# Patient Record
Sex: FEMALE | Employment: OTHER | URBAN - NONMETROPOLITAN AREA
[De-identification: names, ages, dates, MRNs, and addresses within clinical notes are randomized per-mention and may not be internally consistent; named-entity substitution may affect disease eponyms.]

---

## 2018-09-25 ENCOUNTER — OFFICE VISIT (OUTPATIENT)
Dept: MEDICAL GROUP | Facility: PHYSICIAN GROUP | Age: 68
End: 2018-09-25
Payer: MEDICARE

## 2018-09-25 VITALS
DIASTOLIC BLOOD PRESSURE: 70 MMHG | SYSTOLIC BLOOD PRESSURE: 130 MMHG | TEMPERATURE: 97.8 F | RESPIRATION RATE: 16 BRPM | OXYGEN SATURATION: 98 % | HEART RATE: 86 BPM | HEIGHT: 62 IN | WEIGHT: 181 LBS | BODY MASS INDEX: 33.31 KG/M2

## 2018-09-25 DIAGNOSIS — E78.2 MIXED HYPERLIPIDEMIA: ICD-10-CM

## 2018-09-25 DIAGNOSIS — Z13.0 ENCOUNTER FOR SCREENING FOR HEMATOLOGIC DISORDER: ICD-10-CM

## 2018-09-25 DIAGNOSIS — J44.9 CHRONIC OBSTRUCTIVE PULMONARY DISEASE, UNSPECIFIED COPD TYPE (HCC): ICD-10-CM

## 2018-09-25 DIAGNOSIS — I10 ESSENTIAL HYPERTENSION: ICD-10-CM

## 2018-09-25 DIAGNOSIS — Z23 NEED FOR INFLUENZA VACCINATION: ICD-10-CM

## 2018-09-25 DIAGNOSIS — Z12.31 VISIT FOR SCREENING MAMMOGRAM: ICD-10-CM

## 2018-09-25 PROCEDURE — 99204 OFFICE O/P NEW MOD 45 MIN: CPT | Mod: 25 | Performed by: NURSE PRACTITIONER

## 2018-09-25 PROCEDURE — 90662 IIV NO PRSV INCREASED AG IM: CPT | Performed by: NURSE PRACTITIONER

## 2018-09-25 PROCEDURE — G0008 ADMIN INFLUENZA VIRUS VAC: HCPCS | Performed by: NURSE PRACTITIONER

## 2018-09-25 RX ORDER — TIOTROPIUM BROMIDE 18 UG/1
18 CAPSULE ORAL; RESPIRATORY (INHALATION) DAILY
COMMUNITY
End: 2018-12-13 | Stop reason: SDUPTHER

## 2018-09-25 RX ORDER — IBUPROFEN 200 MG
800 TABLET ORAL EVERY 8 HOURS PRN
Status: ON HOLD | COMMUNITY
End: 2023-11-30

## 2018-09-25 RX ORDER — FLUOXETINE HYDROCHLORIDE 40 MG/1
40 CAPSULE ORAL DAILY
COMMUNITY
End: 2018-11-06

## 2018-09-25 RX ORDER — LOSARTAN POTASSIUM AND HYDROCHLOROTHIAZIDE 12.5; 1 MG/1; MG/1
1 TABLET ORAL DAILY
Qty: 90 TAB | Refills: 3 | Status: SHIPPED | OUTPATIENT
Start: 2018-09-25 | End: 2019-09-03 | Stop reason: SDUPTHER

## 2018-09-25 RX ORDER — RANITIDINE 150 MG/1
150 TABLET ORAL 2 TIMES DAILY
COMMUNITY
End: 2019-09-17

## 2018-09-25 RX ORDER — METFORMIN HYDROCHLORIDE 500 MG/1
500 TABLET, EXTENDED RELEASE ORAL DAILY
COMMUNITY
End: 2018-11-06 | Stop reason: SDUPTHER

## 2018-09-25 RX ORDER — LOSARTAN POTASSIUM AND HYDROCHLOROTHIAZIDE 12.5; 1 MG/1; MG/1
1 TABLET ORAL DAILY
COMMUNITY
End: 2018-09-25 | Stop reason: SDUPTHER

## 2018-09-25 RX ORDER — BETAMETHASONE DIPROPIONATE 0.5 MG/G
CREAM TOPICAL 2 TIMES DAILY
COMMUNITY
End: 2020-08-24

## 2018-09-25 RX ORDER — AMLODIPINE BESYLATE 10 MG/1
10 TABLET ORAL DAILY
COMMUNITY
End: 2018-09-25

## 2018-09-25 RX ORDER — AMCINONIDE 1 MG/G
CREAM TOPICAL 2 TIMES DAILY
COMMUNITY
End: 2020-08-24

## 2018-09-25 ASSESSMENT — PATIENT HEALTH QUESTIONNAIRE - PHQ9: CLINICAL INTERPRETATION OF PHQ2 SCORE: 0

## 2018-09-25 NOTE — PROGRESS NOTES
Chief Complaint   Patient presents with   • Hypertension     est care         This is a 67 y.o.female patient that presents today with the following:establish care    COPD (chronic obstructive pulmonary disease) ([facility])  This is a chronic condition which is well controlled on current regimen/medication(s): combivent and spiriva. Patient is tolerating medication(s) without significant or bothersome side effects. She is not currently followed by pulmonology. Unfortunately, she continue to smoke on a daily basis.               DM (diabetes mellitus), type 2, uncontrolled ([facility])  This is chronic, status of control unknown as she is due for labs. She believes her last A1c was over 8. She is only on Metformin ER. She will have labs done before she follows up with me in 6-8 weeks. She appropriately on  ARB, but is not on a statin.    Essential hypertension  This is a chronic condition which is well controlled on current regimen/medication(s): losartan HCIZ. BP today is 130/70 and she denies s/sx of HTN.  Patient is tolerating medication(s) without significant or bothersome side effects.  Patient is continue current regimen for this condition and is due for labs, she is to have these done before her next appt.            Hyperlipidemia  This is chronic, status of control unknown as she is due for labs. She is not currently on statin therapy.       No results found for any previous visit.         clinical course has been stable    History reviewed. No pertinent past medical history.    History reviewed. No pertinent surgical history.    History reviewed. No pertinent family history.    Sulfa drugs    Current Outpatient Prescriptions Ordered in [facility]   Medication Sig Dispense Refill   • fluoxetine (PROZAC) 40 MG capsule Take 40 mg by mouth every day.     • aspirin EC (ECOTRIN) 81 MG Tablet Delayed Response Take 81 mg by mouth every day.     • metFORMIN ER (GLUCOPHAGE XR) 500 MG TABLET SR 24 HR Take 500 mg by mouth every day.    "  • raNITidine (ZANTAC) 150 MG Tab Take 150 mg by mouth 2 times a day.     • amcinonide (CYCLOCORT) 0.1 % topical cream Apply  to affected area(s) 2 times a day.     • betamethasone dipropionate (DIPROLENE) 0.05 % Cream Apply  to affected area(s) 2 times a day.     • ibuprofen (MOTRIN) 200 MG Tab Take 200 mg by mouth every 6 hours as needed.     • fluticasone-salmeterol (ADVAIR) 250-50 MCG/DOSE AEROSOL POWDER, BREATH ACTIVATED Inhale 1 Puff by mouth every 12 hours.     • ipratropium-albuterol (COMBIVENT RESPIMAT)  MCG/ACT Aero Soln Inhale 1 Puff by mouth 4 times a day.     • tiotropium (SPIRIVA HANDIHALER) 18 MCG Cap Inhale 18 mcg by mouth every day.     • losartan-hydrochlorothiazide (HYZAAR) 100-12.5 MG per tablet Take 1 Tab by mouth every day. 90 Tab 3     No current Epic-ordered facility-administered medications on file.        Constitutional ROS: No unexpected change in weight, No weakness, No unexplained fevers, sweats, or chills  Pulmonary ROS:  No shortness of breath, No recent change in breathing, Positive for smoker, COPD  Cardiovascular ROS: No chest pain, No edema, No palpitations, Positive for hypertension and hyperlipidemia  Gastrointestinal ROS: No abdominal pain, No nausea, vomiting, diarrhea, or constipation  Musculoskeletal/Extremities ROS: No clubbing, No peripheral edema, No pain, redness or swelling on the joints  Neurologic ROS: Normal development  Endocrine ROS: positive per HPI    Physical exam:  /70 (BP Location: Right arm, Patient Position: Sitting, BP Cuff Size: Adult)   Pulse 86   Temp 36.6 °C (97.8 °F) (Temporal)   Resp 16   Ht 1.575 m (5' 2\")   Wt 82.1 kg (181 lb)   SpO2 98%   BMI 33.11 kg/m²   General Appearance: alert, no distress, obese, well groomed  Skin: Skin color, texture, turgor normal. No rashes or lesions.  Lungs: negative findings: normal respiratory rate and rhythm, lungs clear to auscultation  Heart: negative. RRR without murmur, gallop, or rubs.  No " ectopy.  Abdomen: Abdomen soft, non-tender. BS normal. No masses,  No organomegaly  Musculoskeletal: negative findings: no evidence of joint instability  Neurologic: intact, CN 2-12 grossly intact.    Medical decision making/discussion: pt to have labs done before next visit in 6-8 weeks. Her medications have been refilled. She will get influenza immunization today    Jigna was seen today for hypertension.    Diagnoses and all orders for this visit:    Uncontrolled type 2 diabetes mellitus with other specified complication, without long-term current use of insulin (HCC)  -     COMP METABOLIC PANEL; Future  -     HEMOGLOBIN A1C; Future  -     LIPID PROFILE; Future  -     MICROALBUMIN CREAT RATIO URINE; Future  -     TSH WITH REFLEX TO FT4; Future  -     VITAMIN D,25 HYDROXY; Future    Chronic obstructive pulmonary disease, unspecified COPD type (HCC)    Mixed hyperlipidemia  -     COMP METABOLIC PANEL; Future  -     LIPID PROFILE; Future    Essential hypertension  -     COMP METABOLIC PANEL; Future  -     LIPID PROFILE; Future  -     losartan-hydrochlorothiazide (HYZAAR) 100-12.5 MG per tablet; Take 1 Tab by mouth every day.    Need for influenza vaccination  -     INFLUENZA VACCINE, HIGH DOSE (65+ ONLY)    Encounter for screening for hematologic disorder  -     CBC WITH DIFFERENTIAL; Future    Visit for screening mammogram  -     MA-SCREEN MAMMO W/CAD-BILAT; Future    Other orders  -     Obtain Results: Other (see comment); Obtain Results From: Other (see comment)          Please note that this dictation was created using voice recognition software. I have made every reasonable attempt to correct obvious errors, but I expect that there are errors of grammar and possibly content that I did not discover before finalizing the note.

## 2018-09-26 NOTE — ASSESSMENT & PLAN NOTE
This is a chronic condition which is well controlled on current regimen/medication(s): losartan HCIZ. BP today is 130/70 and she denies s/sx of HTN.  Patient is tolerating medication(s) without significant or bothersome side effects.  Patient is continue current regimen for this condition and is due for labs, she is to have these done before her next appt.

## 2018-09-26 NOTE — ASSESSMENT & PLAN NOTE
This is chronic, status of control unknown as she is due for labs. She is not currently on statin therapy.

## 2018-09-26 NOTE — ASSESSMENT & PLAN NOTE
This is chronic, status of control unknown as she is due for labs. She believes her last A1c was over 8. She is only on Metformin ER. She will have labs done before she follows up with me in 6-8 weeks. She appropriately on  ARB, but is not on a statin.

## 2018-09-26 NOTE — ASSESSMENT & PLAN NOTE
This is a chronic condition which is well controlled on current regimen/medication(s): combivent and spiriva. Patient is tolerating medication(s) without significant or bothersome side effects. She is not currently followed by pulmonology. Unfortunately, she continue to smoke on a daily basis.

## 2018-10-08 ENCOUNTER — HOSPITAL ENCOUNTER (OUTPATIENT)
Dept: LAB | Facility: MEDICAL CENTER | Age: 68
End: 2018-10-08
Attending: NURSE PRACTITIONER
Payer: MEDICARE

## 2018-10-08 DIAGNOSIS — I10 ESSENTIAL HYPERTENSION: ICD-10-CM

## 2018-10-08 DIAGNOSIS — Z13.0 ENCOUNTER FOR SCREENING FOR HEMATOLOGIC DISORDER: ICD-10-CM

## 2018-10-08 DIAGNOSIS — E78.2 MIXED HYPERLIPIDEMIA: ICD-10-CM

## 2018-10-08 LAB
25(OH)D3 SERPL-MCNC: 18 NG/ML (ref 30–100)
ALBUMIN SERPL BCP-MCNC: 3.9 G/DL (ref 3.2–4.9)
ALBUMIN/GLOB SERPL: 1.2 G/DL
ALP SERPL-CCNC: 75 U/L (ref 30–99)
ALT SERPL-CCNC: 18 U/L (ref 2–50)
ANION GAP SERPL CALC-SCNC: 8 MMOL/L (ref 0–11.9)
AST SERPL-CCNC: 17 U/L (ref 12–45)
BASOPHILS # BLD AUTO: 0.7 % (ref 0–1.8)
BASOPHILS # BLD: 0.08 K/UL (ref 0–0.12)
BILIRUB SERPL-MCNC: 0.5 MG/DL (ref 0.1–1.5)
BUN SERPL-MCNC: 21 MG/DL (ref 8–22)
CALCIUM SERPL-MCNC: 10 MG/DL (ref 8.5–10.5)
CHLORIDE SERPL-SCNC: 104 MMOL/L (ref 96–112)
CHOLEST SERPL-MCNC: 208 MG/DL (ref 100–199)
CO2 SERPL-SCNC: 28 MMOL/L (ref 20–33)
CREAT SERPL-MCNC: 0.76 MG/DL (ref 0.5–1.4)
CREAT UR-MCNC: 206.9 MG/DL
EOSINOPHIL # BLD AUTO: 0.22 K/UL (ref 0–0.51)
EOSINOPHIL NFR BLD: 2 % (ref 0–6.9)
ERYTHROCYTE [DISTWIDTH] IN BLOOD BY AUTOMATED COUNT: 45.6 FL (ref 35.9–50)
EST. AVERAGE GLUCOSE BLD GHB EST-MCNC: 169 MG/DL
FASTING STATUS PATIENT QL REPORTED: NORMAL
GLOBULIN SER CALC-MCNC: 3.3 G/DL (ref 1.9–3.5)
GLUCOSE SERPL-MCNC: 106 MG/DL (ref 65–99)
HBA1C MFR BLD: 7.5 % (ref 0–5.6)
HCT VFR BLD AUTO: 43.5 % (ref 37–47)
HDLC SERPL-MCNC: 53 MG/DL
HGB BLD-MCNC: 13.9 G/DL (ref 12–16)
IMM GRANULOCYTES # BLD AUTO: 0.03 K/UL (ref 0–0.11)
IMM GRANULOCYTES NFR BLD AUTO: 0.3 % (ref 0–0.9)
LDLC SERPL CALC-MCNC: 127 MG/DL
LYMPHOCYTES # BLD AUTO: 3.71 K/UL (ref 1–4.8)
LYMPHOCYTES NFR BLD: 33.1 % (ref 22–41)
MCH RBC QN AUTO: 27.8 PG (ref 27–33)
MCHC RBC AUTO-ENTMCNC: 32 G/DL (ref 33.6–35)
MCV RBC AUTO: 87 FL (ref 81.4–97.8)
MICROALBUMIN UR-MCNC: 0.9 MG/DL
MICROALBUMIN/CREAT UR: 4 MG/G (ref 0–30)
MONOCYTES # BLD AUTO: 0.87 K/UL (ref 0–0.85)
MONOCYTES NFR BLD AUTO: 7.8 % (ref 0–13.4)
NEUTROPHILS # BLD AUTO: 6.3 K/UL (ref 2–7.15)
NEUTROPHILS NFR BLD: 56.1 % (ref 44–72)
NRBC # BLD AUTO: 0 K/UL
NRBC BLD-RTO: 0 /100 WBC
PLATELET # BLD AUTO: 348 K/UL (ref 164–446)
PMV BLD AUTO: 10.2 FL (ref 9–12.9)
POTASSIUM SERPL-SCNC: 4.3 MMOL/L (ref 3.6–5.5)
PROT SERPL-MCNC: 7.2 G/DL (ref 6–8.2)
RBC # BLD AUTO: 5 M/UL (ref 4.2–5.4)
SODIUM SERPL-SCNC: 140 MMOL/L (ref 135–145)
TRIGL SERPL-MCNC: 138 MG/DL (ref 0–149)
TSH SERPL DL<=0.005 MIU/L-ACNC: 1.49 UIU/ML (ref 0.38–5.33)
WBC # BLD AUTO: 11.2 K/UL (ref 4.8–10.8)

## 2018-10-08 PROCEDURE — 80053 COMPREHEN METABOLIC PANEL: CPT

## 2018-10-08 PROCEDURE — 36415 COLL VENOUS BLD VENIPUNCTURE: CPT

## 2018-10-08 PROCEDURE — 80061 LIPID PANEL: CPT

## 2018-10-08 PROCEDURE — 85025 COMPLETE CBC W/AUTO DIFF WBC: CPT

## 2018-10-08 PROCEDURE — 82306 VITAMIN D 25 HYDROXY: CPT | Mod: GA

## 2018-10-08 PROCEDURE — 83036 HEMOGLOBIN GLYCOSYLATED A1C: CPT | Mod: GA

## 2018-10-08 PROCEDURE — 84443 ASSAY THYROID STIM HORMONE: CPT

## 2018-10-08 PROCEDURE — 82570 ASSAY OF URINE CREATININE: CPT

## 2018-10-08 PROCEDURE — 82043 UR ALBUMIN QUANTITATIVE: CPT

## 2018-11-06 ENCOUNTER — OFFICE VISIT (OUTPATIENT)
Dept: MEDICAL GROUP | Facility: PHYSICIAN GROUP | Age: 68
End: 2018-11-06
Payer: MEDICARE

## 2018-11-06 VITALS
HEIGHT: 62 IN | HEART RATE: 90 BPM | SYSTOLIC BLOOD PRESSURE: 138 MMHG | WEIGHT: 183 LBS | OXYGEN SATURATION: 96 % | RESPIRATION RATE: 16 BRPM | BODY MASS INDEX: 33.68 KG/M2 | TEMPERATURE: 97.2 F | DIASTOLIC BLOOD PRESSURE: 76 MMHG

## 2018-11-06 DIAGNOSIS — E11.65 UNCONTROLLED TYPE 2 DIABETES MELLITUS WITH HYPERGLYCEMIA (HCC): ICD-10-CM

## 2018-11-06 DIAGNOSIS — E55.9 VITAMIN D DEFICIENCY: ICD-10-CM

## 2018-11-06 DIAGNOSIS — E78.2 MIXED HYPERLIPIDEMIA: ICD-10-CM

## 2018-11-06 PROCEDURE — 99214 OFFICE O/P EST MOD 30 MIN: CPT | Performed by: NURSE PRACTITIONER

## 2018-11-06 RX ORDER — ERGOCALCIFEROL 1.25 MG/1
CAPSULE ORAL
COMMUNITY
End: 2019-09-17

## 2018-11-06 RX ORDER — METFORMIN HYDROCHLORIDE 500 MG/1
500 TABLET, EXTENDED RELEASE ORAL 2 TIMES DAILY
Qty: 180 TAB | Refills: 3 | Status: SHIPPED | OUTPATIENT
Start: 2018-11-06 | End: 2020-01-08

## 2018-11-06 RX ORDER — FENOFIBRATE 145 MG/1
145 TABLET, COATED ORAL DAILY
Qty: 90 TAB | Refills: 1 | Status: SHIPPED | OUTPATIENT
Start: 2018-11-06 | End: 2019-05-08 | Stop reason: SDUPTHER

## 2018-11-06 NOTE — PATIENT INSTRUCTIONS
In addition to fenofibrate--start taking red yeast rice supplement, bergamot supplement    Increase the metformin to twice daily

## 2018-11-06 NOTE — PROGRESS NOTES
Chief Complaint   Patient presents with   • Diabetes     fv labs         This is a 68 y.o.female patient that presents today with the following: Follow-up, review labs    Hyperlipidemia  The ASCVD Risk score ([facility] et al., 2013) failed to calculate for the following reasons:    The patient has a prior MI or stroke diagnosis     Patie  Component      Latest Ref Rng & Units 10/8/2018          11:24 AM   Cholesterol,Tot      100 - 199 mg/dL 208 (H)   Triglycerides      0 - 149 mg/dL 138   HDL      >=40 mg/dL 53   LDL      <100 mg/dL 127 (H)   nt not currently taking a statin, states she does not tolerate these medications at all due to severe muscle aches.  She is agreeable to trying an on statin, she will start fenofibrate 445 mg daily.  Also advised her to consider taking red yeast rice or bergamot supplement, as these have also been shown to decrease cholesterol levels.    DM (diabetes mellitus), type 2, uncontrolled (HCC)  This is a chronic condition, suboptimally controlled on current medications.  She is on metformin extended release 500 mg twice daily.  Hemoglobin A1c is 7.5%, but states that before she moved here was over 8.  We will have her increase the metformin to 1000 mg twice daily and plan on rechecking labs again in 3 months.  She is appropriately on ARB, but not on a statin due to intolerance.  She is agreeable to starting an on statin and we discussed over-the-counter supplements.      Hospital Outpatient Visit on 10/08/2018   Component Date Value   • Sodium 10/08/2018 140    • Potassium 10/08/2018 4.3    • Chloride 10/08/2018 104    • Co2 10/08/2018 28    • Anion Gap 10/08/2018 8.0    • Glucose 10/08/2018 106*   • Bun 10/08/2018 21    • Creatinine 10/08/2018 0.76    • Calcium 10/08/2018 10.0    • AST(SGOT) 10/08/2018 17    • ALT(SGPT) 10/08/2018 18    • Alkaline Phosphatase 10/08/2018 75    • Total Bilirubin 10/08/2018 0.5    • Albumin 10/08/2018 3.9    • Total Protein 10/08/2018 7.2    •  Globulin 10/08/2018 3.3    • A-G Ratio 10/08/2018 1.2    • WBC 10/08/2018 11.2*   • RBC 10/08/2018 5.00    • Hemoglobin 10/08/2018 13.9    • Hematocrit 10/08/2018 43.5    • MCV 10/08/2018 87.0    • MCH 10/08/2018 27.8    • MCHC 10/08/2018 32.0*   • RDW 10/08/2018 45.6    • Platelet Count 10/08/2018 348    • MPV 10/08/2018 10.2    • Neutrophils-Polys 10/08/2018 56.10    • Lymphocytes 10/08/2018 33.10    • Monocytes 10/08/2018 7.80    • Eosinophils 10/08/2018 2.00    • Basophils 10/08/2018 0.70    • Immature Granulocytes 10/08/2018 0.30    • Nucleated RBC 10/08/2018 0.00    • Neutrophils (Absolute) 10/08/2018 6.30    • Lymphs (Absolute) 10/08/2018 3.71    • Monos (Absolute) 10/08/2018 0.87*   • Eos (Absolute) 10/08/2018 0.22    • Baso (Absolute) 10/08/2018 0.08    • Immature Granulocytes (a* 10/08/2018 0.03    • NRBC (Absolute) 10/08/2018 0.00    • Glycohemoglobin 10/08/2018 7.5*   • Est Avg Glucose 10/08/2018 169    • Cholesterol,Tot 10/08/2018 208*   • Triglycerides 10/08/2018 138    • HDL 10/08/2018 53    • LDL 10/08/2018 127*   • Creatinine, Urine 10/08/2018 206.90    • Microalbumin, Urine Rand* 10/08/2018 0.9    • Micro Alb Creat Ratio 10/08/2018 4    • TSH 10/08/2018 1.490    • 25-Hydroxy   Vitamin D 25 10/08/2018 18*   • Fasting Status 10/08/2018 Fasting    • GFR If  10/08/2018 >60    • GFR If Non  Ameri* 10/08/2018 >60          clinical course has been stable    No past medical history on file.    No past surgical history on file.    No family history on file.    Sulfa drugs    Current Outpatient Prescriptions Ordered in [facility]   Medication Sig Dispense Refill   • vitamin D, Ergocalciferol, (DRISDOL) 52609 units Cap capsule Take  by mouth every 7 days.     • fenofibrate (TRICOR) 145 MG Tab Take 1 Tab by mouth every day. 90 Tab 1   • metFORMIN ER (GLUCOPHAGE XR) 500 MG TABLET SR 24 HR Take 1 Tab by mouth 2 times a day. 180 Tab 3   • aspirin EC (ECOTRIN) 81 MG Tablet Delayed Response Take 81  "mg by mouth every day.     • raNITidine (ZANTAC) 150 MG Tab Take 150 mg by mouth 2 times a day.     • amcinonide (CYCLOCORT) 0.1 % topical cream Apply  to affected area(s) 2 times a day.     • betamethasone dipropionate (DIPROLENE) 0.05 % Cream Apply  to affected area(s) 2 times a day.     • ibuprofen (MOTRIN) 200 MG Tab Take 200 mg by mouth every 6 hours as needed.     • fluticasone-salmeterol (ADVAIR) 250-50 MCG/DOSE AEROSOL POWDER, BREATH ACTIVATED Inhale 1 Puff by mouth every 12 hours.     • ipratropium-albuterol (COMBIVENT RESPIMAT)  MCG/ACT Aero Soln Inhale 1 Puff by mouth 4 times a day.     • tiotropium (SPIRIVA HANDIHALER) 18 MCG Cap Inhale 18 mcg by mouth every day.     • losartan-hydrochlorothiazide (HYZAAR) 100-12.5 MG per tablet Take 1 Tab by mouth every day. 90 Tab 3     No current Epic-ordered facility-administered medications on file.        Constitutional ROS: No unexpected change in weight, No weakness, No unexplained fevers, sweats, or chills  Pulmonary ROS: Positive for COPD, current everyday smoker  Cardiovascular ROS: Positive for CAD status post stent, hyperlipidemia, carotid artery disease  Musculoskeletal/Extremities ROS: No clubbing, No peripheral edema, No pain, redness or swelling on the joints  Neurologic ROS: Normal development, No seizures, No weakness  Endocrine ROS: Positive per HPI    Physical exam:  /76 (BP Location: Right arm, Patient Position: Sitting, BP Cuff Size: Adult)   Pulse 90   Temp 36.2 °C (97.2 °F)   Resp 16   Ht 1.575 m (5' 2\")   Wt 83 kg (183 lb)   SpO2 96%   BMI 33.47 kg/m²   General Appearance: Older female, alert, no distress, obese, well-groomed  Skin: Skin color, texture, turgor normal. No rashes or lesions.  Lungs: negative findings: normal respiratory rate and rhythm, lungs clear to auscultation  Heart: negative. RRR without murmur, gallop, or rubs.  No ectopy.  Abdomen: Abdomen soft, non-tender. BS normal. No masses,  No " organomegaly  Musculoskeletal: negative findings: no evidence of joint instability, strength normal, no deformities present  Neurologic: intact, CN II through XII grossly intact  Diabetic Foot Exam: No ulcers, erythema or skin lesions present, patient tested with monofilament (10g) and tuning fork found to be sensitive bilaterally throughout the ball of the foot, great toe and heel.      Medical decision making/discussion: Patient will increase metformin to 1000 mg twice daily.  In addition to fenofibrate we discussed over-the-counter supplements including red yeast rice, Bergamot and fish oil.  She is going to follow-up with me in 3 months with labs done before visit.    Jigna was seen today for diabetes.    Diagnoses and all orders for this visit:    Uncontrolled type 2 diabetes mellitus with hyperglycemia (HCC)  -     HEMOGLOBIN A1C; Future  -     Diabetic Monofilament Lower Extremity Exam  -     metFORMIN ER (GLUCOPHAGE XR) 500 MG TABLET SR 24 HR; Take 1 Tab by mouth 2 times a day.    Vitamin D deficiency  -     VITAMIN D,25 HYDROXY; Future    Mixed hyperlipidemia  -     fenofibrate (TRICOR) 145 MG Tab; Take 1 Tab by mouth every day.  -     LIPID PROFILE; Future    Other orders  -     Obtain Results: Colonoscopy; Obtain Results From: Other (see comment)          Please note that this dictation was created using voice recognition software. I have made every reasonable attempt to correct obvious errors, but I expect that there are errors of grammar and possibly content that I did not discover before finalizing the note.

## 2018-11-06 NOTE — ASSESSMENT & PLAN NOTE
The ASCVD Risk score ([place] Jr., et al., 2013) failed to calculate for the following reasons:    The patient has a prior MI or stroke diagnosis     Patie  Component      Latest Ref Rng & Units 10/8/2018          11:24 AM   Cholesterol,Tot      100 - 199 mg/dL 208 (H)   Triglycerides      0 - 149 mg/dL 138   HDL      >=40 mg/dL 53   LDL      <100 mg/dL 127 (H)   nt not currently taking a statin, states she does not tolerate these medications at all due to severe muscle aches.  She is agreeable to trying an on statin, she will start fenofibrate 445 mg daily.  Also advised her to consider taking red yeast rice or bergamot supplement, as these have also been shown to decrease cholesterol levels.

## 2018-11-07 NOTE — ASSESSMENT & PLAN NOTE
This is a chronic condition, suboptimally controlled on current medications.  She is on metformin extended release 500 mg twice daily.  Hemoglobin A1c is 7.5%, but states that before she moved here was over 8.  We will have her increase the metformin to 1000 mg twice daily and plan on rechecking labs again in 3 months.  She is appropriately on ARB, but not on a statin due to intolerance.  She is agreeable to starting an on statin and we discussed over-the-counter supplements.

## 2018-12-03 ENCOUNTER — HOSPITAL ENCOUNTER (OUTPATIENT)
Dept: RADIOLOGY | Facility: MEDICAL CENTER | Age: 68
End: 2018-12-03
Attending: NURSE PRACTITIONER
Payer: MEDICARE

## 2018-12-03 DIAGNOSIS — Z12.31 VISIT FOR SCREENING MAMMOGRAM: ICD-10-CM

## 2018-12-03 PROCEDURE — 77067 SCR MAMMO BI INCL CAD: CPT

## 2019-01-08 NOTE — TELEPHONE ENCOUNTER
Was the patient seen in the last year in this department? Yes    Does patient have an active prescription for medications requested? No     Received Request Via: Patient      Pt met protocol?: Yes    OV 11/18

## 2019-02-02 ENCOUNTER — HOSPITAL ENCOUNTER (OUTPATIENT)
Dept: LAB | Facility: MEDICAL CENTER | Age: 69
End: 2019-02-02
Attending: NURSE PRACTITIONER
Payer: MEDICARE

## 2019-02-02 DIAGNOSIS — E55.9 VITAMIN D DEFICIENCY: ICD-10-CM

## 2019-02-02 DIAGNOSIS — E11.65 UNCONTROLLED TYPE 2 DIABETES MELLITUS WITH HYPERGLYCEMIA (HCC): ICD-10-CM

## 2019-02-02 DIAGNOSIS — E78.2 MIXED HYPERLIPIDEMIA: ICD-10-CM

## 2019-02-02 LAB
25(OH)D3 SERPL-MCNC: 33 NG/ML (ref 30–100)
CHOLEST SERPL-MCNC: 196 MG/DL (ref 100–199)
EST. AVERAGE GLUCOSE BLD GHB EST-MCNC: 171 MG/DL
HBA1C MFR BLD: 7.6 % (ref 0–5.6)
HDLC SERPL-MCNC: 45 MG/DL
LDLC SERPL CALC-MCNC: 116 MG/DL
TRIGL SERPL-MCNC: 175 MG/DL (ref 0–149)

## 2019-02-02 PROCEDURE — 82306 VITAMIN D 25 HYDROXY: CPT

## 2019-02-02 PROCEDURE — 83036 HEMOGLOBIN GLYCOSYLATED A1C: CPT | Mod: GA

## 2019-02-02 PROCEDURE — 36415 COLL VENOUS BLD VENIPUNCTURE: CPT

## 2019-02-02 PROCEDURE — 80061 LIPID PANEL: CPT

## 2019-02-05 ENCOUNTER — OFFICE VISIT (OUTPATIENT)
Dept: MEDICAL GROUP | Facility: PHYSICIAN GROUP | Age: 69
End: 2019-02-05
Payer: MEDICARE

## 2019-02-05 VITALS
OXYGEN SATURATION: 98 % | HEIGHT: 62 IN | SYSTOLIC BLOOD PRESSURE: 146 MMHG | DIASTOLIC BLOOD PRESSURE: 82 MMHG | BODY MASS INDEX: 35.15 KG/M2 | RESPIRATION RATE: 16 BRPM | TEMPERATURE: 98.6 F | HEART RATE: 70 BPM | WEIGHT: 191 LBS

## 2019-02-05 DIAGNOSIS — E55.9 VITAMIN D DEFICIENCY: ICD-10-CM

## 2019-02-05 DIAGNOSIS — Z98.890 S/P CAROTID ENDARTERECTOMY: ICD-10-CM

## 2019-02-05 DIAGNOSIS — M54.2 NECK PAIN ON LEFT SIDE: ICD-10-CM

## 2019-02-05 DIAGNOSIS — E11.65 UNCONTROLLED TYPE 2 DIABETES MELLITUS WITH HYPERGLYCEMIA (HCC): ICD-10-CM

## 2019-02-05 DIAGNOSIS — J44.9 CHRONIC OBSTRUCTIVE PULMONARY DISEASE, UNSPECIFIED COPD TYPE (HCC): ICD-10-CM

## 2019-02-05 DIAGNOSIS — M79.2 RADICULAR PAIN IN LEFT ARM: ICD-10-CM

## 2019-02-05 DIAGNOSIS — I65.23 BILATERAL CAROTID ARTERY STENOSIS WITHOUT CEREBRAL INFARCTION: ICD-10-CM

## 2019-02-05 DIAGNOSIS — E78.2 MIXED HYPERLIPIDEMIA: ICD-10-CM

## 2019-02-05 PROCEDURE — 99214 OFFICE O/P EST MOD 30 MIN: CPT | Performed by: NURSE PRACTITIONER

## 2019-02-05 RX ORDER — TIZANIDINE 4 MG/1
4 TABLET ORAL EVERY 6 HOURS PRN
Qty: 30 TAB | Refills: 0 | Status: SHIPPED | OUTPATIENT
Start: 2019-02-05 | End: 2021-01-19

## 2019-02-05 RX ORDER — FAMOTIDINE 20 MG/1
20 TABLET, FILM COATED ORAL 2 TIMES DAILY
COMMUNITY
End: 2019-09-17

## 2019-02-05 RX ORDER — NAPROXEN 500 MG/1
500 TABLET ORAL 2 TIMES DAILY WITH MEALS
Qty: 60 TAB | Refills: 0 | Status: SHIPPED | OUTPATIENT
Start: 2019-02-05 | End: 2019-03-14 | Stop reason: SDUPTHER

## 2019-02-05 RX ORDER — ALBUTEROL SULFATE 90 UG/1
2 AEROSOL, METERED RESPIRATORY (INHALATION) EVERY 6 HOURS PRN
Qty: 8.5 G | Refills: 3 | Status: SHIPPED | OUTPATIENT
Start: 2019-02-05 | End: 2020-04-17

## 2019-02-05 NOTE — PROGRESS NOTES
Chief Complaint   Patient presents with   • Diabetes     fv labs         This is a 68 y.o.female patient that presents today with the following: Follow-up visit, review labs    Carotid artery stenosis without cerebral infarction  Patient has history of carotid artery disease, she is status post left carotid endarterectomy several years ago.  She reports that her provider in [place] did a carotid ultrasound on the left side about a year ago and was told it was normal and no intervention was needed.  She does decline referral to vascular medicine or to cardiology at this time.  She does deny any symptoms associated with carotid artery disease.    COPD (chronic obstructive pulmonary disease) ([facility])  This is chronic and stable, well controlled on current medications including Combivent, Spiriva and Advair.  However she is requesting a different medication in place of the Combivent due to the high cost, this is reasonable, will call in albuterol, she can use as needed.  She does not report any recent exacerbations.    DM (diabetes mellitus), type 2, uncontrolled ([facility])  This is chronic, suboptimally controlled on current medications.  She is on metformin extended release 5 mg twice daily.  3 months ago A1c was 7.5, is now 7.6 she believes this is due to the recent holidays and does not want to make any changes to her medications but to see how she does over the next 3 months with healthy eating and regular physical activity.  She is appropriately on ARB but not on statin as she declines.  At her last visit fenofibrate was called in, she did not start taking this.    Hyperlipidemia  The ASCVD Risk score (Marek OSCAR Jr., et al., 2013) failed to calculate for the following reasons:    The patient has a prior MI or stroke diagnosis  Not on statin she does continue to climb as she does not feel she tolerates these well.  She was started on fenofibrate but has not started taking this yet.    Vitamin D deficiency  Patient has  history of vitamin D deficiency, her most recent level was well within normal limits    Radicular pain in left arm  Pt has pain in L neck radiating down L shoulder and arm into her hand.  This is not related to any particular injury.  She does have limited range of motion in her cervical spine especially on the left side.  We will start with anti-inflammatory muscle relaxer, these were called in for her.  She is advised to take the naproxen with food and only take the muscle relaxer as needed and not to drive due to sedating effects.  If symptoms do not improve with these interventions, she may benefit from physical therapy and may need referral to specialist.      Hospital Outpatient Visit on 02/02/2019   Component Date Value   • Cholesterol,Tot 02/02/2019 196    • Triglycerides 02/02/2019 175*   • HDL 02/02/2019 45    • LDL 02/02/2019 116*   • Glycohemoglobin 02/02/2019 7.6*   • Est Avg Glucose 02/02/2019 171    • 25-Hydroxy   Vitamin D 25 02/02/2019 33          clinical course has been stable    Past Medical History:   Diagnosis Date   • Anxiety    • Carotid arterial disease (HCC)    • COPD (chronic obstructive pulmonary disease) (HCC)    • Diabetes (HCC)    • HTN (hypertension)    • Hyperlipidemia    • Tobacco use        No past surgical history on file.    Family History   Problem Relation Age of Onset   • Cancer Mother 47        breast       Sulfa drugs    Current Outpatient Prescriptions Ordered in [facility]   Medication Sig Dispense Refill   • famotidine (PEPCID) 20 MG Tab Take 20 mg by mouth 2 times a day.     • albuterol 108 (90 Base) MCG/ACT Aero Soln inhalation aerosol Inhale 2 Puffs by mouth every 6 hours as needed for Shortness of Breath. 8.5 g 3   • naproxen (NAPROSYN) 500 MG Tab Take 1 Tab by mouth 2 times a day, with meals. 60 Tab 0   • tizanidine (ZANAFLEX) 4 MG Tab Take 1 Tab by mouth every 6 hours as needed. 30 Tab 0   • fluticasone-salmeterol (ADVAIR) 250-50 MCG/DOSE AEROSOL POWDER, BREATH ACTIVATED  "Inhale 1 Puff by mouth every 12 hours. 180 Inhaler 1   • tiotropium (SPIRIVA HANDIHALER) 18 MCG Cap Inhale 1 Cap by mouth every day. 90 Cap 1   • glucose blood (FREESTYLE LITE) strip Use as directed.patient tests once a day     • vitamin D, Ergocalciferol, (DRISDOL) 21890 units Cap capsule Take  by mouth every 7 days.     • metFORMIN ER (GLUCOPHAGE XR) 500 MG TABLET SR 24 HR Take 1 Tab by mouth 2 times a day. 180 Tab 3   • aspirin EC (ECOTRIN) 81 MG Tablet Delayed Response Take 81 mg by mouth every day.     • losartan-hydrochlorothiazide (HYZAAR) 100-12.5 MG per tablet Take 1 Tab by mouth every day. 90 Tab 3   • fenofibrate (TRICOR) 145 MG Tab Take 1 Tab by mouth every day. 90 Tab 1   • raNITidine (ZANTAC) 150 MG Tab Take 150 mg by mouth 2 times a day.     • amcinonide (CYCLOCORT) 0.1 % topical cream Apply  to affected area(s) 2 times a day.     • betamethasone dipropionate (DIPROLENE) 0.05 % Cream Apply  to affected area(s) 2 times a day.     • ibuprofen (MOTRIN) 200 MG Tab Take 200 mg by mouth every 6 hours as needed.       No current Epic-ordered facility-administered medications on file.        Constitutional ROS: No unexpected change in weight, No weakness, No unexplained fevers, sweats, or chills  Pulmonary ROS: No chronic cough, sputum, or hemoptysis, No shortness of breath, No recent change in breathing, Positive for smoker   Cardiovascular ROS: No chest pain, No edema, No palpitations, Positive for coronary artery and carotid artery disease  Gastrointestinal ROS: No abdominal pain  Musculoskeletal/Extremities ROS: No clubbing, No peripheral edema, No pain, redness or swelling on the joints  Neurologic ROS: Normal development, No seizures, No weakness  Endocrine ROS: Positive per HPI    Physical exam:  /82 (BP Location: Right arm, Patient Position: Sitting, BP Cuff Size: Adult)   Pulse 70   Temp 37 °C (98.6 °F) (Temporal)   Resp 16   Ht 1.575 m (5' 2\")   Wt 86.6 kg (191 lb)   SpO2 98%   BMI " 34.93 kg/m²   General Appearance: Older female, alert, no distress, obese, well-groomed  Skin: Skin color, texture, turgor normal. No rashes or lesions.  Neck: negative findings: no asymmetry, masses, or scars, trachea midline and normal to palpitation, carotids 4/4 without bruits, no jugular venous distention  Lungs: negative findings: normal respiratory rate and rhythm, lungs clear to auscultation  Heart: negative. RRR without murmur, gallop, or rubs.  No ectopy.  Abdomen: Abdomen soft, non-tender. BS normal. No masses,  No organomegaly  Musculoskeletal: negative findings: no evidence of joint instability, no evidence of muscle atrophy, no deformities present  Neurologic: intact    Medical decision making/discussion: We will have patient start anti-inflammatory muscle relaxer as mentioned above.  We will also switch the Combivent to just albuterol since she is already on Spiriva.  I would like her to follow-up with me in 3 months with labs done before visit.  She is to work on healthy diet, regular physical activity and continued efforts towards weight loss.    Jigna was seen today for diabetes.    Diagnoses and all orders for this visit:    Chronic obstructive pulmonary disease, unspecified COPD type ([facility])  -     albuterol 108 (90 Base) MCG/ACT Aero Soln inhalation aerosol; Inhale 2 Puffs by mouth every 6 hours as needed for Shortness of Breath.    Radicular pain in left arm  -     naproxen (NAPROSYN) 500 MG Tab; Take 1 Tab by mouth 2 times a day, with meals.  -     tizanidine (ZANAFLEX) 4 MG Tab; Take 1 Tab by mouth every 6 hours as needed.    Neck pain on left side  -     naproxen (NAPROSYN) 500 MG Tab; Take 1 Tab by mouth 2 times a day, with meals.  -     tizanidine (ZANAFLEX) 4 MG Tab; Take 1 Tab by mouth every 6 hours as needed.    Uncontrolled type 2 diabetes mellitus with hyperglycemia ([facility])  -     HEMOGLOBIN A1C; Future    Mixed hyperlipidemia  -     Lipid Profile; Future    Vitamin D  deficiency    Bilateral carotid artery stenosis without cerebral infarction    S/P carotid endarterectomy          Please note that this dictation was created using voice recognition software. I have made every reasonable attempt to correct obvious errors, but I expect that there are errors of grammar and possibly content that I did not discover before finalizing the note.

## 2019-02-05 NOTE — ASSESSMENT & PLAN NOTE
The ASCVD Risk score (Marekmallorie MOORE Jr., et al., 2013) failed to calculate for the following reasons:    The patient has a prior MI or stroke diagnosis  Not on statin she does continue to climb as she does not feel she tolerates these well.  She was started on fenofibrate but has not started taking this yet.

## 2019-02-05 NOTE — ASSESSMENT & PLAN NOTE
This is chronic and stable, well controlled on current medications including Combivent, Spiriva and Advair.  However she is requesting a different medication in place of the Combivent due to the high cost, this is reasonable, will call in albuterol, she can use as needed.  She does not report any recent exacerbations.

## 2019-02-05 NOTE — ASSESSMENT & PLAN NOTE
Patient has history of carotid artery disease, she is status post left carotid endarterectomy several years ago.  She reports that her provider in [place] did a carotid ultrasound on the left side about a year ago and was told it was normal and no intervention was needed.  She does decline referral to vascular medicine or to cardiology at this time.  She does deny any symptoms associated with carotid artery disease.

## 2019-02-05 NOTE — ASSESSMENT & PLAN NOTE
Pt has pain in L neck radiating down L shoulder and arm into her hand.  This is not related to any particular injury.  She does have limited range of motion in her cervical spine especially on the left side.  We will start with anti-inflammatory muscle relaxer, these were called in for her.  She is advised to take the naproxen with food and only take the muscle relaxer as needed and not to drive due to sedating effects.  If symptoms do not improve with these interventions, she may benefit from physical therapy and may need referral to specialist.

## 2019-02-05 NOTE — ASSESSMENT & PLAN NOTE
This is chronic, suboptimally controlled on current medications.  She is on metformin extended release 5 mg twice daily.  3 months ago A1c was 7.5, is now 7.6 she believes this is due to the recent holidays and does not want to make any changes to her medications but to see how she does over the next 3 months with healthy eating and regular physical activity.  She is appropriately on ARB but not on statin as she declines.  At her last visit fenofibrate was called in, she did not start taking this.

## 2019-03-01 ENCOUNTER — PATIENT MESSAGE (OUTPATIENT)
Dept: MEDICAL GROUP | Facility: PHYSICIAN GROUP | Age: 69
End: 2019-03-01

## 2019-03-01 DIAGNOSIS — E11.65 UNCONTROLLED TYPE 2 DIABETES MELLITUS WITH HYPERGLYCEMIA (HCC): ICD-10-CM

## 2019-03-04 NOTE — TELEPHONE ENCOUNTER
From: Jigna Allen  To: OMAYRA Tannre  Sent: 3/1/2019 3:53 PM PST  Subject: Prescription Question    Aliya, I am at my daughter's home and I have forgotten my Meds. Can you notify [facility] to request 3 days of metformin and losartin/hctz the number is [phone] at [address], [place]  Thank you Jigna Allen

## 2019-03-04 NOTE — PATIENT COMMUNICATION
Phone Number Called: [phone]    Message: Spoke with patient and they are already home. No need for the 3 day refill    Left Message for patient to call back: N\A

## 2019-03-14 DIAGNOSIS — M54.2 NECK PAIN ON LEFT SIDE: ICD-10-CM

## 2019-03-14 DIAGNOSIS — M79.2 RADICULAR PAIN IN LEFT ARM: ICD-10-CM

## 2019-03-15 NOTE — TELEPHONE ENCOUNTER
Was the patient seen in the last year in this department? Yes    Does patient have an active prescription for medications requested? No     Received Request Via: Pharmacy      Pt met protocol?: Yes    LAST OV 02/05/2019

## 2019-03-17 RX ORDER — NAPROXEN 500 MG/1
TABLET ORAL
Qty: 60 TAB | Refills: 0 | Status: SHIPPED | OUTPATIENT
Start: 2019-03-17 | End: 2019-07-22 | Stop reason: SDUPTHER

## 2019-05-08 DIAGNOSIS — E78.2 MIXED HYPERLIPIDEMIA: ICD-10-CM

## 2019-05-09 RX ORDER — FENOFIBRATE 145 MG/1
145 TABLET, COATED ORAL DAILY
Qty: 90 TAB | Refills: 1 | Status: SHIPPED | OUTPATIENT
Start: 2019-05-09 | End: 2020-03-05

## 2019-05-09 NOTE — TELEPHONE ENCOUNTER
Was the patient seen in the last year in this department? Yes    Does patient have an active prescription for medications requested? No     Received Request Via: Pharmacy    Pt met protocol?: Yes     Last OV 02/05/2019      Lab Results  Component Value Date/Time   CHOLSTRLTOT 196 02/02/2019 1134   TRIGLYCERIDE 175 (H) 02/02/2019 1134   HDL 45 02/02/2019 1134    (H) 02/02/2019 1134

## 2019-05-09 NOTE — TELEPHONE ENCOUNTER
Pt has had OV within the 12 month protocol and lipid panel is current. 6 month supply sent to pharmacy.   Lab Results   Component Value Date/Time    CHOLSTRLTOT 196 02/02/2019 11:34 AM     (H) 02/02/2019 11:34 AM    HDL 45 02/02/2019 11:34 AM    TRIGLYCERIDE 175 (H) 02/02/2019 11:34 AM       Lab Results   Component Value Date/Time    SODIUM 140 10/08/2018 11:24 AM    POTASSIUM 4.3 10/08/2018 11:24 AM    CHLORIDE 104 10/08/2018 11:24 AM    CO2 28 10/08/2018 11:24 AM    GLUCOSE 106 (H) 10/08/2018 11:24 AM    BUN 21 10/08/2018 11:24 AM    CREATININE 0.76 10/08/2018 11:24 AM     Lab Results   Component Value Date/Time    ALKPHOSPHAT 75 10/08/2018 11:24 AM    ASTSGOT 17 10/08/2018 11:24 AM    ALTSGPT 18 10/08/2018 11:24 AM    TBILIRUBIN 0.5 10/08/2018 11:24 AM

## 2019-06-14 RX ORDER — TIOTROPIUM BROMIDE 18 UG/1
CAPSULE ORAL; RESPIRATORY (INHALATION)
Qty: 90 CAP | Refills: 1 | Status: SHIPPED | OUTPATIENT
Start: 2019-06-14 | End: 2020-01-08

## 2019-06-28 DIAGNOSIS — E11.65 UNCONTROLLED TYPE 2 DIABETES MELLITUS WITH HYPERGLYCEMIA (HCC): ICD-10-CM

## 2019-07-22 DIAGNOSIS — M54.2 NECK PAIN ON LEFT SIDE: ICD-10-CM

## 2019-07-22 DIAGNOSIS — M79.2 RADICULAR PAIN IN LEFT ARM: ICD-10-CM

## 2019-07-22 RX ORDER — NAPROXEN 500 MG/1
TABLET ORAL
Qty: 60 TAB | Refills: 2 | Status: SHIPPED | OUTPATIENT
Start: 2019-07-22 | End: 2021-01-19

## 2019-07-22 NOTE — TELEPHONE ENCOUNTER
Was the patient seen in the last year in this department? Yes    Does patient have an active prescription for medications requested? No     Received Request Via: Pharmacy      Pt met protocol?: Yes, ov 2/19

## 2019-08-14 ENCOUNTER — HOSPITAL ENCOUNTER (OUTPATIENT)
Dept: LAB | Facility: MEDICAL CENTER | Age: 69
End: 2019-08-14
Attending: NURSE PRACTITIONER
Payer: MEDICARE

## 2019-08-14 DIAGNOSIS — E11.65 UNCONTROLLED TYPE 2 DIABETES MELLITUS WITH HYPERGLYCEMIA (HCC): ICD-10-CM

## 2019-08-14 DIAGNOSIS — E78.2 MIXED HYPERLIPIDEMIA: ICD-10-CM

## 2019-08-14 LAB
CHOLEST SERPL-MCNC: 176 MG/DL (ref 100–199)
EST. AVERAGE GLUCOSE BLD GHB EST-MCNC: 146 MG/DL
FASTING STATUS PATIENT QL REPORTED: NORMAL
HBA1C MFR BLD: 6.7 % (ref 0–5.6)
HDLC SERPL-MCNC: 46 MG/DL
LDLC SERPL CALC-MCNC: 101 MG/DL
TRIGL SERPL-MCNC: 143 MG/DL (ref 0–149)

## 2019-08-14 PROCEDURE — 36415 COLL VENOUS BLD VENIPUNCTURE: CPT

## 2019-08-14 PROCEDURE — 83036 HEMOGLOBIN GLYCOSYLATED A1C: CPT | Mod: GA

## 2019-08-14 PROCEDURE — 80061 LIPID PANEL: CPT

## 2019-09-03 DIAGNOSIS — I10 ESSENTIAL HYPERTENSION: ICD-10-CM

## 2019-09-04 RX ORDER — LOSARTAN POTASSIUM AND HYDROCHLOROTHIAZIDE 12.5; 1 MG/1; MG/1
TABLET ORAL
Qty: 90 TAB | Refills: 1 | Status: SHIPPED | OUTPATIENT
Start: 2019-09-04 | End: 2020-03-05

## 2019-09-04 NOTE — TELEPHONE ENCOUNTER
*PT NEEDS TO GET LABS UPDATED*  Was the patient seen in the last year in this department? Yes    Does patient have an active prescription for medications requested? No     Received Request Via: Pharmacy      Pt met protocol?: NO      LAST OV 02/05/2019    BP Readings from Last 1 Encounters:   02/05/19 146/82     Lab Results   Component Value Date/Time    CHOLSTRLTOT 176 08/14/2019 10:36 AM     (H) 08/14/2019 10:36 AM    HDL 46 08/14/2019 10:36 AM    TRIGLYCERIDE 143 08/14/2019 10:36 AM       Lab Results   Component Value Date/Time    SODIUM 140 10/08/2018 11:24 AM    POTASSIUM 4.3 10/08/2018 11:24 AM    CHLORIDE 104 10/08/2018 11:24 AM    CO2 28 10/08/2018 11:24 AM    GLUCOSE 106 (H) 10/08/2018 11:24 AM    BUN 21 10/08/2018 11:24 AM    CREATININE 0.76 10/08/2018 11:24 AM     Lab Results   Component Value Date/Time    ALKPHOSPHAT 75 10/08/2018 11:24 AM    ASTSGOT 17 10/08/2018 11:24 AM    ALTSGPT 18 10/08/2018 11:24 AM    TBILIRUBIN 0.5 10/08/2018 11:24 AM

## 2019-09-04 NOTE — TELEPHONE ENCOUNTER
Refill X 6 months, sent to pharmacy.Pt. Seen in the last 6 months per protocol.   Lab Results   Component Value Date/Time    SODIUM 140 10/08/2018 11:24 AM    POTASSIUM 4.3 10/08/2018 11:24 AM    CHLORIDE 104 10/08/2018 11:24 AM    CO2 28 10/08/2018 11:24 AM    GLUCOSE 106 (H) 10/08/2018 11:24 AM    BUN 21 10/08/2018 11:24 AM    CREATININE 0.76 10/08/2018 11:24 AM

## 2019-09-17 ENCOUNTER — OFFICE VISIT (OUTPATIENT)
Dept: MEDICAL GROUP | Facility: PHYSICIAN GROUP | Age: 69
End: 2019-09-17
Payer: MEDICARE

## 2019-09-17 DIAGNOSIS — Z23 NEED FOR VACCINATION: ICD-10-CM

## 2019-09-17 DIAGNOSIS — Z12.11 SCREENING FOR COLORECTAL CANCER: ICD-10-CM

## 2019-09-17 DIAGNOSIS — E78.2 MIXED HYPERLIPIDEMIA: ICD-10-CM

## 2019-09-17 DIAGNOSIS — E66.9 OBESITY (BMI 30-39.9): ICD-10-CM

## 2019-09-17 DIAGNOSIS — Z11.59 NEED FOR HEPATITIS C SCREENING TEST: ICD-10-CM

## 2019-09-17 DIAGNOSIS — J44.9 CHRONIC OBSTRUCTIVE PULMONARY DISEASE, UNSPECIFIED COPD TYPE (HCC): ICD-10-CM

## 2019-09-17 DIAGNOSIS — Z78.0 POSTMENOPAUSAL STATUS (AGE-RELATED) (NATURAL): ICD-10-CM

## 2019-09-17 DIAGNOSIS — Z12.12 SCREENING FOR COLORECTAL CANCER: ICD-10-CM

## 2019-09-17 DIAGNOSIS — E11.8 TYPE 2 DIABETES MELLITUS WITH COMPLICATION, WITHOUT LONG-TERM CURRENT USE OF INSULIN (HCC): ICD-10-CM

## 2019-09-17 PROCEDURE — 99214 OFFICE O/P EST MOD 30 MIN: CPT | Mod: 25 | Performed by: NURSE PRACTITIONER

## 2019-09-17 PROCEDURE — 90662 IIV NO PRSV INCREASED AG IM: CPT | Performed by: NURSE PRACTITIONER

## 2019-09-17 PROCEDURE — G0008 ADMIN INFLUENZA VIRUS VAC: HCPCS | Performed by: NURSE PRACTITIONER

## 2019-09-17 RX ORDER — FLUTICASONE PROPIONATE 50 MCG
SPRAY, SUSPENSION (ML) NASAL
COMMUNITY
End: 2020-08-24

## 2019-09-17 RX ORDER — CHOLECALCIFEROL (VITAMIN D3) 50 MCG
4000 TABLET ORAL DAILY
COMMUNITY

## 2019-09-17 RX ORDER — AMLODIPINE BESYLATE 10 MG/1
10 TABLET ORAL
COMMUNITY
Start: 2016-12-20 | End: 2020-08-24

## 2019-09-17 ASSESSMENT — PAIN SCALES - GENERAL: PAINLEVEL: NO PAIN

## 2019-09-17 ASSESSMENT — PATIENT HEALTH QUESTIONNAIRE - PHQ9: CLINICAL INTERPRETATION OF PHQ2 SCORE: 0

## 2019-09-17 NOTE — PATIENT INSTRUCTIONS
dexa scan    FIT test    See me in 6 months with labs    Flu shot    Shingles shot in written form    Keep up the good work!

## 2019-09-18 NOTE — ASSESSMENT & PLAN NOTE
This is a chronic condition, stable and very well controlled on current medications including metformin 500 mg twice a day, A1c 6.7%, this is down from 7.6% to 7 months ago.  She has started the keto diet, doing the healthy version and has lost nearly 20 pounds since she last saw me.  She is appropriately on ARB but does not take statin due to intolerance.  She will follow-up with me in 6 months with labs done before visit.

## 2019-09-18 NOTE — ASSESSMENT & PLAN NOTE
This is a chronic condition, much improved.  She is down nearly 20 pounds since her last visit with me.  She has been doing the keto diet and doing it in a healthy manner.  Her LDL has gone down over 20 points.  She states that she has more energy, feels healthier.  She was congratulated for her efforts and encouraged to continue with her weight loss efforts.

## 2019-09-18 NOTE — ASSESSMENT & PLAN NOTE
The ASCVD Risk score (Marekmallorie MOORE Jr, et al., 2013) failed to calculate.  Patient does continue to decline statin, reports that she does not tolerate them.  She does continue to take fenofibrate.  She does understand the risks associated with not taking statin.  Her LDL has significantly improved since losing weight, she was congratulated for her weight loss efforts.  She will continue to work on this.

## 2019-09-18 NOTE — ASSESSMENT & PLAN NOTE
This is a chronic condition, stable and well controlled on medications including Spiriva and Advair.  She also uses as needed albuterol.  She does not report any recent exacerbations.  She also does not use supplemental oxygen.

## 2019-09-19 VITALS
HEIGHT: 62 IN | SYSTOLIC BLOOD PRESSURE: 110 MMHG | OXYGEN SATURATION: 95 % | TEMPERATURE: 99.3 F | BODY MASS INDEX: 32.94 KG/M2 | WEIGHT: 179 LBS | RESPIRATION RATE: 12 BRPM | DIASTOLIC BLOOD PRESSURE: 68 MMHG | HEART RATE: 101 BPM

## 2019-10-07 DIAGNOSIS — L98.9 SKIN LESION OF FACE: ICD-10-CM

## 2019-10-08 ENCOUNTER — HOSPITAL ENCOUNTER (OUTPATIENT)
Dept: RADIOLOGY | Facility: MEDICAL CENTER | Age: 69
End: 2019-10-08
Attending: NURSE PRACTITIONER
Payer: MEDICARE

## 2019-10-08 PROCEDURE — 77080 DXA BONE DENSITY AXIAL: CPT

## 2019-12-31 ENCOUNTER — APPOINTMENT (OUTPATIENT)
Dept: RADIOLOGY | Facility: IMAGING CENTER | Age: 69
End: 2019-12-31
Attending: NURSE PRACTITIONER
Payer: MEDICARE

## 2019-12-31 ENCOUNTER — OFFICE VISIT (OUTPATIENT)
Dept: URGENT CARE | Facility: PHYSICIAN GROUP | Age: 69
End: 2019-12-31
Payer: MEDICARE

## 2019-12-31 VITALS
TEMPERATURE: 97.4 F | HEART RATE: 84 BPM | OXYGEN SATURATION: 91 % | DIASTOLIC BLOOD PRESSURE: 72 MMHG | SYSTOLIC BLOOD PRESSURE: 124 MMHG | RESPIRATION RATE: 14 BRPM

## 2019-12-31 DIAGNOSIS — J40 BRONCHITIS: ICD-10-CM

## 2019-12-31 DIAGNOSIS — J01.00 ACUTE MAXILLARY SINUSITIS, RECURRENCE NOT SPECIFIED: ICD-10-CM

## 2019-12-31 DIAGNOSIS — J18.9 COMMUNITY ACQUIRED PNEUMONIA OF LEFT LOWER LOBE OF LUNG: ICD-10-CM

## 2019-12-31 DIAGNOSIS — R05.9 COUGH: ICD-10-CM

## 2019-12-31 PROCEDURE — 99214 OFFICE O/P EST MOD 30 MIN: CPT | Performed by: NURSE PRACTITIONER

## 2019-12-31 PROCEDURE — 71046 X-RAY EXAM CHEST 2 VIEWS: CPT | Mod: TC,FY | Performed by: NURSE PRACTITIONER

## 2019-12-31 RX ORDER — PREDNISONE 10 MG/1
TABLET ORAL
Qty: 15 TAB | Refills: 0 | Status: SHIPPED | OUTPATIENT
Start: 2019-12-31 | End: 2020-08-24

## 2019-12-31 RX ORDER — IPRATROPIUM BROMIDE AND ALBUTEROL SULFATE 2.5; .5 MG/3ML; MG/3ML
3 SOLUTION RESPIRATORY (INHALATION) ONCE
Status: COMPLETED | OUTPATIENT
Start: 2019-12-31 | End: 2019-12-31

## 2019-12-31 RX ORDER — IPRATROPIUM BROMIDE AND ALBUTEROL SULFATE 2.5; .5 MG/3ML; MG/3ML
3 SOLUTION RESPIRATORY (INHALATION) 4 TIMES DAILY
Qty: 60 BULLET | Refills: 0 | Status: SHIPPED | OUTPATIENT
Start: 2019-12-31 | End: 2020-12-17 | Stop reason: SDUPTHER

## 2019-12-31 RX ORDER — AMOXICILLIN AND CLAVULANATE POTASSIUM 562.5; 437.5; 62.5 MG/1; MG/1; MG/1
2 TABLET, MULTILAYER, EXTENDED RELEASE ORAL 2 TIMES DAILY
Qty: 28 TAB | Refills: 0 | Status: SHIPPED | OUTPATIENT
Start: 2019-12-31 | End: 2020-01-07

## 2019-12-31 RX ORDER — DOXYCYCLINE HYCLATE 100 MG
100 TABLET ORAL 2 TIMES DAILY
Qty: 20 TAB | Refills: 0 | Status: SHIPPED | OUTPATIENT
Start: 2019-12-31 | End: 2020-01-10

## 2019-12-31 RX ORDER — BENZONATATE 200 MG/1
200 CAPSULE ORAL 3 TIMES DAILY PRN
Qty: 60 CAP | Refills: 0 | Status: SHIPPED | OUTPATIENT
Start: 2019-12-31 | End: 2020-08-24

## 2019-12-31 RX ADMIN — IPRATROPIUM BROMIDE AND ALBUTEROL SULFATE 3 ML: 2.5; .5 SOLUTION RESPIRATORY (INHALATION) at 16:40

## 2019-12-31 ASSESSMENT — ENCOUNTER SYMPTOMS
CHILLS: 0
FEVER: 0
SORE THROAT: 1
SINUS PAIN: 1
RHINORRHEA: 1
COUGH: 1
SPUTUM PRODUCTION: 1

## 2020-01-01 NOTE — PROGRESS NOTES
Subjective:      Jigna Allen is a 69 y.o. female who presents with Cough (face pain x10 days )    Past Medical History:   Diagnosis Date   • Anxiety    • Carotid arterial disease (HCC)    • COPD (chronic obstructive pulmonary disease) (HCC)    • Diabetes (HCC)    • HTN (hypertension)    • Hyperlipidemia    • Tobacco use      Social History     Socioeconomic History   • Marital status:      Spouse name: Not on file   • Number of children: Not on file   • Years of education: Not on file   • Highest education level: Not on file   Occupational History   • Not on file   Social Needs   • Financial resource strain: Not on file   • Food insecurity:     Worry: Not on file     Inability: Not on file   • Transportation needs:     Medical: Not on file     Non-medical: Not on file   Tobacco Use   • Smoking status: Current Some Day Smoker     Packs/day: 0.50     Types: Cigarettes   • Smokeless tobacco: Never Used   Substance and Sexual Activity   • Alcohol use: No   • Drug use: Yes     Types: Marijuana     Comment: cbd ointment   • Sexual activity: Not Currently     Partners: Male   Lifestyle   • Physical activity:     Days per week: Not on file     Minutes per session: Not on file   • Stress: Not on file   Relationships   • Social connections:     Talks on phone: Not on file     Gets together: Not on file     Attends [employer] service: Not on file     Active member of club or organization: Not on file     Attends meetings of clubs or organizations: Not on file     Relationship status: Not on file   • Intimate partner violence:     Fear of current or ex partner: Not on file     Emotionally abused: Not on file     Physically abused: Not on file     Forced sexual activity: Not on file   Other Topics Concern   • Not on file   Social History Narrative   • Not on file     Family History   Problem Relation Age of Onset   • Cancer Mother 47        breast       Allergies: Sulfa drugs    Patient is a 69-year-old female who presents  today with complaint of 10-day onset of cough that has been productive, and shortness of breath.  Positive history of COPD.  States she has had sinus pain pressure and drainage with this as well.  Positive prior history of pneumonia.        Cough   This is a new problem. The current episode started in the past 7 days. The problem has been unchanged. The problem occurs every few minutes. The cough is productive of sputum. Associated symptoms include nasal congestion, postnasal drip, rhinorrhea and a sore throat. Pertinent negatives include no chills or fever. Nothing aggravates the symptoms. She has tried nothing for the symptoms. The treatment provided no relief.       Review of Systems   Constitutional: Positive for malaise/fatigue. Negative for chills and fever.   HENT: Positive for congestion, postnasal drip, rhinorrhea, sinus pain and sore throat.    Respiratory: Positive for cough and sputum production.    Skin: Negative.    All other systems reviewed and are negative.         Objective:     /72   Pulse 84   Temp 36.3 °C (97.4 °F)   Resp 14   SpO2 91%      Physical Exam  Vitals signs reviewed.   Constitutional:       Appearance: Normal appearance.   HENT:      Right Ear: Tympanic membrane, ear canal and external ear normal.      Left Ear: Tympanic membrane, ear canal and external ear normal.      Mouth/Throat:      Mouth: Mucous membranes are moist.   Eyes:      Extraocular Movements: Extraocular movements intact.      Conjunctiva/sclera: Conjunctivae normal.      Pupils: Pupils are equal, round, and reactive to light.   Neck:      Musculoskeletal: Normal range of motion and neck supple.   Cardiovascular:      Rate and Rhythm: Normal rate and regular rhythm.      Heart sounds: Normal heart sounds.   Skin:     General: Skin is warm and dry.      Capillary Refill: Capillary refill takes less than 2 seconds.   Neurological:      Mental Status: She is alert and oriented to person, place, and time.    Psychiatric:         Mood and Affect: Mood normal.         Behavior: Behavior normal.         Thought Content: Thought content normal.         Judgment: Judgment normal.         XR chest:     12/31/2019 4:27 PM     HISTORY/REASON FOR EXAM:  Cough        TECHNIQUE/EXAM DESCRIPTION AND NUMBER OF VIEWS:  Two views of the chest.     COMPARISON:  None available.     FINDINGS:  Cardiomediastinal contour is within normal limits.  Atherosclerotic calcification of thoracic aorta.  Patchy increased opacity LEFT lung base.  No pleural fluid collection or pneumothorax.  Degenerative change of thoracic spine.     IMPRESSION:     Minimal LEFT lung base infiltrate or atelectasis.            Assessment/Plan:   Sinusitis  Bronchitis  cough    Doxycycline  augmentin  Prednisone  Tessalon PRN cough  duoneb treatment given in office  duoneb by HHN at home every 6 hours  Follow up with PCP in the next 2 weeks  flonase  Humidifier  Push fluids  Strict ER precautions for respiratory distress     There are no diagnoses linked to this encounter.

## 2020-01-07 ENCOUNTER — OFFICE VISIT (OUTPATIENT)
Dept: MEDICAL GROUP | Facility: PHYSICIAN GROUP | Age: 70
End: 2020-01-07
Payer: MEDICARE

## 2020-01-07 VITALS
HEART RATE: 81 BPM | DIASTOLIC BLOOD PRESSURE: 76 MMHG | SYSTOLIC BLOOD PRESSURE: 124 MMHG | RESPIRATION RATE: 16 BRPM | TEMPERATURE: 98.2 F | OXYGEN SATURATION: 97 % | BODY MASS INDEX: 31.83 KG/M2 | HEIGHT: 62 IN | WEIGHT: 173 LBS

## 2020-01-07 DIAGNOSIS — E11.65 UNCONTROLLED TYPE 2 DIABETES MELLITUS WITH HYPERGLYCEMIA (HCC): ICD-10-CM

## 2020-01-07 DIAGNOSIS — Z91.89 OTHER SPECIFIED PERSONAL RISK FACTORS, NOT ELSEWHERE CLASSIFIED: ICD-10-CM

## 2020-01-07 DIAGNOSIS — F17.200 TOBACCO DEPENDENCE: ICD-10-CM

## 2020-01-07 DIAGNOSIS — E78.2 MIXED HYPERLIPIDEMIA: ICD-10-CM

## 2020-01-07 DIAGNOSIS — Z23 NEED FOR VACCINATION: ICD-10-CM

## 2020-01-07 DIAGNOSIS — Z12.31 ENCOUNTER FOR SCREENING MAMMOGRAM FOR BREAST CANCER: ICD-10-CM

## 2020-01-07 DIAGNOSIS — B37.31 VAGINAL YEAST INFECTION: ICD-10-CM

## 2020-01-07 DIAGNOSIS — J18.9 PNEUMONIA DUE TO INFECTIOUS ORGANISM, UNSPECIFIED LATERALITY, UNSPECIFIED PART OF LUNG: ICD-10-CM

## 2020-01-07 DIAGNOSIS — E11.8 TYPE 2 DIABETES MELLITUS WITH COMPLICATION, WITHOUT LONG-TERM CURRENT USE OF INSULIN (HCC): ICD-10-CM

## 2020-01-07 PROCEDURE — 99214 OFFICE O/P EST MOD 30 MIN: CPT | Mod: 25 | Performed by: NURSE PRACTITIONER

## 2020-01-07 PROCEDURE — G0009 ADMIN PNEUMOCOCCAL VACCINE: HCPCS | Performed by: NURSE PRACTITIONER

## 2020-01-07 PROCEDURE — 90732 PPSV23 VACC 2 YRS+ SUBQ/IM: CPT | Performed by: NURSE PRACTITIONER

## 2020-01-07 RX ORDER — FLUCONAZOLE 200 MG/1
TABLET ORAL
Qty: 2 TAB | Refills: 0 | Status: SHIPPED | OUTPATIENT
Start: 2020-01-07 | End: 2020-08-24

## 2020-01-07 RX ORDER — DUREZOL 0.5 MG/ML
EMULSION OPHTHALMIC
COMMUNITY
Start: 2019-10-10 | End: 2021-04-12

## 2020-01-07 ASSESSMENT — PATIENT HEALTH QUESTIONNAIRE - PHQ9: CLINICAL INTERPRETATION OF PHQ2 SCORE: 0

## 2020-01-07 NOTE — PROGRESS NOTES
Chief Complaint   Patient presents with   • Pneumonia     Seen in  Monday   • Vaginitis         This is a 69 y.o.female patient that presents today with the following: Care follow-up, pneumonia, yeast infection    Hyperlipidemia  The ASCVD Risk score ([place] OSCAR Jr, et al., 2013) failed to calculate.  Patient due for labs before she follows up with me in a couple of months.  She is currently on TriCor, unable to tolerate statins.  She had incidental finding of atherosclerosis of the thoracic aorta with a recent chest x-ray.  She does agree to CT cardiac scoring exam    Pneumonia due to infectious organism  Patient here for urgent care follow-up, was seen last week and diagnosed and treated for pneumonia.  She states that overall she is feeling better but still tired.  I did discuss with her recovery time in setting of pneumonia.  Unfortunately she has developed a vaginal yeast infection and is requesting medication.  She will start Diflucan 200 mg once and may repeat in 72 hours if needed.  We will plan on repeating chest x-ray in about 5 to 6 weeks to check for resolution of pneumonia.  Advised her to continue with supportive measures including rest, fluids, over-the-counter cough and cold medications especially those containing guaifenesin.    Type 2 diabetes mellitus with complication, without long-term current use of insulin (HCC)  This is a chronic condition, stable and under very good control with current medications including metformin, last known A1c was 6.7%, she is due for follow-up visit in March with labs done before visit.  She is due for monofilament exam, this was completed today.    Vaginal yeast infection  See additional notes    Tobacco dependence  Unfortunately patient continues to smoke on a daily basis, not ready to quit but will let me know when she is ready.  She does understand the risks associated with this especially in the setting of her comorbid conditions.      No visits with results  within 1 Month(s) from this visit.   Latest known visit with results is:   Hospital Outpatient Visit on 08/14/2019   Component Date Value   • Cholesterol,Tot 08/14/2019 176    • Triglycerides 08/14/2019 143    • HDL 08/14/2019 46    • LDL 08/14/2019 101*   • Glycohemoglobin 08/14/2019 6.7*   • Est Avg Glucose 08/14/2019 146    • Fasting Status 08/14/2019 Fasting          clinical course has been stable    Past Medical History:   Diagnosis Date   • Anxiety    • Carotid arterial disease (HCC)    • COPD (chronic obstructive pulmonary disease) (HCC)    • Diabetes (HCC)    • HTN (hypertension)    • Hyperlipidemia    • Tobacco use        No past surgical history on file.    Family History   Problem Relation Age of Onset   • Cancer Mother 47        breast       Sulfa drugs    Current Outpatient Medications Ordered in Epic   Medication Sig Dispense Refill   • fluconazole (DIFLUCAN) 200 MG Tab Take 1 pill, can repeat in 72 hours if needed 2 Tab 0   • doxycycline (VIBRAMYCIN) 100 MG Tab Take 1 Tab by mouth 2 times a day for 10 days. 20 Tab 0   • predniSONE (DELTASONE) 10 MG Tab Take 1 tablet by mouth 3 times daily for 5 days 15 Tab 0   • benzonatate (TESSALON) 200 MG capsule Take 1 Cap by mouth 3 times a day as needed. 60 Cap 0   • ipratropium-albuterol (DUONEB) 0.5-2.5 (3) MG/3ML nebulizer solution 3 mL by Nebulization route 4 times a day. 60 Bullet 0   • vitamin D (CHOLECALCIFEROL) 1000 UNIT Tab Take 1,000 Units by mouth every day.     • amLODIPine (NORVASC) 10 MG Tab Take 10 mg by mouth.     • fluticasone (FLONASE) 50 MCG/ACT nasal spray Spray  in nose.     • losartan-hydrochlorothiazide (HYZAAR) 100-12.5 MG per tablet TAKE 1 TABLET DAILY 90 Tab 1   • naproxen (NAPROSYN) 500 MG Tab TAKE 1 TABLET BY MOUTH TWICE DAILY WITH MEALS 60 Tab 2   • SPIRIVA HANDIHALER 18 MCG Cap INHALE THE CONTENTS OF 1 CAPSULE DAILY 90 Cap 1   • fenofibrate (TRICOR) 145 MG Tab Take 1 Tab by mouth every day. 90 Tab 1   • albuterol 108 (90 Base)  "MCG/ACT Aero Soln inhalation aerosol Inhale 2 Puffs by mouth every 6 hours as needed for Shortness of Breath. 8.5 g 3   • tizanidine (ZANAFLEX) 4 MG Tab Take 1 Tab by mouth every 6 hours as needed. 30 Tab 0   • fluticasone-salmeterol (ADVAIR) 250-50 MCG/DOSE AEROSOL POWDER, BREATH ACTIVATED Inhale 1 Puff by mouth every 12 hours. 180 Inhaler 1   • glucose blood (FREESTYLE LITE) strip Use as directed.patient tests once a day     • metFORMIN ER (GLUCOPHAGE XR) 500 MG TABLET SR 24 HR Take 1 Tab by mouth 2 times a day. 180 Tab 3   • aspirin EC (ECOTRIN) 81 MG Tablet Delayed Response Take 81 mg by mouth every day.     • amcinonide (CYCLOCORT) 0.1 % topical cream Apply  to affected area(s) 2 times a day.     • ibuprofen (MOTRIN) 200 MG Tab Take 200 mg by mouth every 6 hours as needed.     • DUREZOL 0.05 % Emulsion      • amoxicillin-clavulanate SR (AUGMENTIN SR) 1000-62.5 MG TABLET SR 12 HR Take 2 Tabs by mouth 2 times a day for 7 days. 28 Tab 0   • betamethasone dipropionate (DIPROLENE) 0.05 % Cream Apply  to affected area(s) 2 times a day.       No current [facility]-ordered facility-administered medications on file.        Constitutional ROS: No unexpected change in weight, No weakness, No unexplained fevers, sweats, or chills  Pulmonary ROS:  Positive for smoker, recent pneumonia  Cardiovascular ROS: No chest pain, No edema, No palpitations, Positive for hypertension, hyperlipidemia, CAD  Gastrointestinal ROS: No abdominal pain, No nausea, vomiting, diarrhea, or constipation  Musculoskeletal/Extremities ROS: No clubbing, No peripheral edema, No pain, redness or swelling on the joints  Neurologic ROS: Normal development, No seizures, No weakness   ROS: Positive per HPI    Physical exam:  /76   Pulse 81   Temp 36.8 °C (98.2 °F) (Temporal)   Resp 16   Ht 1.575 m (5' 2\")   Wt 78.5 kg (173 lb)   SpO2 97%   BMI 31.64 kg/m²   General Appearance: Very pleasant elderly female, alert, no distress, obese, " well-groomed  Skin: Skin color, texture, turgor normal. No rashes or lesions.  Lungs: negative findings: normal respiratory rate and rhythm, lungs clear to auscultation  Heart: negative. RRR without murmur, gallop, or rubs.  No ectopy.  Abdomen: Abdomen soft, non-tender. BS normal. No masses,  No organomegaly  Musculoskeletal: negative findings: no evidence of joint instability, no evidence of muscle atrophy, no deformities present  Neurologic: intact, CN II through XII grossly intact  Diabetic Foot Exam: No ulcers, erythema or skin lesions present, patient tested with monofilament (10g) and tuning fork found to be sensitive bilaterally throughout the ball of the foot, great toe and heel.      Medical decision making/discussion: Patient to keep her upcoming appointment with me as already scheduled in March with labs done before visit.  She will repeat a chest x-ray in about 5 to 6 weeks to check for resolution of pneumonia.  In the setting of recent infection we will have her start Diflucan 200 mg 1 pill, can repeat with 1 pill in 72 hours if needed.  She is due for mammogram, this is been ordered.  She does agree to CT cardiac scoring exam, this is been ordered.    Jigna was seen today for pneumonia and vaginitis.    Diagnoses and all orders for this visit:    Type 2 diabetes mellitus with complication, without long-term current use of insulin (HCC)  -     Diabetic Monofilament LE Exam    Vaginal yeast infection  -     fluconazole (DIFLUCAN) 200 MG Tab; Take 1 pill, can repeat in 72 hours if needed    Pneumonia due to infectious organism, unspecified laterality, unspecified part of lung  -     DX-CHEST-2 VIEWS; Future    Mixed hyperlipidemia  -     CT-CARDIAC SCORING; Future    Other specified personal risk factors, not elsewhere classified  -     CT-CARDIAC SCORING; Future    Tobacco dependence    Encounter for screening mammogram for breast cancer  -     MA-SCREEN MAMMO W/CAD-BILAT; Future    Need for  vaccination  -     Pneumovax Vaccine (PPSV23)        Return in about 2 months (around 3/7/2020) for Discuss Labs, Follow-up.        Please note that this dictation was created using voice recognition software. I have made every reasonable attempt to correct obvious errors, but I expect that there are errors of grammar and possibly content that I did not discover before finalizing the note.

## 2020-01-08 RX ORDER — METFORMIN HYDROCHLORIDE 500 MG/1
TABLET, EXTENDED RELEASE ORAL
Qty: 180 TAB | Refills: 0 | Status: SHIPPED | OUTPATIENT
Start: 2020-01-08 | End: 2020-03-17

## 2020-01-08 RX ORDER — TIOTROPIUM BROMIDE 18 UG/1
CAPSULE ORAL; RESPIRATORY (INHALATION)
Qty: 90 CAP | Refills: 4 | Status: SHIPPED | OUTPATIENT
Start: 2020-01-08 | End: 2021-03-25

## 2020-01-08 NOTE — ASSESSMENT & PLAN NOTE
This is a chronic condition, stable and under very good control with current medications including metformin, last known A1c was 6.7%, she is due for follow-up visit in March with labs done before visit.  She is due for monofilament exam, this was completed today.

## 2020-01-08 NOTE — ASSESSMENT & PLAN NOTE
Patient here for urgent care follow-up, was seen last week and diagnosed and treated for pneumonia.  She states that overall she is feeling better but still tired.  I did discuss with her recovery time in setting of pneumonia.  Unfortunately she has developed a vaginal yeast infection and is requesting medication.  She will start Diflucan 200 mg once and may repeat in 72 hours if needed.  We will plan on repeating chest x-ray in about 5 to 6 weeks to check for resolution of pneumonia.  Advised her to continue with supportive measures including rest, fluids, over-the-counter cough and cold medications especially those containing guaifenesin.

## 2020-01-08 NOTE — ASSESSMENT & PLAN NOTE
The ASCVD Risk score (Mare[place]allorie MOORE Jr, et al., 2013) failed to calculate.  Patient due for labs before she follows up with me in a couple of months.  She is currently on TriCor, unable to tolerate statins.  She had incidental finding of atherosclerosis of the thoracic aorta with a recent chest x-ray.  She does agree to CT cardiac scoring exam

## 2020-01-08 NOTE — ASSESSMENT & PLAN NOTE
Unfortunately patient continues to smoke on a daily basis, not ready to quit but will let me know when she is ready.  She does understand the risks associated with this especially in the setting of her comorbid conditions.

## 2020-02-12 ENCOUNTER — HOSPITAL ENCOUNTER (OUTPATIENT)
Dept: RADIOLOGY | Facility: MEDICAL CENTER | Age: 70
End: 2020-02-12
Attending: NURSE PRACTITIONER
Payer: MEDICARE

## 2020-02-12 ENCOUNTER — HOSPITAL ENCOUNTER (OUTPATIENT)
Dept: RADIOLOGY | Facility: MEDICAL CENTER | Age: 70
End: 2020-02-12
Attending: NURSE PRACTITIONER
Payer: COMMERCIAL

## 2020-02-12 DIAGNOSIS — E78.2 MIXED HYPERLIPIDEMIA: ICD-10-CM

## 2020-02-12 DIAGNOSIS — Z91.89 OTHER SPECIFIED PERSONAL RISK FACTORS, NOT ELSEWHERE CLASSIFIED: ICD-10-CM

## 2020-02-12 DIAGNOSIS — Z12.31 ENCOUNTER FOR SCREENING MAMMOGRAM FOR BREAST CANCER: ICD-10-CM

## 2020-02-12 PROCEDURE — 77067 SCR MAMMO BI INCL CAD: CPT

## 2020-02-12 PROCEDURE — 4410556 CT-CARDIAC SCORING

## 2020-02-14 PROBLEM — R93.1 ABNORMAL SCREENING CARDIAC CT: Status: ACTIVE | Noted: 2020-02-14

## 2020-02-14 NOTE — RESULT ENCOUNTER NOTE
Cam Pisano is off today. This is a copy of your mammogram. I see you are scheduled for additional views already.   Many times the follow up test will prove to be negative. I will hope for this result for you.   Please be sure to keep your follow up appointment for further testing  Thank you   Lisseth García RN, APN

## 2020-02-14 NOTE — RESULT ENCOUNTER NOTE
Hello  Here are your cardiac scoring results.   It is a fairly high score which means you have some cholesterol or plaque build up in your arteries that supply your heart. This increases your risk of a heart attack in the future.   The cardiologists recommend that you stop smoking, eat very healthy with a plant based diet and take a daily low dose aspirin. They also recommend a statin medication for your cholesterol.   Please schedule an appointment soon to review these results and recommendations with Aliya garland.  Thank you   Lisseth García RN, APN

## 2020-02-24 ENCOUNTER — HOSPITAL ENCOUNTER (OUTPATIENT)
Dept: RADIOLOGY | Facility: MEDICAL CENTER | Age: 70
End: 2020-02-24
Attending: NURSE PRACTITIONER
Payer: MEDICARE

## 2020-02-24 DIAGNOSIS — R92.8 ABNORMAL MAMMOGRAM: ICD-10-CM

## 2020-02-24 PROCEDURE — 76642 ULTRASOUND BREAST LIMITED: CPT | Mod: LT

## 2020-02-24 PROCEDURE — G0279 TOMOSYNTHESIS, MAMMO: HCPCS | Mod: LT

## 2020-03-04 DIAGNOSIS — E78.2 MIXED HYPERLIPIDEMIA: ICD-10-CM

## 2020-03-04 DIAGNOSIS — I10 ESSENTIAL HYPERTENSION: ICD-10-CM

## 2020-03-05 RX ORDER — FENOFIBRATE 145 MG/1
TABLET ORAL
Qty: 90 TAB | Refills: 1 | Status: SHIPPED | OUTPATIENT
Start: 2020-03-05 | End: 2020-08-24 | Stop reason: SDUPTHER

## 2020-03-05 RX ORDER — LOSARTAN POTASSIUM AND HYDROCHLOROTHIAZIDE 12.5; 1 MG/1; MG/1
TABLET ORAL
Qty: 90 TAB | Refills: 1 | Status: SHIPPED | OUTPATIENT
Start: 2020-03-05 | End: 2020-05-15 | Stop reason: SDUPTHER

## 2020-03-05 NOTE — TELEPHONE ENCOUNTER
Pt has had OV within the 12 month protocol and lipid panel is current. 6 month supply sent to pharmacy.   Lab Results   Component Value Date/Time    CHOLSTRLTOT 176 08/14/2019 10:36 AM     (H) 08/14/2019 10:36 AM    HDL 46 08/14/2019 10:36 AM    TRIGLYCERIDE 143 08/14/2019 10:36 AM       Lab Results   Component Value Date/Time    SODIUM 140 10/08/2018 11:24 AM    POTASSIUM 4.3 10/08/2018 11:24 AM    CHLORIDE 104 10/08/2018 11:24 AM    CO2 28 10/08/2018 11:24 AM    GLUCOSE 106 (H) 10/08/2018 11:24 AM    BUN 21 10/08/2018 11:24 AM    CREATININE 0.76 10/08/2018 11:24 AM     Lab Results   Component Value Date/Time    ALKPHOSPHAT 75 10/08/2018 11:24 AM    ASTSGOT 17 10/08/2018 11:24 AM    ALTSGPT 18 10/08/2018 11:24 AM    TBILIRUBIN 0.5 10/08/2018 11:24 AM

## 2020-03-09 ENCOUNTER — APPOINTMENT (OUTPATIENT)
Dept: URGENT CARE | Facility: PHYSICIAN GROUP | Age: 70
End: 2020-03-09
Payer: MEDICARE

## 2020-03-09 ENCOUNTER — APPOINTMENT (OUTPATIENT)
Dept: RADIOLOGY | Facility: IMAGING CENTER | Age: 70
End: 2020-03-09
Attending: NURSE PRACTITIONER
Payer: MEDICARE

## 2020-03-09 DIAGNOSIS — J18.9 PNEUMONIA DUE TO INFECTIOUS ORGANISM, UNSPECIFIED LATERALITY, UNSPECIFIED PART OF LUNG: ICD-10-CM

## 2020-03-09 PROCEDURE — 71046 X-RAY EXAM CHEST 2 VIEWS: CPT | Mod: TC,FY | Performed by: NURSE PRACTITIONER

## 2020-03-15 DIAGNOSIS — E11.65 UNCONTROLLED TYPE 2 DIABETES MELLITUS WITH HYPERGLYCEMIA (HCC): ICD-10-CM

## 2020-03-17 RX ORDER — METFORMIN HYDROCHLORIDE 500 MG/1
TABLET, EXTENDED RELEASE ORAL
Qty: 180 TAB | Refills: 1 | Status: SHIPPED | OUTPATIENT
Start: 2020-03-17 | End: 2020-08-24 | Stop reason: SDUPTHER

## 2020-03-17 NOTE — TELEPHONE ENCOUNTER
Patient has recently been seen by PCP within the last 6 months per protocol (1/20). Will refill medications for 6 months.  Lab Results   Component Value Date/Time    HBA1C 6.7 (H) 08/14/2019 10:36 AM      Lab Results   Component Value Date/Time    MALBCRT 4 10/08/2018 11:24 AM    MICROALBUR 0.9 10/08/2018 11:24 AM      Lab Results   Component Value Date/Time    ALKPHOSPHAT 75 10/08/2018 11:24 AM    ASTSGOT 17 10/08/2018 11:24 AM    ALTSGPT 18 10/08/2018 11:24 AM    TBILIRUBIN 0.5 10/08/2018 11:24 AM

## 2020-03-17 NOTE — TELEPHONE ENCOUNTER
Was the patient seen in the last year in this department? Yes    Does patient have an active prescription for medications requested? No     Received Request Via: Pharmacy      Pt met protocol?: Yes    OV 1/20

## 2020-04-17 DIAGNOSIS — J44.9 CHRONIC OBSTRUCTIVE PULMONARY DISEASE, UNSPECIFIED COPD TYPE (HCC): ICD-10-CM

## 2020-04-17 NOTE — TELEPHONE ENCOUNTER
Was the patient seen in the last year in this department? Yes    Does patient have an active prescription for medications requested? No     Received Request Via: Pharmacy      Pt met protocol?: Yes, OV 1/20

## 2020-05-15 DIAGNOSIS — I10 ESSENTIAL HYPERTENSION: ICD-10-CM

## 2020-05-15 RX ORDER — LOSARTAN POTASSIUM AND HYDROCHLOROTHIAZIDE 12.5; 1 MG/1; MG/1
TABLET ORAL
Qty: 90 TAB | Refills: 1 | Status: SHIPPED | OUTPATIENT
Start: 2020-05-15 | End: 2020-09-18

## 2020-05-15 NOTE — TELEPHONE ENCOUNTER
Last seen by PCP 01/07/2020.     Will send 6 month(s) to the pharmacy.  Last Blood Pressure reading was 124/76 on 01/07/2020

## 2020-05-15 NOTE — TELEPHONE ENCOUNTER
2  Was the patient seen in the last year in this department? Yes    Does patient have an active prescription for medications requested? Yes    Received Request Via: Pharmacy      Pt met protocol?: Yes , medication is temporarily out of stock please mark woods, ov 1/20bp 124/76

## 2020-08-24 ENCOUNTER — TELEMEDICINE (OUTPATIENT)
Dept: MEDICAL GROUP | Facility: PHYSICIAN GROUP | Age: 70
End: 2020-08-24
Payer: MEDICARE

## 2020-08-24 VITALS — BODY MASS INDEX: 30.12 KG/M2 | WEIGHT: 170 LBS | HEIGHT: 63 IN

## 2020-08-24 DIAGNOSIS — E11.8 TYPE 2 DIABETES MELLITUS WITH COMPLICATION, WITHOUT LONG-TERM CURRENT USE OF INSULIN (HCC): ICD-10-CM

## 2020-08-24 DIAGNOSIS — M79.2 NEUROPATHIC PAIN: ICD-10-CM

## 2020-08-24 DIAGNOSIS — R30.0 DYSURIA: ICD-10-CM

## 2020-08-24 DIAGNOSIS — E78.2 MIXED HYPERLIPIDEMIA: ICD-10-CM

## 2020-08-24 PROCEDURE — 99214 OFFICE O/P EST MOD 30 MIN: CPT | Mod: 95,CR | Performed by: NURSE PRACTITIONER

## 2020-08-24 RX ORDER — METFORMIN HYDROCHLORIDE 500 MG/1
TABLET, EXTENDED RELEASE ORAL
Qty: 180 TAB | Refills: 1 | Status: SHIPPED | OUTPATIENT
Start: 2020-08-24 | End: 2021-02-24

## 2020-08-24 RX ORDER — GABAPENTIN 100 MG/1
100 CAPSULE ORAL 3 TIMES DAILY
Qty: 90 CAP | Refills: 2 | Status: SHIPPED | OUTPATIENT
Start: 2020-08-24 | End: 2021-01-19

## 2020-08-24 RX ORDER — FENOFIBRATE 145 MG/1
TABLET, COATED ORAL
Qty: 90 TAB | Refills: 3 | Status: SHIPPED | OUTPATIENT
Start: 2020-08-24 | End: 2021-01-19

## 2020-08-24 ASSESSMENT — FIBROSIS 4 INDEX: FIB4 SCORE: 0.79

## 2020-08-24 NOTE — ASSESSMENT & PLAN NOTE
The ASCVD Risk score (Marekmallorie MOORE Jr, et al., 2013) failed to calculate.  Patient still past due for labs  Currently on TriCor, unable to tolerate statins  She does need refills on TriCor, this was called in  Advised to have her labs done as soon as possible and follow-up in office in 3 months

## 2020-08-24 NOTE — ASSESSMENT & PLAN NOTE
Chronic, status of control unknown as patient is past due for labs  These were previously ordered, she can have done anytime soon  Does need refills on metformin which is only medication she takes for type 2 diabetes  Last known A1c over a year ago was 6.7%  Appropriately on ARB.  On TriCor unable to tolerate statin

## 2020-08-24 NOTE — PATIENT INSTRUCTIONS
Will trial gabapentin, 100 mg 3 times a day, start with 1 pill daily for 3-5 days, then 1 pill twice a day for 3-5 days, then take 3 times a day    meds refilled    Have your labs done as soon as possible    Follow up with me in 3 months in office or virtual visit

## 2020-08-24 NOTE — PROGRESS NOTES
Virtual Visit: Established Patient   This visit was conducted via Zoom  using secure and encrypted videoconferencing technology. The patient was in a private location in the state of [place].    The patient's identity was confirmed and verbal consent was obtained for this virtual visit.    Subjective:   CC:   Chief Complaint   Patient presents with   • Diabetes   • Medication Refill       Jigna Allen is a 69 y.o. female presenting for evaluation and management of:    Type 2 diabetes mellitus with complication, without long-term current use of insulin (HCC)  Chronic, status of control unknown as patient is past due for labs  These were previously ordered, she can have done anytime soon  Does need refills on metformin which is only medication she takes for type 2 diabetes  Last known A1c over a year ago was 6.7%  Appropriately on ARB.  On TriCor unable to tolerate statin    Hyperlipidemia  The ASCVD Risk score ([place] OSCAR Jr, et al., 2013) failed to calculate.  Patient still past due for labs  Currently on TriCor, unable to tolerate statins  She does need refills on TriCor, this was called in  Advised to have her labs done as soon as possible and follow-up in office in 3 months    Neuropathic pain  Patient continues to have neuropathic pain in her right hand  She does not report chronic neck pain  Of note she does have type 2 diabetes, status control unknown  She has been taking ibuprofen and naproxen  Discouraged use of 2 different anti-inflammatories  Because her pain is neuropathic in nature we will trial gabapentin 100 mg up to 3 times a day  She will titrate slowly up by 1 pill every 3 to 5 days as tolerated  Discussed with her the risks, benefits side effects associated with this medication    Dysuria  Patient requesting urinalysis as she feels she may have a UTI  However she has never had a UTI before  She does have burning with urination as well as urgency and frequency  Urinalysis has been ordered, she will have  labs done tomorrow and be notified of results and further actions if needed        ROS   Denies any recent fevers or chills. No nausea or vomiting. No chest pains or shortness of breath.   CV ROS: Positive for hypertension, hyperlipidemia  Endocrine ROS: Positive per HPI  Neuro ROS: Positive for neuropathy   ROS: Positive per HPI    Allergies   Allergen Reactions   • Sulfa Drugs        Current medicines (including changes today)  Current Outpatient Medications   Medication Sig Dispense Refill   • gabapentin (NEURONTIN) 100 MG Cap Take 1 Cap by mouth 3 times a day. 90 Cap 2   • fenofibrate (TRICOR) 145 MG Tab TAKE 1 TABLET DAILY 90 Tab 3   • metFORMIN ER (GLUCOPHAGE XR) 500 MG TABLET SR 24 HR TAKE 1 TABLET TWICE A  Tab 1   • losartan-hydrochlorothiazide (HYZAAR) 100-12.5 MG per tablet TAKE 1 TABLET DAILY 90 Tab 1   • PROAIR  (90 Base) MCG/ACT Aero Soln inhalation aerosol USE 2 INHALATIONS EVERY 6 HOURS AS NEEDED FOR SHORTNESS OF BREATH 1 Inhaler 5   • fluticasone-salmeterol (ADVAIR) 250-50 MCG/DOSE AEROSOL POWDER, BREATH ACTIVATED USE 1 INHALATION EVERY 12 HOURS 3 Inhaler 1   • SPIRIVA HANDIHALER 18 MCG Cap INHALE THE CONTENTS OF 1 CAPSULE DAILY 90 Cap 4   • DUREZOL 0.05 % Emulsion      • ipratropium-albuterol (DUONEB) 0.5-2.5 (3) MG/3ML nebulizer solution 3 mL by Nebulization route 4 times a day. 60 Bullet 0   • vitamin D (CHOLECALCIFEROL) 1000 UNIT Tab Take 1,000 Units by mouth every day.     • tizanidine (ZANAFLEX) 4 MG Tab Take 1 Tab by mouth every 6 hours as needed. 30 Tab 0   • glucose blood (FREESTYLE LITE) strip Use as directed.patient tests once a day     • aspirin EC (ECOTRIN) 81 MG Tablet Delayed Response Take 81 mg by mouth every day.     • ibuprofen (MOTRIN) 200 MG Tab Take 200 mg by mouth every 6 hours as needed.     • predniSONE (DELTASONE) 10 MG Tab Take 1 tablet by mouth 3 times daily for 5 days 15 Tab 0   • naproxen (NAPROSYN) 500 MG Tab TAKE 1 TABLET BY MOUTH TWICE DAILY WITH MEALS  "60 Tab 2     No current facility-administered medications for this visit.        Patient Active Problem List    Diagnosis Date Noted   • Neuropathic pain 08/24/2020   • Dysuria 08/24/2020   • Abnormal screening cardiac CT 02/14/2020   • Vaginal yeast infection 01/07/2020   • Pneumonia due to infectious organism 01/07/2020   • Obesity (BMI 30-39.9) 09/17/2019   • S/P carotid endarterectomy 02/05/2019   • Radicular pain in left arm 02/05/2019   • Neck pain on left side 02/05/2019   • Vitamin D deficiency 11/06/2018   • Essential hypertension 09/25/2018   • Anxiety 03/25/2016   • CAD (coronary artery disease) 03/25/2016   • COPD (chronic obstructive pulmonary disease) ([facility]) 03/25/2016   • Type 2 diabetes mellitus with complication, without long-term current use of insulin ([facility]) 03/25/2016   • Goiter 03/25/2016   • Hyperlipidemia 03/25/2016   • Stented coronary artery 03/25/2016   • Tobacco dependence 03/25/2016   • Venous stasis 03/25/2016   • Carotid artery stenosis without cerebral infarction 09/25/2014       Family History   Problem Relation Age of Onset   • Cancer Mother 47        breast       She  has a past medical history of Abnormal screening cardiac CT (2/14/2020), Anxiety, Carotid arterial disease ([facility]), COPD (chronic obstructive pulmonary disease) ([facility]), Diabetes ([facility]), HTN (hypertension), Hyperlipidemia, and Tobacco use.  She  has no past surgical history on file.       Objective:   Ht 1.6 m (5' 3\")   Wt 77.1 kg (170 lb)   BMI 30.11 kg/m²     Physical Exam:  Constitutional: Alert, no distress, well-groomed.  Skin: No rashes in visible areas.  Eye: Round. Conjunctiva clear, lids normal. No icterus.   ENMT: Lips pink without lesions, good dentition, moist mucous membranes. Phonation normal.  Neck: No masses, no thyromegaly. Moves freely without pain.  Respiratory: Unlabored respiratory effort, no cough or audible wheeze  Psych: Alert and oriented x3, normal affect and mood.       Assessment and Plan:   The " following treatment plan was discussed:     Will trial gabapentin, 100 mg 3 times a day, start with 1 pill daily for 3-5 days, then 1 pill twice a day for 3-5 days, then take 3 times a day    meds refilled    Have your labs done as soon as possible    Follow up with me in 3 months in office or virtual visit    1. Type 2 diabetes mellitus with complication, without long-term current use of insulin (HCC)  - metFORMIN ER (GLUCOPHAGE XR) 500 MG TABLET SR 24 HR; TAKE 1 TABLET TWICE A DAY  Dispense: 180 Tab; Refill: 1    2. Mixed hyperlipidemia  - fenofibrate (TRICOR) 145 MG Tab; TAKE 1 TABLET DAILY  Dispense: 90 Tab; Refill: 3    3. Neuropathic pain  - gabapentin (NEURONTIN) 100 MG Cap; Take 1 Cap by mouth 3 times a day.  Dispense: 90 Cap; Refill: 2    4. Dysuria  - URINALYSIS,CULTURE IF INDICATED; Future        Follow-up: Return in about 3 months (around 11/24/2020) for Discuss Labs, Follow-up.

## 2020-08-24 NOTE — ASSESSMENT & PLAN NOTE
Patient requesting urinalysis as she feels she may have a UTI  However she has never had a UTI before  She does have burning with urination as well as urgency and frequency  Urinalysis has been ordered, she will have labs done tomorrow and be notified of results and further actions if needed

## 2020-08-28 DIAGNOSIS — E11.8 TYPE 2 DIABETES MELLITUS WITH COMPLICATION, WITHOUT LONG-TERM CURRENT USE OF INSULIN (HCC): ICD-10-CM

## 2020-08-28 DIAGNOSIS — I10 ESSENTIAL HYPERTENSION: ICD-10-CM

## 2020-08-28 DIAGNOSIS — E78.2 MIXED HYPERLIPIDEMIA: ICD-10-CM

## 2020-08-31 ENCOUNTER — HOSPITAL ENCOUNTER (OUTPATIENT)
Dept: LAB | Facility: MEDICAL CENTER | Age: 70
End: 2020-08-31
Attending: NURSE PRACTITIONER
Payer: MEDICARE

## 2020-08-31 ENCOUNTER — HOSPITAL ENCOUNTER (OUTPATIENT)
Dept: LAB | Facility: MEDICAL CENTER | Age: 70
End: 2020-08-31
Attending: FAMILY MEDICINE
Payer: MEDICARE

## 2020-08-31 DIAGNOSIS — E11.8 TYPE 2 DIABETES MELLITUS WITH COMPLICATION, WITHOUT LONG-TERM CURRENT USE OF INSULIN (HCC): ICD-10-CM

## 2020-08-31 DIAGNOSIS — I10 ESSENTIAL HYPERTENSION: ICD-10-CM

## 2020-08-31 DIAGNOSIS — E78.2 MIXED HYPERLIPIDEMIA: ICD-10-CM

## 2020-08-31 DIAGNOSIS — Z11.59 NEED FOR HEPATITIS C SCREENING TEST: ICD-10-CM

## 2020-08-31 DIAGNOSIS — R30.0 DYSURIA: ICD-10-CM

## 2020-08-31 LAB
ALBUMIN SERPL BCP-MCNC: 4.5 G/DL (ref 3.2–4.9)
ALBUMIN/GLOB SERPL: 1.7 G/DL
ALP SERPL-CCNC: 52 U/L (ref 30–99)
ALT SERPL-CCNC: 20 U/L (ref 2–50)
ANION GAP SERPL CALC-SCNC: 16 MMOL/L (ref 7–16)
APPEARANCE UR: CLEAR
AST SERPL-CCNC: 20 U/L (ref 12–45)
BACTERIA #/AREA URNS HPF: NEGATIVE /HPF
BILIRUB SERPL-MCNC: 0.3 MG/DL (ref 0.1–1.5)
BILIRUB UR QL STRIP.AUTO: NEGATIVE
BUN SERPL-MCNC: 19 MG/DL (ref 8–22)
CALCIUM SERPL-MCNC: 10 MG/DL (ref 8.5–10.5)
CHLORIDE SERPL-SCNC: 100 MMOL/L (ref 96–112)
CHOLEST SERPL-MCNC: 181 MG/DL (ref 100–199)
CO2 SERPL-SCNC: 23 MMOL/L (ref 20–33)
COLOR UR: YELLOW
CREAT SERPL-MCNC: 0.74 MG/DL (ref 0.5–1.4)
CREAT UR-MCNC: 70.91 MG/DL
EPI CELLS #/AREA URNS HPF: ABNORMAL /HPF
EST. AVERAGE GLUCOSE BLD GHB EST-MCNC: 128 MG/DL
FASTING STATUS PATIENT QL REPORTED: NORMAL
GLOBULIN SER CALC-MCNC: 2.6 G/DL (ref 1.9–3.5)
GLUCOSE SERPL-MCNC: 115 MG/DL (ref 65–99)
GLUCOSE UR STRIP.AUTO-MCNC: NEGATIVE MG/DL
HBA1C MFR BLD: 6.1 % (ref 0–5.6)
HCV AB SER QL: NORMAL
HDLC SERPL-MCNC: 62 MG/DL
HYALINE CASTS #/AREA URNS LPF: ABNORMAL /LPF
KETONES UR STRIP.AUTO-MCNC: NEGATIVE MG/DL
LDLC SERPL CALC-MCNC: 101 MG/DL
LEUKOCYTE ESTERASE UR QL STRIP.AUTO: ABNORMAL
MICRO URNS: ABNORMAL
MICROALBUMIN UR-MCNC: <1.2 MG/DL
MICROALBUMIN/CREAT UR: NORMAL MG/G (ref 0–30)
NITRITE UR QL STRIP.AUTO: NEGATIVE
PH UR STRIP.AUTO: 5.5 [PH] (ref 5–8)
POTASSIUM SERPL-SCNC: 4 MMOL/L (ref 3.6–5.5)
PROT SERPL-MCNC: 7.1 G/DL (ref 6–8.2)
PROT UR QL STRIP: NEGATIVE MG/DL
RBC # URNS HPF: ABNORMAL /HPF
RBC UR QL AUTO: NEGATIVE
SODIUM SERPL-SCNC: 139 MMOL/L (ref 135–145)
SP GR UR STRIP.AUTO: 1.02
TRIGL SERPL-MCNC: 90 MG/DL (ref 0–149)
UROBILINOGEN UR STRIP.AUTO-MCNC: 0.2 MG/DL
WBC #/AREA URNS HPF: ABNORMAL /HPF

## 2020-08-31 PROCEDURE — 82043 UR ALBUMIN QUANTITATIVE: CPT

## 2020-08-31 PROCEDURE — 80053 COMPREHEN METABOLIC PANEL: CPT

## 2020-08-31 PROCEDURE — 80061 LIPID PANEL: CPT

## 2020-08-31 PROCEDURE — 83036 HEMOGLOBIN GLYCOSYLATED A1C: CPT | Mod: GA

## 2020-08-31 PROCEDURE — 81001 URINALYSIS AUTO W/SCOPE: CPT

## 2020-08-31 PROCEDURE — 82570 ASSAY OF URINE CREATININE: CPT

## 2020-08-31 PROCEDURE — 36415 COLL VENOUS BLD VENIPUNCTURE: CPT

## 2020-08-31 PROCEDURE — 86803 HEPATITIS C AB TEST: CPT

## 2020-09-01 NOTE — RESULT ENCOUNTER NOTE
Jigna,  Your labs show improved A1c and improved urine protein levels. The urine did not show any bacteria, if you have any symptoms of a UTI let me know. All other labs look stable and good.   Laurie Bee M.D.  (covering for Aliya a few days)

## 2020-09-17 DIAGNOSIS — I10 ESSENTIAL HYPERTENSION: ICD-10-CM

## 2020-09-18 RX ORDER — LOSARTAN POTASSIUM AND HYDROCHLOROTHIAZIDE 12.5; 1 MG/1; MG/1
TABLET ORAL
Qty: 90 TAB | Refills: 0 | Status: SHIPPED | OUTPATIENT
Start: 2020-09-18 | End: 2021-02-02

## 2020-09-18 NOTE — TELEPHONE ENCOUNTER
Last seen by PCP 08/24/2020.      Last Blood Pressure reading was 124/76 on 1/7/2020    Lab Results   Component Value Date/Time    SODIUM 139 08/31/2020 10:13 AM    POTASSIUM 4.0 08/31/2020 10:13 AM    CHLORIDE 100 08/31/2020 10:13 AM    CO2 23 08/31/2020 10:13 AM    GLUCOSE 115 (H) 08/31/2020 10:13 AM    BUN 19 08/31/2020 10:13 AM    CREATININE 0.74 08/31/2020 10:13 AM       Will send 3 month(s) to the pharmacy.

## 2020-09-21 DIAGNOSIS — M79.2 NEUROPATHIC PAIN: ICD-10-CM

## 2020-09-21 DIAGNOSIS — M79.2 RADICULAR PAIN IN LEFT ARM: ICD-10-CM

## 2020-09-21 DIAGNOSIS — M54.2 NECK PAIN: ICD-10-CM

## 2020-11-23 DIAGNOSIS — M54.2 NECK PAIN: ICD-10-CM

## 2020-11-23 DIAGNOSIS — M79.2 NEUROPATHIC PAIN: ICD-10-CM

## 2020-11-23 DIAGNOSIS — M79.2 RADICULAR PAIN IN LEFT ARM: ICD-10-CM

## 2020-12-15 ENCOUNTER — HOSPITAL ENCOUNTER (OUTPATIENT)
Dept: RADIOLOGY | Facility: MEDICAL CENTER | Age: 70
End: 2020-12-15
Attending: NURSE PRACTITIONER
Payer: MEDICARE

## 2020-12-15 DIAGNOSIS — R92.8 ABNORMAL MAMMOGRAM: ICD-10-CM

## 2020-12-15 PROCEDURE — G0279 TOMOSYNTHESIS, MAMMO: HCPCS

## 2020-12-17 ENCOUNTER — OFFICE VISIT (OUTPATIENT)
Dept: MEDICAL GROUP | Facility: PHYSICIAN GROUP | Age: 70
End: 2020-12-17
Payer: MEDICARE

## 2020-12-17 VITALS
RESPIRATION RATE: 18 BRPM | DIASTOLIC BLOOD PRESSURE: 80 MMHG | HEART RATE: 94 BPM | WEIGHT: 174 LBS | BODY MASS INDEX: 30.83 KG/M2 | HEIGHT: 63 IN | TEMPERATURE: 98.5 F | OXYGEN SATURATION: 94 % | SYSTOLIC BLOOD PRESSURE: 140 MMHG

## 2020-12-17 DIAGNOSIS — I87.2 VENOUS STASIS DERMATITIS OF LEFT LOWER EXTREMITY: ICD-10-CM

## 2020-12-17 DIAGNOSIS — E11.8 TYPE 2 DIABETES MELLITUS WITH COMPLICATION, WITHOUT LONG-TERM CURRENT USE OF INSULIN (HCC): ICD-10-CM

## 2020-12-17 DIAGNOSIS — J44.9 CHRONIC OBSTRUCTIVE PULMONARY DISEASE, UNSPECIFIED COPD TYPE (HCC): ICD-10-CM

## 2020-12-17 PROCEDURE — 99214 OFFICE O/P EST MOD 30 MIN: CPT | Performed by: NURSE PRACTITIONER

## 2020-12-17 RX ORDER — TRIAMCINOLONE ACETONIDE 1 MG/G
CREAM TOPICAL
Qty: 45 G | Refills: 1 | Status: SHIPPED | OUTPATIENT
Start: 2020-12-17 | End: 2021-04-05 | Stop reason: SDUPTHER

## 2020-12-17 RX ORDER — IPRATROPIUM BROMIDE AND ALBUTEROL SULFATE 2.5; .5 MG/3ML; MG/3ML
3 SOLUTION RESPIRATORY (INHALATION) 4 TIMES DAILY
Qty: 270 ML | Refills: 0 | Status: SHIPPED | OUTPATIENT
Start: 2020-12-17 | End: 2021-07-01

## 2020-12-18 NOTE — PROGRESS NOTES
Chief Complaint   Patient presents with   • Skin Lesion   • Other     silviano on shin had for 2 year then turned red first of the week         This is a 70 y.o.female patient that presents today with the following: follow up visit, rash, refills    Type 2 diabetes mellitus with complication, without long-term current use of insulin (HCC)  This is a chronic and stable condition  Well controlled on Metformin alone, A1c in late August was 6.1%  Appropriately on ARB, on tricor as she does not tolerate statins  Will be due for labs in late February  Discussed importance of lifestyle modifications    COPD (chronic obstructive pulmonary disease) (HCC)  Chronic and stable  Unfortunately she continues to smoke and understands the risks in doing so  Does need refills on Duoneb, this has been refilled  Does not report any recent exacerbations    Venous stasis dermatitis of left lower extremity  Pt has s/sx consistent with venous stasis dermatitis  Does report LE edema that is gone by morning  Denies SOB or chest pain  The rash is most prominent on the anterior aspect of the LLE  Reports to be very pruritic  Will trial triamcinolone cream twice a day  Encouraged elevated when not active, compression stockings, adequate hydration      No visits with results within 1 Month(s) from this visit.   Latest known visit with results is:   Hospital Outpatient Visit on 08/31/2020   Component Date Value   • Sodium 08/31/2020 139    • Potassium 08/31/2020 4.0    • Chloride 08/31/2020 100    • Co2 08/31/2020 23    • Anion Gap 08/31/2020 16.0    • Glucose 08/31/2020 115*   • Bun 08/31/2020 19    • Creatinine 08/31/2020 0.74    • Calcium 08/31/2020 10.0    • AST(SGOT) 08/31/2020 20    • ALT(SGPT) 08/31/2020 20    • Alkaline Phosphatase 08/31/2020 52    • Total Bilirubin 08/31/2020 0.3    • Albumin 08/31/2020 4.5    • Total Protein 08/31/2020 7.1    • Globulin 08/31/2020 2.6    • A-G Ratio 08/31/2020 1.7    • GFR If  08/31/2020 >60     • GFR If Non  Ameri* 08/31/2020 >60          clinical course has been stable    Past Medical History:   Diagnosis Date   • Abnormal screening cardiac CT 2/14/2020   • Anxiety    • Carotid arterial disease (HCC)    • COPD (chronic obstructive pulmonary disease) (HCC)    • Diabetes (HCC)    • HTN (hypertension)    • Hyperlipidemia    • Tobacco use        History reviewed. No pertinent surgical history.    Family History   Problem Relation Age of Onset   • Cancer Mother 47        breast       Sulfa drugs    Current Outpatient Medications Ordered in Epic   Medication Sig Dispense Refill   • triamcinolone acetonide (KENALOG) 0.1 % Cream Apply to affected area twice a day for no more than 2 weeks at a time 45 g 1   • ipratropium-albuterol (DUONEB) 0.5-2.5 (3) MG/3ML nebulizer solution Take 3 mL by nebulization 4 times a day. 270 mL 0   • losartan-hydrochlorothiazide (HYZAAR) 100-12.5 MG per tablet TAKE 1 TABLET BY MOUTH DAILY 90 Tab 0   • gabapentin (NEURONTIN) 100 MG Cap Take 1 Cap by mouth 3 times a day. 90 Cap 2   • fenofibrate (TRICOR) 145 MG Tab TAKE 1 TABLET DAILY 90 Tab 3   • metFORMIN ER (GLUCOPHAGE XR) 500 MG TABLET SR 24 HR TAKE 1 TABLET TWICE A  Tab 1   • PROAIR  (90 Base) MCG/ACT Aero Soln inhalation aerosol USE 2 INHALATIONS EVERY 6 HOURS AS NEEDED FOR SHORTNESS OF BREATH 1 Inhaler 5   • fluticasone-salmeterol (ADVAIR) 250-50 MCG/DOSE AEROSOL POWDER, BREATH ACTIVATED USE 1 INHALATION EVERY 12 HOURS 3 Inhaler 1   • SPIRIVA HANDIHALER 18 MCG Cap INHALE THE CONTENTS OF 1 CAPSULE DAILY 90 Cap 4   • DUREZOL 0.05 % Emulsion      • vitamin D (CHOLECALCIFEROL) 1000 UNIT Tab Take 1,000 Units by mouth every day.     • naproxen (NAPROSYN) 500 MG Tab TAKE 1 TABLET BY MOUTH TWICE DAILY WITH MEALS 60 Tab 2   • tizanidine (ZANAFLEX) 4 MG Tab Take 1 Tab by mouth every 6 hours as needed. 30 Tab 0   • glucose blood (FREESTYLE LITE) strip Use as directed.patient tests once a day     • aspirin EC  "(ECOTRIN) 81 MG Tablet Delayed Response Take 81 mg by mouth every day.     • ibuprofen (MOTRIN) 200 MG Tab Take 200 mg by mouth every 6 hours as needed.       No current Epic-ordered facility-administered medications on file.        Constitutional ROS: No unexpected change in weight, No weakness, No unexplained fevers, sweats, or chills  Pulmonary ROS: positive per HPI  Cardiovascular ROS: No chest pain  Gastrointestinal ROS: No abdominal pain, No nausea, vomiting, diarrhea, or constipation  Musculoskeletal/Extremities ROS: No clubbing, No peripheral edema, No pain, redness or swelling on the joints  Skin/Integumentary ROS: positive per HPI  Neurologic ROS: Normal development, No seizures, No weakness  Endocrine ROS: positive per HPI    Physical exam:  /80 (BP Location: Left arm, Patient Position: Sitting, BP Cuff Size: Adult)   Pulse 94   Temp 36.9 °C (98.5 °F) (Temporal)   Resp 18   Ht 1.6 m (5' 3\")   Wt 78.9 kg (174 lb)   SpO2 94%   BMI 30.82 kg/m²   General Appearance: very pleasant elderly female, alert, no distress, well groomed  Skin: positive for venous stasis rash to anterior aspect of LLE  Lungs: negative findings: normal respiratory rate and rhythm, lungs clear to auscultation  Heart: negative. RRR without murmur, gallop, or rubs.  No ectopy.  Abdomen: Abdomen soft, non-tender. BS normal. No masses,  No organomegaly  Musculoskeletal: negative findings: no evidence of joint instability, no evidence of muscle atrophy, no deformities present  Neurologic: intact, CN 2-12 grossly intact    Medical decision making/discussion:     A1c in February    Follow up after lab    meds refilled    Will trial steroid cream for rash    Jigna was seen today for skin lesion and other.    Diagnoses and all orders for this visit:    Venous stasis dermatitis of left lower extremity  -     triamcinolone acetonide (KENALOG) 0.1 % Cream; Apply to affected area twice a day for no more than 2 weeks at a time    Type 2 " diabetes mellitus with complication, without long-term current use of insulin (HCC)  -     HEMOGLOBIN A1C; Future    Chronic obstructive pulmonary disease, unspecified COPD type (HCC)  -     ipratropium-albuterol (DUONEB) 0.5-2.5 (3) MG/3ML nebulizer solution; Take 3 mL by nebulization 4 times a day.        Return in about 3 months (around 3/17/2021) for Follow-up, Discuss Labs.        Please note that this dictation was created using voice recognition software. I have made every reasonable attempt to correct obvious errors, but I expect that there are errors of grammar and possibly content that I did not discover before finalizing the note.

## 2020-12-21 NOTE — ASSESSMENT & PLAN NOTE
Chronic and stable  Unfortunately she continues to smoke and understands the risks in doing so  Does need refills on Duoneb, this has been refilled  Does not report any recent exacerbations

## 2020-12-21 NOTE — ASSESSMENT & PLAN NOTE
This is a chronic and stable condition  Well controlled on Metformin alone, A1c in late August was 6.1%  Appropriately on ARB, on tricor as she does not tolerate statins  Will be due for labs in late February  Discussed importance of lifestyle modifications

## 2021-01-11 ENCOUNTER — OFFICE VISIT (OUTPATIENT)
Dept: URGENT CARE | Facility: PHYSICIAN GROUP | Age: 71
End: 2021-01-11
Payer: MEDICARE

## 2021-01-11 VITALS
TEMPERATURE: 97.1 F | HEART RATE: 105 BPM | HEIGHT: 63 IN | OXYGEN SATURATION: 91 % | BODY MASS INDEX: 30.62 KG/M2 | SYSTOLIC BLOOD PRESSURE: 122 MMHG | DIASTOLIC BLOOD PRESSURE: 72 MMHG | RESPIRATION RATE: 16 BRPM | WEIGHT: 172.8 LBS

## 2021-01-11 DIAGNOSIS — R07.9 CHEST PAIN, UNSPECIFIED TYPE: ICD-10-CM

## 2021-01-11 DIAGNOSIS — R42 VERTIGO: ICD-10-CM

## 2021-01-11 DIAGNOSIS — R53.83 FATIGUE, UNSPECIFIED TYPE: ICD-10-CM

## 2021-01-11 PROCEDURE — 93000 ELECTROCARDIOGRAM COMPLETE: CPT | Performed by: PHYSICIAN ASSISTANT

## 2021-01-11 PROCEDURE — 99214 OFFICE O/P EST MOD 30 MIN: CPT | Performed by: PHYSICIAN ASSISTANT

## 2021-01-11 NOTE — PROGRESS NOTES
Chief Complaint   Patient presents with   • Dizziness     started on thursday, pt states she had episode of felling hot went to bed, woke feeling dizzy.        HISTORY OF PRESENT ILLNESS: Patient is a 70 y.o. female who presents today for the followin2021, sitting, reading; suddenly felt hot, diaphoretic, squeezing sensation around her chest  Got up to go to bed; woke up on the floor; doesn't remember passing out/falling;  helped her up, passed out again  Slept the rest of the day  Dizziness since; no longer squeezing sensation  Dizziness is worse with walking or getting up  Denies SOB; didn't realize she was about to pass out  H/o MI 11 years ago; symptoms were similar but more intense than ; had a drug-eluting stent placed at that time; history of left-sided endarterectomy  Denies palpitations, fever, speech changes, vision changes, weakness, recent illness, urinary symptoms  DMII, doesn't check her sugars regularly; last A1C 6.1 2020  Last saw cardiology 2 years ago  Swept the floor yesterday and had to lay down for the rest of the day; extreme fatigue continues today; not normal for her  Denies orthopnea, PND, leg swelling  Cardiologist was in [place]    Patient Active Problem List    Diagnosis Date Noted   • Venous stasis dermatitis of left lower extremity 2020   • Neuropathic pain 2020   • Dysuria 2020   • Abnormal screening cardiac CT 2020   • Vaginal yeast infection 2020   • Pneumonia due to infectious organism 2020   • Obesity (BMI 30-39.9) 2019   • S/P carotid endarterectomy 2019   • Radicular pain in left arm 2019   • Neck pain on left side 2019   • Vitamin D deficiency 2018   • Essential hypertension 2018   • Anxiety 2016   • CAD (coronary artery disease) 2016   • COPD (chronic obstructive pulmonary disease) (HCC) 2016   • Type 2 diabetes mellitus with complication, without long-term current  use of insulin (HCC) 03/25/2016   • Goiter 03/25/2016   • Hyperlipidemia 03/25/2016   • Stented coronary artery 03/25/2016   • Tobacco dependence 03/25/2016   • Venous stasis 03/25/2016   • Carotid artery stenosis without cerebral infarction 09/25/2014       Allergies:Sulfa drugs    Current Outpatient Medications Ordered in Epic   Medication Sig Dispense Refill   • triamcinolone acetonide (KENALOG) 0.1 % Cream Apply to affected area twice a day for no more than 2 weeks at a time 45 g 1   • ipratropium-albuterol (DUONEB) 0.5-2.5 (3) MG/3ML nebulizer solution Take 3 mL by nebulization 4 times a day. 270 mL 0   • losartan-hydrochlorothiazide (HYZAAR) 100-12.5 MG per tablet TAKE 1 TABLET BY MOUTH DAILY 90 Tab 0   • gabapentin (NEURONTIN) 100 MG Cap Take 1 Cap by mouth 3 times a day. 90 Cap 2   • fenofibrate (TRICOR) 145 MG Tab TAKE 1 TABLET DAILY 90 Tab 3   • metFORMIN ER (GLUCOPHAGE XR) 500 MG TABLET SR 24 HR TAKE 1 TABLET TWICE A  Tab 1   • PROAIR  (90 Base) MCG/ACT Aero Soln inhalation aerosol USE 2 INHALATIONS EVERY 6 HOURS AS NEEDED FOR SHORTNESS OF BREATH 1 Inhaler 5   • fluticasone-salmeterol (ADVAIR) 250-50 MCG/DOSE AEROSOL POWDER, BREATH ACTIVATED USE 1 INHALATION EVERY 12 HOURS 3 Inhaler 1   • SPIRIVA HANDIHALER 18 MCG Cap INHALE THE CONTENTS OF 1 CAPSULE DAILY 90 Cap 4   • DUREZOL 0.05 % Emulsion      • vitamin D (CHOLECALCIFEROL) 1000 UNIT Tab Take 1,000 Units by mouth every day.     • naproxen (NAPROSYN) 500 MG Tab TAKE 1 TABLET BY MOUTH TWICE DAILY WITH MEALS 60 Tab 2   • tizanidine (ZANAFLEX) 4 MG Tab Take 1 Tab by mouth every 6 hours as needed. 30 Tab 0   • glucose blood (FREESTYLE LITE) strip Use as directed.patient tests once a day     • aspirin EC (ECOTRIN) 81 MG Tablet Delayed Response Take 81 mg by mouth every day.     • ibuprofen (MOTRIN) 200 MG Tab Take 200 mg by mouth every 6 hours as needed.       No current Epic-ordered facility-administered medications on file.        Past  "Medical History:   Diagnosis Date   • Abnormal screening cardiac CT 2020   • Anxiety    • Carotid arterial disease (HCC)    • COPD (chronic obstructive pulmonary disease) (HCC)    • Diabetes (HCC)    • HTN (hypertension)    • Hyperlipidemia    • Tobacco use        Social History     Tobacco Use   • Smoking status: Current Some Day Smoker     Packs/day: 0.50     Types: Cigarettes   • Smokeless tobacco: Never Used   Substance Use Topics   • Alcohol use: No   • Drug use: Yes     Types: Marijuana       Family Status   Relation Name Status   • Mo       Family History   Problem Relation Age of Onset   • Cancer Mother 47        breast       Review of Systems:  Constitutional ROS: No unexpected change in weight, No weakness, No fatigue  Eye ROS: No recent significant change in vision, No eye pain, redness, discharge  Ear ROS: No drainage, No tinnitus or vertigo, No recent change in hearing  Mouth/Throat ROS: No teeth or gum problems, No bleeding gums, No tongue complaints  Neck ROS: No swollen glands, No significant pain in neck  Pulmonary ROS: No chronic cough, sputum, or hemoptysis, No dyspnea on exertion, No wheezing  Cardiovascular ROS: chest tightness  Musculoskeletal/Extremities ROS: No peripheral edema, No pain, redness or swelling on the joints  Hematologic/Lymphatic ROS: No chills, No night sweats, No weight loss  Skin/Integumentary ROS: No edema, No evidence of rash, No itching      Exam:  /72   Pulse (!) 105   Temp 36.2 °C (97.1 °F) (Temporal)   Resp 16   Ht 1.6 m (5' 3\")   Wt 78.4 kg (172 lb 12.8 oz)   SpO2 91%   General: Well developed, well nourished. No distress.    Eye: PERRL/EOMI; conjunctivae clear, lids normal.  Neck: No carotid bruits noted.  Scar noted on the left side of the neck, consistent with HPI of history of endarterectomy.  ENMT: Lips without lesions, MMM. Oropharynx is clear. Bilateral TMs are within normal limits.  Pulmonary: Unlabored respiratory effort. Lungs clear " to auscultation, no wheezes, no rhonchi.    Cardiovascular: Regular rate and rhythm without murmur.   Neurologic: Grossly nonfocal. No facial asymmetry noted.  Extremities: No lower extremity edema noted.  Skin: Warm, dry, good turgor. No rashes in visible areas.   Psych: Normal mood. Alert and oriented to person, place and time.    EKG, per my interpretation: Normal sinus rhythm.  Normal axis.  No obvious ST elevation/depression.  No old EKGs for comparison.    Assessment/Plan:  Had a long discussion with the patient regarding her symptoms.  Am concerned that this may be cardiac in nature.  Her initial chest tightness did resolve after 5 to 10 minutes 1/7/2021 but she has had persistent extreme fatigue.  Patient states that she is scared to go to the emergency department in light of Covid.  She is polite but refuses ED evaluation at this time.  Placing an urgent referral to cardiology for further evaluation, understanding if symptoms worsen at any point that she will call 911 or proceed directly to the emergency department.  Patient verbalizes understanding and agrees with plan of care.  1. Chest pain, unspecified type  EKG    REFERRAL TO CARDIOLOGY   2. Fatigue, unspecified type  EKG    REFERRAL TO CARDIOLOGY   3. Vertigo  EKG    REFERRAL TO CARDIOLOGY

## 2021-01-18 ENCOUNTER — TELEPHONE (OUTPATIENT)
Dept: CARDIOLOGY | Facility: MEDICAL CENTER | Age: 71
End: 2021-01-18

## 2021-01-18 NOTE — TELEPHONE ENCOUNTER
Spoke with pt and confimred pt was last seen by cardiologist in [facility]. Dr. Cuenca. All records have been requested for upcoming appt with AK.    Pending records.

## 2021-01-19 ENCOUNTER — OFFICE VISIT (OUTPATIENT)
Dept: CARDIOLOGY | Facility: MEDICAL CENTER | Age: 71
End: 2021-01-19
Payer: MEDICARE

## 2021-01-19 VITALS
SYSTOLIC BLOOD PRESSURE: 132 MMHG | RESPIRATION RATE: 12 BRPM | WEIGHT: 172 LBS | HEIGHT: 63 IN | HEART RATE: 98 BPM | BODY MASS INDEX: 30.48 KG/M2 | DIASTOLIC BLOOD PRESSURE: 82 MMHG | OXYGEN SATURATION: 94 %

## 2021-01-19 DIAGNOSIS — I20.0 UNSTABLE ANGINA (HCC): ICD-10-CM

## 2021-01-19 DIAGNOSIS — I25.2 OLD MYOCARDIAL INFARCTION: ICD-10-CM

## 2021-01-19 PROCEDURE — 99204 OFFICE O/P NEW MOD 45 MIN: CPT | Performed by: INTERNAL MEDICINE

## 2021-01-19 RX ORDER — NITROGLYCERIN 0.4 MG/1
0.4 TABLET SUBLINGUAL PRN
Qty: 25 TAB | Refills: 3 | Status: SHIPPED | OUTPATIENT
Start: 2021-01-19 | End: 2021-01-21 | Stop reason: SDUPTHER

## 2021-01-19 RX ORDER — EZETIMIBE 10 MG/1
10 TABLET ORAL DAILY
Qty: 30 TAB | Refills: 11 | Status: SHIPPED | OUTPATIENT
Start: 2021-01-19 | End: 2021-04-12

## 2021-01-19 NOTE — PROGRESS NOTES
Cardiology Initial Consultation Note    Date of note:    1/19/2021    Primary Care Provider: OMAYRA Tanner  Referring Provider: Anny Ravi P.A.*     Patient Name: Jigna Allen     YOB: 1950  MRN:              [id number]    Chief Complaint: Chest pressure    Jigna Allen is a 70 y.o. female  patient presented today with complaints of chest pressure.  She had history of coronary artery disease, prior MI, prior stent placed several years ago in [place].  She did well from cardiac standpoint all this years, recently she had an episode of angina similar to what she had before.  She felt sudden onset heart flashes along with sweating, chest pressure lasted for several minutes.  She was seen by her primary care physician, referred to me.  She feels well currently, since that episode she does not have recurrent episodes.    ROS  Fatigue, itching, cough, wheezing, heartburn, neck pain, back pain, joint pain, easy bruising, dizziness, passing out, anxiety, memory loss.    All other systems reviewed and discussed using a comprehensive questionnaire and are negative.     Past medical history, family history, social history, allergies and labs are reviewed and updated as needed as documented below.    Past Medical History:   Diagnosis Date   • Abnormal screening cardiac CT 2/14/2020   • Anxiety    • Carotid arterial disease (HCC)    • COPD (chronic obstructive pulmonary disease) (HCC)    • Diabetes (HCC)    • HTN (hypertension)    • Hyperlipidemia    • Tobacco use          No past surgical history on file.      Current Outpatient Medications   Medication Sig Dispense Refill   • ezetimibe (ZETIA) 10 MG Tab Take 1 Tab by mouth every day. 30 Tab 11   • triamcinolone acetonide (KENALOG) 0.1 % Cream Apply to affected area twice a day for no more than 2 weeks at a time 45 g 1   • ipratropium-albuterol (DUONEB) 0.5-2.5 (3) MG/3ML nebulizer solution Take 3 mL by nebulization 4 times a day. 270 mL  0   • losartan-hydrochlorothiazide (HYZAAR) 100-12.5 MG per tablet TAKE 1 TABLET BY MOUTH DAILY 90 Tab 0   • metFORMIN ER (GLUCOPHAGE XR) 500 MG TABLET SR 24 HR TAKE 1 TABLET TWICE A  Tab 1   • PROAIR  (90 Base) MCG/ACT Aero Soln inhalation aerosol USE 2 INHALATIONS EVERY 6 HOURS AS NEEDED FOR SHORTNESS OF BREATH 1 Inhaler 5   • fluticasone-salmeterol (ADVAIR) 250-50 MCG/DOSE AEROSOL POWDER, BREATH ACTIVATED USE 1 INHALATION EVERY 12 HOURS 3 Inhaler 1   • SPIRIVA HANDIHALER 18 MCG Cap INHALE THE CONTENTS OF 1 CAPSULE DAILY 90 Cap 4   • vitamin D (CHOLECALCIFEROL) 1000 UNIT Tab Take 1,000 Units by mouth every day.     • aspirin EC (ECOTRIN) 81 MG Tablet Delayed Response Take 81 mg by mouth every day.     • ibuprofen (MOTRIN) 200 MG Tab Take 200 mg by mouth every 6 hours as needed.     • DUREZOL 0.05 % Emulsion      • glucose blood (FREESTYLE LITE) strip Use as directed.patient tests once a day       No current facility-administered medications for this visit.          Allergies   Allergen Reactions   • Sulfa Drugs          Family History   Problem Relation Age of Onset   • Cancer Mother 47        breast         Social History     Socioeconomic History   • Marital status:      Spouse name: Not on file   • Number of children: Not on file   • Years of education: Not on file   • Highest education level: Not on file   Occupational History   • Not on file   Social Needs   • Financial resource strain: Not on file   • Food insecurity     Worry: Not on file     Inability: Not on file   • Transportation needs     Medical: Not on file     Non-medical: Not on file   Tobacco Use   • Smoking status: Current Some Day Smoker     Packs/day: 0.50     Types: Cigarettes   • Smokeless tobacco: Never Used   Substance and Sexual Activity   • Alcohol use: No   • Drug use: Yes     Types: Marijuana   • Sexual activity: Not Currently     Partners: Male     Comment:    Lifestyle   • Physical activity     Days per  "week: Not on file     Minutes per session: Not on file   • Stress: Not on file   Relationships   • Social connections     Talks on phone: Not on file     Gets together: Not on file     Attends Jainism service: Not on file     Active member of club or organization: Not on file     Attends meetings of clubs or organizations: Not on file     Relationship status: Not on file   • Intimate partner violence     Fear of current or ex partner: Not on file     Emotionally abused: Not on file     Physically abused: Not on file     Forced sexual activity: Not on file   Other Topics Concern   • Not on file   Social History Narrative   • Not on file         Physical Exam:  Ambulatory Vitals  /82 (BP Location: Right arm, Patient Position: Sitting, BP Cuff Size: Adult)   Pulse 98   Resp 12   Ht 1.6 m (5' 3\")   Wt 78 kg (172 lb)   SpO2 94%    Oxygen Therapy:  Pulse Oximetry: 94 %  BP Readings from Last 4 Encounters:   01/19/21 132/82   01/11/21 122/72   12/17/20 140/80   01/07/20 124/76       Weight/BMI: Body mass index is 30.47 kg/m².  Wt Readings from Last 4 Encounters:   01/19/21 78 kg (172 lb)   01/11/21 78.4 kg (172 lb 12.8 oz)   12/17/20 78.9 kg (174 lb)   08/24/20 77.1 kg (170 lb)       General: Well appearing and in no apparent distress  Head: atrumatic  Eyes: No conjunctival pallor   ENT: normal external appearance of nose and ears  Neck: JVD absent, carotid bruits absent  Lungs: respiratory sounds  normal, additional breath sounds absent  Heart: Regular rhythm,   No palpable thrills on palpation, murmurs absent, no rubs,   Lower extremity edema absent.   Pedal pulses normal  Abdomen: soft, non tender, non distended.  Extremities/MSK: no clubbing, no cyanosis  Neurological: normal orientation, Gait normal   Psychiatric: Appropriate affect, intact judgement and insight  Skin: Warm extremities      Lab Data Review:  Lab Results   Component Value Date/Time    LUCIENANUSHKA 181 08/31/2020 10:05 AM     (H) " 2020 10:05 AM    HDL 62 2020 10:05 AM    TRIGLYCERIDE 90 2020 10:05 AM       Lab Results   Component Value Date/Time    SODIUM 139 2020 10:13 AM    POTASSIUM 4.0 2020 10:13 AM    CHLORIDE 100 2020 10:13 AM    CO2 23 2020 10:13 AM    GLUCOSE 115 (H) 2020 10:13 AM    BUN 19 2020 10:13 AM    CREATININE 0.74 2020 10:13 AM     Lab Results   Component Value Date/Time    ALKPHOSPHAT 52 2020 10:13 AM    ASTSGOT 20 2020 10:13 AM    ALTSGPT 20 2020 10:13 AM    TBILIRUBIN 0.3 2020 10:13 AM      Lab Results   Component Value Date/Time    WBC 11.2 (H) 10/08/2018 11:24 AM     Notes from Ms. Ravi reviewed 21.    EKG from 2021 reviewed, independently interpreted sinus rhythm, no significant ST changes    Medical Decision Makin-year-old female patient with  1.  Episode of anginal equivalent, unstable angina  2.  Coronary artery disease with prior stent  3.  Carotid artery stenosis with prior endarterectomy  4.  Diabetes mellitus  5.  Smoking  6.  Hypertension  7.  COPD    She has an episode of chest pain, similar to her prior angina.  She is feeling currently well, no recurrent angina.  Will perform nuclear stress test to further evaluate.  Her LDL was 100, stop fenofibrate, start Zetia.  She cannot take statins due to muscle cramps.  Continue aspirin 81 mg daily.  Blood pressure is well controlled, continue losartan hydrochlorothiazide.  Repeat lipid panel in 3 months on Zetia.  Prescribed nitroglycerin tablets in case if she develops recurrent chest pains.    If the stress test is normal, will follow up in a year.  This note was dictated using Dragon speech recognition software.    Jason PAYAN  Interventional cardiologist  [facility] [address]  [address] [address]  [place] [address]  Phone: [phone]  Fax: [phone]

## 2021-01-20 ENCOUNTER — PATIENT MESSAGE (OUTPATIENT)
Dept: CARDIOLOGY | Facility: MEDICAL CENTER | Age: 71
End: 2021-01-20

## 2021-01-21 RX ORDER — NITROGLYCERIN 0.4 MG/1
0.4 TABLET SUBLINGUAL PRN
Qty: 25 TAB | Refills: 3 | Status: SHIPPED | OUTPATIENT
Start: 2021-01-21 | End: 2022-03-22 | Stop reason: SDUPTHER

## 2021-01-25 ENCOUNTER — APPOINTMENT (OUTPATIENT)
Dept: RADIOLOGY | Facility: MEDICAL CENTER | Age: 71
End: 2021-01-25
Attending: INTERNAL MEDICINE
Payer: MEDICARE

## 2021-01-25 DIAGNOSIS — I25.2 OLD MYOCARDIAL INFARCTION: ICD-10-CM

## 2021-01-25 DIAGNOSIS — I20.0 UNSTABLE ANGINA (HCC): ICD-10-CM

## 2021-01-25 PROCEDURE — 93018 CV STRESS TEST I&R ONLY: CPT | Performed by: INTERNAL MEDICINE

## 2021-01-25 PROCEDURE — 700111 HCHG RX REV CODE 636 W/ 250 OVERRIDE (IP)

## 2021-01-25 PROCEDURE — A9502 TC99M TETROFOSMIN: HCPCS

## 2021-01-25 PROCEDURE — 78452 HT MUSCLE IMAGE SPECT MULT: CPT | Mod: 26 | Performed by: INTERNAL MEDICINE

## 2021-01-25 RX ORDER — REGADENOSON 0.08 MG/ML
0.4 INJECTION, SOLUTION INTRAVENOUS ONCE
Status: COMPLETED | OUTPATIENT
Start: 2021-01-25 | End: 2021-01-25

## 2021-01-25 RX ORDER — REGADENOSON 0.08 MG/ML
INJECTION, SOLUTION INTRAVENOUS
Status: COMPLETED
Start: 2021-01-25 | End: 2021-01-25

## 2021-01-25 RX ADMIN — REGADENOSON 0.4 MG: 0.08 INJECTION, SOLUTION INTRAVENOUS at 13:58

## 2021-01-29 DIAGNOSIS — I10 ESSENTIAL HYPERTENSION: ICD-10-CM

## 2021-02-02 RX ORDER — LOSARTAN POTASSIUM AND HYDROCHLOROTHIAZIDE 12.5; 1 MG/1; MG/1
TABLET ORAL
Qty: 90 TAB | Refills: 0 | Status: SHIPPED | OUTPATIENT
Start: 2021-02-02 | End: 2021-05-03

## 2021-03-23 ENCOUNTER — HOSPITAL ENCOUNTER (OUTPATIENT)
Dept: LAB | Facility: MEDICAL CENTER | Age: 71
End: 2021-03-23
Attending: NURSE PRACTITIONER
Payer: MEDICARE

## 2021-03-23 DIAGNOSIS — E11.8 TYPE 2 DIABETES MELLITUS WITH COMPLICATION, WITHOUT LONG-TERM CURRENT USE OF INSULIN (HCC): ICD-10-CM

## 2021-03-23 PROCEDURE — 83036 HEMOGLOBIN GLYCOSYLATED A1C: CPT | Mod: GA

## 2021-03-23 PROCEDURE — 36415 COLL VENOUS BLD VENIPUNCTURE: CPT | Mod: GA

## 2021-03-24 LAB
EST. AVERAGE GLUCOSE BLD GHB EST-MCNC: 146 MG/DL
HBA1C MFR BLD: 6.7 % (ref 4–5.6)

## 2021-04-05 DIAGNOSIS — I87.2 VENOUS STASIS DERMATITIS OF LEFT LOWER EXTREMITY: ICD-10-CM

## 2021-04-05 RX ORDER — TRIAMCINOLONE ACETONIDE 1 MG/G
CREAM TOPICAL
Qty: 45 G | Refills: 1 | Status: SHIPPED | OUTPATIENT
Start: 2021-04-05 | End: 2021-11-17 | Stop reason: SDUPTHER

## 2021-04-12 ENCOUNTER — OFFICE VISIT (OUTPATIENT)
Dept: MEDICAL GROUP | Facility: PHYSICIAN GROUP | Age: 71
End: 2021-04-12
Payer: MEDICARE

## 2021-04-12 VITALS
OXYGEN SATURATION: 98 % | HEART RATE: 94 BPM | HEIGHT: 63 IN | WEIGHT: 174 LBS | TEMPERATURE: 97.6 F | SYSTOLIC BLOOD PRESSURE: 152 MMHG | RESPIRATION RATE: 14 BRPM | BODY MASS INDEX: 30.83 KG/M2 | DIASTOLIC BLOOD PRESSURE: 90 MMHG

## 2021-04-12 DIAGNOSIS — Z23 NEED FOR VACCINATION: ICD-10-CM

## 2021-04-12 DIAGNOSIS — E78.2 MIXED HYPERLIPIDEMIA: ICD-10-CM

## 2021-04-12 DIAGNOSIS — Z12.11 SCREENING FOR COLON CANCER: ICD-10-CM

## 2021-04-12 DIAGNOSIS — E11.8 TYPE 2 DIABETES MELLITUS WITH COMPLICATION, WITHOUT LONG-TERM CURRENT USE OF INSULIN (HCC): ICD-10-CM

## 2021-04-12 PROCEDURE — 99214 OFFICE O/P EST MOD 30 MIN: CPT | Performed by: NURSE PRACTITIONER

## 2021-04-12 RX ORDER — FENOFIBRATE 145 MG/1
145 TABLET, COATED ORAL DAILY
COMMUNITY
End: 2021-04-12 | Stop reason: SDUPTHER

## 2021-04-12 RX ORDER — FENOFIBRATE 145 MG/1
145 TABLET, COATED ORAL DAILY
Qty: 90 TABLET | Refills: 3 | Status: SHIPPED | OUTPATIENT
Start: 2021-04-12 | End: 2022-03-21

## 2021-04-12 ASSESSMENT — PATIENT HEALTH QUESTIONNAIRE - PHQ9: CLINICAL INTERPRETATION OF PHQ2 SCORE: 0

## 2021-04-12 NOTE — ASSESSMENT & PLAN NOTE
The ASCVD Risk score (Marekmallorie MOORE Jr, et al., 2013) failed to calculate.  Patient suffers from statin intolerance however she is candidate for cholesterol-lowering medication in the setting of hypertension and type 2 diabetes  She is closely followed by cardiology, states she was recently put on Zetia but did not tolerate this as well, caused significant diarrhea  Previously on fenofibrate, she still has some medication left over from previous prescription he would like to continue on this until she can be seen again by cardiology, this is reasonable, medication refilled

## 2021-04-12 NOTE — ASSESSMENT & PLAN NOTE
This is a chronic condition, usually well controlled on medications including Metformin 500 mg twice daily  A1c previously at 6.1%, this has gone up to 6.7%  She has successfully brought her A1c down with diet and exercise along with medication and would like to work on this for another 3 to 4 months, this is reasonable  Appropriately on ARB but not on statin due to intolerance  Due for monofilament exam, this is completed today  She will follow up in 3 to 4 months with labs done before visit

## 2021-04-12 NOTE — PROGRESS NOTES
Chief Complaint   Patient presents with   • Diabetes       HISTORY OF PRESENT ILLNESS: Patient is a 70 y.o. female established patient who presents today to follow-up, review labs    Hyperlipidemia  The ASCVD Risk score (Marek OSCAR Jr, et al., 2013) failed to calculate.  Patient suffers from statin intolerance however she is candidate for cholesterol-lowering medication in the setting of hypertension and type 2 diabetes  She is closely followed by cardiology, states she was recently put on Zetia but did not tolerate this as well, caused significant diarrhea  Previously on fenofibrate, she still has some medication left over from previous prescription he would like to continue on this until she can be seen again by cardiology, this is reasonable, medication refilled    Type 2 diabetes mellitus with complication, without long-term current use of insulin (HCC)  This is a chronic condition, usually well controlled on medications including Metformin 500 mg twice daily  A1c previously at 6.1%, this has gone up to 6.7%  She has successfully brought her A1c down with diet and exercise along with medication and would like to work on this for another 3 to 4 months, this is reasonable  Appropriately on ARB but not on statin due to intolerance  Due for monofilament exam, this is completed today  She will follow up in 3 to 4 months with labs done before visit      Patient Active Problem List    Diagnosis Date Noted   • Venous stasis dermatitis of left lower extremity 12/17/2020   • Neuropathic pain 08/24/2020   • Dysuria 08/24/2020   • Abnormal screening cardiac CT 02/14/2020   • Vaginal yeast infection 01/07/2020   • Pneumonia due to infectious organism 01/07/2020   • Obesity (BMI 30-39.9) 09/17/2019   • S/P carotid endarterectomy 02/05/2019   • Radicular pain in left arm 02/05/2019   • Neck pain on left side 02/05/2019   • Vitamin D deficiency 11/06/2018   • Essential hypertension 09/25/2018   • Anxiety 03/25/2016   • CAD  (coronary artery disease) 03/25/2016   • COPD (chronic obstructive pulmonary disease) ([facility]) 03/25/2016   • Type 2 diabetes mellitus with complication, without long-term current use of insulin ([facility]) 03/25/2016   • Goiter 03/25/2016   • Hyperlipidemia 03/25/2016   • Stented coronary artery 03/25/2016   • Tobacco dependence 03/25/2016   • Venous stasis 03/25/2016   • Carotid artery stenosis without cerebral infarction 09/25/2014       Allergies:Statins [hmg-coa-r inhibitors] and Sulfa drugs    Current Outpatient Medications   Medication Sig Dispense Refill   • Zoster Vac Recomb Adjuvanted (SHINGRIX) 50 MCG/0.5ML Recon Susp Inject 0.5 mL into the shoulder, thigh, or buttocks one time for 1 dose. 0.5 mL 0   • fenofibrate (TRICOR) 145 MG Tab Take 1 tablet by mouth every day. 90 tablet 3   • triamcinolone acetonide (KENALOG) 0.1 % Cream Apply to affected area twice a day for no more than 2 weeks at a time 45 g 1   • SPIRIVA HANDIHALER 18 MCG Cap INHALE THE CONTENTS OF 1 CAPSULE DAILY 90 capsule 3   • metFORMIN ER (GLUCOPHAGE XR) 500 MG TABLET SR 24 HR TAKE 1 TABLET TWICE A  tablet 3   • fluticasone-salmeterol (ADVAIR) 250-50 MCG/DOSE AEROSOL POWDER, BREATH ACTIVATED USE 1 INHALATION EVERY 12 HOURS 3 Each 1   • losartan-hydrochlorothiazide (HYZAAR) 100-12.5 MG per tablet TAKE 1 TABLET DAILY 90 Tab 0   • nitroglycerin (NITROSTAT) 0.4 MG SL Tab Place 1 Tab under the tongue as needed for Chest Pain (Once every 5 minutes up to 3 doses). 25 Tab 3   • ipratropium-albuterol (DUONEB) 0.5-2.5 (3) MG/3ML nebulizer solution Take 3 mL by nebulization 4 times a day. 270 mL 0   • PROAIR  (90 Base) MCG/ACT Aero Soln inhalation aerosol USE 2 INHALATIONS EVERY 6 HOURS AS NEEDED FOR SHORTNESS OF BREATH 1 Inhaler 5   • vitamin D (CHOLECALCIFEROL) 1000 UNIT Tab Take 1,000 Units by mouth every day.     • aspirin EC (ECOTRIN) 81 MG Tablet Delayed Response Take 81 mg by mouth every day.     • ibuprofen (MOTRIN) 200 MG Tab Take 200  "mg by mouth every 6 hours as needed.     • glucose blood (FREESTYLE LITE) strip Use as directed.patient tests once a day       No current facility-administered medications for this visit.       Social History     Tobacco Use   • Smoking status: Current Some Day Smoker     Packs/day: 0.50     Types: Cigarettes   • Smokeless tobacco: Never Used   Substance Use Topics   • Alcohol use: No   • Drug use: Yes     Types: Marijuana       Family Status   Relation Name Status   • Mo       Family History   Problem Relation Age of Onset   • Cancer Mother 47        breast       Review of Systems:   Constitutional: Negative for fever, chills, weight loss and malaise/fatigue.   HENT: Negative for ear pain, nosebleeds, congestion, sore throat and neck pain.    Eyes: Negative for blurred vision.   Respiratory: Negative for cough, sputum production, shortness of breath and wheezing.    Cardiovascular: Negative for chest pain, palpitations, orthopnea and leg swelling.   Gastrointestinal: Negative for heartburn, nausea, vomiting and abdominal pain.   Genitourinary/Renal: Negative for dysuria, urgency and frequency.   Musculoskeletal: Negative for myalgias, back pain and joint pain.   Skin: Negative for rash and itching.   Neurological: Negative for dizziness, tingling, tremors, sensory change, focal weakness and headaches.   Endo/Heme/Allergies: Does not bruise/bleed easily.   Psychiatric/Behavioral: Negative for depression, suicidal ideas and memory loss.  The patient is not nervous/anxious and does not have insomnia.    All other systems reviewed and are negative except as in HPI.    Exam:  /90   Pulse 94   Temp 36.4 °C (97.6 °F) (Temporal)   Resp 14   Ht 1.6 m (5' 3\")   Wt 78.9 kg (174 lb)   SpO2 98%   General:  Well nourished, well developed female in NAD  Skin: warm, dry, intact, no evidence of rash or concerning lesions  Head: is grossly normal.  HEENT: eyes clear, conjunctiva normal, PERRLA,TMs normal; " bilaterally  Neck: Supple without JVD or bruit. Thyroid is not enlarged.  Pulmonary: Clear to ausculation. Normal effort. No rales, ronchi, or wheezing.  Cardiovascular: Regular rate and rhythm without murmur. Carotid and radial pulses are intact and equal bilaterally.  Abdomen: soft, non-tender, positive bowel sounds  Musculoskeletal: no clubbing, cyanosis, or edema.  Psych/mental: no depression, anxiety, hallucinations  Neuro: alert, intact, CN 2-12 grossly intact    Medical decision-making and discussion:    As mentioned above, patient will continue with healthy lifestyle modifications and follow-up in 3 to 4 months with labs done before visit.  She will start her fenofibrate until he can be seen again by cardiology.        Assessment/Plan:  Jigna was seen today for diabetes.    Diagnoses and all orders for this visit:    Type 2 diabetes mellitus with complication, without long-term current use of insulin (HCC)  -     REFERRAL FOR RETINAL SCREENING EXAM  -     Diabetic Monofilament LE Exam    Mixed hyperlipidemia  -     fenofibrate (TRICOR) 145 MG Tab; Take 1 tablet by mouth every day.    Need for vaccination  -     Zoster Vac Recomb Adjuvanted (SHINGRIX) 50 MCG/0.5ML Recon Susp; Inject 0.5 mL into the shoulder, thigh, or buttocks one time for 1 dose.    Screening for colon cancer  -     Cologuard Colon Cancer Screening         Return in about 4 months (around 8/12/2021) for Chronic Pain.    Please note that this dictation was created using voice recognition software. I have made every reasonable attempt to correct obvious errors, but I expect that there are errors of grammar and possibly content that I did not discover before finalizing the note.

## 2021-06-03 DIAGNOSIS — R92.8 ABNORMAL MAMMOGRAM: ICD-10-CM

## 2021-06-21 ENCOUNTER — HOSPITAL ENCOUNTER (OUTPATIENT)
Dept: RADIOLOGY | Facility: MEDICAL CENTER | Age: 71
End: 2021-06-21
Attending: NURSE PRACTITIONER
Payer: MEDICARE

## 2021-06-21 DIAGNOSIS — R92.8 ABNORMAL MAMMOGRAM: ICD-10-CM

## 2021-06-21 PROCEDURE — G0279 TOMOSYNTHESIS, MAMMO: HCPCS

## 2021-07-02 NOTE — PROGRESS NOTES
Chief Complaint   Patient presents with   • Labs Only         This is a 68 y.o.female patient that presents today with the following: Follow-up, review labs    COPD (chronic obstructive pulmonary disease) (HCC)  This is a chronic condition, stable and well controlled on medications including Spiriva and Advair.  She also uses as needed albuterol.  She does not report any recent exacerbations.  She also does not use supplemental oxygen.    Hyperlipidemia  The ASCVD Risk score (Garland OSCAR Jr, et al., 2013) failed to calculate.  Patient does continue to decline statin, reports that she does not tolerate them.  She does continue to take fenofibrate.  She does understand the risks associated with not taking statin.  Her LDL has significantly improved since losing weight, she was congratulated for her weight loss efforts.  She will continue to work on this.    Obesity (BMI 30-39.9)  This is a chronic condition, much improved.  She is down nearly 20 pounds since her last visit with me.  She has been doing the keto diet and doing it in a healthy manner.  Her LDL has gone down over 20 points.  She states that she has more energy, feels healthier.  She was congratulated for her efforts and encouraged to continue with her weight loss efforts.    Type 2 diabetes mellitus with complication, without long-term current use of insulin (HCC)  This is a chronic condition, stable and very well controlled on current medications including metformin 500 mg twice a day, A1c 6.7%, this is down from 7.6% to 7 months ago.  She has started the keto diet, doing the healthy version and has lost nearly 20 pounds since she last saw me.  She is appropriately on ARB but does not take statin due to intolerance.  She will follow-up with me in 6 months with labs done before visit.      No visits with results within 1 Month(s) from this visit.   Latest known visit with results is:   Hospital Outpatient Visit on 08/14/2019   Component Date Value   •  July 2, 2021     [place] [id number]    Patient: Deysi Panda   YOB: 1941   Date of Visit: 7/2/2021       Dear Dr Bernie Gilmore: Thank you for referring Iglesia Hendrickson to me for evaluation  Below are my notes for this consultation  If you have questions, please do not hesitate to call me  I look forward to following your patient along with you  Sincerely,        Vero Mac MD        CC: No Recipients  Vero Mac MD  7/2/2021  9:48 AM  Sign when Signing Visit     Cystoscopy     Date/Time 7/2/2021 9:48 AM     Performed by  Vero Mac MD     Authorized by Vero Mac MD      Universal Protocol:  Timeout called at: 7/2/2021 9:48 AM       Procedure Details:  Procedure type: cystoscopy    Patient tolerance: Patient tolerated the procedure well with no immediate complications    Additional Procedure Details:   Cystoscopy procedure note:    Risk and benefits of flexible cystoscopy were discussed  Informed consent was obtained  Urine dip was adequate for cystoscopy  The patient was placed in the supine position  His genitalia was prepped with Betadine and draped in a sterile fashion  Viscous 2% lidocaine jelly was instilled into the urethra and allowed dwell time for local anesthesia  Patient does have some meatal stenosis from prior meatotomy and this was dilated with graduated lidocaine says cylinder  Flexible cystoscopy was then performed using a 16F scope  The distal urethra and prostatic urethra were evaluated and were normal in course and caliber  We did note a bulbar urethral stricture that was easily passable with the scope  Once inside the bladder, it was carefully inspected in a 360 degree fashion  Immediately at the left side of the bladder neck, there was a growth emanating from prosthetic tissue  Both ureteral orifices in their orthotopic location were visualized with clear efflux of urine  Cholesterol,Tot 08/14/2019 176    • Triglycerides 08/14/2019 143    • HDL 08/14/2019 46    • LDL 08/14/2019 101*   • Glycohemoglobin 08/14/2019 6.7*   • Est Avg Glucose 08/14/2019 146    • Fasting Status 08/14/2019 Fasting          clinical course has been stable    Past Medical History:   Diagnosis Date   • Anxiety    • Carotid arterial disease (HCC)    • COPD (chronic obstructive pulmonary disease) (HCC)    • Diabetes (HCC)    • HTN (hypertension)    • Hyperlipidemia    • Tobacco use        No past surgical history on file.    Family History   Problem Relation Age of Onset   • Cancer Mother 47        breast       Sulfa drugs    Current Outpatient Medications Ordered in Epic   Medication Sig Dispense Refill   • vitamin D (CHOLECALCIFEROL) 1000 UNIT Tab Take 1,000 Units by mouth every day.     • amLODIPine (NORVASC) 10 MG Tab Take 10 mg by mouth.     • losartan-hydrochlorothiazide (HYZAAR) 100-12.5 MG per tablet TAKE 1 TABLET DAILY 90 Tab 1   • naproxen (NAPROSYN) 500 MG Tab TAKE 1 TABLET BY MOUTH TWICE DAILY WITH MEALS 60 Tab 2   • SPIRIVA HANDIHALER 18 MCG Cap INHALE THE CONTENTS OF 1 CAPSULE DAILY 90 Cap 1   • fenofibrate (TRICOR) 145 MG Tab Take 1 Tab by mouth every day. 90 Tab 1   • albuterol 108 (90 Base) MCG/ACT Aero Soln inhalation aerosol Inhale 2 Puffs by mouth every 6 hours as needed for Shortness of Breath. 8.5 g 3   • tizanidine (ZANAFLEX) 4 MG Tab Take 1 Tab by mouth every 6 hours as needed. 30 Tab 0   • fluticasone-salmeterol (ADVAIR) 250-50 MCG/DOSE AEROSOL POWDER, BREATH ACTIVATED Inhale 1 Puff by mouth every 12 hours. 180 Inhaler 1   • metFORMIN ER (GLUCOPHAGE XR) 500 MG TABLET SR 24 HR Take 1 Tab by mouth 2 times a day. 180 Tab 3   • aspirin EC (ECOTRIN) 81 MG Tablet Delayed Response Take 81 mg by mouth every day.     • amcinonide (CYCLOCORT) 0.1 % topical cream Apply  to affected area(s) 2 times a day.     • betamethasone dipropionate (DIPROLENE) 0.05 % Cream Apply  to affected area(s) 2 times a  Retroflexion for evaluation of the bladder neck   Again demonstrated the abnormality  Overall this was   A cystoscopy that demonstrated a lesion emanating from the bladder neck and prostatic tissue  Hood Montenegro MD  7/2/2021  9:47 AM  Sign when Signing Visit    UROLOGY PROCEDURE NOTE     CHIEF COMPLAINT   Kamran Schmid is a 78 y o  male with a complaint of prostate cancer, metastatic    History of Present Illness:     78 y o  gentleman who had been undergoing routine prostate cancer screening with his primary care team  The patient has had a steady PSA but in March of 2017 was noted to have some oscillation  The patient was given a course of antibiotics and his PSA came down from the mid 5 range to 4 6  He initially presented in June 2017  We initially recommended follow-up in 1 year  Follow-up of his PSA values ordered by his primary team demonstrated concern for rapid rise  The patient return to see me in May of 2018  At that time, we recommended a prostate biopsy but the patient had just undergone percutaneous stenting and was on dual anti-platelet therapy  Biopsy delayed  The patient underwent a CT scan of his chest which demonstrated some sclerotic lesions in his primary care team again contacted us  We recommended repeat PSA and a bone scan  PSA has risen again and bone scan does demonstrate some concern  Lab Results   Component Value Date    PSA 13 0 (H) 06/30/2021    PSA 7 2 (H) 04/27/2021    PSA 6 9 (H) 04/13/2021   10/5/18 PSA 9 8  4/30/18 PSA 8 6, free 19 6%  9/30/17 PSA 7 3, free 15%  5/5/17 PSA 4 6  3/20/17 PSA 5 4     We did discuss with Interventional Radiology possible biopsy of the patient's thoracic and rib lesions although these were not felt to be safe for attempt  As such, the patient was arranged for a prostate biopsy here in our office  This demonstrated high risk [place] 9 disease    Patient was discussed at multidisciplinary conference and although "day.     • ibuprofen (MOTRIN) 200 MG Tab Take 200 mg by mouth every 6 hours as needed.     • fluticasone (FLONASE) 50 MCG/ACT nasal spray Spray  in nose.     • glucose blood (FREESTYLE LITE) strip Use as directed.patient tests once a day       No current [facility]-ordered facility-administered medications on file.        Constitutional ROS: No unexpected change in weight, No weakness, No unexplained fevers, sweats, or chills  Pulmonary ROS: No chronic cough, sputum, or hemoptysis, No shortness of breath, No recent change in breathing, Positive for smoker, COPD  Cardiovascular ROS: No chest pain, No edema, No palpitations, Positive for hypertension and hyperlipidemia  Gastrointestinal ROS: No abdominal pain, No nausea, vomiting, diarrhea, or constipation  Musculoskeletal/Extremities ROS: No clubbing, No peripheral edema, No pain, redness or swelling on the joints  Neurologic ROS: Normal development, No seizures, No weakness  Endocrine ROS: Positive per HPI    Physical exam:  /68 (BP Location: Left arm, Patient Position: Sitting, BP Cuff Size: Adult)   Pulse (!) 101   Temp 37.4 °C (99.3 °F) (Temporal)   Resp 12   Ht 1.575 m (5' 2\")   Wt 85.7 kg (189 lb)   SpO2 95%   Breastfeeding? No   BMI 34.57 kg/m²   General Appearance: Very pleasant older female, alert, no distress, obese, well-groomed  Skin: Skin color, texture, turgor normal. No rashes or lesions.  Lungs: negative findings: normal respiratory rate and rhythm, lungs clear to auscultation  Heart: negative. RRR without murmur, gallop, or rubs.  No ectopy.  Abdomen: Abdomen soft, non-tender. BS normal. No masses,  No organomegaly  Musculoskeletal: negative findings: no evidence of joint instability, no evidence of muscle atrophy, no deformities present  Neurologic: intact, CN II through XII grossly intact    Medical decision making/discussion: Patient to continue on medications and call for refills as needed.  She will get influenza immunization today.  She " we were not able to biopsy his bony lesions, it was felt these represented stage IV metastatic disease  The patient was started on androgen deprivation therapy and seen by Medical Oncology  He was started on enzalutamide and Zometa  He returns for androgen deprivation therapy  He has had good PSA response  Recently enzalutamide has transitioned to abiraterone and now back to enzalutamide  Patient continues to have progression of his bony disease on imaging as well as slight progression his PSA trend  Patient has had further discussion with medical oncology team after progression castrate resistant disease  PET scan has demonstrated activity in the thoracic spine and at the bladder base and additional pathologic testing was performed on his cancer  Ultimately it was decided the patient would be a candidate for radiation to the prostate and pelvis  He will likely be progressing to Taxotere chemotherapy according to medical oncology  In advance of his radiation, the team has requested fiducial marker placement  Multidisciplinary discussion with patient to ensure he had good understanding of prognosis and goals of care  Has now undergone local therapy with radiation  Last Lupron 3/2021  Continues to have 2nd opinion with the [facility] oncology team   Imaging raised concern for posterior bladder lesion  Questionable secondary malignancy for bladder tumor  Patient presents today for cystoscopy to rule out      Past Medical History:     Past Medical History:   Diagnosis Date    Anemia     last assessed  1/7/14     Cancer [facility])     Coronary artery disease     Hypercholesteremia     Hypertension     Kidney stone     Polyp of sigmoid colon     Prostate cancer ([address] )     Renal disorder     elevated serum creat    Tinnitus     unspecified laterality / last assessed 4/9/13    Vitamin D deficiency        PAST SURGICAL HISTORY:     Past Surgical History:   Procedure Laterality Date is to continue with right loss efforts as mentioned above.  I would like her to follow-up with me in 6 months with labs done before visit.    Jigna was seen today for labs only.    Diagnoses and all orders for this visit:    Type 2 diabetes mellitus with complication, without long-term current use of insulin (HCC)  -     HEMOGLOBIN A1C; Future  -     MICROALBUMIN CREAT RATIO URINE; Future    Mixed hyperlipidemia  -     Lipid Profile; Future    Chronic obstructive pulmonary disease, unspecified COPD type (HCC)    Obesity (BMI 30-39.9)  -     Patient identified as having weight management issue.  Appropriate orders and counseling given.    Need for vaccination  -     Zoster Vac Recomb Adjuvanted (SHINGRIX) 50 MCG/0.5ML Recon Susp; 0.5 mL by Intramuscular route Once for 1 dose.  -     INFLUENZA VACCINE, HIGH DOSE (65+ ONLY)    Screening for colorectal cancer  -     Occult Blood Feces Immunoassay (FIT); Future    Postmenopausal status (age-related) (natural)  -     DS-BONE DENSITY STUDY (DEXA)    Need for hepatitis C screening test  -     HEP C VIRUS ANTIBODY; Future        Return in about 6 months (around 3/17/2020) for Follow-up, Discuss Labs.        Please note that this dictation was created using voice recognition software. I have made every reasonable attempt to correct obvious errors, but I expect that there are errors of grammar and possibly content that I did not discover before finalizing the note.          CARDIAC SURGERY      COLONOSCOPY      CORONARY ANGIOPLASTY WITH STENT PLACEMENT      FL RETROGRADE PYELOGRAM  9/11/2019    HEMORRHOID SURGERY      TN CYSTO/URETERO W/LITHOTRIPSY &INDWELL STENT INSRT Left 9/11/2019    Procedure: CYSTO, URETEROSCOPY W/HOLMIUM LASER, BASKET STONE EXTRACTION, RETROGRADE PYELOGRAM, STENT EXCHANGE;  Surgeon: Paulino Milton MD;  Location: [facility];  Service: Urology    PROSTATE BIOPSY  10/24/2018    TONSILLECTOMY         CURRENT MEDICATIONS:     Current Outpatient Medications   Medication Sig Dispense Refill    acetaminophen (TYLENOL) 500 mg tablet Take 500 mg by mouth every 6 (six) hours as needed for mild pain      aspirin (ASPIRIN ADULT LOW STRENGTH) 81 mg EC tablet Take 1 tablet by mouth daily      atorvastatin (LIPITOR) 40 mg tablet Take 40 mg by mouth daily       Calcium 500 MG tablet Take 1,000 mg by mouth daily       cholecalciferol (VITAMIN D3) 1,000 units tablet Take 1 tablet by mouth daily      fluticasone (FLONASE) 50 mcg/act nasal spray 2 sprays into each nostril daily 16 g 3    hydrochlorothiazide (HYDRODIURIL) 25 mg tablet Take 1 tablet (25 mg total) by mouth daily 90 tablet 1    leuprolide (LUPRON DEPOT 6 MONTH KIT) 45 mg Inject 45 mg into a muscle every 6 (six) months 45 mg 0    losartan (COZAAR) 100 MG tablet Take 1 tablet (100 mg total) by mouth daily 90 tablet 1    MELATONIN PO Take 1 tablet by mouth daily at bedtime as needed       metoprolol succinate (TOPROL-XL) 25 mg 24 hr tablet take 1 tablet by mouth once daily 90 tablet 1    ondansetron (ZOFRAN) 4 mg tablet Take 1 tablet (4 mg total) by mouth every 8 (eight) hours as needed for nausea or vomiting 20 tablet 1    tamsulosin (FLOMAX) 0 4 mg take 1 capsule by mouth once daily with dinner 90 capsule 3    Zoledronic Acid (ZOMETA IV) Infuse into a venous catheter every 3 (three) months      [START ON 7/4/2021] predniSONE 5 mg tablet Take 1 tablet (5 mg total) by mouth 2 (two) times a day with meals 60 tablet 3     No current facility-administered medications for this visit  ALLERGIES:   No Known Allergies    SOCIAL HISTORY:     Social History     Socioeconomic History    Marital status: /Civil Union     Spouse name: None    Number of children: None    Years of education: None    Highest education level: None   Occupational History    Occupation: consultant   Tobacco Use    Smoking status: Never Smoker    Smokeless tobacco: Never Used   Vaping Use    Vaping Use: Never used   Substance and Sexual Activity    Alcohol use: Yes     Alcohol/week: 2 0 - 3 0 standard drinks     Types: 2 - 3 Glasses of wine per week     Comment: Occuational / social     Drug use: No    Sexual activity: None   Other Topics Concern    None   Social History Narrative    Always uses seat belt     Daily caffeine consumption 2-3 servings a day     Feels safe at home     Social Determinants of Health     Financial Resource Strain:     Difficulty of Paying Living Expenses:    Food Insecurity:     Worried About Running Out of Food in the Last Year:     Ran Out of Food in the Last Year:    Transportation Needs:     Lack of Transportation (Medical):      Lack of Transportation (Non-Medical):    Physical Activity:     Days of Exercise per Week:     Minutes of Exercise per Session:    Stress:     Feeling of Stress :    Social Connections:     Frequency of Communication with Friends and Family:     Frequency of Social Gatherings with Friends and Family:     Attends Confucianist Services:     Active Member of Clubs or Organizations:     Attends Club or Organization Meetings:     Marital Status:    Intimate Partner Violence:     Fear of Current or Ex-Partner:     Emotionally Abused:     Physically Abused:     Sexually Abused:        SOCIAL HISTORY:     Family History   Problem Relation Age of Onset    Breast cancer Mother     Hypertension Father        REVIEW OF SYSTEMS:     Review of Systems Constitutional: Negative  Respiratory: Negative  Cardiovascular: Negative  Gastrointestinal: Negative  Genitourinary: Positive for frequency  Musculoskeletal: Negative  Skin: Negative  Psychiatric/Behavioral: Negative  PHYSICAL EXAM:     /70   Pulse 63   Ht 5' 5" (1 651 m)   Wt 93 4 kg (206 lb)   BMI 34 28 kg/m²     General:  Healthy appearing male in no acute distress  They have a normal affect  There is not appear to be any gross neurologic defects or abnormalities  HEENT:  Normocephalic, atraumatic  Neck is supple without any palpable lymphadenopathy  Cardiovascular:  Patient has normal palpable distal radial pulses  There is no significant peripheral edema  No JVD is noted  Respiratory:  Patient has unlabored respirations  There is no audible wheeze or rhonchi  Abdomen:  Abdomen is soft and nontender  There is no tympany  Inguinal and umbilical hernia are not appreciated  Genitourinary:   Uncircumcised phallus, reducible foreskin, patient has some lichen sclerosis and prior surgical meatotomy incision is visible with some meatal stenosis, testicles are smooth and descended    Musculoskeletal:  Patient does not have significant CVA tenderness in the  flank with palpation or percussion  They full range of motion in all 4 extremities  Strength in all 4 extremities appears congruent  Patient is able to ambulate without assistance or difficulty  Dermatologic:  Patient has no skin abnormalities or rashes          LABS:     CBC:   Lab Results   Component Value Date    WBC 5 4 06/30/2021    HGB 13 1 (L) 06/30/2021    HCT 37 9 (L) 06/30/2021    MCV 90 2 06/30/2021     06/30/2021       BMP:   Lab Results   Component Value Date    CALCIUM 9 5 06/30/2021     09/30/2017    K 4 2 06/30/2021    CO2 28 06/30/2021     06/30/2021    BUN 23 06/30/2021    CREATININE 1 09 06/30/2021     10/5/18 PSA 9 8  Lab Results   Component Value Date    PSA 13 0 (H) 2021    PSA 7 2 (H) 2021    PSA 6 9 (H) 2021     IMAGIN/18/21  BONE SCAN  WHOLE BODY     INDICATION: Metastatic prostate carcinoma  Right shoulder pain      PREVIOUS FILM CORRELATION:    CT 2021, PET scan 2020, nuclear bone scan 2020     TECHNIQUE:   This study was performed following the intravenous administration of 24 7 mCi Tc-99m labeled MDP  Delayed, anterior and posterior whole body images were acquired, 2-3 hours after radiopharmaceutical administration      FINDINGS:     IMPRESSION:  Multiple osseous lesions are seen compatible with progressive osseous metastases  In comparison to the previous nuclear bone scan, IMPRESSION: There is increasing activity noted in the proximal humerus, near the junction of the neck and shaft  There is   also increasing intensity with respect to the right anterior 5th rib lesion  Significantly increased intensity has occurred at the T10 vertebral body, and development of increased activity at L5, right lateral iliac bone, and left iliac crest   There   is also a new small focus of activity involving the right posterior T8 rib     IMPRESSION: In comparison to the previous nuclear bone scan, there is both an increase in intensity and number of scintigraphic abnormalities, compatible with progression of osseous metastatic disease    21  CT CHEST, ABDOMEN AND PELVIS WITH IV CONTRAST     INDICATION:   C79 51: Secondary malignant neoplasm of bone  C61: Malignant neoplasm of prostate      COMPARISON:  Compared with 2020 and PET scan of 2020     TECHNIQUE: CT examination of the chest, abdomen and pelvis was performed  Axial, sagittal, and coronal 2D reformatted images were created from the source data and submitted for interpretation      Radiation dose length product (DLP) for this visit:  951 mGy-cm     This examination, like all CT scans performed in the [facility], was performed utilizing techniques to minimize radiation dose exposure, including the use of iterative   reconstruction and automated exposure control      IV Contrast:  100 mL of iohexol (OMNIPAQUE)  Enteric Contrast: Enteric contrast was administered      FINDINGS:     CHEST     LUNGS:  Lungs are clear  There is no tracheal or endobronchial lesion      PLEURA:  Unremarkable      HEART/GREAT VESSELS:  Unremarkable for patient's age      MEDIASTINUM AND JOSE ANTONIO:  Small hiatal hernia is unchanged      CHEST WALL AND LOWER NECK:   Unremarkable      ABDOMEN     LIVER/BILIARY TREE:  Stable hepatic hypodensities of known cysts      GALLBLADDER:  No calcified gallstones  No pericholecystic inflammatory change      SPLEEN:  Unremarkable      PANCREAS:  Unremarkable      ADRENAL GLANDS:  Unremarkable      KIDNEYS/URETERS:  Stable bilateral renal cysts largest on the left  Stable bilateral parapelvic cysts    No hydronephrosis      STOMACH AND BOWEL:  Unremarkable      APPENDIX:  No findings to suggest appendicitis      ABDOMINOPELVIC CAVITY:  No ascites  No pneumoperitoneum  No lymphadenopathy      VESSELS:  Unremarkable for patient's age      PELVIS     REPRODUCTIVE ORGANS:  Unremarkable for patient's age      URINARY BLADDER:  Persistent hyperdense  mass posteriorly measuring 1 8 x 1 5 cm compared to 2 1 x 1 8 cm      ABDOMINAL WALL/INGUINAL REGIONS:  Subcentimeter inguinal lymph nodes unchanged      OSSEOUS STRUCTURES: Tiny less than 4 mm sclerotic foci in the sacrum unchanged  1 5 cm sclerotic focus in L5 vertebral body increased from 1 2 cm  Tiny sclerotic focus in the right posterior 4th rib is unchanged   left posterior 7th sclerotic foci are unchanged  Unchanged right anterior 5th rib sclerotic focus  Sclerotic focus in T10 is unchanged  Other tiny scattered sclerotic foci unchanged      No acute fracture or destructive osseous lesion      IMPRESSION:     Persistent lobulated mass in the posterior bladder      Mostly unchanged sclerotic metastatic lesions in the bone with slight increase in the L5 lesion  PATHOLOGY:     Final Diagnosis   A  Right lateral base prostate core biopsy:  - Adenocarcinoma, [place] score 4+ 5 = 9/10 (grade 5 comprises 10% of core biopsy), grade group 5, continuously involving greater than 95% of this core biopsy  - No perineural invasion is seen     B  Right lateral mid prostate core biopsy:  - Adenocarcinoma, [place] score 4+ 5 = 9/10 (grade 5 conprises 20% of core biopsy), grade group 5, continuously involving 90% of this core biopsy  - Perineural invasion is seen      C  Left lateral apex prostate core biopsy:  - Adenocarcinoma, Vitaliy score 5 + 4 = 9/10 (grade 5 comprises 60% of core biopsy), grade group 5, continuously involving 60% of this core biopsy  - Perineural invasion is seen     D  Right base prostate core biopsy:  - Adenocarcinoma, [place] score 4 + 5 = 9/10 (grade 5 comprises 10% of core biopsy), grade group 5, continuously involving 85% of this core biopsy  - Perineural invasion is seen     E  Right mid prostate core biopsy   - Adenocarcinoma, [place] score 5 + 4 = 9/10 (grade 5 comprises 95% of core biopsy), grade group 5, continuously involving 60% of this core biopsy  - Perineural invasion is seen     F  Right apex prostate core biopsy:  - Adenocarcinoma, Vitaliy score 4 + 5 = 9/10 (grade 5 comprises 45% of core biopsy), grade group 5, continuously involving 70% of this core biopsy  - Perineural invasion is seen     G  Left base prostate core biopsy:  - Adenocarcinoma, Castlewood score 4 + 5 = 9/10 (grade 5 comprises 35% of core biopsy), grade group 5, continuously involving 70% of this core biopsy  - No perineural invasion is seen     H  Left mid prostate core biopsy:  - Adenocarcinoma, Castlewood score 4 + 4 = 8/10, grade group 4, discontinuously involving 20% of this core biopsy  - No perineural invasion is seen      I  Left apex prostate core biopsy:  - Benign prostatic tissue     J   Left lateral base prostate core biopsy:  - Adenocarcinoma, Vitaliy score 4 + 5 = 9/10, (grade 5 comprises 35% of core biopsy), grade group 5, discontinuously involving 20% of this core biopsy  - No perineural invasion is seen      K  Left lateral mid prostate core biopsy:  - Adenocarcinoma, Vitaliy score 4 + 4 = 8/10, grade group 4, continuously involving 5% of this core biopsy  - No perineural invasion is seen      L  Left lateral apex prostate core biopsy:  - Adenocarcinoma, Felch score 4 + 4 = 8/10, grade group 4, discontinuously involving 5% of this core biopsy  - No perineural invasion is seen      Note: Block for Prolaris testing if required: E     PROCEDURE:     SEE NOTE    ASSESSMENT:     78 y o  male with high volume, high risk Felch 5+4=9 CaP,   With progressive bony lesions despite advanced systemic therapy, now completed local radiation to the prostate and pelvis after discussion with the [facility] 2nd opinion, presenting for cystoscopy to define the lobulated posterior bladder mass seen on CT imaging    PLAN:     Patient does have a growth emanating from the left side bladder neck and prostatic fossa  Although this could be urothelial carcinoma, a high likelihood is that this is a growth off the patient's prosthetic tissue  I still think resection is prudent and so I have recommended operative transurethral resection of his bladder lesion  This will be sent for pathology which will be definitive  Patient is getting ready to start more advanced treatments and chemotherapy with Medical Oncology team and I think it is certainly reasonable for him to start these and the procedure to be performed as soon as can be reasonably scheduled  I discussed procedure the risks and benefits with the patient he has agreed to proceed  He has signed informed consent

## 2021-08-25 ENCOUNTER — NON-PROVIDER VISIT (OUTPATIENT)
Dept: MEDICAL GROUP | Facility: PHYSICIAN GROUP | Age: 71
End: 2021-08-25
Payer: MEDICARE

## 2021-08-25 VITALS — SYSTOLIC BLOOD PRESSURE: 128 MMHG | DIASTOLIC BLOOD PRESSURE: 74 MMHG

## 2021-08-25 NOTE — NON-PROVIDER
Jigna Allen is a 70 y.o. female here for a non-provider visit for  BP check only     Vitals:    08/25/21 1318   BP: 128/74     If abnormal, was the Registered Nurse (office provider if RN is unavailable) notified today? No    Routed to PCP? Yes

## 2021-09-08 DIAGNOSIS — J44.9 CHRONIC OBSTRUCTIVE PULMONARY DISEASE, UNSPECIFIED COPD TYPE (HCC): ICD-10-CM

## 2021-09-08 NOTE — TELEPHONE ENCOUNTER
Received request via: Pharmacy    Was the patient seen in the last year in this department? Yes    Does the patient have an active prescription (recently filled or refills available) for medication(s) requested? No    Requested Prescriptions     Pending Prescriptions Disp Refills    fluticasone-salmeterol (ADVAIR DISKUS) 250-50 MCG/DOSE AEROSOL POWDER, BREATH ACTIVATED 3 Each 3     Sig: USE 1 INHALATION EVERY 12 HOURS

## 2021-10-28 ENCOUNTER — PATIENT MESSAGE (OUTPATIENT)
Dept: MEDICAL GROUP | Facility: PHYSICIAN GROUP | Age: 71
End: 2021-10-28

## 2021-10-28 DIAGNOSIS — E11.8 TYPE 2 DIABETES MELLITUS WITH COMPLICATION, WITHOUT LONG-TERM CURRENT USE OF INSULIN (HCC): ICD-10-CM

## 2021-10-28 RX ORDER — BLOOD-GLUCOSE METER
KIT MISCELLANEOUS
Qty: 100 STRIP | Refills: 1 | Status: SHIPPED | OUTPATIENT
Start: 2021-10-28 | End: 2022-03-22 | Stop reason: SDUPTHER

## 2021-10-28 NOTE — PATIENT COMMUNICATION
Received request via: Patient    Was the patient seen in the last year in this department? Yes    Does the patient have an active prescription (recently filled or refills available) for medication(s) requested? No    Requested Prescriptions     Pending Prescriptions Disp Refills   • glucose blood (FREESTYLE LITE) strip 100 Strip 1     Sig: Use as directed.patient tests once a day

## 2021-11-15 DIAGNOSIS — J44.9 CHRONIC OBSTRUCTIVE PULMONARY DISEASE, UNSPECIFIED COPD TYPE (HCC): ICD-10-CM

## 2021-11-15 RX ORDER — ALBUTEROL SULFATE 90 UG/1
AEROSOL, METERED RESPIRATORY (INHALATION)
Qty: 1 EACH | Refills: 3 | Status: SHIPPED | OUTPATIENT
Start: 2021-11-15 | End: 2021-11-17 | Stop reason: SDUPTHER

## 2021-11-17 DIAGNOSIS — J44.9 CHRONIC OBSTRUCTIVE PULMONARY DISEASE, UNSPECIFIED COPD TYPE (HCC): ICD-10-CM

## 2021-11-17 DIAGNOSIS — I87.2 VENOUS STASIS DERMATITIS OF LEFT LOWER EXTREMITY: ICD-10-CM

## 2021-11-17 RX ORDER — TRIAMCINOLONE ACETONIDE 1 MG/G
CREAM TOPICAL
Qty: 45 G | Refills: 1 | Status: SHIPPED | OUTPATIENT
Start: 2021-11-17 | End: 2022-03-22 | Stop reason: SDUPTHER

## 2021-11-17 RX ORDER — ALBUTEROL SULFATE 90 UG/1
AEROSOL, METERED RESPIRATORY (INHALATION)
Qty: 1 EACH | Refills: 3 | Status: SHIPPED | OUTPATIENT
Start: 2021-11-17 | End: 2022-03-22 | Stop reason: SDUPTHER

## 2021-11-17 NOTE — TELEPHONE ENCOUNTER
Received request via: Pharmacy    Was the patient seen in the last year in this department? Yes  LOV 04/12/2021    Does the patient have an active prescription (recently filled or refills available) for medication(s) requested? No     Pt is requesting a 90 day supply on both RX.

## 2021-12-28 ENCOUNTER — TELEMEDICINE (OUTPATIENT)
Dept: TELEHEALTH | Facility: TELEMEDICINE | Age: 71
End: 2021-12-28
Payer: MEDICARE

## 2021-12-28 ENCOUNTER — HOSPITAL ENCOUNTER (OUTPATIENT)
Dept: LAB | Facility: MEDICAL CENTER | Age: 71
End: 2021-12-28
Attending: FAMILY MEDICINE
Payer: MEDICARE

## 2021-12-28 DIAGNOSIS — R30.0 DYSURIA: ICD-10-CM

## 2021-12-28 LAB
APPEARANCE UR: CLEAR
BACTERIA #/AREA URNS HPF: ABNORMAL /HPF
BILIRUB UR QL STRIP.AUTO: NEGATIVE
COLOR UR: YELLOW
EPI CELLS #/AREA URNS HPF: NEGATIVE /HPF
GLUCOSE UR STRIP.AUTO-MCNC: NEGATIVE MG/DL
HYALINE CASTS #/AREA URNS LPF: ABNORMAL /LPF
KETONES UR STRIP.AUTO-MCNC: NEGATIVE MG/DL
LEUKOCYTE ESTERASE UR QL STRIP.AUTO: ABNORMAL
MICRO URNS: ABNORMAL
NITRITE UR QL STRIP.AUTO: NEGATIVE
PH UR STRIP.AUTO: 7 [PH] (ref 5–8)
PROT UR QL STRIP: 30 MG/DL
RBC # URNS HPF: ABNORMAL /HPF
RBC UR QL AUTO: ABNORMAL
SP GR UR STRIP.AUTO: 1.02
UROBILINOGEN UR STRIP.AUTO-MCNC: 0.2 MG/DL
WBC #/AREA URNS HPF: ABNORMAL /HPF

## 2021-12-28 PROCEDURE — 99213 OFFICE O/P EST LOW 20 MIN: CPT | Mod: 95 | Performed by: FAMILY MEDICINE

## 2021-12-28 PROCEDURE — 81001 URINALYSIS AUTO W/SCOPE: CPT

## 2021-12-28 PROCEDURE — 87186 SC STD MICRODIL/AGAR DIL: CPT

## 2021-12-28 PROCEDURE — 87086 URINE CULTURE/COLONY COUNT: CPT

## 2021-12-28 PROCEDURE — 87077 CULTURE AEROBIC IDENTIFY: CPT

## 2021-12-28 RX ORDER — CEFDINIR 300 MG/1
300 CAPSULE ORAL 2 TIMES DAILY
Qty: 10 CAPSULE | Refills: 0 | Status: SHIPPED | OUTPATIENT
Start: 2021-12-28 | End: 2022-01-02

## 2021-12-28 NOTE — PROGRESS NOTES
"Virtual Visit: Established Patient   This visit was conducted via Zoom using secure and encrypted videoconferencing technology.   The patient was in a private location in the state of [place].    The patient's identity was confirmed and verbal consent was obtained for this virtual visit.    Subjective:   CC: dysuria    Jigna Allen is a 71 y.o. female presenting for evaluation and management of:    1.5 weeks urinary burning, urgency, frequency. Urine appears cloudy with a change. No fever. Right low back pain \"ache\" 5/10 severity. No PMH pyelonephritis. No new fatigue. No other aggravating or alleviating factors.      ROS       Current medicines (including changes today)  Current Outpatient Medications   Medication Sig Dispense Refill   • albuterol (PROAIR HFA) 108 (90 Base) MCG/ACT Aero Soln inhalation aerosol USE 2 INHALATIONS EVERY 6 HOURS AS NEEDED FOR SHORTNESS OF BREATH 1 Each 3   • triamcinolone acetonide (KENALOG) 0.1 % Cream Apply to affected area twice a day for no more than 2 weeks at a time 45 g 1   • glucose blood (FREESTYLE LITE) strip Use as directed.patient tests once a day 100 Strip 1   • fluticasone-salmeterol (ADVAIR DISKUS) 250-50 MCG/DOSE AEROSOL POWDER, BREATH ACTIVATED USE 1 INHALATION EVERY 12 HOURS 3 Each 3   • ipratropium-albuterol (DUONEB) 0.5-2.5 (3) MG/3ML nebulizer solution USE 1 VIAL (3 ML) VIA NEBULIZER FOUR TIMES A DAY 45 mL 3   • losartan-hydrochlorothiazide (HYZAAR) 100-12.5 MG per tablet TAKE 1 TABLET DAILY 90 tablet 3   • fenofibrate (TRICOR) 145 MG Tab Take 1 tablet by mouth every day. 90 tablet 3   • SPIRIVA HANDIHALER 18 MCG Cap INHALE THE CONTENTS OF 1 CAPSULE DAILY 90 capsule 3   • metFORMIN ER (GLUCOPHAGE XR) 500 MG TABLET SR 24 HR TAKE 1 TABLET TWICE A  tablet 3   • nitroglycerin (NITROSTAT) 0.4 MG SL Tab Place 1 Tab under the tongue as needed for Chest Pain (Once every 5 minutes up to 3 doses). 25 Tab 3   • vitamin D (CHOLECALCIFEROL) 1000 UNIT Tab Take 1,000 " Units by mouth every day.     • aspirin EC (ECOTRIN) 81 MG Tablet Delayed Response Take 81 mg by mouth every day.     • ibuprofen (MOTRIN) 200 MG Tab Take 200 mg by mouth every 6 hours as needed.       No current facility-administered medications for this visit.       Patient Active Problem List    Diagnosis Date Noted   • Venous stasis dermatitis of left lower extremity 12/17/2020   • Neuropathic pain 08/24/2020   • Dysuria 08/24/2020   • Abnormal screening cardiac CT 02/14/2020   • Vaginal yeast infection 01/07/2020   • Pneumonia due to infectious organism 01/07/2020   • Obesity (BMI 30-39.9) 09/17/2019   • S/P carotid endarterectomy 02/05/2019   • Radicular pain in left arm 02/05/2019   • Neck pain on left side 02/05/2019   • Vitamin D deficiency 11/06/2018   • Essential hypertension 09/25/2018   • Anxiety 03/25/2016   • CAD (coronary artery disease) 03/25/2016   • COPD (chronic obstructive pulmonary disease) ([facility]) 03/25/2016   • Type 2 diabetes mellitus with complication, without long-term current use of insulin ([facility]) 03/25/2016   • Goiter 03/25/2016   • Hyperlipidemia 03/25/2016   • Stented coronary artery 03/25/2016   • Tobacco dependence 03/25/2016   • Venous stasis 03/25/2016   • Carotid artery stenosis without cerebral infarction 09/25/2014        Objective:   There were no vitals taken for this visit.    Physical Exam:  Constitutional: Alert, no distress, well-groomed.  Skin: No rashes in visible areas.  Eye: Round. Conjunctiva clear, lids normal. No icterus.   ENMT: Lips pink without lesions, good dentition, moist mucous membranes. Phonation normal.  Area of perceived pain right low back/inferior to CVA.   Neck: No masses, no thyromegaly. Moves freely without pain.  Respiratory: Unlabored respiratory effort, no cough or audible wheeze  Psych: Alert and oriented x3, normal affect and mood.     Assessment and Plan:     1. Dysuria  URINALYSIS,CULTURE IF INDICATED    cefdinir (OMNICEF) 300 MG Cap      Differential diagnosis, natural history, supportive care, and indications for immediate follow-up discussed at length.     She will leave urine sample and [place] lab prior to initiating abx.

## 2021-12-29 DIAGNOSIS — I10 ESSENTIAL HYPERTENSION: ICD-10-CM

## 2021-12-29 DIAGNOSIS — E55.9 VITAMIN D DEFICIENCY: ICD-10-CM

## 2021-12-29 DIAGNOSIS — E78.2 MIXED HYPERLIPIDEMIA: ICD-10-CM

## 2021-12-29 DIAGNOSIS — E11.8 TYPE 2 DIABETES MELLITUS WITH COMPLICATION, WITHOUT LONG-TERM CURRENT USE OF INSULIN (HCC): ICD-10-CM

## 2022-01-01 LAB
BACTERIA UR CULT: ABNORMAL
BACTERIA UR CULT: ABNORMAL
SIGNIFICANT IND 70042: ABNORMAL
SITE SITE: ABNORMAL
SOURCE SOURCE: ABNORMAL

## 2022-01-16 DIAGNOSIS — J44.9 CHRONIC OBSTRUCTIVE PULMONARY DISEASE, UNSPECIFIED COPD TYPE (HCC): ICD-10-CM

## 2022-01-18 ENCOUNTER — HOSPITAL ENCOUNTER (OUTPATIENT)
Dept: LAB | Facility: MEDICAL CENTER | Age: 72
End: 2022-01-18
Attending: NURSE PRACTITIONER
Payer: MEDICARE

## 2022-01-18 DIAGNOSIS — E11.8 TYPE 2 DIABETES MELLITUS WITH COMPLICATION, WITHOUT LONG-TERM CURRENT USE OF INSULIN (HCC): ICD-10-CM

## 2022-01-18 DIAGNOSIS — E55.9 VITAMIN D DEFICIENCY: ICD-10-CM

## 2022-01-18 DIAGNOSIS — E78.2 MIXED HYPERLIPIDEMIA: ICD-10-CM

## 2022-01-18 DIAGNOSIS — I10 ESSENTIAL HYPERTENSION: ICD-10-CM

## 2022-01-18 LAB
25(OH)D3 SERPL-MCNC: 31 NG/ML (ref 30–100)
ALBUMIN SERPL BCP-MCNC: 4.4 G/DL (ref 3.2–4.9)
ALBUMIN/GLOB SERPL: 1.5 G/DL
ALP SERPL-CCNC: 56 U/L (ref 30–99)
ALT SERPL-CCNC: 16 U/L (ref 2–50)
ANION GAP SERPL CALC-SCNC: 15 MMOL/L (ref 7–16)
AST SERPL-CCNC: 15 U/L (ref 12–45)
BASOPHILS # BLD AUTO: 0.6 % (ref 0–1.8)
BASOPHILS # BLD: 0.06 K/UL (ref 0–0.12)
BILIRUB SERPL-MCNC: 0.4 MG/DL (ref 0.1–1.5)
BUN SERPL-MCNC: 15 MG/DL (ref 8–22)
CALCIUM SERPL-MCNC: 10.4 MG/DL (ref 8.5–10.5)
CHLORIDE SERPL-SCNC: 101 MMOL/L (ref 96–112)
CHOLEST SERPL-MCNC: 184 MG/DL (ref 100–199)
CO2 SERPL-SCNC: 23 MMOL/L (ref 20–33)
CREAT SERPL-MCNC: 0.74 MG/DL (ref 0.5–1.4)
CREAT UR-MCNC: 127.62 MG/DL
EOSINOPHIL # BLD AUTO: 0.18 K/UL (ref 0–0.51)
EOSINOPHIL NFR BLD: 1.7 % (ref 0–6.9)
ERYTHROCYTE [DISTWIDTH] IN BLOOD BY AUTOMATED COUNT: 43.8 FL (ref 35.9–50)
EST. AVERAGE GLUCOSE BLD GHB EST-MCNC: 160 MG/DL
FASTING STATUS PATIENT QL REPORTED: NORMAL
GLOBULIN SER CALC-MCNC: 2.9 G/DL (ref 1.9–3.5)
GLUCOSE SERPL-MCNC: 134 MG/DL (ref 65–99)
HBA1C MFR BLD: 7.2 % (ref 4–5.6)
HCT VFR BLD AUTO: 43.4 % (ref 37–47)
HDLC SERPL-MCNC: 57 MG/DL
HGB BLD-MCNC: 14.1 G/DL (ref 12–16)
IMM GRANULOCYTES # BLD AUTO: 0.02 K/UL (ref 0–0.11)
IMM GRANULOCYTES NFR BLD AUTO: 0.2 % (ref 0–0.9)
LDLC SERPL CALC-MCNC: 103 MG/DL
LYMPHOCYTES # BLD AUTO: 3.47 K/UL (ref 1–4.8)
LYMPHOCYTES NFR BLD: 32.8 % (ref 22–41)
MCH RBC QN AUTO: 28.3 PG (ref 27–33)
MCHC RBC AUTO-ENTMCNC: 32.5 G/DL (ref 33.6–35)
MCV RBC AUTO: 87.1 FL (ref 81.4–97.8)
MICROALBUMIN UR-MCNC: 1.7 MG/DL
MICROALBUMIN/CREAT UR: 13 MG/G (ref 0–30)
MONOCYTES # BLD AUTO: 0.87 K/UL (ref 0–0.85)
MONOCYTES NFR BLD AUTO: 8.2 % (ref 0–13.4)
NEUTROPHILS # BLD AUTO: 5.97 K/UL (ref 2–7.15)
NEUTROPHILS NFR BLD: 56.5 % (ref 44–72)
NRBC # BLD AUTO: 0 K/UL
NRBC BLD-RTO: 0 /100 WBC
PLATELET # BLD AUTO: 377 K/UL (ref 164–446)
PMV BLD AUTO: 10.1 FL (ref 9–12.9)
POTASSIUM SERPL-SCNC: 4 MMOL/L (ref 3.6–5.5)
PROT SERPL-MCNC: 7.3 G/DL (ref 6–8.2)
RBC # BLD AUTO: 4.98 M/UL (ref 4.2–5.4)
SODIUM SERPL-SCNC: 139 MMOL/L (ref 135–145)
TRIGL SERPL-MCNC: 122 MG/DL (ref 0–149)
WBC # BLD AUTO: 10.6 K/UL (ref 4.8–10.8)

## 2022-01-18 PROCEDURE — 82570 ASSAY OF URINE CREATININE: CPT

## 2022-01-18 PROCEDURE — 83036 HEMOGLOBIN GLYCOSYLATED A1C: CPT | Mod: GA

## 2022-01-18 PROCEDURE — 80061 LIPID PANEL: CPT

## 2022-01-18 PROCEDURE — 85025 COMPLETE CBC W/AUTO DIFF WBC: CPT

## 2022-01-18 PROCEDURE — 80053 COMPREHEN METABOLIC PANEL: CPT

## 2022-01-18 PROCEDURE — 82043 UR ALBUMIN QUANTITATIVE: CPT

## 2022-01-18 PROCEDURE — 82306 VITAMIN D 25 HYDROXY: CPT

## 2022-01-18 PROCEDURE — 36415 COLL VENOUS BLD VENIPUNCTURE: CPT

## 2022-01-20 RX ORDER — IPRATROPIUM BROMIDE AND ALBUTEROL SULFATE 2.5; .5 MG/3ML; MG/3ML
SOLUTION RESPIRATORY (INHALATION)
Qty: 15 ML | Refills: 44 | Status: SHIPPED | OUTPATIENT
Start: 2022-01-20 | End: 2022-03-22 | Stop reason: SDUPTHER

## 2022-01-20 NOTE — TELEPHONE ENCOUNTER
Received request via: Pharmacy    Was the patient seen in the last year in this department? Yes    Does the patient have an active prescription (recently filled or refills available) for medication(s) requested? No   Last office visit: 12/28/2021

## 2022-01-27 DIAGNOSIS — E11.8 TYPE 2 DIABETES MELLITUS WITH COMPLICATION, WITHOUT LONG-TERM CURRENT USE OF INSULIN (HCC): ICD-10-CM

## 2022-02-03 RX ORDER — METFORMIN HYDROCHLORIDE 500 MG/1
TABLET, EXTENDED RELEASE ORAL
Qty: 180 TABLET | Refills: 3 | Status: SHIPPED | OUTPATIENT
Start: 2022-02-03 | End: 2022-03-22 | Stop reason: SDUPTHER

## 2022-02-03 NOTE — TELEPHONE ENCOUNTER
[id number]  Last OV      Received request via: Pharmacy    Was the patient seen in the last year in this department? Yes    Does the patient have an active prescription (recently filled or refills available) for medication(s) requested? No

## 2022-02-28 RX ORDER — TIOTROPIUM BROMIDE 18 UG/1
CAPSULE ORAL; RESPIRATORY (INHALATION)
Qty: 90 CAPSULE | Refills: 3 | Status: SHIPPED | OUTPATIENT
Start: 2022-02-28 | End: 2022-03-22 | Stop reason: SDUPTHER

## 2022-02-28 NOTE — TELEPHONE ENCOUNTER
Received request via: Pharmacy    Was the patient seen in the last year in this department? Yes    Does the patient have an active prescription (recently filled or refills available) for medication(s) requested? No     LAST OV: 4/12/21

## 2022-03-21 DIAGNOSIS — E78.2 MIXED HYPERLIPIDEMIA: ICD-10-CM

## 2022-03-21 RX ORDER — FENOFIBRATE 145 MG/1
TABLET, COATED ORAL
Qty: 90 TABLET | Refills: 3 | Status: SHIPPED | OUTPATIENT
Start: 2022-03-21 | End: 2022-03-22 | Stop reason: SDUPTHER

## 2022-03-21 SDOH — ECONOMIC STABILITY: INCOME INSECURITY: IN THE LAST 12 MONTHS, WAS THERE A TIME WHEN YOU WERE NOT ABLE TO PAY THE MORTGAGE OR RENT ON TIME?: NO

## 2022-03-21 SDOH — ECONOMIC STABILITY: HOUSING INSECURITY
IN THE LAST 12 MONTHS, WAS THERE A TIME WHEN YOU DID NOT HAVE A STEADY PLACE TO SLEEP OR SLEPT IN A SHELTER (INCLUDING NOW)?: NO

## 2022-03-21 SDOH — HEALTH STABILITY: PHYSICAL HEALTH: ON AVERAGE, HOW MANY DAYS PER WEEK DO YOU ENGAGE IN MODERATE TO STRENUOUS EXERCISE (LIKE A BRISK WALK)?: 0 DAYS

## 2022-03-21 SDOH — ECONOMIC STABILITY: HOUSING INSECURITY: IN THE LAST 12 MONTHS, HOW MANY PLACES HAVE YOU LIVED?: 1

## 2022-03-21 SDOH — HEALTH STABILITY: MENTAL HEALTH
STRESS IS WHEN SOMEONE FEELS TENSE, NERVOUS, ANXIOUS, OR CAN'T SLEEP AT NIGHT BECAUSE THEIR MIND IS TROUBLED. HOW STRESSED ARE YOU?: VERY MUCH

## 2022-03-21 SDOH — ECONOMIC STABILITY: TRANSPORTATION INSECURITY
IN THE PAST 12 MONTHS, HAS THE LACK OF TRANSPORTATION KEPT YOU FROM MEDICAL APPOINTMENTS OR FROM GETTING MEDICATIONS?: NO

## 2022-03-21 SDOH — ECONOMIC STABILITY: FOOD INSECURITY: WITHIN THE PAST 12 MONTHS, THE FOOD YOU BOUGHT JUST DIDN'T LAST AND YOU DIDN'T HAVE MONEY TO GET MORE.: NEVER TRUE

## 2022-03-21 SDOH — ECONOMIC STABILITY: INCOME INSECURITY: HOW HARD IS IT FOR YOU TO PAY FOR THE VERY BASICS LIKE FOOD, HOUSING, MEDICAL CARE, AND HEATING?: NOT VERY HARD

## 2022-03-21 SDOH — ECONOMIC STABILITY: FOOD INSECURITY: WITHIN THE PAST 12 MONTHS, YOU WORRIED THAT YOUR FOOD WOULD RUN OUT BEFORE YOU GOT MONEY TO BUY MORE.: NEVER TRUE

## 2022-03-21 SDOH — ECONOMIC STABILITY: TRANSPORTATION INSECURITY
IN THE PAST 12 MONTHS, HAS LACK OF TRANSPORTATION KEPT YOU FROM MEETINGS, WORK, OR FROM GETTING THINGS NEEDED FOR DAILY LIVING?: NO

## 2022-03-21 SDOH — HEALTH STABILITY: PHYSICAL HEALTH

## 2022-03-21 SDOH — ECONOMIC STABILITY: TRANSPORTATION INSECURITY
IN THE PAST 12 MONTHS, HAS LACK OF RELIABLE TRANSPORTATION KEPT YOU FROM MEDICAL APPOINTMENTS, MEETINGS, WORK OR FROM GETTING THINGS NEEDED FOR DAILY LIVING?: NO

## 2022-03-21 ASSESSMENT — SOCIAL DETERMINANTS OF HEALTH (SDOH)
DO YOU BELONG TO ANY CLUBS OR ORGANIZATIONS SUCH AS CHURCH GROUPS UNIONS, FRATERNAL OR ATHLETIC GROUPS, OR SCHOOL GROUPS?: NO
DO YOU BELONG TO ANY CLUBS OR ORGANIZATIONS SUCH AS CHURCH GROUPS UNIONS, FRATERNAL OR ATHLETIC GROUPS, OR SCHOOL GROUPS?: NO
HOW OFTEN DO YOU HAVE SIX OR MORE DRINKS ON ONE OCCASION: NEVER
HOW HARD IS IT FOR YOU TO PAY FOR THE VERY BASICS LIKE FOOD, HOUSING, MEDICAL CARE, AND HEATING?: NOT VERY HARD
HOW OFTEN DO YOU GET TOGETHER WITH FRIENDS OR RELATIVES?: ONCE A WEEK
HOW OFTEN DO YOU ATTEND CHURCH OR RELIGIOUS SERVICES?: NEVER
IN A TYPICAL WEEK, HOW MANY TIMES DO YOU TALK ON THE PHONE WITH FAMILY, FRIENDS, OR NEIGHBORS?: MORE THAN THREE TIMES A WEEK
HOW OFTEN DO YOU ATTEND CHURCH OR RELIGIOUS SERVICES?: NEVER
HOW OFTEN DO YOU ATTENT MEETINGS OF THE CLUB OR ORGANIZATION YOU BELONG TO?: NEVER
HOW MANY DRINKS CONTAINING ALCOHOL DO YOU HAVE ON A TYPICAL DAY WHEN YOU ARE DRINKING: 1 OR 2
HOW OFTEN DO YOU ATTENT MEETINGS OF THE CLUB OR ORGANIZATION YOU BELONG TO?: NEVER
WITHIN THE PAST 12 MONTHS, YOU WORRIED THAT YOUR FOOD WOULD RUN OUT BEFORE YOU GOT THE MONEY TO BUY MORE: NEVER TRUE
HOW OFTEN DO YOU HAVE A DRINK CONTAINING ALCOHOL: MONTHLY OR LESS
IN A TYPICAL WEEK, HOW MANY TIMES DO YOU TALK ON THE PHONE WITH FAMILY, FRIENDS, OR NEIGHBORS?: MORE THAN THREE TIMES A WEEK
HOW OFTEN DO YOU GET TOGETHER WITH FRIENDS OR RELATIVES?: ONCE A WEEK

## 2022-03-21 ASSESSMENT — LIFESTYLE VARIABLES
HOW OFTEN DO YOU HAVE A DRINK CONTAINING ALCOHOL: MONTHLY OR LESS
HOW MANY STANDARD DRINKS CONTAINING ALCOHOL DO YOU HAVE ON A TYPICAL DAY: 1 OR 2
HOW OFTEN DO YOU HAVE SIX OR MORE DRINKS ON ONE OCCASION: NEVER

## 2022-03-21 NOTE — TELEPHONE ENCOUNTER
Received request via: Pharmacy    Was the patient seen in the last year in this department? Yes    Does the patient have an active prescription (recently filled or refills available) for medication(s) requested? No     Last Office Visit:04/12/2021  Last Labs:01/18/2022      Establish care appt:03/22/2022

## 2022-03-22 ENCOUNTER — OFFICE VISIT (OUTPATIENT)
Dept: MEDICAL GROUP | Facility: PHYSICIAN GROUP | Age: 72
End: 2022-03-22
Payer: MEDICARE

## 2022-03-22 VITALS
SYSTOLIC BLOOD PRESSURE: 140 MMHG | RESPIRATION RATE: 16 BRPM | WEIGHT: 170.2 LBS | DIASTOLIC BLOOD PRESSURE: 76 MMHG | OXYGEN SATURATION: 98 % | HEART RATE: 96 BPM | HEIGHT: 63 IN | BODY MASS INDEX: 30.16 KG/M2 | TEMPERATURE: 97.2 F

## 2022-03-22 DIAGNOSIS — Z12.31 ENCOUNTER FOR SCREENING MAMMOGRAM FOR MALIGNANT NEOPLASM OF BREAST: ICD-10-CM

## 2022-03-22 DIAGNOSIS — Z12.11 COLON CANCER SCREENING: ICD-10-CM

## 2022-03-22 DIAGNOSIS — J44.9 CHRONIC OBSTRUCTIVE PULMONARY DISEASE, UNSPECIFIED COPD TYPE (HCC): ICD-10-CM

## 2022-03-22 DIAGNOSIS — E78.2 MIXED HYPERLIPIDEMIA: ICD-10-CM

## 2022-03-22 DIAGNOSIS — I25.118 CORONARY ARTERY DISEASE OF NATIVE HEART WITH STABLE ANGINA PECTORIS, UNSPECIFIED VESSEL OR LESION TYPE (HCC): ICD-10-CM

## 2022-03-22 DIAGNOSIS — I87.2 VENOUS STASIS DERMATITIS OF LEFT LOWER EXTREMITY: ICD-10-CM

## 2022-03-22 DIAGNOSIS — Z00.00 HEALTHCARE MAINTENANCE: ICD-10-CM

## 2022-03-22 DIAGNOSIS — F33.1 MODERATE EPISODE OF RECURRENT MAJOR DEPRESSIVE DISORDER (HCC): ICD-10-CM

## 2022-03-22 DIAGNOSIS — E55.9 VITAMIN D DEFICIENCY: ICD-10-CM

## 2022-03-22 DIAGNOSIS — E11.8 TYPE 2 DIABETES MELLITUS WITH COMPLICATION, WITHOUT LONG-TERM CURRENT USE OF INSULIN (HCC): ICD-10-CM

## 2022-03-22 DIAGNOSIS — I10 ESSENTIAL HYPERTENSION: ICD-10-CM

## 2022-03-22 PROBLEM — R93.1 ABNORMAL SCREENING CARDIAC CT: Status: RESOLVED | Noted: 2020-02-14 | Resolved: 2022-03-22

## 2022-03-22 PROBLEM — F33.9 RECURRENT MAJOR DEPRESSIVE DISORDER (HCC): Status: ACTIVE | Noted: 2022-03-22

## 2022-03-22 PROBLEM — R30.0 DYSURIA: Status: RESOLVED | Noted: 2020-08-24 | Resolved: 2022-03-22

## 2022-03-22 PROBLEM — J18.9 PNEUMONIA DUE TO INFECTIOUS ORGANISM: Status: RESOLVED | Noted: 2020-01-07 | Resolved: 2022-03-22

## 2022-03-22 PROCEDURE — 99214 OFFICE O/P EST MOD 30 MIN: CPT | Performed by: NURSE PRACTITIONER

## 2022-03-22 RX ORDER — LOSARTAN POTASSIUM AND HYDROCHLOROTHIAZIDE 12.5; 1 MG/1; MG/1
1 TABLET ORAL DAILY
Qty: 90 TABLET | Refills: 3 | Status: SHIPPED | OUTPATIENT
Start: 2022-03-22 | End: 2023-04-03

## 2022-03-22 RX ORDER — IPRATROPIUM BROMIDE AND ALBUTEROL SULFATE 2.5; .5 MG/3ML; MG/3ML
3 SOLUTION RESPIRATORY (INHALATION) EVERY 6 HOURS PRN
Qty: 360 EACH | Refills: 3 | Status: SHIPPED | OUTPATIENT
Start: 2022-03-22 | End: 2022-03-22

## 2022-03-22 RX ORDER — TIOTROPIUM BROMIDE 18 UG/1
18 CAPSULE ORAL; RESPIRATORY (INHALATION) DAILY
Qty: 90 CAPSULE | Refills: 3 | Status: SHIPPED | OUTPATIENT
Start: 2022-03-22 | End: 2023-05-30 | Stop reason: SDUPTHER

## 2022-03-22 RX ORDER — TRIAMCINOLONE ACETONIDE 1 MG/G
CREAM TOPICAL
Qty: 45 G | Refills: 1 | Status: SHIPPED | OUTPATIENT
Start: 2022-03-22 | End: 2023-05-30 | Stop reason: SDUPTHER

## 2022-03-22 RX ORDER — NITROGLYCERIN 0.4 MG/1
0.4 TABLET SUBLINGUAL PRN
Qty: 25 TABLET | Refills: 3 | Status: SHIPPED | OUTPATIENT
Start: 2022-03-22 | End: 2023-05-30 | Stop reason: SDUPTHER

## 2022-03-22 RX ORDER — BLOOD-GLUCOSE METER
KIT MISCELLANEOUS
Qty: 100 STRIP | Refills: 3 | Status: SHIPPED | OUTPATIENT
Start: 2022-03-22

## 2022-03-22 RX ORDER — COVID-19 MOLECULAR TEST ASSAY
KIT MISCELLANEOUS
COMMUNITY
Start: 2022-01-27 | End: 2022-03-22

## 2022-03-22 RX ORDER — IPRATROPIUM BROMIDE AND ALBUTEROL SULFATE 2.5; .5 MG/3ML; MG/3ML
3 SOLUTION RESPIRATORY (INHALATION) EVERY 6 HOURS PRN
Qty: 360 EACH | Refills: 3 | Status: SHIPPED | OUTPATIENT
Start: 2022-03-22 | End: 2023-02-21

## 2022-03-22 RX ORDER — METFORMIN HYDROCHLORIDE 500 MG/1
TABLET, EXTENDED RELEASE ORAL
Qty: 270 TABLET | Refills: 3 | Status: SHIPPED | OUTPATIENT
Start: 2022-03-22 | End: 2023-05-09

## 2022-03-22 RX ORDER — FENOFIBRATE 145 MG/1
145 TABLET, COATED ORAL DAILY
Qty: 90 TABLET | Refills: 3 | Status: SHIPPED | OUTPATIENT
Start: 2022-03-22 | End: 2023-02-27

## 2022-03-22 RX ORDER — ALBUTEROL SULFATE 90 UG/1
AEROSOL, METERED RESPIRATORY (INHALATION)
Qty: 1 EACH | Refills: 11 | Status: SHIPPED | OUTPATIENT
Start: 2022-03-22 | End: 2023-05-30 | Stop reason: SDUPTHER

## 2022-03-22 ASSESSMENT — PATIENT HEALTH QUESTIONNAIRE - PHQ9
SUM OF ALL RESPONSES TO PHQ QUESTIONS 1-9: 11
CLINICAL INTERPRETATION OF PHQ2 SCORE: 2
5. POOR APPETITE OR OVEREATING: 3 - NEARLY EVERY DAY

## 2022-03-22 ASSESSMENT — FIBROSIS 4 INDEX: FIB4 SCORE: 0.71

## 2022-03-22 NOTE — ASSESSMENT & PLAN NOTE
Most recent A1c was 7.2, increased from 6.7 in March, 2021; taking metformin 500 mg bid as prescribed; pt will work on lifestyle changes (watch breads, pasta, rice, sweets)

## 2022-03-22 NOTE — ASSESSMENT & PLAN NOTE
Pt reports issues w/depression off and on but not interested in using an antidepressant; she reports added stress sometimes from her 's history of gambling, but he's doing better    She stays close with friends and family also has hobbies she enjoys doing like scrapbooking    Behavioral health referral was offered, but she  is not interested  at this time

## 2022-03-22 NOTE — PROGRESS NOTES
"Subjective:     CC:   Chief Complaint   Patient presents with   • Establish Care   • Other     Cologuard        HPI:   Jigna presents today with    Problem   Recurrent Major Depressive Disorder (Hcc)   Healthcare Maintenance   Vitamin D Deficiency   Essential Hypertension   Cad (Coronary Artery Disease)   Type 2 Diabetes Mellitus With Complication, Without Long-Term Current Use of Insulin (Hcc)   Dysuria (Resolved)   Abnormal Screening Cardiac CT (Resolved)   Pneumonia Due to Infectious Organism (Resolved)   Venous Stasis (Resolved)               Objective:     Exam:  /76 (BP Location: Right arm, Patient Position: Sitting, BP Cuff Size: Adult)   Pulse 96   Temp 36.2 °C (97.2 °F) (Temporal)   Resp 16   Ht 1.6 m (5' 3\")   Wt 77.2 kg (170 lb 3.2 oz)   SpO2 98%   BMI 30.15 kg/m²  Body mass index is 30.15 kg/m².    Physical Exam:  Constitutional: Well-developed and well-nourished female in NAD. Not diaphoretic. No distress.   Skin: warm, dry, intact, no evidence of rash or concerning lesions  Head: Atraumatic without lesions.  Eyes: Conjunctivae are normal. Pupils are equal, round,  No scleral icterus.   Ears:  External ears unremarkable.  Neck: Supple, trachea midline. No thyromegaly present.   Cardiovascular: Regular rate and rhythm without murmur. Radial pulses are intact and equal bilaterally.  Pulmonary: Clear to ausculation. Normal effort. No rales, ronchi, or wheezing.  Extremities: No cyanosis, clubbing, erythema, nor edema.   Neurological: Alert and oriented x 3.   Psychiatric:  Behavior, mood, and affect are appropriate.        Assessment & Plan:     71 y.o. female with the following -     Problem List Items Addressed This Visit     CAD (coronary artery disease)     Doesn't seen cardiology annually; prefers to be managed in PCP office; will let me know if she becomes symptomatic so I can refer her     Keeps NTG on hand in case she needs it; has never needed to use it         Relevant Medications    " fenofibrate (TRICOR) 145 MG Tab    losartan-hydrochlorothiazide (HYZAAR) 100-12.5 MG per tablet    nitroglycerin (NITROSTAT) 0.4 MG SL Tab    COPD (chronic obstructive pulmonary disease) ([facility])    Relevant Medications    albuterol (PROAIR HFA) 108 (90 Base) MCG/ACT Aero Soln inhalation aerosol    fluticasone-salmeterol (ADVAIR DISKUS) 250-50 MCG/DOSE AEROSOL POWDER, BREATH ACTIVATED    tiotropium (SPIRIVA HANDIHALER) 18 MCG Cap    ipratropium-albuterol (DUONEB) 0.5-2.5 (3) MG/3ML nebulizer solution    Type 2 diabetes mellitus with complication, without long-term current use of insulin ([facility])     Most recent A1c was 7.2, increased from 6.7 in March, 2021; taking metformin 500 mg bid as prescribed; pt will work on lifestyle changes (watch breads, pasta, rice, sweets)         Relevant Medications    metFORMIN ER (GLUCOPHAGE XR) 500 MG TABLET SR 24 HR    glucose blood (FREESTYLE LITE) strip    Hyperlipidemia    Relevant Medications    fenofibrate (TRICOR) 145 MG Tab    losartan-hydrochlorothiazide (HYZAAR) 100-12.5 MG per tablet    nitroglycerin (NITROSTAT) 0.4 MG SL Tab    Essential hypertension     Goal bp <130/90; pt to measure twice a day x 2 weeks and message me on My Chart w/results          Relevant Medications    fenofibrate (TRICOR) 145 MG Tab    losartan-hydrochlorothiazide (HYZAAR) 100-12.5 MG per tablet    nitroglycerin (NITROSTAT) 0.4 MG SL Tab    Vitamin D deficiency     Most recent value in mid 30s; advised 2000 iu/day          Venous stasis dermatitis of left lower extremity    Relevant Medications    triamcinolone acetonide (KENALOG) 0.1 % Cream    Recurrent major depressive disorder (HCC)     Pt reports issues w/depression off and on but not interested in using an antidepressant; she reports added stress sometimes from her 's history of gambling, but he's doing better    She stays close with friends and family also has hobbies she enjoys doing like scrapbooking    Behavioral health referral was  offered, but she  is not interested  at this time         Healthcare maintenance     Mammogram and colo guard ordered; will discuss w/pt at f/up            Other Visit Diagnoses     Colon cancer screening        Relevant Orders    COLOGUARD (FIT DNA)    Encounter for screening mammogram for malignant neoplasm of breast        Relevant Orders    MA-SCREENING MAMMO BILAT W/TOMOSYNTHESIS W/CAD            Return in about 6 months (around 9/22/2022) for annual Medicare visit.    Please note that this dictation was created using voice recognition software. I have made every reasonable attempt to correct obvious errors, but I expect that there are errors of grammar and possibly content that I did not discover before finalizing the note.

## 2022-03-22 NOTE — ASSESSMENT & PLAN NOTE
Doesn't seen cardiology annually; prefers to be managed in PCP office; will let me know if she becomes symptomatic so I can refer her     Keeps NTG on hand in case she needs it; has never needed to use it

## 2022-04-06 ENCOUNTER — OFFICE VISIT (OUTPATIENT)
Dept: URGENT CARE | Facility: PHYSICIAN GROUP | Age: 72
End: 2022-04-06
Payer: MEDICARE

## 2022-04-06 ENCOUNTER — HOSPITAL ENCOUNTER (EMERGENCY)
Facility: MEDICAL CENTER | Age: 72
End: 2022-04-06
Payer: MEDICARE

## 2022-04-06 VITALS
SYSTOLIC BLOOD PRESSURE: 180 MMHG | RESPIRATION RATE: 16 BRPM | HEART RATE: 108 BPM | HEIGHT: 63 IN | DIASTOLIC BLOOD PRESSURE: 84 MMHG | TEMPERATURE: 98.1 F | WEIGHT: 170 LBS | BODY MASS INDEX: 30.12 KG/M2 | OXYGEN SATURATION: 92 %

## 2022-04-06 DIAGNOSIS — R10.84 GENERALIZED ABDOMINAL PAIN: ICD-10-CM

## 2022-04-06 DIAGNOSIS — R11.2 NON-INTRACTABLE VOMITING WITH NAUSEA, UNSPECIFIED VOMITING TYPE: ICD-10-CM

## 2022-04-06 DIAGNOSIS — R53.1 WEAKNESS: ICD-10-CM

## 2022-04-06 DIAGNOSIS — Z87.19 HISTORY OF GASTROESOPHAGEAL REFLUX (GERD): ICD-10-CM

## 2022-04-06 DIAGNOSIS — Z86.39 HISTORY OF DIABETES MELLITUS: ICD-10-CM

## 2022-04-06 LAB — GLUCOSE BLD-MCNC: 175 MG/DL (ref 70–100)

## 2022-04-06 PROCEDURE — 82962 GLUCOSE BLOOD TEST: CPT | Performed by: NURSE PRACTITIONER

## 2022-04-06 PROCEDURE — 99214 OFFICE O/P EST MOD 30 MIN: CPT | Performed by: NURSE PRACTITIONER

## 2022-04-06 ASSESSMENT — ENCOUNTER SYMPTOMS
ORTHOPNEA: 0
PALPITATIONS: 0
ANOREXIA: 1
VOMITING: 1
FEVER: 0
CONSTIPATION: 0
SHORTNESS OF BREATH: 1
BELCHING: 0
DIZZINESS: 1
BLOOD IN STOOL: 0
DIARRHEA: 0
NAUSEA: 1
FLANK PAIN: 0
ABDOMINAL PAIN: 1
FLATUS: 0
MYALGIAS: 0
HEADACHES: 0
WEAKNESS: 1
HEARTBURN: 1
CHILLS: 0
EYES NEGATIVE: 1

## 2022-04-06 ASSESSMENT — FIBROSIS 4 INDEX: FIB4 SCORE: 0.71

## 2022-04-06 ASSESSMENT — CROHNS DISEASE ACTIVITY INDEX (CDAI): CDAI SCORE: 0

## 2022-04-06 NOTE — PROGRESS NOTES
Subjective     Jigna Allen is a 71 y.o. female who presents with Abdominal Pain (X2days ) and Nausea            Unable to retain fluids or foods since yesterday. Upper abdominal pain that radiates across upper abdomen. History of diabetes, GERD, HTN,CAD, COPD. Unable to take her meds earlier this morning and last night. Unsure if meds were taken yesterday morning last. Severe fatigue, malaise without fever., weakness.     Abdominal Pain  This is a new problem. The current episode started yesterday. The onset quality is sudden. The problem occurs constantly. The most recent episode lasted 2 days. The problem has been gradually worsening. The pain is located in the epigastric region. The pain is severe. The quality of the pain is sharp and burning. The abdominal pain radiates to the LUQ and RUQ. Associated symptoms include anorexia, nausea and vomiting. Pertinent negatives include no belching, constipation, diarrhea, dysuria, fever, flatus, frequency, headaches, hematuria, melena or myalgias. Nothing aggravates the pain. The pain is relieved by nothing. Her past medical history is significant for GERD. There is no history of abdominal surgery, colon cancer, Crohn's disease, gallstones, irritable bowel syndrome, pancreatitis, PUD or ulcerative colitis.   Nausea  Associated symptoms include abdominal pain, anorexia, nausea, vomiting and weakness. Pertinent negatives include no chest pain, chills, fever, headaches or myalgias.       Review of Systems   Constitutional: Positive for malaise/fatigue. Negative for chills and fever.   HENT: Negative.    Eyes: Negative.    Respiratory: Positive for shortness of breath.    Cardiovascular: Negative for chest pain, palpitations and orthopnea.   Gastrointestinal: Positive for abdominal pain, anorexia, heartburn, nausea and vomiting. Negative for blood in stool, constipation, diarrhea, flatus and melena.   Genitourinary: Negative for dysuria, flank pain, frequency, hematuria and  urgency.   Musculoskeletal: Negative for myalgias.   Neurological: Positive for dizziness and weakness. Negative for headaches.   All other systems reviewed and are negative.    PMH:  has a past medical history of Abnormal screening cardiac CT (2/14/2020), Anxiety, Carotid arterial disease (HCC), COPD (chronic obstructive pulmonary disease) ([facility]), Diabetes (HCC), HTN (hypertension), Hyperlipidemia, and Tobacco use.  MEDS:   Current Outpatient Medications:   •  albuterol (PROAIR HFA) 108 (90 Base) MCG/ACT Aero Soln inhalation aerosol, USE 2 INHALATIONS EVERY 6 HOURS AS NEEDED FOR SHORTNESS OF BREATH, Disp: 1 Each, Rfl: 11  •  fenofibrate (TRICOR) 145 MG Tab, Take 1 Tablet by mouth every day., Disp: 90 Tablet, Rfl: 3  •  fluticasone-salmeterol (ADVAIR DISKUS) 250-50 MCG/DOSE AEROSOL POWDER, BREATH ACTIVATED, USE 1 INHALATION EVERY 12 HOURS, Disp: 3 Each, Rfl: 3  •  losartan-hydrochlorothiazide (HYZAAR) 100-12.5 MG per tablet, Take 1 Tablet by mouth every day., Disp: 90 Tablet, Rfl: 3  •  metFORMIN ER (GLUCOPHAGE XR) 500 MG TABLET SR 24 HR, 1 tab PO in am and 2 tabs in pm, Disp: 270 Tablet, Rfl: 3  •  nitroglycerin (NITROSTAT) 0.4 MG SL Tab, Place 1 Tablet under the tongue as needed for Chest Pain (Once every 5 minutes up to 3 doses)., Disp: 25 Tablet, Rfl: 3  •  tiotropium (SPIRIVA HANDIHALER) 18 MCG Cap, Place 1 Capsule into inhaler and inhale every day., Disp: 90 Capsule, Rfl: 3  •  triamcinolone acetonide (KENALOG) 0.1 % Cream, Apply to affected area twice a day for no more than 2 weeks at a time, Disp: 45 g, Rfl: 1  •  ipratropium-albuterol (DUONEB) 0.5-2.5 (3) MG/3ML nebulizer solution, Take 3 mL by nebulization every 6 hours as needed for Shortness of Breath., Disp: 360 Each, Rfl: 3  •  glucose blood (FREESTYLE LITE) strip, Use as directed.patient tests fasting blood sugar every am, Disp: 100 Strip, Rfl: 3  •  vitamin D (CHOLECALCIFEROL) 1000 UNIT Tab, Take 1,000 Units by mouth every day., Disp: , Rfl:   •   "aspirin EC (ECOTRIN) 81 MG Tablet Delayed Response, Take 81 mg by mouth every day., Disp: , Rfl:   •  ibuprofen (MOTRIN) 200 MG Tab, Take 200 mg by mouth every 6 hours as needed., Disp: , Rfl:   ALLERGIES:   Allergies   Allergen Reactions   • Statins [Hmg-Coa-R Inhibitors]      Muscle cramps   • Sulfa Drugs    • Zetia [Ezetimibe]      Diarrhea      SURGHX: History reviewed. No pertinent surgical history.  SOCHX:  reports that she has been smoking cigarettes. She has been smoking about 0.50 packs per day. She has never used smokeless tobacco. She reports previous drug use. Drug: Marijuana. She reports that she does not drink alcohol.  FH: Family history was reviewed, no pertinent findings to report           Objective     BP (!) 180/84   Pulse (!) 108   Temp 36.7 °C (98.1 °F) (Temporal)   Resp 16   Ht 1.6 m (5' 3\")   Wt 77.1 kg (170 lb)   SpO2 92%   BMI 30.11 kg/m²      Physical Exam  Vitals reviewed.   Constitutional:       General: She is awake. She is not in acute distress.     Appearance: She is well-developed. She is ill-appearing. She is not toxic-appearing or diaphoretic.      Comments: Appears fatigued.   HENT:      Head: Normocephalic.      Mouth/Throat:      Mouth: Mucous membranes are dry.   Eyes:      Conjunctiva/sclera: Conjunctivae normal.      Pupils: Pupils are equal, round, and reactive to light.   Cardiovascular:      Rate and Rhythm: Regular rhythm. Tachycardia present.   Pulmonary:      Effort: Pulmonary effort is normal. No tachypnea, accessory muscle usage or respiratory distress.      Breath sounds: Normal breath sounds and air entry. No stridor, decreased air movement or transmitted upper airway sounds. No decreased breath sounds, wheezing, rhonchi or rales.   Abdominal:      General: Bowel sounds are normal. There is no distension.      Palpations: Abdomen is soft.      Tenderness: There is no abdominal tenderness. There is no guarding or rebound.   Musculoskeletal:         General: " Normal range of motion.      Cervical back: Normal range of motion and neck supple.   Skin:     General: Skin is warm and dry.   Neurological:      Mental Status: She is alert and oriented to person, place, and time.   Psychiatric:         Attention and Perception: Attention normal.         Mood and Affect: Mood normal.         Speech: Speech normal.         Behavior: Behavior normal. Behavior is cooperative.                             Assessment & Plan        1. Generalized abdominal pain    - POCT Glucose    2. Non-intractable vomiting with nausea, unspecified vomiting type    - POCT Glucose    3. Weakness    - POCT Glucose    4. History of diabetes mellitus    - POCT Glucose: 175    5. History of gastroesophageal reflux (GERD)      Unable to urinate during exam for POCT UA    -Refer to [facility] ER for further evaluation of acute abdominal pain, nausea/vomiting, weakness with co-morbidities   -Patient stable, vitals stable  -Patient prefers to go back ambulance to ER   -Called [facility] regarding patient status

## 2022-04-12 ENCOUNTER — OFFICE VISIT (OUTPATIENT)
Dept: MEDICAL GROUP | Facility: PHYSICIAN GROUP | Age: 72
End: 2022-04-12
Payer: MEDICARE

## 2022-04-12 VITALS
OXYGEN SATURATION: 95 % | WEIGHT: 173 LBS | RESPIRATION RATE: 16 BRPM | DIASTOLIC BLOOD PRESSURE: 52 MMHG | TEMPERATURE: 97.3 F | SYSTOLIC BLOOD PRESSURE: 152 MMHG | BODY MASS INDEX: 29.53 KG/M2 | HEART RATE: 98 BPM | HEIGHT: 64 IN

## 2022-04-12 DIAGNOSIS — K55.1 MESENTERIC ARTERY STENOSIS (HCC): ICD-10-CM

## 2022-04-12 DIAGNOSIS — R10.84 GENERALIZED ABDOMINAL PAIN: ICD-10-CM

## 2022-04-12 PROCEDURE — 99214 OFFICE O/P EST MOD 30 MIN: CPT | Performed by: NURSE PRACTITIONER

## 2022-04-12 RX ORDER — SUCRALFATE 1 G/1
1 TABLET ORAL
COMMUNITY
Start: 2022-04-06 | End: 2022-04-12

## 2022-04-12 RX ORDER — FAMOTIDINE 20 MG/1
20 TABLET, FILM COATED ORAL
COMMUNITY
Start: 2022-04-06 | End: 2022-04-12

## 2022-04-12 RX ORDER — DICYCLOMINE HYDROCHLORIDE 10 MG/1
20 CAPSULE ORAL
COMMUNITY
Start: 2022-04-07 | End: 2022-04-15

## 2022-04-12 RX ORDER — SUCRALFATE ORAL 1 G/10ML
1 SUSPENSION ORAL 4 TIMES DAILY
Qty: 1200 ML | Refills: 6 | Status: SHIPPED | OUTPATIENT
Start: 2022-04-12 | End: 2022-09-06

## 2022-04-12 RX ORDER — ONDANSETRON 4 MG/1
4 TABLET, ORALLY DISINTEGRATING ORAL
COMMUNITY
Start: 2022-04-06 | End: 2022-05-06

## 2022-04-12 ASSESSMENT — FIBROSIS 4 INDEX: FIB4 SCORE: 0.71

## 2022-04-13 NOTE — ASSESSMENT & PLAN NOTE
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

## 2022-04-13 NOTE — PROGRESS NOTES
"Subjective:     CC:   Chief Complaint   Patient presents with   • Follow-Up     ER stomach pain        HPI:   Jigna presents today for fup after ER     Generalized abdominal pain  See note from mesenteric artery stenosis    Pt reports bentyl has been helpful in reducing abdominal pain;  she was unable to take the sucralfate because the tablets were too big; will switch to oral suspension    GI referral ordered       Mesenteric artery stenosis (HCC)  Pt was seen in the ER at [facility]  last week two days in a row for abd pain, n/v  CBC showed wbc of 11.9  Potassium 3.0  Lipase wnl  Trop neg  Chest xray showed only minimal left lung base atelectasis or scarring, small reticular opacity at the left base     CTA abd and pelvis showed moderate proximal SMA stenosis; no acute arterial occlusion, no CT evidence of bowel ischemia                ROS per HPI    Objective:     Exam:  /52 (BP Location: Right arm, Patient Position: Sitting, BP Cuff Size: Adult)   Pulse 98   Temp 36.3 °C (97.3 °F) (Temporal)   Resp 16   Ht 1.613 m (5' 3.5\")   Wt 78.5 kg (173 lb)   SpO2 95%   BMI 30.16 kg/m²  Body mass index is 30.16 kg/m².    Physical Exam:  Constitutional: Well-developed and well-nourished female  in NAD. Not diaphoretic. No distress.   Skin: warm, dry, intact, no evidence of rash or concerning lesions  Head: Atraumatic without lesions.  Eyes: Conjunctivae are normal. Pupils are equal, round. No scleral icterus.   Ears:  External ears unremarkable.   Neck: Supple, trachea midline.   Cardiovascular: Regular rate and rhythm without murmur.   Pulmonary:  Normal effort.   Extremities: No cyanosis, clubbing, erythema, nor edema.   Neurological: Alert and oriented x 3.   Psychiatric:  Behavior, mood, and affect are appropriate.        Assessment & Plan:     71 y.o. female with the following -     1. Generalized abdominal pain  - sucralfate (CARAFATE) 1 GM/10ML Suspension; Take 10 mL by mouth 4 times a day.  Dispense: 1200 " mL; Refill: 6  - Referral to Gastroenterology    2. Mesenteric artery stenosis (HCC)  - Referral to Gastroenterology    Other orders  - dicyclomine (BENTYL) 10 MG Cap; Take 20 mg by mouth.  - ondansetron (ZOFRAN ODT) 4 MG TABLET DISPERSIBLE; Take 4 mg by mouth.         Return in about 3 months (around 7/12/2022) for gen check in after GI referral .    Please note that this dictation was created using voice recognition software. I have made every reasonable attempt to correct obvious errors, but I expect that there are errors of grammar and possibly content that I did not discover before finalizing the note.

## 2022-04-27 ENCOUNTER — OFFICE VISIT (OUTPATIENT)
Dept: URGENT CARE | Facility: PHYSICIAN GROUP | Age: 72
End: 2022-04-27
Payer: MEDICARE

## 2022-04-27 VITALS
OXYGEN SATURATION: 94 % | HEART RATE: 106 BPM | TEMPERATURE: 96.5 F | RESPIRATION RATE: 16 BRPM | HEIGHT: 63 IN | DIASTOLIC BLOOD PRESSURE: 60 MMHG | BODY MASS INDEX: 29.41 KG/M2 | WEIGHT: 166 LBS | SYSTOLIC BLOOD PRESSURE: 142 MMHG

## 2022-04-27 DIAGNOSIS — S90.811A ABRASION OF RIGHT FOOT, INITIAL ENCOUNTER: ICD-10-CM

## 2022-04-27 PROCEDURE — 99213 OFFICE O/P EST LOW 20 MIN: CPT | Performed by: NURSE PRACTITIONER

## 2022-04-27 ASSESSMENT — FIBROSIS 4 INDEX: FIB4 SCORE: 0.71

## 2022-04-27 ASSESSMENT — ENCOUNTER SYMPTOMS
FEVER: 0
WEAKNESS: 0
SENSORY CHANGE: 0
BRUISES/BLEEDS EASILY: 0
TINGLING: 0
CHILLS: 0
MYALGIAS: 0

## 2022-04-27 NOTE — PROGRESS NOTES
Subjective     Jigna Allen is a 71 y.o. female who presents with Laceration and Foot Injury (Went for a [employer] scrapped foot. )            HPI  Abrsion to right foot during pedicure. States unable to see wound on foot. Has been applying over the counter triple antibiotic ointment to it. No noted numbness.tingling or bleeding from site. No difficulty with walking.     PMH:  has a past medical history of Abnormal screening cardiac CT (2/14/2020), Anxiety, Carotid arterial disease (HCC), COPD (chronic obstructive pulmonary disease) (HCC), Diabetes (HCC), HTN (hypertension), Hyperlipidemia, and Tobacco use.  MEDS:   Current Outpatient Medications:   •  mupirocin (BACTROBAN) 2 % Ointment, Apply 1 Application topically 2 times a day for 7 days., Disp: 60 g, Rfl: 0  •  ondansetron (ZOFRAN ODT) 4 MG TABLET DISPERSIBLE, Take 4 mg by mouth., Disp: , Rfl:   •  albuterol (PROAIR HFA) 108 (90 Base) MCG/ACT Aero Soln inhalation aerosol, USE 2 INHALATIONS EVERY 6 HOURS AS NEEDED FOR SHORTNESS OF BREATH, Disp: 1 Each, Rfl: 11  •  fenofibrate (TRICOR) 145 MG Tab, Take 1 Tablet by mouth every day., Disp: 90 Tablet, Rfl: 3  •  fluticasone-salmeterol (ADVAIR DISKUS) 250-50 MCG/DOSE AEROSOL POWDER, BREATH ACTIVATED, USE 1 INHALATION EVERY 12 HOURS, Disp: 3 Each, Rfl: 3  •  losartan-hydrochlorothiazide (HYZAAR) 100-12.5 MG per tablet, Take 1 Tablet by mouth every day., Disp: 90 Tablet, Rfl: 3  •  metFORMIN ER (GLUCOPHAGE XR) 500 MG TABLET SR 24 HR, 1 tab PO in am and 2 tabs in pm, Disp: 270 Tablet, Rfl: 3  •  nitroglycerin (NITROSTAT) 0.4 MG SL Tab, Place 1 Tablet under the tongue as needed for Chest Pain (Once every 5 minutes up to 3 doses)., Disp: 25 Tablet, Rfl: 3  •  tiotropium (SPIRIVA HANDIHALER) 18 MCG Cap, Place 1 Capsule into inhaler and inhale every day., Disp: 90 Capsule, Rfl: 3  •  triamcinolone acetonide (KENALOG) 0.1 % Cream, Apply to affected area twice a day for no more than 2 weeks at a time, Disp: 45 g, Rfl: 1  •   "ipratropium-albuterol (DUONEB) 0.5-2.5 (3) MG/3ML nebulizer solution, Take 3 mL by nebulization every 6 hours as needed for Shortness of Breath., Disp: 360 Each, Rfl: 3  •  glucose blood (FREESTYLE LITE) strip, Use as directed.patient tests fasting blood sugar every am, Disp: 100 Strip, Rfl: 3  •  vitamin D (CHOLECALCIFEROL) 1000 UNIT Tab, Take 1,000 Units by mouth every day., Disp: , Rfl:   •  aspirin EC (ECOTRIN) 81 MG Tablet Delayed Response, Take 81 mg by mouth every day., Disp: , Rfl:   •  ibuprofen (MOTRIN) 200 MG Tab, Take 200 mg by mouth every 6 hours as needed., Disp: , Rfl:   •  sucralfate (CARAFATE) 1 GM/10ML Suspension, Take 10 mL by mouth 4 times a day., Disp: 1200 mL, Rfl: 6  ALLERGIES:   Allergies   Allergen Reactions   • Statins [Hmg-Coa-R Inhibitors]      Muscle cramps   • Sulfa Drugs    • Zetia [Ezetimibe]      Diarrhea      SURGHX: History reviewed. No pertinent surgical history.  SOCHX:  reports that she has been smoking cigarettes. She has been smoking about 0.50 packs per day. She has never used smokeless tobacco. She reports previous drug use. Drug: Marijuana. She reports that she does not drink alcohol.  FH: Family history was reviewed, no pertinent findings to report      Review of Systems   Constitutional: Negative for chills, fever and malaise/fatigue.   Musculoskeletal: Negative for joint pain and myalgias.   Skin: Negative for itching and rash.        Abrasion to right foot.   Neurological: Negative for tingling, sensory change and weakness.   Endo/Heme/Allergies: Does not bruise/bleed easily.   All other systems reviewed and are negative.             Objective     /60   Pulse (!) 106   Temp 35.8 °C (96.5 °F) (Temporal)   Resp 16   Ht 1.6 m (5' 3\")   Wt 75.3 kg (166 lb)   SpO2 94%   BMI 29.41 kg/m²      Physical Exam  Vitals reviewed.   Constitutional:       General: She is not in acute distress.     Appearance: Normal appearance. She is well-developed. She is not " ill-appearing, toxic-appearing or diaphoretic.   HENT:      Head: Normocephalic.   Cardiovascular:      Rate and Rhythm: Normal rate.   Pulmonary:      Effort: Pulmonary effort is normal.   Musculoskeletal:         General: Tenderness present. No deformity.      Right foot: Normal.   Feet:      Right foot:      Protective Sensation: 10 sites tested. 10 sites sensed.      Skin integrity: Skin integrity normal.   Skin:     General: Skin is warm and dry.      Findings: Abrasion present. No abscess, bruising, ecchymosis, erythema, signs of injury, laceration, lesion, petechiae, rash or wound. Urticarial rash: l.      Comments: Small singe superficial abrasion to right foot along lateral aspect of heel region.    Neurological:      Mental Status: She is alert and oriented to person, place, and time.   Psychiatric:         Behavior: Behavior is cooperative.                             Assessment & Plan        1. Abrasion of right foot, initial encounter    - mupirocin (BACTROBAN) 2 % Ointment; Apply 1 Application topically 2 times a day for 7 days.  Dispense: 60 g; Refill: 0     -May apply cool compress to area for any swelling   -Keep area clean with mild soap and tepid water, pat dry  -May apply rx antibiotic ointment with no weeping, drainage from site  -May cover loosely with bandage to prevent dirt or debris into wounds, as discussed  -Monitor for skin infection with increased swelling, redness, pain, discharge, fever, malaise, red streaking-recheck  -Return as needed

## 2022-06-07 ENCOUNTER — HOSPITAL ENCOUNTER (OUTPATIENT)
Dept: LAB | Facility: MEDICAL CENTER | Age: 72
End: 2022-06-07
Attending: INTERNAL MEDICINE
Payer: MEDICARE

## 2022-06-07 LAB
AMYLASE SERPL-CCNC: 74 U/L (ref 20–103)
CRP SERPL HS-MCNC: <0.3 MG/DL (ref 0–0.75)
ERYTHROCYTE [SEDIMENTATION RATE] IN BLOOD BY WESTERGREN METHOD: 12 MM/HOUR (ref 0–25)
LIPASE SERPL-CCNC: 34 U/L (ref 11–82)

## 2022-06-07 PROCEDURE — 82150 ASSAY OF AMYLASE: CPT

## 2022-06-07 PROCEDURE — 85652 RBC SED RATE AUTOMATED: CPT

## 2022-06-07 PROCEDURE — 83516 IMMUNOASSAY NONANTIBODY: CPT

## 2022-06-07 PROCEDURE — 82784 ASSAY IGA/IGD/IGG/IGM EACH: CPT

## 2022-06-07 PROCEDURE — 86140 C-REACTIVE PROTEIN: CPT

## 2022-06-07 PROCEDURE — 36415 COLL VENOUS BLD VENIPUNCTURE: CPT

## 2022-06-07 PROCEDURE — 83690 ASSAY OF LIPASE: CPT

## 2022-06-08 ENCOUNTER — HOSPITAL ENCOUNTER (OUTPATIENT)
Facility: MEDICAL CENTER | Age: 72
End: 2022-06-08
Attending: INTERNAL MEDICINE
Payer: MEDICARE

## 2022-06-08 LAB — H PYLORI AG STL QL IA: NOT DETECTED

## 2022-06-08 PROCEDURE — 83993 ASSAY FOR CALPROTECTIN FECAL: CPT

## 2022-06-08 PROCEDURE — 83630 LACTOFERRIN FECAL (QUAL): CPT

## 2022-06-08 PROCEDURE — 87338 HPYLORI STOOL AG IA: CPT

## 2022-06-09 LAB — IGA SERPL-MCNC: 168 MG/DL (ref 68–408)

## 2022-06-10 LAB
GLIADIN PEPTIDE+TTG IGA+IGG SER QL IA: 7 UNITS (ref 0–19)
LACTOFERRIN STL QL IA: NEGATIVE

## 2022-06-11 LAB — CALPROTECTIN STL-MCNT: 84 UG/G

## 2022-06-22 ENCOUNTER — HOSPITAL ENCOUNTER (OUTPATIENT)
Dept: RADIOLOGY | Facility: MEDICAL CENTER | Age: 72
End: 2022-06-22
Attending: NURSE PRACTITIONER
Payer: MEDICARE

## 2022-06-22 DIAGNOSIS — Z12.31 ENCOUNTER FOR SCREENING MAMMOGRAM FOR MALIGNANT NEOPLASM OF BREAST: ICD-10-CM

## 2022-06-22 PROCEDURE — 77063 BREAST TOMOSYNTHESIS BI: CPT

## 2022-09-06 ENCOUNTER — OFFICE VISIT (OUTPATIENT)
Dept: MEDICAL GROUP | Facility: PHYSICIAN GROUP | Age: 72
End: 2022-09-06
Payer: MEDICARE

## 2022-09-06 VITALS
DIASTOLIC BLOOD PRESSURE: 70 MMHG | HEART RATE: 97 BPM | HEIGHT: 63 IN | OXYGEN SATURATION: 95 % | WEIGHT: 167 LBS | BODY MASS INDEX: 29.59 KG/M2 | TEMPERATURE: 97 F | SYSTOLIC BLOOD PRESSURE: 130 MMHG

## 2022-09-06 DIAGNOSIS — F17.200 TOBACCO DEPENDENCE: ICD-10-CM

## 2022-09-06 DIAGNOSIS — E11.8 TYPE 2 DIABETES MELLITUS WITH COMPLICATION, WITHOUT LONG-TERM CURRENT USE OF INSULIN (HCC): ICD-10-CM

## 2022-09-06 DIAGNOSIS — F33.9 EPISODE OF RECURRENT MAJOR DEPRESSIVE DISORDER, UNSPECIFIED DEPRESSION EPISODE SEVERITY (HCC): ICD-10-CM

## 2022-09-06 PROCEDURE — G0439 PPPS, SUBSEQ VISIT: HCPCS | Performed by: NURSE PRACTITIONER

## 2022-09-06 RX ORDER — BUPROPION HYDROCHLORIDE 75 MG/1
TABLET ORAL
Qty: 60 TABLET | Refills: 3 | Status: SHIPPED | OUTPATIENT
Start: 2022-09-06 | End: 2022-12-05

## 2022-09-06 RX ORDER — NICOTINE 21 MG/24HR
1 PATCH, TRANSDERMAL 24 HOURS TRANSDERMAL EVERY 24 HOURS
Qty: 30 PATCH | Refills: 1 | Status: SHIPPED | OUTPATIENT
Start: 2022-09-06 | End: 2022-11-09

## 2022-09-06 ASSESSMENT — PATIENT HEALTH QUESTIONNAIRE - PHQ9
5. POOR APPETITE OR OVEREATING: 3 - NEARLY EVERY DAY
CLINICAL INTERPRETATION OF PHQ2 SCORE: 6
SUM OF ALL RESPONSES TO PHQ QUESTIONS 1-9: 16

## 2022-09-06 ASSESSMENT — ACTIVITIES OF DAILY LIVING (ADL): BATHING_REQUIRES_ASSISTANCE: 0

## 2022-09-06 ASSESSMENT — ENCOUNTER SYMPTOMS: GENERAL WELL-BEING: GOOD

## 2022-09-06 ASSESSMENT — FIBROSIS 4 INDEX: FIB4 SCORE: 0.71

## 2022-09-06 NOTE — PROGRESS NOTES
Chief Complaint   Patient presents with    Annual Exam       HPI:  Jigna Allen is a 71 y.o. here for Medicare Annual Wellness Visit     Patient Active Problem List    Diagnosis Date Noted    Generalized abdominal pain 04/12/2022    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    Recurrent major depressive disorder (HCC) 03/22/2022    Healthcare maintenance 03/22/2022    Venous stasis dermatitis of left lower extremity 12/17/2020    Neuropathic pain 08/24/2020    Vaginal yeast infection 01/07/2020    Obesity (BMI 30-39.9) 09/17/2019    S/P carotid endarterectomy 02/05/2019    Radicular pain in left arm 02/05/2019    Neck pain on left side 02/05/2019    Vitamin D deficiency 11/06/2018    Essential hypertension 09/25/2018    Anxiety 03/25/2016    CAD (coronary artery disease) 03/25/2016    COPD (chronic obstructive pulmonary disease) ([facility]) 03/25/2016    Type 2 diabetes mellitus with complication, without long-term current use of insulin ([facility]) 03/25/2016    Goiter 03/25/2016    Hyperlipidemia 03/25/2016    Stented coronary artery 03/25/2016    Tobacco dependence 03/25/2016    Carotid artery stenosis without cerebral infarction 09/25/2014       Current Outpatient Medications   Medication Sig Dispense Refill    buPROPion (WELLBUTRIN) 75 MG Tab 1 tab PO every am x 1 week then increase to twice daily 60 Tablet 3    nicotine (NICODERM) 14 MG/24HR PATCH 24 HR Place 1 Patch on the skin every 24 hours. 30 Patch 1    albuterol (PROAIR HFA) 108 (90 Base) MCG/ACT Aero Soln inhalation aerosol USE 2 INHALATIONS EVERY 6 HOURS AS NEEDED FOR SHORTNESS OF BREATH 1 Each 11    fenofibrate (TRICOR) 145 MG Tab Take 1 Tablet by mouth every day. 90 Tablet 3    fluticasone-salmeterol (ADVAIR DISKUS) 250-50 MCG/DOSE AEROSOL POWDER, BREATH ACTIVATED USE 1 INHALATION EVERY 12 HOURS 3 Each 3    losartan-hydrochlorothiazide (HYZAAR) 100-12.5 MG per tablet Take 1 Tablet by mouth every day. 90 Tablet 3    metFORMIN ER (GLUCOPHAGE XR) 500 MG TABLET  SR 24 HR 1 tab PO in am and 2 tabs in pm 270 Tablet 3    nitroglycerin (NITROSTAT) 0.4 MG SL Tab Place 1 Tablet under the tongue as needed for Chest Pain (Once every 5 minutes up to 3 doses). 25 Tablet 3    tiotropium (SPIRIVA HANDIHALER) 18 MCG Cap Place 1 Capsule into inhaler and inhale every day. 90 Capsule 3    triamcinolone acetonide (KENALOG) 0.1 % Cream Apply to affected area twice a day for no more than 2 weeks at a time 45 g 1    ipratropium-albuterol (DUONEB) 0.5-2.5 (3) MG/3ML nebulizer solution Take 3 mL by nebulization every 6 hours as needed for Shortness of Breath. 360 Each 3    glucose blood (FREESTYLE LITE) strip Use as directed.patient tests fasting blood sugar every am 100 Strip 3    vitamin D (CHOLECALCIFEROL) 1000 UNIT Tab Take 1,000 Units by mouth every day.      aspirin EC (ECOTRIN) 81 MG Tablet Delayed Response Take 81 mg by mouth every day.      ibuprofen (MOTRIN) 200 MG Tab Take 200 mg by mouth every 6 hours as needed.      sucralfate (CARAFATE) 1 GM/10ML Suspension Take 10 mL by mouth 4 times a day. 1200 mL 6     No current facility-administered medications for this visit.          Current supplements as per medication list.     Allergies: Statins [hmg-coa-r inhibitors], Sulfa drugs, and Zetia [ezetimibe]    Current social contact/activities:     She  reports that she has been smoking cigarettes. She has been smoking an average of .5 packs per day. She has never used smokeless tobacco. She reports that she does not currently use drugs after having used the following drugs: Marijuana. She reports that she does not drink alcohol.  Ready to quit: yes; she tried Chantix in the past but her insurance does not cover it has never tried Wellbutrin  Counseling given: yes; starting wellbutrin w/14 mg patch       DPA/Advanced Directive:  Patient does not have an Advanced Directive.  A packet and workshop information was given on Advanced Directives.    ROS:    Gait: Uses no assistive device  Ostomy:  No  Other tubes: No  Amputations: No  Chronic oxygen use: No  Last eye exam: June 2022  Wears hearing aids: No   : Reports urinary leakage during the last 6 months that has somewhat interfered with their daily activities or sleep.    Screening:    Depression Screening  Little interest or pleasure in doing things?  3 - nearly every day  Feeling down, depressed , or hopeless? 3 - nearly every day  Trouble falling or staying asleep, or sleeping too much?  1 - several days  Feeling tired or having little energy?  3 - nearly every day  Poor appetite or overeating?  3 - nearly every day  Feeling bad about yourself - or that you are a failure or have let yourself or your family down? 0 - not at all  Trouble concentrating on things, such as reading the newspaper or watching television? 3 - nearly every day  Moving or speaking so slowly that other people could have noticed.  Or the opposite - being so fidgety or restless that you have been moving around a lot more than usual?  0 - not at all  Thoughts that you would be better off dead, or of hurting yourself?  0 - not at all  Patient Health Questionnaire Score: 16    If depressive symptoms identified deferred to follow up visit unless specifically addressed in assessment and plan.    Interpretation of PHQ-9 Total Score   Score Severity   1-4 No Depression   5-9 Mild Depression   10-14 Moderate Depression   15-19 Moderately Severe Depression   20-27 Severe Depression    Screening for Cognitive Impairment  Three Minute Recall (daughter, heaven, mountain) 3/3    Frantz clock face with all 12 numbers and set the hands to show 10 past 11.  Yes    Cognitive concerns identified deferred for follow up unless specifically addressed in assessment and plan.    Fall Risk Assessment  Has the patient had two or more falls in the last year or any fall with injury in the last year?  No    Safety Assessment  Throw rugs on floor.  Yes  Handrails on all stairs.  No  Good lighting in all  hallways.  Yes  Difficulty hearing.  No  Patient counseled about all safety risks that were identified.    Functional Assessment ADLs  Are there any barriers preventing you from cooking for yourself or meeting nutritional needs?  No.    Are there any barriers preventing you from driving safely or obtaining transportation?  No.    Are there any barriers preventing you from using a telephone or calling for help?  No.    Are there any barriers preventing you from shopping?  No.    Are there any barriers preventing you from taking care of your own finances?  No.    Are there any barriers preventing you from managing your medications?  No.    Are there any barriers preventing you from showering, bathing or dressing yourself? No.    Are you currently engaging in any exercise or physical activity?  Yes.     What is your perception of your health? Good.    Health Maintenance Summary            Overdue - Annual Wellness Visit (Once) Overdue - never done      No completion history exists for this topic.              Overdue - IMM DTaP/Tdap/Td Vaccine (2 - Td or Tdap) Overdue since 1/1/2020 01/01/2010  Imm Admin: Tdap Vaccine             Overdue - IMM ZOSTER VACCINES (2 of 2) Overdue since 10/29/2021      09/03/2021  Imm Admin: Zoster Vaccine Recombinant (RZV) (SHINGRIX)             Overdue - RETINAL SCREENING (Yearly) Overdue since 7/7/2022 07/07/2021  REFERRAL FOR RETINAL SCREENING EXAM     08/07/2019  REFERRAL FOR RETINAL SCREENING EXAM     07/23/2019  REFERRAL FOR RETINAL SCREENING EXAM             Ordered - A1C SCREENING (Every 6 Months) Ordered on 9/6/2022 01/18/2022  HEMOGLOBIN A1C     03/23/2021  HEMOGLOBIN A1C     08/31/2020  HEMOGLOBIN A1C     08/14/2019  HEMOGLOBIN A1C     02/02/2019  HEMOGLOBIN A1C     Only the first 5 history entries have been loaded, but more history exists.            Overdue - IMM INFLUENZA (1) Overdue since 9/1/2022      10/18/2021  Imm Admin: Influenza Vaccine Adult HD      10/23/2020  Imm Admin: Influenza Vaccine Adult HD     10/14/2020  Imm Admin: Influenza, Unspecified - HISTORICAL DATA     09/17/2019  Imm Admin: Influenza Vaccine Adult HD     09/25/2018  Imm Admin: Influenza Vaccine Adult HD     Only the first 5 history entries have been loaded, but more history exists.            FASTING LIPID PROFILE (Yearly) Next due on 1/18/2023 01/18/2022  Lipid Profile     08/31/2020  Lipid Profile     08/14/2019  Lipid Profile     02/02/2019  LIPID PROFILE     10/08/2018  LIPID PROFILE             URINE ACR / MICROALBUMIN (Yearly) Next due on 1/18/2023 01/18/2022  MICROALBUMIN CREAT RATIO URINE     08/31/2020  MICROALBUMIN CREAT RATIO URINE     10/08/2018  MICROALBUMIN CREAT RATIO URINE             SERUM CREATININE (Yearly) Next due on 4/7/2023 04/07/2022  Outside Procedure: COMP METABOLIC PANEL     04/06/2022  Outside Procedure: COMP METABOLIC PANEL     01/18/2022  Comp Metabolic Panel     08/31/2020  Comp Metabolic Panel     10/08/2018  COMP METABOLIC PANEL             DIABETES MONOFILAMENT / LE EXAM (Yearly) Next due on 4/27/2023 04/27/2022  [employer]: FINDINGS - TESTS - NEUROLOGY - MONOFILAMENT TEST - RIGHT FOOT - NUMBER OF SITES TESTED     04/12/2021  Diabetic Monofilament LE Exam     01/07/2020  Diabetic Monofilament LE Exam     11/06/2018  Diabetic Monofilament Lower Extremity Exam             MAMMOGRAM (Yearly) Next due on 6/22/2023 06/22/2022  MA-SCREENING MAMMO BILAT W/TOMOSYNTHESIS W/CAD     06/21/2021  MA-DIAGNOSTIC MAMMO LEFT W/TOMOSYNTHESIS W/CAD     12/15/2020  MA-DIAGNOSTIC MAMMO BILAT W/TOMOSYNTHESIS W/CAD     02/24/2020  MA-DIAGNOSTIC MAMMO LEFT W/TOMOSYNTHESIS W/O CAD     02/12/2020  MA-SCREENING MAMMO BILAT W/TOMOSYNTHESIS W/CAD     Only the first 5 history entries have been loaded, but more history exists.            BONE DENSITY (Every 5 Years) Next due on 10/8/2024      10/08/2019  DS-BONE DENSITY STUDY (DEXA)             COLORECTAL CANCER  SCREENING (COLOGUARD STOOL DNA - Every 3 Years) Next due on 4/10/2025      04/10/2022  COLOGUARD COLON CANCER SCREENING     04/10/2022  COLOGUARD COLON CANCER SCREENING             IMM PNEUMOCOCCAL VACCINE: 65+ Years (Series Information) Completed      01/07/2020  Imm Admin: Pneumococcal polysaccharide vaccine (PPSV-23)     09/20/2017  Imm Admin: Pneumococcal Conjugate Vaccine (Prevnar/PCV-13)     01/01/2015  Imm Admin: Pneumococcal polysaccharide vaccine (PPSV-23)             HEPATITIS C SCREENING  Completed      08/31/2020  HEP C VIRUS ANTIBODY             COVID-19 Vaccine (Series Information) Completed      04/16/2022  Outside Immunization: COVID-19 mRNA (MOD)     10/28/2021  Imm Admin: Moderna SARS-CoV-2 Vaccine     03/08/2021  Imm Admin: Moderna SARS-CoV-2 Vaccine     02/08/2021  Imm Admin: Moderna SARS-CoV-2 Vaccine             IMM MENINGOCOCCAL ACWY VACCINE (Series Information) Aged Out      No completion history exists for this topic.              Discontinued - PAP SMEAR  Discontinued      No completion history exists for this topic.              Discontinued - IMM HEP B VACCINE  Discontinued      No completion history exists for this topic.              Discontinued - Annual Pulmonary Function Test / Spirometry  Discontinued      No completion history exists for this topic.                    Patient Care Team:  Cheryl M. Demucha, A.[email or web address] as PCP - General (Nurse Practitioner Family)      Social History     Tobacco Use    Smoking status: Some Days     Packs/day: 0.50     Types: Cigarettes    Smokeless tobacco: Never   Vaping Use    Vaping Use: Never used   Substance Use Topics    Alcohol use: No    Drug use: Not Currently     Types: Marijuana     Comment: CBD      Family History   Problem Relation Age of Onset    Cancer Mother 47        breast     She  has a past medical history of Abnormal screening cardiac CT (2/14/2020), Anxiety, Carotid arterial disease (HCC), COPD (chronic obstructive pulmonary  "disease) (HCC), Diabetes (HCC), HTN (hypertension), Hyperlipidemia, and Tobacco use.   No past surgical history on file.    Exam:   /70   Pulse 97   Temp 36.1 °C (97 °F)   Ht 1.6 m (5' 3\")   Wt 75.8 kg (167 lb)   SpO2 95%  Body mass index is 29.58 kg/m².    Hearing good.    Dentition good  Alert, oriented in no acute distress.  Eye contact is good, speech goal directed, affect calm    Assessment and Plan. The following treatment and monitoring plan is recommended:    1. Type 2 diabetes mellitus with complication, without long-term current use of insulin ([facility])  - POCT  A1C    2. Episode of recurrent major depressive disorder, unspecified depression episode severity ([facility])  - buPROPion (WELLBUTRIN) 75 MG Tab; 1 tab PO every am x 1 week then increase to twice daily  Dispense: 60 Tablet; Refill: 3    3. Tobacco dependence  - nicotine (NICODERM) 14 MG/24HR PATCH 24 HR; Place 1 Patch on the skin every 24 hours.  Dispense: 30 Patch; Refill: 1    Services suggested: No services needed at this time  Health Care Screening: Age-appropriate preventive services recommended by USPTF and ACIP covered by Medicare were discussed today. Services ordered if indicated and agreed upon by the patient.  Referrals offered: Community-based lifestyle interventions to reduce health risks and promote self-management and wellness, fall prevention, nutrition, physical activity, tobacco-use cessation, weight loss, and mental health services as per orders if indicated.    Discussion today about general wellness and lifestyle habits:    Prevent falls and reduce trip hazards; Cautioned about securing or removing rugs.  Have a working fire alarm and carbon monoxide detector;   Engage in regular physical activity and social activities     Follow-up: Return in about 3 weeks (around 9/27/2022) for depression smoking cessation.    "

## 2022-09-06 NOTE — ASSESSMENT & PLAN NOTE
Patient reports her   has become more irritable since his health issues have worsened   he told her he was going on a cruise by himself this week   has spent thousands of dollars    is open to trying Wellbutrin for smoking cessation and this issue

## 2022-09-06 NOTE — ASSESSMENT & PLAN NOTE
Patient reports smoking up almost a pack a day stress has increased since her 's health problems have worsened he is open to trying Wellbutrin and nicotine replacement patch we will start those and follow-up in 3 to 4 weeks

## 2022-09-24 ENCOUNTER — HOSPITAL ENCOUNTER (OUTPATIENT)
Dept: LAB | Facility: MEDICAL CENTER | Age: 72
End: 2022-09-24
Attending: INTERNAL MEDICINE
Payer: MEDICARE

## 2022-09-26 ENCOUNTER — HOSPITAL ENCOUNTER (OUTPATIENT)
Facility: MEDICAL CENTER | Age: 72
End: 2022-09-26
Attending: INTERNAL MEDICINE
Payer: MEDICARE

## 2022-09-26 PROCEDURE — 83993 ASSAY FOR CALPROTECTIN FECAL: CPT

## 2022-09-29 ENCOUNTER — OFFICE VISIT (OUTPATIENT)
Dept: MEDICAL GROUP | Facility: PHYSICIAN GROUP | Age: 72
End: 2022-09-29
Payer: MEDICARE

## 2022-09-29 VITALS
HEIGHT: 63 IN | WEIGHT: 169.6 LBS | TEMPERATURE: 96.8 F | BODY MASS INDEX: 30.05 KG/M2 | RESPIRATION RATE: 14 BRPM | OXYGEN SATURATION: 97 % | HEART RATE: 95 BPM | DIASTOLIC BLOOD PRESSURE: 74 MMHG | SYSTOLIC BLOOD PRESSURE: 132 MMHG

## 2022-09-29 DIAGNOSIS — F17.200 TOBACCO DEPENDENCE: ICD-10-CM

## 2022-09-29 DIAGNOSIS — F33.1 MODERATE EPISODE OF RECURRENT MAJOR DEPRESSIVE DISORDER (HCC): ICD-10-CM

## 2022-09-29 DIAGNOSIS — E11.8 TYPE 2 DIABETES MELLITUS WITH COMPLICATION, WITHOUT LONG-TERM CURRENT USE OF INSULIN (HCC): ICD-10-CM

## 2022-09-29 DIAGNOSIS — Z23 NEED FOR VACCINATION: ICD-10-CM

## 2022-09-29 LAB
HBA1C MFR BLD: 6.4 % (ref 0–5.6)
INT CON NEG: ABNORMAL
INT CON POS: ABNORMAL

## 2022-09-29 PROCEDURE — G0008 ADMIN INFLUENZA VIRUS VAC: HCPCS | Performed by: NURSE PRACTITIONER

## 2022-09-29 PROCEDURE — 90662 IIV NO PRSV INCREASED AG IM: CPT | Performed by: NURSE PRACTITIONER

## 2022-09-29 PROCEDURE — 99214 OFFICE O/P EST MOD 30 MIN: CPT | Mod: 25 | Performed by: NURSE PRACTITIONER

## 2022-09-29 PROCEDURE — 83036 HEMOGLOBIN GLYCOSYLATED A1C: CPT | Performed by: NURSE PRACTITIONER

## 2022-09-29 ASSESSMENT — PATIENT HEALTH QUESTIONNAIRE - PHQ9
5. POOR APPETITE OR OVEREATING: 3 - NEARLY EVERY DAY
CLINICAL INTERPRETATION OF PHQ2 SCORE: 1
SUM OF ALL RESPONSES TO PHQ QUESTIONS 1-9: 10

## 2022-09-29 ASSESSMENT — FIBROSIS 4 INDEX: FIB4 SCORE: 0.71

## 2022-09-29 NOTE — ASSESSMENT & PLAN NOTE
Feels wellbutrin is helpful; a little trouble sleeping  Still having stress w/her -spending money

## 2022-09-29 NOTE — ASSESSMENT & PLAN NOTE
Down to 12 cigarettes a day  Hasn't picked up nicotine patch yet; wasn't covered by insurance but will try [employer]

## 2022-09-29 NOTE — PROGRESS NOTES
"Subjective:     CC:   Chief Complaint   Patient presents with    Follow-Up     Depression and smoking        HPI:   Jigna presents today with    Tobacco dependence  Down to 12 cigarettes a day  Hasn't picked up nicotine patch yet; wasn't covered by insurance but will try Goodrx    Recurrent major depressive disorder (HCC)  Feels wellbutrin is helpful; a little trouble sleeping  Still having stress w/her -spending money     Type 2 diabetes mellitus with complication, without long-term current use of insulin (HCC)  Last A1c in Jan 2022 was 7.2  Today was 6.4               ROS per HPI    Objective:     Exam:  /74   Pulse 95   Temp 36 °C (96.8 °F) (Temporal)   Resp 14   Ht 1.6 m (5' 3\")   Wt 76.9 kg (169 lb 9.6 oz)   SpO2 97%   BMI 30.04 kg/m²  Body mass index is 30.04 kg/m².    Physical Exam:  Constitutional: Well-developed and well-nourished female. Not diaphoretic. No distress.   Skin: warm, dry, intact, no evidence of rash or concerning lesions  Head: Atraumatic without lesions.  Eyes: Conjunctivae are normal. Pupils are equal, round. No scleral icterus.   Ears:  External ears unremarkable.   Neck: Supple, trachea midline. No thyromegaly present. No cervical or supraclavicular lymphadenopathy.  Cardiovascular: Regular rate and rhythm without murmur.   Pulmonary: Clear to ausculation. Normal effort. No rales, ronchi, or wheezing.  Extremities: No cyanosis, clubbing, erythema, nor edema.   Neurological: Alert and oriented x 3.   Psychiatric:  Behavior, mood, and affect are appropriate.        Assessment & Plan:     71 y.o. female with the following -     1. Tobacco dependence  Will try Goodrx for patches     2. Moderate episode of recurrent major depressive disorder (HCC)   Consider taking 2nd dose at dinner vs bedtime; we can also do double dose in the mid am as needed  Pt to message me if she wants to do a  referral     Flu shot given today     Return in about 6 months (around 3/29/2023) for " gen check in, DM-A1c.      Spent 30 min reading notes, gathering hx, evaluating and planning care   Please note that this dictation was created using voice recognition software. I have made every reasonable attempt to correct obvious errors, but I expect that there are errors of grammar and possibly content that I did not discover before finalizing the note.

## 2022-10-01 LAB — CALPROTECTIN STL-MCNT: 70 UG/G

## 2022-11-06 DIAGNOSIS — J44.9 CHRONIC OBSTRUCTIVE PULMONARY DISEASE, UNSPECIFIED COPD TYPE (HCC): ICD-10-CM

## 2022-11-07 ENCOUNTER — APPOINTMENT (OUTPATIENT)
Dept: URGENT CARE | Facility: PHYSICIAN GROUP | Age: 72
End: 2022-11-07
Payer: MEDICARE

## 2022-11-07 RX ORDER — FLUTICASONE PROPIONATE AND SALMETEROL 250; 50 UG/1; UG/1
POWDER RESPIRATORY (INHALATION)
Qty: 1 EACH | Refills: 11 | Status: SHIPPED | OUTPATIENT
Start: 2022-11-07 | End: 2023-06-15

## 2022-11-07 NOTE — TELEPHONE ENCOUNTER
Received request via: Pharmacy    Was the patient seen in the last year in this department? Yes    Does the patient have an active prescription (recently filled or refills available) for medication(s) requested? No    Last OV 9/29/2022  Next OV 3/30/2023

## 2022-11-09 ENCOUNTER — OFFICE VISIT (OUTPATIENT)
Dept: MEDICAL GROUP | Facility: PHYSICIAN GROUP | Age: 72
End: 2022-11-09
Payer: MEDICARE

## 2022-11-09 VITALS
HEART RATE: 82 BPM | TEMPERATURE: 97 F | RESPIRATION RATE: 18 BRPM | BODY MASS INDEX: 30.02 KG/M2 | SYSTOLIC BLOOD PRESSURE: 128 MMHG | WEIGHT: 169.4 LBS | DIASTOLIC BLOOD PRESSURE: 78 MMHG | OXYGEN SATURATION: 94 % | HEIGHT: 63 IN

## 2022-11-09 DIAGNOSIS — J06.9 VIRAL UPPER RESPIRATORY TRACT INFECTION: ICD-10-CM

## 2022-11-09 DIAGNOSIS — R06.02 SHORTNESS OF BREATH: ICD-10-CM

## 2022-11-09 DIAGNOSIS — J44.9 CHRONIC OBSTRUCTIVE PULMONARY DISEASE, UNSPECIFIED COPD TYPE (HCC): ICD-10-CM

## 2022-11-09 DIAGNOSIS — J06.9 UPPER RESPIRATORY TRACT INFECTION, UNSPECIFIED TYPE: ICD-10-CM

## 2022-11-09 PROCEDURE — 99213 OFFICE O/P EST LOW 20 MIN: CPT | Performed by: NURSE PRACTITIONER

## 2022-11-09 RX ORDER — AZITHROMYCIN 250 MG/1
TABLET, FILM COATED ORAL
Qty: 6 TABLET | Refills: 0 | Status: SHIPPED | OUTPATIENT
Start: 2022-11-09 | End: 2023-01-18

## 2022-11-09 RX ORDER — TRIAMCINOLONE ACETONIDE 40 MG/ML
40 INJECTION, SUSPENSION INTRA-ARTICULAR; INTRAMUSCULAR ONCE
Status: COMPLETED | OUTPATIENT
Start: 2022-11-09 | End: 2022-11-09

## 2022-11-09 RX ADMIN — TRIAMCINOLONE ACETONIDE 40 MG: 40 INJECTION, SUSPENSION INTRA-ARTICULAR; INTRAMUSCULAR at 09:01

## 2022-11-09 ASSESSMENT — FIBROSIS 4 INDEX: FIB4 SCORE: 0.72

## 2022-11-09 NOTE — PROGRESS NOTES
"Subjective:     CC:   Chief Complaint   Patient presents with    Cough     Congestion x 7 weeks        HPI:   Jigna presents today with    URI (upper respiratory infection)  Cough and congestion for the past 2 months  Tested for covid twice neg most recent 2 weeks ago  Denies fever, chills, sore throat  Feels chest heaviness, gurgling              ROS per HPI    Objective:     Exam:  /78   Pulse 82   Temp 36.1 °C (97 °F) (Temporal)   Resp 18   Ht 1.6 m (5' 3\")   Wt 76.8 kg (169 lb 6.4 oz)   SpO2 94%   BMI 30.01 kg/m²  Body mass index is 30.01 kg/m².    Physical Exam:  Constitutional: Well-developed and well-nourished female Not diaphoretic. No distress.   Skin: warm, dry, intact, no evidence of rash or concerning lesions  Head: Atraumatic without lesions.  Eyes: Conjunctivae are normal. Pupils are equal, round. No scleral icterus.   Ears:  External ears unremarkable. Tms wnl; mild cerumen present bilat   Mouth: no erythema  Neck: Supple, trachea midline. No thyromegaly present. No cervical or supraclavicular lymphadenopathy.  Cardiovascular: Regular rate and rhythm without murmur.   Pulmonary:  Normal effort. Coarse lung sounds bilat; productive cough upon deep inspiration  Extremities: No cyanosis, clubbing, erythema, nor edema.   Neurological: Alert and oriented x 3.   Psychiatric:  Behavior, mood, and affect are appropriate.        Assessment & Plan:     72 y.o. female with the following -     1.  Upper respiratory tract infection, unspecified type  - triamcinolone acetonide (KENALOG-40) injection 40 mg  - azithromycin (ZITHROMAX Z-PARIS) 250 MG Tab; Take 2 tabs PO day 1 then 1 tab days 2-5  Dispense: 6 Tablet; Refill: 0  - Nebulizers Misc; Use nebulizer as directed up to 4x day prn sob  Dispense: 1 Each; Refill: 1    2. Shortness of breath  - Nebulizers Misc; Use nebulizer as directed up to 4x day prn sob  Dispense: 1 Each; Refill: 1    3. Chronic obstructive pulmonary disease, unspecified COPD type " ([facility])  - Nebulizers Misc; Use nebulizer as directed up to 4x day prn sob  Dispense: 1 Each; Refill: 1       Return in about 2 weeks (around 11/23/2022), or if symptoms worsen or fail to improve.    Please note that this dictation was created using voice recognition software. I have made every reasonable attempt to correct obvious errors, but I expect that there are errors of grammar and possibly content that I did not discover before finalizing the note.

## 2022-11-09 NOTE — ASSESSMENT & PLAN NOTE
Cough and congestion for the past 2 months  Tested for covid twice neg most recent 2 weeks ago  Denies fever, chills, sore throat  Feels chest heaviness, gurgling

## 2022-11-30 ENCOUNTER — OFFICE VISIT (OUTPATIENT)
Dept: MEDICAL GROUP | Facility: PHYSICIAN GROUP | Age: 72
End: 2022-11-30
Payer: MEDICARE

## 2022-11-30 VITALS
WEIGHT: 167 LBS | HEIGHT: 63 IN | BODY MASS INDEX: 29.59 KG/M2 | TEMPERATURE: 97.2 F | OXYGEN SATURATION: 96 % | SYSTOLIC BLOOD PRESSURE: 124 MMHG | RESPIRATION RATE: 18 BRPM | DIASTOLIC BLOOD PRESSURE: 62 MMHG | HEART RATE: 92 BPM

## 2022-11-30 DIAGNOSIS — J06.9 VIRAL UPPER RESPIRATORY TRACT INFECTION: ICD-10-CM

## 2022-11-30 DIAGNOSIS — R19.7 DIARRHEA, UNSPECIFIED TYPE: ICD-10-CM

## 2022-11-30 PROCEDURE — 99213 OFFICE O/P EST LOW 20 MIN: CPT | Performed by: NURSE PRACTITIONER

## 2022-11-30 ASSESSMENT — FIBROSIS 4 INDEX: FIB4 SCORE: 0.72

## 2022-11-30 NOTE — ASSESSMENT & PLAN NOTE
Daily after eating breakfast x 3 mos  Eats eggs and chi  Suggesting milder breakfast and probiotics

## 2022-11-30 NOTE — PROGRESS NOTES
"Subjective:     CC:   Chief Complaint   Patient presents with    Follow-Up     Cough     Diarrhea     X 3 m.o        HPI:   Jigna presents today with    Diarrhea  Daily after eating breakfast x 3 mos  Eats eggs and chi  Suggesting milder breakfast and probiotics   Has appt w/GI in near future     Viral upper respiratory tract infection   at her last visit she was reporting shortness of breath as well as cough   she was given a Kenalog shot Z-Daniel and a nebulizer and reports much improved              ROS per HPI    Objective:     Exam:  /62   Pulse 92   Temp 36.2 °C (97.2 °F) (Temporal)   Resp 18   Ht 1.6 m (5' 3\")   Wt 75.8 kg (167 lb)   SpO2 96%   BMI 29.58 kg/m²  Body mass index is 29.58 kg/m².    Physical Exam:  Constitutional: Well-developed and well-nourished female  Not diaphoretic. No distress.   Skin: warm, dry, intact, no evidence of rash or concerning lesions  Head: Atraumatic without lesions.  Eyes: Conjunctivae are normal. Pupils are equal, round. No scleral icterus.   Ears:  External ears unremarkable.   Neck: Supple, trachea midline. No thyromegaly present. No cervical or supraclavicular lymphadenopathy.  Cardiovascular: Regular rate    Pulmonary:  Normal effort. No rales, ronchi, or wheezing. Mild cough.   Extremities: No cyanosis, clubbing, erythema, nor edema.   Neurological: Alert and oriented x 3.   Psychiatric:  Behavior, mood, and affect are appropriate.        Assessment & Plan:     72 y.o. female with the following -     1. Diarrhea, unspecified type  See above    2. Viral upper respiratory tract infection   Improved; cont to monitor     Return in about 3 months (around 2/28/2023) for gen check in.    Please note that this dictation was created using voice recognition software. I have made every reasonable attempt to correct obvious errors, but I expect that there are errors of grammar and possibly content that I did not discover before finalizing the note.        "

## 2022-11-30 NOTE — ASSESSMENT & PLAN NOTE
at her last visit she was reporting shortness of breath as well as cough   she was given a Kenalog shot Z-Daniel and a nebulizer and reports much improved

## 2023-01-18 ENCOUNTER — TELEMEDICINE (OUTPATIENT)
Dept: MEDICAL GROUP | Facility: PHYSICIAN GROUP | Age: 73
End: 2023-01-18
Payer: MEDICARE

## 2023-01-18 VITALS — HEIGHT: 63 IN | WEIGHT: 162 LBS | BODY MASS INDEX: 28.7 KG/M2

## 2023-01-18 DIAGNOSIS — E66.9 OBESITY (BMI 30-39.9): ICD-10-CM

## 2023-01-18 DIAGNOSIS — R53.83 OTHER FATIGUE: ICD-10-CM

## 2023-01-18 DIAGNOSIS — M79.605 LEFT LEG PAIN: ICD-10-CM

## 2023-01-18 DIAGNOSIS — F33.41 RECURRENT MAJOR DEPRESSIVE DISORDER, IN PARTIAL REMISSION (HCC): ICD-10-CM

## 2023-01-18 DIAGNOSIS — E11.8 TYPE 2 DIABETES MELLITUS WITH COMPLICATION, WITHOUT LONG-TERM CURRENT USE OF INSULIN (HCC): ICD-10-CM

## 2023-01-18 DIAGNOSIS — E78.2 MIXED HYPERLIPIDEMIA: ICD-10-CM

## 2023-01-18 DIAGNOSIS — L60.0 INGROWN TOENAIL OF LEFT FOOT: ICD-10-CM

## 2023-01-18 DIAGNOSIS — M79.672 PAIN OF LEFT HEEL: ICD-10-CM

## 2023-01-18 DIAGNOSIS — F33.1 MODERATE EPISODE OF RECURRENT MAJOR DEPRESSIVE DISORDER (HCC): ICD-10-CM

## 2023-01-18 DIAGNOSIS — E55.9 VITAMIN D DEFICIENCY: ICD-10-CM

## 2023-01-18 PROBLEM — F32.9 MAJOR DEPRESSIVE DISORDER: Status: ACTIVE | Noted: 2023-01-18

## 2023-01-18 PROCEDURE — 99214 OFFICE O/P EST MOD 30 MIN: CPT | Mod: 95 | Performed by: NURSE PRACTITIONER

## 2023-01-18 RX ORDER — BUPROPION HYDROCHLORIDE 100 MG/1
100 TABLET ORAL 2 TIMES DAILY
Qty: 180 TABLET | Refills: 3 | Status: SHIPPED | OUTPATIENT
Start: 2023-01-18 | End: 2024-01-30

## 2023-01-18 ASSESSMENT — FIBROSIS 4 INDEX: FIB4 SCORE: 0.96

## 2023-01-18 ASSESSMENT — PATIENT HEALTH QUESTIONNAIRE - PHQ9
SUM OF ALL RESPONSES TO PHQ QUESTIONS 1-9: 12
5. POOR APPETITE OR OVEREATING: 3 - NEARLY EVERY DAY
CLINICAL INTERPRETATION OF PHQ2 SCORE: 3

## 2023-01-18 ASSESSMENT — PAIN SCALES - GENERAL: PAINLEVEL: 7=MODERATE-SEVERE PAIN

## 2023-01-18 NOTE — ASSESSMENT & PLAN NOTE
Pt doesn't feel the wellbutrin is as effective  Still having some issues in her marriage  Will consider counseling  Open to increasing wellbutrin from 75 mg bid to 100 mg bid

## 2023-01-18 NOTE — ASSESSMENT & PLAN NOTE
Left leg pain approx 1 mon ago  Reports waking up with a pain behind her knee  Sharp, piercing  Has had an issue w/this in the past, approx 20 yrs ago; injections were effective  Feels more superficial, not under knee cap   Denies swelling   Hurts more to bear weight, wakes up with aching   Is open to seeing PT; saw someone local for her hand  She reports a rubber band snapping feeling on the heel itself

## 2023-01-18 NOTE — PROGRESS NOTES
Virtual Visit: Established Patient   This visit was conducted via Zoom using secure and encrypted videoconferencing technology.   The patient was in their home in the state of [place].    The patient's identity was confirmed and verbal consent was obtained for this virtual visit.    Subjective:   CC:   Chief Complaint   Patient presents with    Leg Pain     Left leg travels to heel, Tingling, a ruber band feeling when walking under heel  x 1 m.o     Referral Needed     Pt      Jigna Allen is a 72 y.o. female presenting for evaluation and management of:    Left leg pain  Left leg pain approx 1 mon ago  Reports waking up with a pain behind her knee  Sharp, piercing  Has had an issue w/this in the past, approx 20 yrs ago; injections were effective  Feels more superficial, not under knee cap   Denies swelling   Hurts more to bear weight, wakes up with aching   Is open to seeing PT; saw someone local for her hand  She reports a rubber band snapping feeling on the heel itself       Ingrown toenail of left foot  Noticed a few weeks ago  Would like to be eval by podiatry    Recurrent major depressive disorder (HCC)  Pt doesn't feel the wellbutrin is as effective  Still having some issues in her marriage  Will consider counseling  Open to increasing wellbutrin from 75 mg bid to 100 mg bid     ROS       Current medicines (including changes today)  Current Outpatient Medications   Medication Sig Dispense Refill    buPROPion (WELLBUTRIN) 100 MG Tab Take 1 Tablet by mouth 2 times a day. 180 Tablet 3    Nebulizers Misc Use nebulizer as directed up to 4x day prn sob 1 Each 1    fluticasone-salmeterol (WIXELA INHUB) 250-50 MCG/ACT AEROSOL POWDER, BREATH ACTIVATED USE 1 INHALATION EVERY 12 HOURS 1 Each 11    albuterol (PROAIR HFA) 108 (90 Base) MCG/ACT Aero Soln inhalation aerosol USE 2 INHALATIONS EVERY 6 HOURS AS NEEDED FOR SHORTNESS OF BREATH 1 Each 11    fenofibrate (TRICOR) 145 MG Tab Take 1 Tablet by mouth every day. 90  Tablet 3    losartan-hydrochlorothiazide (HYZAAR) 100-12.5 MG per tablet Take 1 Tablet by mouth every day. 90 Tablet 3    metFORMIN ER (GLUCOPHAGE XR) 500 MG TABLET SR 24 HR 1 tab PO in am and 2 tabs in pm 270 Tablet 3    nitroglycerin (NITROSTAT) 0.4 MG SL Tab Place 1 Tablet under the tongue as needed for Chest Pain (Once every 5 minutes up to 3 doses). 25 Tablet 3    tiotropium (SPIRIVA HANDIHALER) 18 MCG Cap Place 1 Capsule into inhaler and inhale every day. 90 Capsule 3    triamcinolone acetonide (KENALOG) 0.1 % Cream Apply to affected area twice a day for no more than 2 weeks at a time 45 g 1    ipratropium-albuterol (DUONEB) 0.5-2.5 (3) MG/3ML nebulizer solution Take 3 mL by nebulization every 6 hours as needed for Shortness of Breath. 360 Each 3    glucose blood (FREESTYLE LITE) strip Use as directed.patient tests fasting blood sugar every am 100 Strip 3    vitamin D (CHOLECALCIFEROL) 1000 UNIT Tab Take 1 Tablet by mouth every day. Taking 2,000      aspirin EC (ECOTRIN) 81 MG Tablet Delayed Response Take 1 Tablet by mouth every day.      ibuprofen (MOTRIN) 200 MG Tab Take 1 Tablet by mouth every 6 hours as needed.       No current facility-administered medications for this visit.       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    Recurrent major depressive disorder (HCC) 03/22/2022    Healthcare maintenance 03/22/2022    Venous stasis dermatitis of left lower extremity 12/17/2020    Neuropathic pain 08/24/2020    Vaginal yeast infection 01/07/2020    Obesity (BMI 30-39.9) 09/17/2019    S/P carotid endarterectomy  "02/05/2019    Radicular pain in left arm 02/05/2019    Neck pain on left side 02/05/2019    Vitamin D deficiency 11/06/2018    Essential hypertension 09/25/2018    Anxiety 03/25/2016    CAD (coronary artery disease) 03/25/2016    COPD (chronic obstructive pulmonary disease) ([facility]) 03/25/2016    Type 2 diabetes mellitus with complication, without long-term current use of insulin ([facility]) 03/25/2016    Goiter 03/25/2016    Hyperlipidemia 03/25/2016    Stented coronary artery 03/25/2016    Tobacco dependence 03/25/2016    Carotid artery stenosis without cerebral infarction 09/25/2014        Objective:   Ht 1.6 m (5' 3\") Comment: pt stated  Wt 73.5 kg (162 lb) Comment: pt stated  BMI 28.70 kg/m²     Physical Exam:  Constitutional: Alert, no distress, well-groomed.  Skin: No rashes in visible areas.  Eye: Round. Conjunctiva clear, lids normal. No icterus.   ENMT: Lips pink without lesions, good dentition, moist mucous membranes. Phonation normal.  Neck: No masses, no thyromegaly. Moves freely without pain.  Respiratory: Unlabored respiratory effort, no cough or audible wheeze  Psych: Alert and oriented x3, normal affect and mood.     Assessment and Plan:   The following treatment plan was discussed:     1. Left leg pain  - Referral to Physical Therapy  - DX-FOOT-COMPLETE 3+ LEFT; Future    2. Type 2 diabetes mellitus with complication, without long-term current use of insulin ([facility])  - Microalbumin Creat Ratio Urine (Lab Collect); Future  - HEMOGLOBIN A1C; Future  - Comp Metabolic Panel; Future    3. Mixed hyperlipidemia  - Lipid Profile; Future    4. Vitamin D deficiency  - VITAMIN D,25 HYDROXY (DEFICIENCY); Future    5. Obesity (BMI 30-39.9)  - CBC WITH DIFFERENTIAL; Future  - Comp Metabolic Panel; Future    6. Other fatigue  - CBC WITH DIFFERENTIAL; Future  - Comp Metabolic Panel; Future    7. Pain of left heel  - DX-KNEE 3 VIEWS LEFT; Future  - Referral to Podiatry    8. Ingrown toenail of left foot  - Referral to " Podiatry    9. Recurrent major depressive disorder, in partial remission (HCC)  - buPROPion (WELLBUTRIN) 100 MG Tab; Take 1 Tablet by mouth 2 times a day.  Dispense: 180 Tablet; Refill: 3  - Patient has been identified as having a positive depression screening. Appropriate orders and counseling have been given.    Will message pt w/xray results       Follow-up: Return in about 4 weeks (around 2/15/2023) for depression, gen check in.

## 2023-01-20 ENCOUNTER — APPOINTMENT (OUTPATIENT)
Dept: RADIOLOGY | Facility: IMAGING CENTER | Age: 73
End: 2023-01-20
Attending: NURSE PRACTITIONER
Payer: MEDICARE

## 2023-01-20 ENCOUNTER — HOSPITAL ENCOUNTER (OUTPATIENT)
Dept: LAB | Facility: MEDICAL CENTER | Age: 73
End: 2023-01-20
Attending: NURSE PRACTITIONER
Payer: MEDICARE

## 2023-01-20 ENCOUNTER — NON-PROVIDER VISIT (OUTPATIENT)
Dept: URGENT CARE | Facility: PHYSICIAN GROUP | Age: 73
End: 2023-01-20
Payer: MEDICARE

## 2023-01-20 DIAGNOSIS — E66.9 OBESITY (BMI 30-39.9): ICD-10-CM

## 2023-01-20 DIAGNOSIS — M79.605 LEFT LEG PAIN: ICD-10-CM

## 2023-01-20 DIAGNOSIS — R53.83 OTHER FATIGUE: ICD-10-CM

## 2023-01-20 DIAGNOSIS — E11.8 TYPE 2 DIABETES MELLITUS WITH COMPLICATION, WITHOUT LONG-TERM CURRENT USE OF INSULIN (HCC): ICD-10-CM

## 2023-01-20 DIAGNOSIS — E55.9 VITAMIN D DEFICIENCY: ICD-10-CM

## 2023-01-20 DIAGNOSIS — M79.672 PAIN OF LEFT HEEL: ICD-10-CM

## 2023-01-20 DIAGNOSIS — E78.2 MIXED HYPERLIPIDEMIA: ICD-10-CM

## 2023-01-20 LAB
25(OH)D3 SERPL-MCNC: 37 NG/ML (ref 30–100)
ALBUMIN SERPL BCP-MCNC: 4.4 G/DL (ref 3.2–4.9)
ALBUMIN/GLOB SERPL: 1.6 G/DL
ALP SERPL-CCNC: 58 U/L (ref 30–99)
ALT SERPL-CCNC: 16 U/L (ref 2–50)
ANION GAP SERPL CALC-SCNC: 14 MMOL/L (ref 7–16)
AST SERPL-CCNC: 18 U/L (ref 12–45)
BASOPHILS # BLD AUTO: 0.6 % (ref 0–1.8)
BASOPHILS # BLD: 0.07 K/UL (ref 0–0.12)
BILIRUB SERPL-MCNC: 0.3 MG/DL (ref 0.1–1.5)
BUN SERPL-MCNC: 23 MG/DL (ref 8–22)
CALCIUM ALBUM COR SERPL-MCNC: 9.7 MG/DL (ref 8.5–10.5)
CALCIUM SERPL-MCNC: 10 MG/DL (ref 8.5–10.5)
CHLORIDE SERPL-SCNC: 99 MMOL/L (ref 96–112)
CHOLEST SERPL-MCNC: 196 MG/DL (ref 100–199)
CO2 SERPL-SCNC: 24 MMOL/L (ref 20–33)
CREAT SERPL-MCNC: 0.88 MG/DL (ref 0.5–1.4)
CREAT UR-MCNC: 124.51 MG/DL
EOSINOPHIL # BLD AUTO: 0.11 K/UL (ref 0–0.51)
EOSINOPHIL NFR BLD: 1 % (ref 0–6.9)
ERYTHROCYTE [DISTWIDTH] IN BLOOD BY AUTOMATED COUNT: 47.7 FL (ref 35.9–50)
EST. AVERAGE GLUCOSE BLD GHB EST-MCNC: 143 MG/DL
FASTING STATUS PATIENT QL REPORTED: NORMAL
GFR SERPLBLD CREATININE-BSD FMLA CKD-EPI: 70 ML/MIN/1.73 M 2
GLOBULIN SER CALC-MCNC: 2.8 G/DL (ref 1.9–3.5)
GLUCOSE SERPL-MCNC: 134 MG/DL (ref 65–99)
HBA1C MFR BLD: 6.6 % (ref 4–5.6)
HCT VFR BLD AUTO: 45.3 % (ref 37–47)
HDLC SERPL-MCNC: 64 MG/DL
HGB BLD-MCNC: 14.8 G/DL (ref 12–16)
IMM GRANULOCYTES # BLD AUTO: 0.03 K/UL (ref 0–0.11)
IMM GRANULOCYTES NFR BLD AUTO: 0.3 % (ref 0–0.9)
LDLC SERPL CALC-MCNC: 109 MG/DL
LYMPHOCYTES # BLD AUTO: 3.72 K/UL (ref 1–4.8)
LYMPHOCYTES NFR BLD: 33.8 % (ref 22–41)
MCH RBC QN AUTO: 29.1 PG (ref 27–33)
MCHC RBC AUTO-ENTMCNC: 32.7 G/DL (ref 33.6–35)
MCV RBC AUTO: 89.2 FL (ref 81.4–97.8)
MICROALBUMIN UR-MCNC: 2.9 MG/DL
MICROALBUMIN/CREAT UR: 23 MG/G (ref 0–30)
MONOCYTES # BLD AUTO: 0.85 K/UL (ref 0–0.85)
MONOCYTES NFR BLD AUTO: 7.7 % (ref 0–13.4)
NEUTROPHILS # BLD AUTO: 6.22 K/UL (ref 2–7.15)
NEUTROPHILS NFR BLD: 56.6 % (ref 44–72)
NRBC # BLD AUTO: 0 K/UL
NRBC BLD-RTO: 0 /100 WBC
PLATELET # BLD AUTO: 369 K/UL (ref 164–446)
PMV BLD AUTO: 10.1 FL (ref 9–12.9)
POTASSIUM SERPL-SCNC: 4.4 MMOL/L (ref 3.6–5.5)
PROT SERPL-MCNC: 7.2 G/DL (ref 6–8.2)
RBC # BLD AUTO: 5.08 M/UL (ref 4.2–5.4)
SODIUM SERPL-SCNC: 137 MMOL/L (ref 135–145)
TRIGL SERPL-MCNC: 116 MG/DL (ref 0–149)
WBC # BLD AUTO: 11 K/UL (ref 4.8–10.8)

## 2023-01-20 PROCEDURE — 82570 ASSAY OF URINE CREATININE: CPT

## 2023-01-20 PROCEDURE — 73630 X-RAY EXAM OF FOOT: CPT | Mod: TC,FY,LT | Performed by: NURSE PRACTITIONER

## 2023-01-20 PROCEDURE — 82306 VITAMIN D 25 HYDROXY: CPT

## 2023-01-20 PROCEDURE — 80061 LIPID PANEL: CPT

## 2023-01-20 PROCEDURE — 80053 COMPREHEN METABOLIC PANEL: CPT

## 2023-01-20 PROCEDURE — 73562 X-RAY EXAM OF KNEE 3: CPT | Mod: TC,FY,LT | Performed by: NURSE PRACTITIONER

## 2023-01-20 PROCEDURE — 83036 HEMOGLOBIN GLYCOSYLATED A1C: CPT | Mod: GZ

## 2023-01-20 PROCEDURE — 82043 UR ALBUMIN QUANTITATIVE: CPT

## 2023-01-20 PROCEDURE — 85025 COMPLETE CBC W/AUTO DIFF WBC: CPT

## 2023-01-20 PROCEDURE — 36415 COLL VENOUS BLD VENIPUNCTURE: CPT

## 2023-02-18 DIAGNOSIS — J44.9 CHRONIC OBSTRUCTIVE PULMONARY DISEASE, UNSPECIFIED COPD TYPE (HCC): ICD-10-CM

## 2023-02-21 RX ORDER — IPRATROPIUM BROMIDE AND ALBUTEROL SULFATE 2.5; .5 MG/3ML; MG/3ML
SOLUTION RESPIRATORY (INHALATION)
Qty: 90 ML | Refills: 44 | Status: SHIPPED | OUTPATIENT
Start: 2023-02-21 | End: 2023-02-22 | Stop reason: SDUPTHER

## 2023-02-21 NOTE — TELEPHONE ENCOUNTER
Received request via: Pharmacy    Was the patient seen in the last year in this department? Yes    Does the patient have an active prescription (recently filled or refills available) for medication(s) requested? No    Does the patient have [facility] and need 100 day supply (blood pressure, diabetes and cholesterol meds only)? Patient does not have SCP    Last Office Visit:01/18/2023  Last Labs:01/20/2023

## 2023-02-22 DIAGNOSIS — J44.9 CHRONIC OBSTRUCTIVE PULMONARY DISEASE, UNSPECIFIED COPD TYPE (HCC): ICD-10-CM

## 2023-02-22 RX ORDER — IPRATROPIUM BROMIDE AND ALBUTEROL SULFATE 2.5; .5 MG/3ML; MG/3ML
SOLUTION RESPIRATORY (INHALATION)
Qty: 360 EACH | Refills: 3 | Status: SHIPPED | OUTPATIENT
Start: 2023-02-22 | End: 2024-02-08 | Stop reason: SDUPTHER

## 2023-02-27 DIAGNOSIS — E78.2 MIXED HYPERLIPIDEMIA: ICD-10-CM

## 2023-02-27 RX ORDER — FENOFIBRATE 145 MG/1
TABLET, COATED ORAL
Qty: 90 TABLET | Refills: 3 | Status: SHIPPED | OUTPATIENT
Start: 2023-02-27 | End: 2023-11-26

## 2023-02-27 NOTE — TELEPHONE ENCOUNTER
Received request via: Pharmacy    Was the patient seen in the last year in this department? Yes    Does the patient have an active prescription (recently filled or refills available) for medication(s) requested? No    Does the patient have [facility] and need 100 day supply (blood pressure, diabetes and cholesterol meds only)? Patient does not have SCP    Last Office Visit:01/18/2023  Last Labs:01/20/2023

## 2023-03-01 ENCOUNTER — OFFICE VISIT (OUTPATIENT)
Dept: MEDICAL GROUP | Facility: PHYSICIAN GROUP | Age: 73
End: 2023-03-01
Payer: MEDICARE

## 2023-03-01 VITALS
OXYGEN SATURATION: 96 % | TEMPERATURE: 96.8 F | BODY MASS INDEX: 29.59 KG/M2 | DIASTOLIC BLOOD PRESSURE: 62 MMHG | HEIGHT: 63 IN | SYSTOLIC BLOOD PRESSURE: 122 MMHG | HEART RATE: 96 BPM | WEIGHT: 167 LBS | RESPIRATION RATE: 15 BRPM

## 2023-03-01 DIAGNOSIS — M79.672 PAIN OF LEFT HEEL: ICD-10-CM

## 2023-03-01 DIAGNOSIS — E11.8 TYPE 2 DIABETES MELLITUS WITH COMPLICATION, WITHOUT LONG-TERM CURRENT USE OF INSULIN (HCC): ICD-10-CM

## 2023-03-01 DIAGNOSIS — Z23 NEED FOR VACCINATION: ICD-10-CM

## 2023-03-01 DIAGNOSIS — F33.41 RECURRENT MAJOR DEPRESSIVE DISORDER, IN PARTIAL REMISSION (HCC): ICD-10-CM

## 2023-03-01 PROCEDURE — 90471 IMMUNIZATION ADMIN: CPT | Performed by: NURSE PRACTITIONER

## 2023-03-01 PROCEDURE — 99214 OFFICE O/P EST MOD 30 MIN: CPT | Mod: 25 | Performed by: NURSE PRACTITIONER

## 2023-03-01 PROCEDURE — 90715 TDAP VACCINE 7 YRS/> IM: CPT | Performed by: NURSE PRACTITIONER

## 2023-03-01 RX ORDER — PANTOPRAZOLE SODIUM 20 MG/1
20 TABLET, DELAYED RELEASE ORAL DAILY
COMMUNITY
Start: 2023-02-01

## 2023-03-01 ASSESSMENT — FIBROSIS 4 INDEX: FIB4 SCORE: .878048780487804878

## 2023-03-01 NOTE — PROGRESS NOTES
"Subjective:     CC:   Chief Complaint   Patient presents with    Follow-Up     Labs        HPI:   Jigna presents today with    Major depressive disorder  Increased wellbutrin from 75 mg bid to 100 mg bid  Pt reports improvement in symptoms     Type 2 diabetes mellitus with complication, without long-term current use of insulin (HCC)  Most recent labs showed an A1c of 6.6, an increase from 6.4 in September 2, 20022    Pain of left heel  Pt has been seen by podiatry since our last visit  Calcaneal enthesophyte              ROS per HPI    Objective:     Exam:  /62   Pulse 96   Temp 36 °C (96.8 °F) (Temporal)   Resp 15   Ht 1.6 m (5' 3\")   Wt 75.8 kg (167 lb)   SpO2 96%   BMI 29.58 kg/m²  Body mass index is 29.58 kg/m².    Physical Exam:  Constitutional: Well-developed and well-nourished female. Not diaphoretic. No distress.   Skin: warm, dry, intact, no evidence of rash or concerning lesions  Head: Atraumatic without lesions.  Eyes: Conjunctivae are normal. Pupils are equal, round. No scleral icterus.   Ears:  External ears unremarkable.   Neck: Supple, trachea midline. No thyromegaly present. No cervical or supraclavicular lymphadenopathy.  Cardiovascular: Regular rate .   Pulmonary: Normal effort.   Extremities: No cyanosis, clubbing, erythema, nor edema.   Neurological: Alert and oriented x 3.   Psychiatric:  Behavior, mood, and affect are appropriate.        Assessment & Plan:     72 y.o. female with the following -     1. Need for vaccination  - Tdap Vaccine =>6YO IM    2. Recurrent major depressive disorder, in partial remission (HCC)  Well controlled    3. Type 2 diabetes mellitus with complication, without long-term current use of insulin (HCC)  Under goal of 7; cont to monitor; f/up in 6 mos    4. Pain of left heel  F/up w/podiatry as advised         Return in about 6 months (around 9/1/2023) for DM-A1c.    Please note that this dictation was created using voice recognition software. I have made " every reasonable attempt to correct obvious errors, but I expect that there are errors of grammar and possibly content that I did not discover before finalizing the note.

## 2023-03-31 DIAGNOSIS — I10 ESSENTIAL HYPERTENSION: ICD-10-CM

## 2023-04-03 RX ORDER — LOSARTAN POTASSIUM AND HYDROCHLOROTHIAZIDE 12.5; 1 MG/1; MG/1
TABLET ORAL
Qty: 90 TABLET | Refills: 3 | Status: SHIPPED | OUTPATIENT
Start: 2023-04-03 | End: 2024-02-08 | Stop reason: SDUPTHER

## 2023-04-03 NOTE — TELEPHONE ENCOUNTER
Received request via: Pharmacy    Was the patient seen in the last year in this department? Yes    Does the patient have an active prescription (recently filled or refills available) for medication(s) requested? No    Does the patient have [facility] and need 100 day supply (blood pressure, diabetes and cholesterol meds only)? Patient does not have SCP    Last Office Visit:03/01/2023  Last Labs:01/20/2023

## 2023-05-05 DIAGNOSIS — E11.8 TYPE 2 DIABETES MELLITUS WITH COMPLICATION, WITHOUT LONG-TERM CURRENT USE OF INSULIN (HCC): ICD-10-CM

## 2023-05-06 ENCOUNTER — OFFICE VISIT (OUTPATIENT)
Dept: URGENT CARE | Facility: PHYSICIAN GROUP | Age: 73
End: 2023-05-06
Payer: MEDICARE

## 2023-05-06 VITALS
SYSTOLIC BLOOD PRESSURE: 162 MMHG | WEIGHT: 171 LBS | BODY MASS INDEX: 30.3 KG/M2 | TEMPERATURE: 96.7 F | RESPIRATION RATE: 18 BRPM | HEIGHT: 63 IN | HEART RATE: 84 BPM | DIASTOLIC BLOOD PRESSURE: 94 MMHG | OXYGEN SATURATION: 95 %

## 2023-05-06 DIAGNOSIS — R11.2 NAUSEA AND VOMITING, UNSPECIFIED VOMITING TYPE: ICD-10-CM

## 2023-05-06 DIAGNOSIS — R07.89 CHEST DISCOMFORT: ICD-10-CM

## 2023-05-06 DIAGNOSIS — K29.70 VIRAL GASTRITIS: ICD-10-CM

## 2023-05-06 PROCEDURE — 99214 OFFICE O/P EST MOD 30 MIN: CPT | Performed by: NURSE PRACTITIONER

## 2023-05-06 PROCEDURE — 93000 ELECTROCARDIOGRAM COMPLETE: CPT | Performed by: NURSE PRACTITIONER

## 2023-05-06 RX ORDER — ONDANSETRON 4 MG/1
4 TABLET, ORALLY DISINTEGRATING ORAL EVERY 8 HOURS PRN
Qty: 10 TABLET | Refills: 0 | Status: SHIPPED | OUTPATIENT
Start: 2023-05-06 | End: 2023-05-30

## 2023-05-06 RX ORDER — ONDANSETRON 2 MG/ML
4 INJECTION INTRAMUSCULAR; INTRAVENOUS ONCE
Status: COMPLETED | OUTPATIENT
Start: 2023-05-06 | End: 2023-05-06

## 2023-05-06 RX ADMIN — ONDANSETRON 4 MG: 2 INJECTION INTRAMUSCULAR; INTRAVENOUS at 10:17

## 2023-05-06 ASSESSMENT — ENCOUNTER SYMPTOMS
DIZZINESS: 0
HEARTBURN: 0
FATIGUE: 1
COUGH: 1
SHORTNESS OF BREATH: 0
ROS GI COMMENTS: 1
SORE THROAT: 0
VOMITING: 1
CHANGE IN BOWEL HABIT: 1
MYALGIAS: 0
FEVER: 0
DIAPHORESIS: 0
NAUSEA: 1
CHILLS: 1
BLOOD IN STOOL: 0
ABDOMINAL PAIN: 1
EYE PAIN: 0
DIARRHEA: 1
CONSTIPATION: 0

## 2023-05-06 ASSESSMENT — FIBROSIS 4 INDEX: FIB4 SCORE: .878048780487804878

## 2023-05-06 NOTE — PROGRESS NOTES
Subjective:   Jigna Allen is a 72 y.o. female who presents for Emesis (Patient states has been vomiting since 1 am this morning)      GI Problem  This is a new problem. The current episode started today (1 am this morning. denies suspected food intake or recently travel). The problem occurs constantly. The problem has been unchanged. Associated symptoms include abdominal pain, a change in bowel habit, chest pain, chills, congestion, coughing, fatigue, nausea and vomiting. Pertinent negatives include no diaphoresis, fever, myalgias, rash or sore throat. Associated symptoms comments: Chest pain from vomiting  . The symptoms are aggravated by eating. She has tried drinking for the symptoms. The treatment provided no relief.     Review of Systems   Constitutional:  Positive for chills, fatigue and malaise/fatigue. Negative for diaphoresis and fever.   HENT:  Positive for congestion. Negative for sore throat.    Eyes:  Negative for pain.   Respiratory:  Positive for cough. Negative for shortness of breath.    Cardiovascular:  Positive for chest pain.   Gastrointestinal:  Positive for abdominal pain, change in bowel habit, diarrhea, nausea and vomiting. Negative for blood in stool, constipation, heartburn and melena.        1   Genitourinary:  Negative for dysuria and hematuria.   Musculoskeletal:  Negative for myalgias.   Skin:  Negative for rash.   Neurological:  Negative for dizziness.     Medications:    albuterol Aers  aspirin EC Tbec  buPROPion Tabs  fenofibrate Tabs  fluticasone-salmeterol Aepb  FREESTYLE LITE Strp  ibuprofen Tabs  ipratropium-albuterol  losartan-hydrochlorothiazide  metFORMIN ER Tb24  Nebulizers Misc  nitroglycerin Subl  ondansetron  pantoprazole  Spiriva HandiHaler Caps  triamcinolone acetonide Crea  vitamin D Tabs    Allergies: Statins [hmg-coa-r inhibitors], Sulfa drugs, and Zetia [ezetimibe]    Problem List: Jigna Allen does not have any pertinent problems on file.    Surgical  "History:  No past surgical history on file.    Past Social Hx: Jigna Allen  reports that she has been smoking cigarettes. She has been smoking an average of .5 packs per day. She has never used smokeless tobacco. She reports that she does not currently use drugs after having used the following drugs: Marijuana. She reports that she does not drink alcohol.     Past Family Hx:  Jigna Allen family history includes Cancer (age of onset: 47) in her mother.     Problem list, medications, and allergies reviewed by myself today in Epic.     Objective:     BP (!) 162/94 (BP Location: Left arm, Patient Position: Sitting, BP Cuff Size: Large adult)   Pulse 84   Temp 35.9 °C (96.7 °F)   Resp 18   Ht 1.6 m (5' 3\")   Wt 77.6 kg (171 lb)   SpO2 95%   BMI 30.29 kg/m²     Physical Exam  Vitals and nursing note reviewed.   Constitutional:       General: She is not in acute distress.     Appearance: She is well-developed. She is ill-appearing.   HENT:      Head: Normocephalic and atraumatic.      Right Ear: External ear normal.      Left Ear: External ear normal.      Nose: Nose normal.      Mouth/Throat:      Mouth: Mucous membranes are moist.   Eyes:      Conjunctiva/sclera: Conjunctivae normal.   Cardiovascular:      Rate and Rhythm: Normal rate.      Pulses: Normal pulses.      Heart sounds: Normal heart sounds, S1 normal and S2 normal.   Pulmonary:      Effort: Pulmonary effort is normal. No respiratory distress.      Breath sounds: Normal breath sounds.   Abdominal:      General: Bowel sounds are normal. There is no distension.      Tenderness: There is no abdominal tenderness. There is no right CVA tenderness, left CVA tenderness, guarding or rebound. Negative signs include Pro's sign, Rovsing's sign, McBurney's sign and psoas sign.      Hernia: No hernia is present.   Musculoskeletal:         General: Normal range of motion.   Skin:     General: Skin is warm and dry.   Neurological:      General: No focal " deficit present.      Mental Status: She is alert and oriented to person, place, and time. Mental status is at baseline.      Gait: Gait (gait at baseline) normal.   Psychiatric:         Judgment: Judgment normal.       Assessment/Plan:     Diagnosis and associated orders:   1. Nausea and vomiting, unspecified vomiting type  ondansetron (ZOFRAN) syringe/vial injection 4 mg    ondansetron (ZOFRAN ODT) 4 MG TABLET DISPERSIBLE      2. Chest discomfort  EKG - Clinic Performed      3. Viral gastritis               Comments/MDM:     EKG: No comparison :   ' Sinus rhythm rate 90, LAE, consider bilateral enlargement.  No ST elevation, minimal ST depression in inferior leads.        Patient is a 72-year-old female present with the stated above, patient was ill-appearing on initial exam did administer IM Zofran which significantly helped patient's symptoms.  Patient's abdomen nontender, no acute rebound and or guarding, vital signs stable, patient mildly hypertensive, EKG with no acute ischemia, patient does have hx of patient does have a history of CAD, COPD and hypertension.  History onset of chest discomfort started after vomiting likely due to dry heaving discussed close watchful monitoring.  Patient will trial oral antiemetics, encouraged increased fluid and electrolytes.I personally reviewed prior external notes and prior test results pertinent to today's visit.   Discussed management options, risks and benefits, and alternatives to treatment plan agreed upon.   Red flags discussed and indications to immediately call 911 or present to the Emergency Department.   Supportive care, differential diagnoses, and indications for immediate follow-up discussed with patient.    Patient expresses understanding and agrees to plan. Patient denies any other questions or concerns.          Please note that this dictation was created using voice recognition software. I have made a reasonable attempt to correct obvious errors, but I  expect that there are errors of grammar and possibly content that I did not discover before finalizing the note.    This note was electronically signed by Michael BRAUN.

## 2023-05-09 RX ORDER — METFORMIN HYDROCHLORIDE 500 MG/1
TABLET, EXTENDED RELEASE ORAL
Qty: 270 TABLET | Refills: 3 | Status: SHIPPED | OUTPATIENT
Start: 2023-05-09 | End: 2024-02-08

## 2023-05-09 NOTE — TELEPHONE ENCOUNTER
Received request via: Pharmacy    Was the patient seen in the last year in this department? Yes    Does the patient have an active prescription (recently filled or refills available) for medication(s) requested? No    Does the patient have [facility] and need 100 day supply (blood pressure, diabetes and cholesterol meds only)? Patient does not have SCP    Last Office Visit:03/01/2023  Last Labs: 01/20/2023

## 2023-05-16 ENCOUNTER — APPOINTMENT (OUTPATIENT)
Dept: MEDICAL GROUP | Facility: PHYSICIAN GROUP | Age: 73
End: 2023-05-16
Payer: MEDICARE

## 2023-05-30 ENCOUNTER — TELEPHONE (OUTPATIENT)
Dept: MEDICAL GROUP | Facility: PHYSICIAN GROUP | Age: 73
End: 2023-05-30

## 2023-05-30 ENCOUNTER — OFFICE VISIT (OUTPATIENT)
Dept: MEDICAL GROUP | Facility: PHYSICIAN GROUP | Age: 73
End: 2023-05-30
Payer: MEDICARE

## 2023-05-30 VITALS
HEART RATE: 99 BPM | BODY MASS INDEX: 29.77 KG/M2 | DIASTOLIC BLOOD PRESSURE: 64 MMHG | OXYGEN SATURATION: 98 % | WEIGHT: 168 LBS | TEMPERATURE: 97.2 F | HEIGHT: 63 IN | RESPIRATION RATE: 18 BRPM | SYSTOLIC BLOOD PRESSURE: 124 MMHG

## 2023-05-30 DIAGNOSIS — J34.1 MUCOUS RETENTION CYST OF MAXILLARY SINUS: ICD-10-CM

## 2023-05-30 DIAGNOSIS — Z09 HOSPITAL DISCHARGE FOLLOW-UP: Primary | ICD-10-CM

## 2023-05-30 DIAGNOSIS — I87.2 VENOUS STASIS DERMATITIS OF LEFT LOWER EXTREMITY: ICD-10-CM

## 2023-05-30 DIAGNOSIS — I65.23 BILATERAL CAROTID ARTERY STENOSIS WITHOUT CEREBRAL INFARCTION: ICD-10-CM

## 2023-05-30 DIAGNOSIS — J44.9 CHRONIC OBSTRUCTIVE PULMONARY DISEASE, UNSPECIFIED COPD TYPE (HCC): ICD-10-CM

## 2023-05-30 PROCEDURE — 3078F DIAST BP <80 MM HG: CPT | Performed by: NURSE PRACTITIONER

## 2023-05-30 PROCEDURE — 3074F SYST BP LT 130 MM HG: CPT | Performed by: NURSE PRACTITIONER

## 2023-05-30 PROCEDURE — 99214 OFFICE O/P EST MOD 30 MIN: CPT | Performed by: NURSE PRACTITIONER

## 2023-05-30 RX ORDER — PANTOPRAZOLE SODIUM 40 MG/1
40 TABLET, DELAYED RELEASE ORAL
COMMUNITY
Start: 2023-05-14 | End: 2023-05-30

## 2023-05-30 RX ORDER — ONDANSETRON 4 MG/1
TABLET, FILM COATED ORAL
COMMUNITY
Start: 2023-05-14 | End: 2023-05-30

## 2023-05-30 RX ORDER — AMLODIPINE BESYLATE 10 MG/1
10 TABLET ORAL DAILY
COMMUNITY
Start: 2023-05-13 | End: 2023-05-30

## 2023-05-30 RX ORDER — TIOTROPIUM BROMIDE 18 UG/1
18 CAPSULE ORAL; RESPIRATORY (INHALATION) DAILY
Qty: 90 CAPSULE | Refills: 3 | Status: SHIPPED | OUTPATIENT
Start: 2023-05-30 | End: 2023-06-15

## 2023-05-30 RX ORDER — AMOXICILLIN AND CLAVULANATE POTASSIUM 875; 125 MG/1; MG/1
TABLET, FILM COATED ORAL
COMMUNITY
Start: 2023-05-12 | End: 2023-05-30

## 2023-05-30 RX ORDER — POTASSIUM CHLORIDE 20 MEQ/1
40 TABLET, EXTENDED RELEASE ORAL DAILY
COMMUNITY
Start: 2023-05-13 | End: 2023-05-30

## 2023-05-30 RX ORDER — FENOFIBRATE 145 MG/1
145 TABLET, COATED ORAL DAILY
COMMUNITY
Start: 2023-02-27 | End: 2023-05-30

## 2023-05-30 RX ORDER — PANTOPRAZOLE SODIUM 40 MG/1
40 TABLET, DELAYED RELEASE ORAL DAILY
COMMUNITY
Start: 2023-05-14 | End: 2023-05-30

## 2023-05-30 RX ORDER — ALBUTEROL SULFATE 90 UG/1
AEROSOL, METERED RESPIRATORY (INHALATION)
Qty: 1 EACH | Refills: 11 | Status: SHIPPED | OUTPATIENT
Start: 2023-05-30

## 2023-05-30 RX ORDER — NITROGLYCERIN 0.4 MG/1
0.4 TABLET SUBLINGUAL PRN
Qty: 100 TABLET | Refills: 3 | Status: SHIPPED | OUTPATIENT
Start: 2023-05-30

## 2023-05-30 RX ORDER — CHLORPROMAZINE HYDROCHLORIDE 25 MG/1
25 TABLET, FILM COATED ORAL
COMMUNITY
Start: 2023-05-12 | End: 2023-05-30 | Stop reason: CLARIF

## 2023-05-30 RX ORDER — FAMOTIDINE 20 MG/1
20 TABLET, FILM COATED ORAL DAILY
Status: ON HOLD | COMMUNITY
Start: 2023-05-25 | End: 2023-11-30

## 2023-05-30 RX ORDER — AMLODIPINE BESYLATE 10 MG/1
10 TABLET ORAL DAILY
Qty: 30 TABLET | Refills: 0 | Status: CANCELLED | OUTPATIENT
Start: 2023-05-30 | End: 2023-06-29

## 2023-05-30 RX ORDER — TRIAMCINOLONE ACETONIDE 1 MG/G
CREAM TOPICAL
Qty: 45 G | Refills: 6 | Status: SHIPPED | OUTPATIENT
Start: 2023-05-30 | End: 2023-11-26

## 2023-05-30 ASSESSMENT — FIBROSIS 4 INDEX: FIB4 SCORE: 0.71

## 2023-05-31 NOTE — TELEPHONE ENCOUNTER
Maria L,  I ordered a referral for patient to see vascular because at her last hospital visit a few weeks ago they found 50 to 69% stenosis in the carotid; she has already undergone left endarterectomy and needs to establish with you guys as soon as possible for possible one on the opposite side.     Could you fast-track this referral please?    Thanks!  t

## 2023-05-31 NOTE — ASSESSMENT & PLAN NOTE
See most recent ER notes from 5/16  Bilateral carotid artery ultrasounds showed hemodynamically significant stenosis of approximately 50 to 69% at the level of distal internal carotid artery on the right and at the level of the common carotid artery on the left    [facility] [place]

## 2023-05-31 NOTE — ASSESSMENT & PLAN NOTE
Patient was seen 3 times in the ER since in the the last month:   Note from 5/9 indicate:   Hospital Course:  Ms. Allen is a 72 year old female with PMHx of schizophrenia on thorazine who presented to the hospital with two day history of abdominal pain, nausea and vomiting and altered mental status. CT A/P showed intra and extrahepatic ductal dilatation. Patient was empirically started on Zosyn as she met sepsis criteria. MRCP was negative and likely post-cholecystectomy physiology given absence of elevated liver enzymes. She was found to have enteritis on KUB and will be discharged with Augmentin to complete her antibiotic course. Patient's mental status returned to normal and is medically stable for discharge.     Notes from 5/14 visit to ER indicate:  Patient seen: 5/14/2023 7:14 AM PDT   Jigna Allen is a 72 y.o. female who presents to the emergency department with complaints as noted above. Patient has a history of diabetes, hypertension, COPD,'s coronary artery disease, tobacco use, and was recently hospitalized in [facility] with abdominal pain, nausea, vomiting, diarrhea. At that time she met SIRS criteria and had a potassium of 2.8. She was discharged on Augmentin and potassium. She is here visiting her daughter. She was well until this morning when she woke up with nausea and vomiting. She says she vomited 3 times without blood or coffee, teary. No diarrhea. No fevers, chills, or sweats. She has a chronic cough and continues to smoke. No sputum production or hemoptysis. No back pain. Denies dysuria, frequency, urgency. She is a diabetic but does not check her sugars at home. Denies any chest pain or chest pressure. No exertional chest discomfort. Accompanied by her daughter.    5/16 notes indicate:    #Expressive aphasia, now resolved  #Possible TIA versus stroke  Symptoms have now resolved. Physical exam is now benign.   CT of the head did not show acute findings.   MRI did not show any acute changes but  "did show moderate severity chronic microangiopathic white matter changes.   Echo was ordered and results are not read. Patient was encouraged to follow-up with her primary care provider about results of the echo  Bilateral carotid artery ultrasounds showed hemodynamically significant stenosis of approximately 50 to 69% at the level of distal internal carotid artery on the right and at the level of the common carotid artery on the left. Discussed with vascular surgery at [facility] who stated patient could be followed up outpatient in a timely manner.  Discussed with patient patient is to see vascular surgery patient states she has a vascular surgeon in [place] and would prefer to follow-up with them.  Patient will be continued on aspirin. Patient does not take a statin because she states she had a bad reaction in the past and she \"does not believe they work.\"  Patient is stable for discharge to follow-up with her PCP as well as her vascular surgeon outpatient.    Pt wants to have schizophrenia and taking thorazine removed from her record as this was erroneous; will f/up w/[facility] to find out protocol              "

## 2023-05-31 NOTE — PROGRESS NOTES
Subjective:     CC:   Chief Complaint   Patient presents with    Transitional Care Management Hospital Follow-up       HPI:   Jigna presents today with    Hospital discharge follow-up  Patient was seen 3 times in the ER since in the the last month:   Note from 5/9 indicate:   Hospital Course:  Ms. Allen is a 72 year old female with PMHx of schizophrenia on thorazine who presented to the hospital with two day history of abdominal pain, nausea and vomiting and altered mental status. CT A/P showed intra and extrahepatic ductal dilatation. Patient was empirically started on Zosyn as she met sepsis criteria. MRCP was negative and likely post-cholecystectomy physiology given absence of elevated liver enzymes. She was found to have enteritis on KUB and will be discharged with Augmentin to complete her antibiotic course. Patient's mental status returned to normal and is medically stable for discharge.     Notes from 5/14 visit to ER indicate:  Patient seen: 5/14/2023 7:14 AM PDT   Jigan Allen is a 72 y.o. female who presents to the emergency department with complaints as noted above. Patient has a history of diabetes, hypertension, COPD,'s coronary artery disease, tobacco use, and was recently hospitalized in [facility] with abdominal pain, nausea, vomiting, diarrhea. At that time she met SIRS criteria and had a potassium of 2.8. She was discharged on Augmentin and potassium. She is here visiting her daughter. She was well until this morning when she woke up with nausea and vomiting. She says she vomited 3 times without blood or coffee, teary. No diarrhea. No fevers, chills, or sweats. She has a chronic cough and continues to smoke. No sputum production or hemoptysis. No back pain. Denies dysuria, frequency, urgency. She is a diabetic but does not check her sugars at home. Denies any chest pain or chest pressure. No exertional chest discomfort. Accompanied by her daughter.    5/16 notes indicate:    #Expressive aphasia, now  "resolved  #Possible TIA versus stroke  Symptoms have now resolved. Physical exam is now benign.   CT of the head did not show acute findings.   MRI did not show any acute changes but did show moderate severity chronic microangiopathic white matter changes.   Echo was ordered and results are not read. Patient was encouraged to follow-up with her primary care provider about results of the echo  Bilateral carotid artery ultrasounds showed hemodynamically significant stenosis of approximately 50 to 69% at the level of distal internal carotid artery on the right and at the level of the common carotid artery on the left. Discussed with vascular surgery at [facility] who stated patient could be followed up outpatient in a timely manner.  Discussed with patient patient is to see vascular surgery patient states she has a vascular surgeon in [place] and would prefer to follow-up with them.  Patient will be continued on aspirin. Patient does not take a statin because she states she had a bad reaction in the past and she \"does not believe they work.\"  Patient is stable for discharge to follow-up with her PCP as well as her vascular surgeon outpatient.    *Note: pt wants to have schizophrenia and taking thorazine removed from her record as this was erroneous; will f/up w/[facility] to find out protocol          Carotid artery stenosis without cerebral infarction  See most recent ER notes from 5/16  Bilateral carotid artery ultrasounds showed hemodynamically significant stenosis of approximately 50 to 69% at the level of distal internal carotid artery on the right and at the level of the common carotid artery on the left    Pt has vascular specialist in [place] but doesn't want to go that far, is open to scheduling here     Mucous retention cyst of maxillary sinus  Has been having daily h/a for the last few months  Left sided maxillary sinus cyst was found on MRI on head earlier this month  Open to ENT " "referral               ROS per HPI    Objective:     Exam:  /64   Pulse 99   Temp 36.2 °C (97.2 °F) (Temporal)   Resp 18   Ht 1.6 m (5' 3\")   Wt 76.2 kg (168 lb)   SpO2 98%   BMI 29.76 kg/m²  Body mass index is 29.76 kg/m².    Physical Exam:  Constitutional: Well-developed and well-nourished female. Not diaphoretic. No distress.   Skin: warm, dry, intact, no evidence of rash or concerning lesions  Head: Atraumatic without lesions.  Eyes: Conjunctivae are normal. Pupils are equal, round. No scleral icterus.   Ears:  External ears unremarkable.   Neck: Supple, trachea midline. No thyromegaly present. No cervical or supraclavicular lymphadenopathy.  Cardiovascular: Regular rate  Pulmonary: . Normal effort.   Extremities: No cyanosis, clubbing, erythema, nor edema.   Neurological: Alert and oriented x 3.   Psychiatric:  Behavior, mood, and affect are appropriate.        Assessment & Plan:     72 y.o. female with the following -     1. Venous stasis dermatitis of left lower extremity  - triamcinolone acetonide (KENALOG) 0.1 % Cream; Apply to affected area twice a day for no more than 2 weeks at a time  Dispense: 45 g; Refill: 6    2. Hospital discharge follow-up    3. Bilateral carotid artery stenosis without cerebral infarction  - Referral to Vascular Surgery  - nitroglycerin (NITROSTAT) 0.4 MG SL Tab; Place 1 Tablet under the tongue as needed for Chest Pain (Once every 5 minutes up to 3 doses).  Dispense: 100 Tablet; Refill: 3    4. Chronic obstructive pulmonary disease, unspecified COPD type (HCC)  - tiotropium (SPIRIVA HANDIHALER) 18 MCG Cap; Place 1 Capsule into inhaler and inhale every day.  Dispense: 90 Capsule; Refill: 3  - albuterol (PROAIR HFA) 108 (90 Base) MCG/ACT Aero Soln inhalation aerosol; USE 2 INHALATIONS EVERY 6 HOURS AS NEEDED FOR SHORTNESS OF BREATH  Dispense: 1 Each; Refill: 11    5. Mucous retention cyst of maxillary sinus  - Referral to ENT    Other orders  - famotidine (PEPCID) 20 MG " Tab         Return in about 7 months (around 12/30/2023).    Please note that this dictation was created using voice recognition software. I have made every reasonable attempt to correct obvious errors, but I expect that there are errors of grammar and possibly content that I did not discover before finalizing the note.

## 2023-05-31 NOTE — TELEPHONE ENCOUNTER
Alberto Arellano  I changed the priority of the referral to urgent. As of now, the referral is pending triage. I will continue to check the status of the triage.    Thanks and have a great day!  Maday   Referral Specialist

## 2023-05-31 NOTE — ASSESSMENT & PLAN NOTE
Has been having daily h/a for the last few months  Left sided maxillary sinus cyst was found on MRI on head earlier this month  ENT referral

## 2023-06-15 ENCOUNTER — TELEMEDICINE (OUTPATIENT)
Dept: MEDICAL GROUP | Facility: PHYSICIAN GROUP | Age: 73
End: 2023-06-15
Payer: MEDICARE

## 2023-06-15 VITALS — WEIGHT: 168 LBS | BODY MASS INDEX: 29.77 KG/M2 | HEIGHT: 63 IN

## 2023-06-15 DIAGNOSIS — J44.9 CHRONIC OBSTRUCTIVE PULMONARY DISEASE, UNSPECIFIED COPD TYPE (HCC): ICD-10-CM

## 2023-06-15 PROCEDURE — 99213 OFFICE O/P EST LOW 20 MIN: CPT | Mod: 95 | Performed by: NURSE PRACTITIONER

## 2023-06-15 RX ORDER — FLUTICASONE FUROATE, UMECLIDINIUM BROMIDE AND VILANTEROL TRIFENATATE 100; 62.5; 25 UG/1; UG/1; UG/1
1 POWDER RESPIRATORY (INHALATION) DAILY
Qty: 90 EACH | Refills: 3 | Status: SHIPPED | OUTPATIENT
Start: 2023-06-15 | End: 2023-06-29 | Stop reason: SDUPTHER

## 2023-06-15 ASSESSMENT — FIBROSIS 4 INDEX: FIB4 SCORE: 0.71

## 2023-06-15 NOTE — ASSESSMENT & PLAN NOTE
Pt reports her insurance doesn't cover Spiriva anymore; would like to d/c wixela and spiriva and try trelegy

## 2023-06-15 NOTE — PROGRESS NOTES
Virtual Visit: Established Patient   This visit was conducted via Zoom using secure and encrypted videoconferencing technology.   The patient was in their home in the state of [place].    The patient's identity was confirmed and verbal consent was obtained for this virtual visit.    Subjective:   CC:   Chief Complaint   Patient presents with    Other     W     Jigna Allen is a 72 y.o. female presenting for evaluation and management of:    COPD (chronic obstructive pulmonary disease) ([facility])  Pt reports her insurance doesn't cover Spiriva anymore; would like to d/c wixela and spiriva and try trelegy     ROS   See HPI    Current medicines (including changes today)  Current Outpatient Medications   Medication Sig Dispense Refill    fluticasone-umeclidin-vilant (TRELEGY ELLIPTA) 100-62.5-25 MCG/ACT AEROSOL POWDER, BREATH ACTIVATED inhalation Inhale 1 Inhalation every day. X 365 days 90 Each 3    famotidine (PEPCID) 20 MG Tab       triamcinolone acetonide (KENALOG) 0.1 % Cream Apply to affected area twice a day for no more than 2 weeks at a time 45 g 6    nitroglycerin (NITROSTAT) 0.4 MG SL Tab Place 1 Tablet under the tongue as needed for Chest Pain (Once every 5 minutes up to 3 doses). 100 Tablet 3    albuterol (PROAIR HFA) 108 (90 Base) MCG/ACT Aero Soln inhalation aerosol USE 2 INHALATIONS EVERY 6 HOURS AS NEEDED FOR SHORTNESS OF BREATH 1 Each 11    metFORMIN ER (GLUCOPHAGE XR) 500 MG TABLET SR 24 HR TAKE 1 TABLET IN THE MORNING AND 2 TABLETS IN THE EVENING 270 Tablet 3    losartan-hydrochlorothiazide (HYZAAR) 100-12.5 MG per tablet TAKE 1 TABLET DAILY 90 Tablet 3    pantoprazole (PROTONIX) 20 MG tablet       fenofibrate (TRICOR) 145 MG Tab TAKE 1 TABLET DAILY 90 Tablet 3    ipratropium-albuterol (DUONEB) 0.5-2.5 (3) MG/3ML nebulizer solution USE 1 VIAL (3 ML) VIA NEBULIZER FOUR TIMES A  Each 3    buPROPion (WELLBUTRIN) 100 MG Tab Take 1 Tablet by mouth 2 times a day. 180 Tablet 3    Nebulizers Misc Use  nebulizer as directed up to 4x day prn sob 1 Each 1    glucose blood (FREESTYLE LITE) strip Use as directed.patient tests fasting blood sugar every am 100 Strip 3    vitamin D (CHOLECALCIFEROL) 1000 UNIT Tab Take 1 Tablet by mouth every day. Taking 2,000      aspirin EC (ECOTRIN) 81 MG Tablet Delayed Response Take 1 Tablet by mouth every day.      ibuprofen (MOTRIN) 200 MG Tab Take 1 Tablet by mouth every 6 hours as needed.       No current facility-administered medications for this visit.       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    Recurrent major depressive disorder (HCC) 03/22/2022    Healthcare maintenance 03/22/2022    Venous stasis dermatitis of left lower extremity 12/17/2020    Neuropathic pain 08/24/2020    Vaginal yeast infection 01/07/2020    Obesity (BMI 30-39.9) 09/17/2019    S/P carotid endarterectomy 02/05/2019    Radicular pain in left arm 02/05/2019    Neck pain on left side 02/05/2019    Vitamin D deficiency 11/06/2018    Essential hypertension 09/25/2018    Anxiety 03/25/2016    CAD (coronary artery disease) 03/25/2016    COPD (chronic obstructive pulmonary disease) ([facility]) 03/25/2016    Type 2 diabetes mellitus with complication, without long-term current use of insulin ([facility]) 03/25/2016    Goiter 03/25/2016    Hyperlipidemia 03/25/2016    Stented coronary artery 03/25/2016    Tobacco dependence 03/25/2016    Carotid artery stenosis without cerebral infarction 09/25/2014        Objective:   Ht 1.6 m (5'  "3\") Comment: pt stated  Wt 76.2 kg (168 lb) Comment: pt stated  BMI 29.76 kg/m²     Physical Exam:  Constitutional: Alert, no distress, well-groomed.  Skin: No rashes in visible areas.  Eye: Round. Conjunctiva clear, lids normal. No icterus.   ENMT: Lips pink without lesions, good dentition, moist mucous membranes. Phonation normal.  Neck: No masses, no thyromegaly. Moves freely without pain.  Respiratory: Unlabored respiratory effort, no cough or audible wheeze  Psych: Alert and oriented x3, normal affect and mood.     Assessment and Plan:   The following treatment plan was discussed:     1. Chronic obstructive pulmonary disease, unspecified COPD type (HCC)  - fluticasone-umeclidin-vilant (TRELEGY ELLIPTA) 100-62.5-25 MCG/ACT AEROSOL POWDER, BREATH ACTIVATED inhalation; Inhale 1 Inhalation every day. X 365 days  Dispense: 90 Each; Refill: 3      Follow-up: Return in about 3 weeks (around 7/6/2023) for will send a message if effective; if not, increase to 200 mg.         "

## 2023-06-20 ENCOUNTER — OFFICE VISIT (OUTPATIENT)
Dept: VASCULAR SURGERY | Facility: MEDICAL CENTER | Age: 73
End: 2023-06-20
Payer: MEDICARE

## 2023-06-20 VITALS
WEIGHT: 165.8 LBS | BODY MASS INDEX: 29.38 KG/M2 | SYSTOLIC BLOOD PRESSURE: 138 MMHG | HEART RATE: 90 BPM | TEMPERATURE: 97.6 F | HEIGHT: 63 IN | OXYGEN SATURATION: 95 % | DIASTOLIC BLOOD PRESSURE: 72 MMHG

## 2023-06-20 DIAGNOSIS — I65.22 STENOSIS OF LEFT CAROTID ARTERY: ICD-10-CM

## 2023-06-20 PROCEDURE — 99204 OFFICE O/P NEW MOD 45 MIN: CPT | Performed by: SURGERY

## 2023-06-20 PROCEDURE — 3075F SYST BP GE 130 - 139MM HG: CPT | Performed by: SURGERY

## 2023-06-20 PROCEDURE — 3078F DIAST BP <80 MM HG: CPT | Performed by: SURGERY

## 2023-06-20 ASSESSMENT — FIBROSIS 4 INDEX: FIB4 SCORE: 0.71

## 2023-06-20 NOTE — H&P
Vascular Surgery            New Patient Consultation    Patient:Jigna Allen  MRN:[id number]  Primary care physician:Cheryl M. Demucha, A.P.R.N.  Referring Provider: Cheryl M. Demucha, A.P.R.N.    Vascular Consultant: Fabriizo Gonzalez MD    Date: 6/20/2023  _____________________________________________________    Chief Complaint:     Carotid stenosis  TIA with aphasia    History of Present Illness:   Jigna Allen  is a 72 y.o. year old female who was referred for evaluation of carotid stenosis.  In May the patient was visiting her daughter in [place] and had a TIA episode manifesting as 5 minutes of Broca's aphasia.  That episode resolved spontaneously and she has not had any recurrent symptoms.  This prompted a carotid ultrasound and a CT of the head and an MRI of the brain.  The CT of the head and the MRI of the brain showed no acute findings.  The carotid ultrasound was interpreted as showing at least moderate stenosis of the carotid arteries however the velocity measurements on both sides are nonconcerning, in fact the velocity measurements indicate less than 50% stenosis on both sides.  The patient has had a left carotid endarterectomy about 5 to 6 years ago.  The patient is right-hand dominant.  It appears based on the records that I have and the history that the patient is providing to me that no additional evaluation was undertaken to try and find the cause of the TIA, specifically no CTA was performed to further evaluate the cerebral vasculature.      Past Medical History:     Past Medical History:   Diagnosis Date    Abnormal screening cardiac CT 2/14/2020    Anxiety     Carotid arterial disease (HCC)     COPD (chronic obstructive pulmonary disease) (HCC)     Diabetes (HCC)     HTN (hypertension)     Hyperlipidemia     Tobacco use      Past Surgical History:   No past surgical history on file.  Allergies:     Allergies   Allergen Reactions    Sulfa Drugs Hives    Ezetimibe Nausea      Diarrhea     Hmg-Coa-R Inhibitors Unspecified     Muscle cramps     Medications:     Outpatient Encounter Medications as of 6/20/2023   Medication Sig Dispense Refill    fluticasone-umeclidin-vilant (TRELEGY ELLIPTA) 100-62.5-25 MCG/ACT AEROSOL POWDER, BREATH ACTIVATED inhalation Inhale 1 Inhalation every day. X 365 days 90 Each 3    famotidine (PEPCID) 20 MG Tab       triamcinolone acetonide (KENALOG) 0.1 % Cream Apply to affected area twice a day for no more than 2 weeks at a time 45 g 6    nitroglycerin (NITROSTAT) 0.4 MG SL Tab Place 1 Tablet under the tongue as needed for Chest Pain (Once every 5 minutes up to 3 doses). 100 Tablet 3    albuterol (PROAIR HFA) 108 (90 Base) MCG/ACT Aero Soln inhalation aerosol USE 2 INHALATIONS EVERY 6 HOURS AS NEEDED FOR SHORTNESS OF BREATH 1 Each 11    metFORMIN ER (GLUCOPHAGE XR) 500 MG TABLET SR 24 HR TAKE 1 TABLET IN THE MORNING AND 2 TABLETS IN THE EVENING 270 Tablet 3    losartan-hydrochlorothiazide (HYZAAR) 100-12.5 MG per tablet TAKE 1 TABLET DAILY 90 Tablet 3    pantoprazole (PROTONIX) 20 MG tablet       fenofibrate (TRICOR) 145 MG Tab TAKE 1 TABLET DAILY 90 Tablet 3    ipratropium-albuterol (DUONEB) 0.5-2.5 (3) MG/3ML nebulizer solution USE 1 VIAL (3 ML) VIA NEBULIZER FOUR TIMES A  Each 3    buPROPion (WELLBUTRIN) 100 MG Tab Take 1 Tablet by mouth 2 times a day. 180 Tablet 3    Nebulizers Misc Use nebulizer as directed up to 4x day prn sob 1 Each 1    glucose blood (FREESTYLE LITE) strip Use as directed.patient tests fasting blood sugar every am 100 Strip 3    vitamin D (CHOLECALCIFEROL) 1000 UNIT Tab Take 1 Tablet by mouth every day. Taking 2,000      aspirin EC (ECOTRIN) 81 MG Tablet Delayed Response Take 1 Tablet by mouth every day.      ibuprofen (MOTRIN) 200 MG Tab Take 1 Tablet by mouth every 6 hours as needed.       No facility-administered encounter medications on file as of 6/20/2023.     Social History:     Social History     Socioeconomic History     Marital status:      Spouse name: Not on file    Number of children: Not on file    Years of education: Not on file    Highest education level: Associate degree: occupational, technical, or vocational program   Occupational History    Not on file   Tobacco Use    Smoking status: Every Day     Packs/day: 0.50     Types: Cigarettes    Smokeless tobacco: Never   Vaping Use    Vaping Use: Never used   Substance and Sexual Activity    Alcohol use: No    Drug use: Not Currently     Types: Marijuana     Comment: CBD  occ    Sexual activity: Not Currently     Partners: Male     Comment:    Other Topics Concern    Not on file   Social History Narrative    Not on file     Social Determinants of Health     Financial Resource Strain: Low Risk  (3/21/2022)    Overall Financial Resource Strain (CARDIA)     Difficulty of Paying Living Expenses: Not very hard   Food Insecurity: No Food Insecurity (3/21/2022)    Hunger Vital Sign     Worried About Running Out of Food in the Last Year: Never true     Ran Out of Food in the Last Year: Never true   Transportation Needs: No Transportation Needs (3/21/2022)    PRAPARE - Transportation     Lack of Transportation (Medical): No     Lack of Transportation (Non-Medical): No   Physical Activity: Unknown (3/21/2022)    Exercise Vital Sign     Days of Exercise per Week: 0 days     Minutes of Exercise per Session: Not on file   Stress: Stress Concern Present (3/21/2022)    [employer] - Occupational Stress Questionnaire     Feeling of Stress : Very much   Social Connections: Moderately Isolated (3/21/2022)    Social Connection and Isolation Panel [NHANES]     Frequency of Communication with Friends and Family: More than three times a week     Frequency of Social Gatherings with Friends and Family: Once a week     Attends Synagogue Services: Never     Active Member of Clubs or Organizations: No     Attends Club or Organization Meetings: Never     Marital  "Status:    Intimate Partner Violence: Not on file   Housing Stability: Low Risk  (3/21/2022)    Housing Stability Vital Sign     Unable to Pay for Housing in the Last Year: No     Number of Places Lived in the Last Year: 1     Unstable Housing in the Last Year: No      Social History     Tobacco Use   Smoking Status Every Day    Packs/day: 0.50    Types: Cigarettes   Smokeless Tobacco Never   Vaping Use    Vaping Use: Never used     Social History     Substance and Sexual Activity   Alcohol Use No     Social History     Substance and Sexual Activity   Drug Use Not Currently    Types: Marijuana    Comment: CBD  occ      Family History:     Family History   Problem Relation Age of Onset    Cancer Mother 47        breast       Review of Systems:   Constitutional: Negative for fever or chills  HENT:   Negative for hearing loss or tinnitus    Eyes:    Negative for blurred vision or loss of vision  Respiratory:  Negative for cough or hemoptysis  Cardiac:  Negative for chest pain or palpitations  Vascular:  Negative for claudication, Negative for rest pain  Gastrointestinal: Negative for vomiting or abdominal pain     Negative for hematochezia or melena   Genitourinary: Negative for dysuria or hematuria   Musculoskeletal: Negative for myalgias or acute joint pain  Skin:   Negative for itching or rash  Neurological:  Negative for dizziness or headaches     Negative for speech disturbance     Negative for extremity weakness or paresthesias  Endo/Heme:  Negative for easy bruising or bleeding       Exam:   /72 (BP Location: Left arm, Patient Position: Sitting, BP Cuff Size: Adult)   Pulse 90   Temp 36.4 °C (97.6 °F) (Temporal)   Ht 1.6 m (5' 3\")   Wt 75.2 kg (165 lb 12.8 oz)   SpO2 95%   BMI 29.37 kg/m²     Constitutional: Alert, oriented, no acute distress  HEENT:  Normocephalic and atraumatic, EOMI  Neck:   Supple, no JVD,   Cardiovascular: Regular rate and rhythm,   Pulmonary:  Good air entry bilaterally, "    Abdominal:  Soft, non-tender, non-distended       Musculoskeletal: No tenderness, no deformity  Neurological:  CN II-XII grossly intact, no focal deficits  Skin:   Skin is warm and dry. No rash noted.  Vascular exam:    RUE: warm, cap refill<3 seconds, no edema, no tissue loss,   LUE: warm, cap refill<3 seconds, no edema, no tissue loss,   RLE: warm, cap refill<3 seconds, no edema, no tissue loss,   LLE: warm, cap refill<3 seconds, no edema, no tissue loss,       Imaging:   CT of the head and the MRI of the brain showed no acute findings    Carotid ultrasound was interpreted as showing at least moderate stenosis of the carotid arteries however the velocity measurements on both sides are nonconcerning, in fact the velocity measurements indicate less than 50% stenosis on both sides      Assessment and Plan:   - Mild bilateral carotid stenosis  - TIA manifesting as 5 minutes of aphasia    Appears to have had a TIA episode manifesting as 5 minutes of aphasia.  It is possible that the treatment team thought this was coming from carotid disease, however her carotid duplex only shows mild less than 50% stenosis bilaterally.  Therefore this does not appear to be a compelling explanation for the TIA that she had and I believe further work-up is necessary in the form of a CT angiogram of the head and neck.  In particular this will evaluate for intracranial disease that could potentially have caused her TIA.  Plan will be to obtain CTA of the head and neck and I will call the patient to discuss results.  Patient was advised to call if any concerning symptoms arise in the meantime to the emergency room if she has any recurrent TIA or stroke symptoms.      _____________________________________________________  Fabrizio Gonzalez MD  [facility]  [phone]  [address], [place] [address]

## 2023-06-29 DIAGNOSIS — J44.9 CHRONIC OBSTRUCTIVE PULMONARY DISEASE, UNSPECIFIED COPD TYPE (HCC): ICD-10-CM

## 2023-06-29 NOTE — TELEPHONE ENCOUNTER
Received request via: Patient    Was the patient seen in the last year in this department? Yes    Does the patient have an active prescription (recently filled or refills available) for medication(s) requested? No    Does the patient have residential Plus and need 100 day supply (blood pressure, diabetes and cholesterol meds only)? Patient does not have SCP    Last office visit:06/15/2023  Last labs:05/17/2023      Was send to the wrong [facility]

## 2023-06-30 ENCOUNTER — HOSPITAL ENCOUNTER (OUTPATIENT)
Dept: RADIOLOGY | Facility: MEDICAL CENTER | Age: 73
End: 2023-06-30
Attending: SURGERY
Payer: MEDICARE

## 2023-06-30 DIAGNOSIS — I65.22 STENOSIS OF LEFT CAROTID ARTERY: ICD-10-CM

## 2023-06-30 PROCEDURE — 70498 CT ANGIOGRAPHY NECK: CPT

## 2023-06-30 PROCEDURE — 700117 HCHG RX CONTRAST REV CODE 255: Performed by: SURGERY

## 2023-06-30 PROCEDURE — 70496 CT ANGIOGRAPHY HEAD: CPT

## 2023-06-30 RX ADMIN — IOHEXOL 100 ML: 350 INJECTION, SOLUTION INTRAVENOUS at 10:45

## 2023-07-04 RX ORDER — FLUTICASONE FUROATE, UMECLIDINIUM BROMIDE AND VILANTEROL TRIFENATATE 100; 62.5; 25 UG/1; UG/1; UG/1
1 POWDER RESPIRATORY (INHALATION) DAILY
Qty: 90 EACH | Refills: 3 | Status: SHIPPED | OUTPATIENT
Start: 2023-07-04

## 2023-08-08 ENCOUNTER — OFFICE VISIT (OUTPATIENT)
Dept: MEDICAL GROUP | Facility: PHYSICIAN GROUP | Age: 73
End: 2023-08-08
Payer: MEDICARE

## 2023-08-08 VITALS
DIASTOLIC BLOOD PRESSURE: 74 MMHG | TEMPERATURE: 97 F | HEIGHT: 63 IN | HEART RATE: 85 BPM | BODY MASS INDEX: 29.77 KG/M2 | RESPIRATION RATE: 20 BRPM | WEIGHT: 168 LBS | SYSTOLIC BLOOD PRESSURE: 130 MMHG | OXYGEN SATURATION: 98 %

## 2023-08-08 DIAGNOSIS — E11.8 TYPE 2 DIABETES MELLITUS WITH COMPLICATION, WITHOUT LONG-TERM CURRENT USE OF INSULIN (HCC): ICD-10-CM

## 2023-08-08 DIAGNOSIS — R94.4 ABNORMAL KIDNEY FUNCTION STUDY: ICD-10-CM

## 2023-08-08 DIAGNOSIS — E87.6 LOW BLOOD POTASSIUM: ICD-10-CM

## 2023-08-08 DIAGNOSIS — F17.200 TOBACCO DEPENDENCE: ICD-10-CM

## 2023-08-08 DIAGNOSIS — J44.9 CHRONIC OBSTRUCTIVE PULMONARY DISEASE, UNSPECIFIED COPD TYPE (HCC): ICD-10-CM

## 2023-08-08 DIAGNOSIS — R91.1 PULMONARY NODULE: ICD-10-CM

## 2023-08-08 LAB
HBA1C MFR BLD: 7 % (ref ?–5.8)
POCT INT CON NEG: NEGATIVE
POCT INT CON POS: POSITIVE

## 2023-08-08 PROCEDURE — 3078F DIAST BP <80 MM HG: CPT | Performed by: NURSE PRACTITIONER

## 2023-08-08 PROCEDURE — 3075F SYST BP GE 130 - 139MM HG: CPT | Performed by: NURSE PRACTITIONER

## 2023-08-08 PROCEDURE — 99214 OFFICE O/P EST MOD 30 MIN: CPT | Performed by: NURSE PRACTITIONER

## 2023-08-08 PROCEDURE — 83036 HEMOGLOBIN GLYCOSYLATED A1C: CPT | Performed by: NURSE PRACTITIONER

## 2023-08-08 RX ORDER — CHLORPROMAZINE HYDROCHLORIDE 100 MG/1
TABLET, FILM COATED ORAL
COMMUNITY
End: 2023-08-08

## 2023-08-08 ASSESSMENT — FIBROSIS 4 INDEX: FIB4 SCORE: 0.71

## 2023-08-09 NOTE — PROGRESS NOTES
"Subjective:     CC:   Chief Complaint   Patient presents with    Follow-Up     A1c         HPI:   Jigna presents today with    COPD (chronic obstructive pulmonary disease) (HCC)  Patient is doing much better on Trelegy  100  does not she feel she needs a higher dose    Type 2 diabetes mellitus with complication, without long-term current use of insulin (HCC)  Patient's A1c has increased from 6.6-7   she will work on dietary changes and healthy choices and continue to take 1 tablet in morning and 2 tablets in evening of metformin               ROS per HPI    Objective:     Exam:  /74   Pulse 85   Temp 36.1 °C (97 °F)   Resp 20   Ht 1.6 m (5' 3\")   Wt 76.2 kg (168 lb)   SpO2 98%   BMI 29.76 kg/m²  Body mass index is 29.76 kg/m².    Physical Exam:  Constitutional: Well-developed and well-nourished female . Not diaphoretic. No distress.   Skin: warm, dry, intact, no evidence of rash or concerning lesions  Head: Atraumatic without lesions.  Eyes: Conjunctivae are normal. Pupils are equal, round. No scleral icterus.   Ears:  External ears unremarkable.   Neck: Supple, trachea midline. No thyromegaly present. No cervical or supraclavicular lymphadenopathy.  Cardiovascular: Regular rate and rhythm without murmur.   Pulmonary: Clear to ausculation. Normal effort. No rales, ronchi, or wheezing.  Extremities: No cyanosis, clubbing, erythema, nor edema.   Neurological: Alert and oriented x 3.   Psychiatric:  Behavior, mood, and affect are appropriate.        Assessment & Plan:     72 y.o. female with the following -     1. Type 2 diabetes mellitus with complication, without long-term current use of insulin (HCC)  - POCT Hemoglobin A1C  - Diabetic Monofilament LE Exam    2. Low blood potassium  - Comp Metabolic Panel; Future    3. Abnormal kidney function study  - Comp Metabolic Panel; Future    4. Pulmonary nodule    5. Chronic obstructive pulmonary disease, unspecified COPD type (HCC)         Return in about 6 " months (around 2/8/2024) for DM-A1c.    Please note that this dictation was created using voice recognition software. I have made every reasonable attempt to correct obvious errors, but I expect that there are errors of grammar and possibly content that I did not discover before finalizing the note.

## 2023-08-10 ENCOUNTER — HOSPITAL ENCOUNTER (OUTPATIENT)
Dept: LAB | Facility: MEDICAL CENTER | Age: 73
End: 2023-08-10
Attending: NURSE PRACTITIONER
Payer: MEDICARE

## 2023-08-10 DIAGNOSIS — E87.6 LOW BLOOD POTASSIUM: ICD-10-CM

## 2023-08-10 DIAGNOSIS — R94.4 ABNORMAL KIDNEY FUNCTION STUDY: ICD-10-CM

## 2023-08-10 LAB
ALBUMIN SERPL BCP-MCNC: 4.4 G/DL (ref 3.2–4.9)
ALBUMIN/GLOB SERPL: 1.6 G/DL
ALP SERPL-CCNC: 61 U/L (ref 30–99)
ALT SERPL-CCNC: 11 U/L (ref 2–50)
ANION GAP SERPL CALC-SCNC: 15 MMOL/L (ref 7–16)
AST SERPL-CCNC: 13 U/L (ref 12–45)
BILIRUB SERPL-MCNC: 0.3 MG/DL (ref 0.1–1.5)
BUN SERPL-MCNC: 21 MG/DL (ref 8–22)
CALCIUM ALBUM COR SERPL-MCNC: 9.7 MG/DL (ref 8.5–10.5)
CALCIUM SERPL-MCNC: 10 MG/DL (ref 8.5–10.5)
CHLORIDE SERPL-SCNC: 103 MMOL/L (ref 96–112)
CO2 SERPL-SCNC: 24 MMOL/L (ref 20–33)
CREAT SERPL-MCNC: 0.74 MG/DL (ref 0.5–1.4)
GFR SERPLBLD CREATININE-BSD FMLA CKD-EPI: 85 ML/MIN/1.73 M 2
GLOBULIN SER CALC-MCNC: 2.7 G/DL (ref 1.9–3.5)
GLUCOSE SERPL-MCNC: 132 MG/DL (ref 65–99)
POTASSIUM SERPL-SCNC: 3.8 MMOL/L (ref 3.6–5.5)
PROT SERPL-MCNC: 7.1 G/DL (ref 6–8.2)
SODIUM SERPL-SCNC: 142 MMOL/L (ref 135–145)

## 2023-08-10 PROCEDURE — 36415 COLL VENOUS BLD VENIPUNCTURE: CPT

## 2023-08-10 PROCEDURE — 80053 COMPREHEN METABOLIC PANEL: CPT

## 2023-08-10 NOTE — ASSESSMENT & PLAN NOTE
Patient's A1c has increased from 6.6-7   she will work on dietary changes and healthy choices and continue to take 1 tablet in morning and 2 tablets in evening of metformin

## 2023-09-14 ENCOUNTER — APPOINTMENT (OUTPATIENT)
Dept: MEDICAL GROUP | Facility: PHYSICIAN GROUP | Age: 73
End: 2023-09-14
Payer: MEDICARE

## 2023-09-14 DIAGNOSIS — R91.1 LUNG NODULE: ICD-10-CM

## 2023-09-22 ENCOUNTER — HOSPITAL ENCOUNTER (OUTPATIENT)
Dept: RADIOLOGY | Facility: MEDICAL CENTER | Age: 73
End: 2023-09-22
Attending: NURSE PRACTITIONER
Payer: MEDICARE

## 2023-09-22 DIAGNOSIS — R91.1 LUNG NODULE: ICD-10-CM

## 2023-09-22 PROCEDURE — 71250 CT THORAX DX C-: CPT

## 2023-11-26 ENCOUNTER — APPOINTMENT (OUTPATIENT)
Dept: RADIOLOGY | Facility: MEDICAL CENTER | Age: 73
DRG: 189 | End: 2023-11-26
Attending: EMERGENCY MEDICINE
Payer: MEDICARE

## 2023-11-26 ENCOUNTER — APPOINTMENT (OUTPATIENT)
Dept: RADIOLOGY | Facility: MEDICAL CENTER | Age: 73
DRG: 189 | End: 2023-11-26
Attending: STUDENT IN AN ORGANIZED HEALTH CARE EDUCATION/TRAINING PROGRAM
Payer: MEDICARE

## 2023-11-26 ENCOUNTER — HOSPITAL ENCOUNTER (INPATIENT)
Facility: MEDICAL CENTER | Age: 73
LOS: 4 days | DRG: 189 | End: 2023-11-30
Attending: EMERGENCY MEDICINE | Admitting: STUDENT IN AN ORGANIZED HEALTH CARE EDUCATION/TRAINING PROGRAM
Payer: MEDICARE

## 2023-11-26 ENCOUNTER — OFFICE VISIT (OUTPATIENT)
Dept: URGENT CARE | Facility: PHYSICIAN GROUP | Age: 73
End: 2023-11-26
Payer: MEDICARE

## 2023-11-26 ENCOUNTER — APPOINTMENT (OUTPATIENT)
Dept: RADIOLOGY | Facility: IMAGING CENTER | Age: 73
End: 2023-11-26
Attending: NURSE PRACTITIONER
Payer: MEDICARE

## 2023-11-26 VITALS
WEIGHT: 163 LBS | DIASTOLIC BLOOD PRESSURE: 78 MMHG | TEMPERATURE: 97 F | HEIGHT: 63 IN | HEART RATE: 106 BPM | BODY MASS INDEX: 28.88 KG/M2 | OXYGEN SATURATION: 83 % | SYSTOLIC BLOOD PRESSURE: 158 MMHG | RESPIRATION RATE: 14 BRPM

## 2023-11-26 DIAGNOSIS — R79.89 ELEVATED TROPONIN: ICD-10-CM

## 2023-11-26 DIAGNOSIS — R10.9 ABDOMINAL PAIN OF UNKNOWN ETIOLOGY: ICD-10-CM

## 2023-11-26 DIAGNOSIS — K55.1 MESENTERIC ARTERY STENOSIS (HCC): ICD-10-CM

## 2023-11-26 DIAGNOSIS — J44.9 CHRONIC OBSTRUCTIVE PULMONARY DISEASE, UNSPECIFIED COPD TYPE (HCC): ICD-10-CM

## 2023-11-26 DIAGNOSIS — R09.02 HYPOXIA: ICD-10-CM

## 2023-11-26 DIAGNOSIS — A41.9 SEPSIS, DUE TO UNSPECIFIED ORGANISM, UNSPECIFIED WHETHER ACUTE ORGAN DYSFUNCTION PRESENT (HCC): ICD-10-CM

## 2023-11-26 DIAGNOSIS — R11.2 NAUSEA AND VOMITING, UNSPECIFIED VOMITING TYPE: ICD-10-CM

## 2023-11-26 DIAGNOSIS — R42 DIZZINESS: ICD-10-CM

## 2023-11-26 DIAGNOSIS — R19.7 DIARRHEA, UNSPECIFIED TYPE: ICD-10-CM

## 2023-11-26 DIAGNOSIS — J06.9 UPPER RESPIRATORY TRACT INFECTION, UNSPECIFIED TYPE: ICD-10-CM

## 2023-11-26 PROBLEM — J44.1 ACUTE EXACERBATION OF CHRONIC OBSTRUCTIVE PULMONARY DISEASE (COPD) (HCC): Status: ACTIVE | Noted: 2023-11-26

## 2023-11-26 PROBLEM — J96.01 ACUTE HYPOXEMIC RESPIRATORY FAILURE (HCC): Status: ACTIVE | Noted: 2023-11-26

## 2023-11-26 PROBLEM — D72.829 LEUKOCYTOSIS: Status: ACTIVE | Noted: 2023-11-26

## 2023-11-26 LAB
ALBUMIN SERPL BCP-MCNC: 4.4 G/DL (ref 3.2–4.9)
ALBUMIN/GLOB SERPL: 1.4 G/DL
ALP SERPL-CCNC: 73 U/L (ref 30–99)
ALT SERPL-CCNC: 17 U/L (ref 2–50)
ANION GAP SERPL CALC-SCNC: 14 MMOL/L (ref 7–16)
APPEARANCE UR: NORMAL
AST SERPL-CCNC: 18 U/L (ref 12–45)
BASOPHILS # BLD AUTO: 0.2 % (ref 0–1.8)
BASOPHILS # BLD: 0.03 K/UL (ref 0–0.12)
BILIRUB SERPL-MCNC: 0.4 MG/DL (ref 0.1–1.5)
BILIRUB UR STRIP-MCNC: NORMAL MG/DL
BUN SERPL-MCNC: 32 MG/DL (ref 8–22)
CALCIUM ALBUM COR SERPL-MCNC: 9.5 MG/DL (ref 8.5–10.5)
CALCIUM SERPL-MCNC: 9.8 MG/DL (ref 8.5–10.5)
CHLORIDE SERPL-SCNC: 95 MMOL/L (ref 96–112)
CO2 SERPL-SCNC: 28 MMOL/L (ref 20–33)
COLOR UR AUTO: YELLOW
CREAT SERPL-MCNC: 0.58 MG/DL (ref 0.5–1.4)
EKG IMPRESSION: NORMAL
EOSINOPHIL # BLD AUTO: 0.01 K/UL (ref 0–0.51)
EOSINOPHIL NFR BLD: 0.1 % (ref 0–6.9)
ERYTHROCYTE [DISTWIDTH] IN BLOOD BY AUTOMATED COUNT: 42.3 FL (ref 35.9–50)
FLUAV RNA SPEC QL NAA+PROBE: NEGATIVE
FLUBV RNA SPEC QL NAA+PROBE: NEGATIVE
GFR SERPLBLD CREATININE-BSD FMLA CKD-EPI: 95 ML/MIN/1.73 M 2
GLOBULIN SER CALC-MCNC: 3.1 G/DL (ref 1.9–3.5)
GLUCOSE BLD STRIP.AUTO-MCNC: 197 MG/DL (ref 65–99)
GLUCOSE BLD STRIP.AUTO-MCNC: 221 MG/DL (ref 65–99)
GLUCOSE BLD-MCNC: 188 MG/DL (ref 65–99)
GLUCOSE SERPL-MCNC: 191 MG/DL (ref 65–99)
GLUCOSE UR STRIP.AUTO-MCNC: 500 MG/DL
HCT VFR BLD AUTO: 42.6 % (ref 37–47)
HGB BLD-MCNC: 14.1 G/DL (ref 12–16)
IMM GRANULOCYTES # BLD AUTO: 0.08 K/UL (ref 0–0.11)
IMM GRANULOCYTES NFR BLD AUTO: 0.4 % (ref 0–0.9)
KETONES UR STRIP.AUTO-MCNC: 40 MG/DL
LACTATE SERPL-SCNC: 1.6 MMOL/L (ref 0.5–2)
LEUKOCYTE ESTERASE UR QL STRIP.AUTO: NEGATIVE
LYMPHOCYTES # BLD AUTO: 2.39 K/UL (ref 1–4.8)
LYMPHOCYTES NFR BLD: 13.2 % (ref 22–41)
MAGNESIUM SERPL-MCNC: 1.9 MG/DL (ref 1.5–2.5)
MCH RBC QN AUTO: 28 PG (ref 27–33)
MCHC RBC AUTO-ENTMCNC: 33.1 G/DL (ref 32.2–35.5)
MCV RBC AUTO: 84.7 FL (ref 81.4–97.8)
MONOCYTES # BLD AUTO: 1.33 K/UL (ref 0–0.85)
MONOCYTES NFR BLD AUTO: 7.3 % (ref 0–13.4)
NEUTROPHILS # BLD AUTO: 14.28 K/UL (ref 1.82–7.42)
NEUTROPHILS NFR BLD: 78.8 % (ref 44–72)
NITRITE UR QL STRIP.AUTO: NEGATIVE
NRBC # BLD AUTO: 0 K/UL
NRBC BLD-RTO: 0 /100 WBC (ref 0–0.2)
PH UR STRIP.AUTO: 6 [PH] (ref 5–8)
PLATELET # BLD AUTO: 418 K/UL (ref 164–446)
PMV BLD AUTO: 9.6 FL (ref 9–12.9)
POTASSIUM SERPL-SCNC: 3.3 MMOL/L (ref 3.6–5.5)
PROCALCITONIN SERPL-MCNC: <0.05 NG/ML
PROT SERPL-MCNC: 7.5 G/DL (ref 6–8.2)
PROT UR QL STRIP: 100 MG/DL
RBC # BLD AUTO: 5.03 M/UL (ref 4.2–5.4)
RBC UR QL AUTO: NORMAL
RSV RNA SPEC QL NAA+PROBE: NEGATIVE
SARS-COV-2 RNA RESP QL NAA+PROBE: NOTDETECTED
SODIUM SERPL-SCNC: 137 MMOL/L (ref 135–145)
SP GR UR STRIP.AUTO: 1.03
SPECIMEN SOURCE: NORMAL
TROPONIN T SERPL-MCNC: 56 NG/L (ref 6–19)
UROBILINOGEN UR STRIP-MCNC: NEGATIVE MG/DL
WBC # BLD AUTO: 18.1 K/UL (ref 4.8–10.8)

## 2023-11-26 PROCEDURE — 700117 HCHG RX CONTRAST REV CODE 255: Performed by: EMERGENCY MEDICINE

## 2023-11-26 PROCEDURE — 83605 ASSAY OF LACTIC ACID: CPT

## 2023-11-26 PROCEDURE — 700101 HCHG RX REV CODE 250: Performed by: EMERGENCY MEDICINE

## 2023-11-26 PROCEDURE — 81002 URINALYSIS NONAUTO W/O SCOPE: CPT | Performed by: NURSE PRACTITIONER

## 2023-11-26 PROCEDURE — 94640 AIRWAY INHALATION TREATMENT: CPT

## 2023-11-26 PROCEDURE — 96376 TX/PRO/DX INJ SAME DRUG ADON: CPT

## 2023-11-26 PROCEDURE — 83735 ASSAY OF MAGNESIUM: CPT

## 2023-11-26 PROCEDURE — 96375 TX/PRO/DX INJ NEW DRUG ADDON: CPT

## 2023-11-26 PROCEDURE — 74181 MRI ABDOMEN W/O CONTRAST: CPT

## 2023-11-26 PROCEDURE — 93005 ELECTROCARDIOGRAM TRACING: CPT | Performed by: EMERGENCY MEDICINE

## 2023-11-26 PROCEDURE — 700111 HCHG RX REV CODE 636 W/ 250 OVERRIDE (IP): Mod: JZ | Performed by: EMERGENCY MEDICINE

## 2023-11-26 PROCEDURE — 99223 1ST HOSP IP/OBS HIGH 75: CPT | Mod: AI | Performed by: STUDENT IN AN ORGANIZED HEALTH CARE EDUCATION/TRAINING PROGRAM

## 2023-11-26 PROCEDURE — 0241U HCHG SARS-COV-2 COVID-19 NFCT DS RESP RNA 4 TRGT MIC: CPT

## 2023-11-26 PROCEDURE — 71046 X-RAY EXAM CHEST 2 VIEWS: CPT | Mod: TC,FY | Performed by: NURSE PRACTITIONER

## 2023-11-26 PROCEDURE — 84145 PROCALCITONIN (PCT): CPT

## 2023-11-26 PROCEDURE — 36415 COLL VENOUS BLD VENIPUNCTURE: CPT

## 2023-11-26 PROCEDURE — 700105 HCHG RX REV CODE 258: Performed by: EMERGENCY MEDICINE

## 2023-11-26 PROCEDURE — 74177 CT ABD & PELVIS W/CONTRAST: CPT

## 2023-11-26 PROCEDURE — 96365 THER/PROPH/DIAG IV INF INIT: CPT

## 2023-11-26 PROCEDURE — 700101 HCHG RX REV CODE 250: Performed by: STUDENT IN AN ORGANIZED HEALTH CARE EDUCATION/TRAINING PROGRAM

## 2023-11-26 PROCEDURE — 3077F SYST BP >= 140 MM HG: CPT | Performed by: NURSE PRACTITIONER

## 2023-11-26 PROCEDURE — 87040 BLOOD CULTURE FOR BACTERIA: CPT

## 2023-11-26 PROCEDURE — 700102 HCHG RX REV CODE 250 W/ 637 OVERRIDE(OP): Performed by: STUDENT IN AN ORGANIZED HEALTH CARE EDUCATION/TRAINING PROGRAM

## 2023-11-26 PROCEDURE — 82962 GLUCOSE BLOOD TEST: CPT | Performed by: NURSE PRACTITIONER

## 2023-11-26 PROCEDURE — 71275 CT ANGIOGRAPHY CHEST: CPT

## 2023-11-26 PROCEDURE — 94669 MECHANICAL CHEST WALL OSCILL: CPT

## 2023-11-26 PROCEDURE — 85025 COMPLETE CBC W/AUTO DIFF WBC: CPT

## 2023-11-26 PROCEDURE — 99285 EMERGENCY DEPT VISIT HI MDM: CPT

## 2023-11-26 PROCEDURE — A9270 NON-COVERED ITEM OR SERVICE: HCPCS | Performed by: STUDENT IN AN ORGANIZED HEALTH CARE EDUCATION/TRAINING PROGRAM

## 2023-11-26 PROCEDURE — 82962 GLUCOSE BLOOD TEST: CPT

## 2023-11-26 PROCEDURE — 700111 HCHG RX REV CODE 636 W/ 250 OVERRIDE (IP): Performed by: STUDENT IN AN ORGANIZED HEALTH CARE EDUCATION/TRAINING PROGRAM

## 2023-11-26 PROCEDURE — 80053 COMPREHEN METABOLIC PANEL: CPT

## 2023-11-26 PROCEDURE — 94760 N-INVAS EAR/PLS OXIMETRY 1: CPT

## 2023-11-26 PROCEDURE — 770006 HCHG ROOM/CARE - MED/SURG/GYN SEMI*

## 2023-11-26 PROCEDURE — 3078F DIAST BP <80 MM HG: CPT | Performed by: NURSE PRACTITIONER

## 2023-11-26 PROCEDURE — 84484 ASSAY OF TROPONIN QUANT: CPT

## 2023-11-26 PROCEDURE — 99215 OFFICE O/P EST HI 40 MIN: CPT | Performed by: NURSE PRACTITIONER

## 2023-11-26 RX ORDER — METRONIDAZOLE 500 MG/100ML
500 INJECTION, SOLUTION INTRAVENOUS EVERY 12 HOURS
Status: DISCONTINUED | OUTPATIENT
Start: 2023-11-26 | End: 2023-11-28

## 2023-11-26 RX ORDER — OMEPRAZOLE 20 MG/1
20 CAPSULE, DELAYED RELEASE ORAL DAILY
Status: DISCONTINUED | OUTPATIENT
Start: 2023-11-27 | End: 2023-11-30 | Stop reason: HOSPADM

## 2023-11-26 RX ORDER — ENOXAPARIN SODIUM 100 MG/ML
40 INJECTION SUBCUTANEOUS DAILY
Status: DISCONTINUED | OUTPATIENT
Start: 2023-11-26 | End: 2023-11-30 | Stop reason: HOSPADM

## 2023-11-26 RX ORDER — ONDANSETRON 4 MG/1
4 TABLET, ORALLY DISINTEGRATING ORAL EVERY 4 HOURS PRN
Status: DISCONTINUED | OUTPATIENT
Start: 2023-11-26 | End: 2023-11-30 | Stop reason: HOSPADM

## 2023-11-26 RX ORDER — IPRATROPIUM BROMIDE AND ALBUTEROL SULFATE 2.5; .5 MG/3ML; MG/3ML
3 SOLUTION RESPIRATORY (INHALATION)
Status: DISCONTINUED | OUTPATIENT
Start: 2023-11-26 | End: 2023-11-28

## 2023-11-26 RX ORDER — POLYETHYLENE GLYCOL 3350 17 G/17G
1 POWDER, FOR SOLUTION ORAL
Status: DISCONTINUED | OUTPATIENT
Start: 2023-11-26 | End: 2023-11-27

## 2023-11-26 RX ORDER — LOSARTAN POTASSIUM AND HYDROCHLOROTHIAZIDE 12.5; 1 MG/1; MG/1
1 TABLET ORAL DAILY
Status: DISCONTINUED | OUTPATIENT
Start: 2023-11-26 | End: 2023-11-26

## 2023-11-26 RX ORDER — AMOXICILLIN 250 MG
2 CAPSULE ORAL 2 TIMES DAILY
Status: DISCONTINUED | OUTPATIENT
Start: 2023-11-27 | End: 2023-11-27

## 2023-11-26 RX ORDER — PREDNISONE 50 MG/1
50 TABLET ORAL DAILY
Status: DISCONTINUED | OUTPATIENT
Start: 2023-11-26 | End: 2023-11-27

## 2023-11-26 RX ORDER — FAMOTIDINE 20 MG/1
20 TABLET, FILM COATED ORAL DAILY
Status: DISCONTINUED | OUTPATIENT
Start: 2023-11-26 | End: 2023-11-30

## 2023-11-26 RX ORDER — HYDRALAZINE HYDROCHLORIDE 20 MG/ML
10 INJECTION INTRAMUSCULAR; INTRAVENOUS EVERY 4 HOURS PRN
Status: DISCONTINUED | OUTPATIENT
Start: 2023-11-26 | End: 2023-11-30 | Stop reason: HOSPADM

## 2023-11-26 RX ORDER — CLOBETASOL PROPIONATE 0.5 MG/G
1 OINTMENT TOPICAL 2 TIMES DAILY PRN
COMMUNITY

## 2023-11-26 RX ORDER — ASPIRIN 81 MG/1
81 TABLET ORAL DAILY
Status: DISCONTINUED | OUTPATIENT
Start: 2023-11-26 | End: 2023-11-30 | Stop reason: HOSPADM

## 2023-11-26 RX ORDER — LOSARTAN POTASSIUM 50 MG/1
100 TABLET ORAL
Status: DISCONTINUED | OUTPATIENT
Start: 2023-11-26 | End: 2023-11-30 | Stop reason: HOSPADM

## 2023-11-26 RX ORDER — ACETAMINOPHEN 500 MG
1000 TABLET ORAL 3 TIMES DAILY
Status: DISCONTINUED | OUTPATIENT
Start: 2023-11-26 | End: 2023-11-30

## 2023-11-26 RX ORDER — ONDANSETRON 2 MG/ML
4 INJECTION INTRAMUSCULAR; INTRAVENOUS EVERY 4 HOURS PRN
Status: DISCONTINUED | OUTPATIENT
Start: 2023-11-26 | End: 2023-11-30 | Stop reason: HOSPADM

## 2023-11-26 RX ORDER — BISACODYL 10 MG
10 SUPPOSITORY, RECTAL RECTAL
Status: DISCONTINUED | OUTPATIENT
Start: 2023-11-26 | End: 2023-11-27

## 2023-11-26 RX ORDER — DOXYCYCLINE 100 MG/1
100 TABLET ORAL EVERY 12 HOURS
Status: DISCONTINUED | OUTPATIENT
Start: 2023-11-26 | End: 2023-11-29

## 2023-11-26 RX ORDER — AMOXICILLIN AND CLAVULANATE POTASSIUM 875; 125 MG/1; MG/1
1 TABLET, FILM COATED ORAL EVERY 12 HOURS
Status: DISCONTINUED | OUTPATIENT
Start: 2023-11-26 | End: 2023-11-26

## 2023-11-26 RX ORDER — HYDROCHLOROTHIAZIDE 12.5 MG/1
12.5 TABLET ORAL
Status: DISCONTINUED | OUTPATIENT
Start: 2023-11-26 | End: 2023-11-30 | Stop reason: HOSPADM

## 2023-11-26 RX ORDER — ONDANSETRON 2 MG/ML
4 INJECTION INTRAMUSCULAR; INTRAVENOUS ONCE
Status: COMPLETED | OUTPATIENT
Start: 2023-11-26 | End: 2023-11-26

## 2023-11-26 RX ORDER — INSULIN LISPRO 100 [IU]/ML
1-6 INJECTION, SOLUTION INTRAVENOUS; SUBCUTANEOUS
Status: DISCONTINUED | OUTPATIENT
Start: 2023-11-26 | End: 2023-11-27

## 2023-11-26 RX ORDER — IPRATROPIUM BROMIDE AND ALBUTEROL SULFATE 2.5; .5 MG/3ML; MG/3ML
3 SOLUTION RESPIRATORY (INHALATION)
Status: COMPLETED | OUTPATIENT
Start: 2023-11-26 | End: 2023-11-26

## 2023-11-26 RX ORDER — BUPROPION HYDROCHLORIDE 100 MG/1
100 TABLET ORAL 2 TIMES DAILY
Status: DISCONTINUED | OUTPATIENT
Start: 2023-11-26 | End: 2023-11-30 | Stop reason: HOSPADM

## 2023-11-26 RX ORDER — GUAIFENESIN/DEXTROMETHORPHAN 100-10MG/5
10 SYRUP ORAL EVERY 6 HOURS PRN
Status: DISCONTINUED | OUTPATIENT
Start: 2023-11-26 | End: 2023-11-30 | Stop reason: HOSPADM

## 2023-11-26 RX ADMIN — AMPICILLIN AND SULBACTAM 3 G: 1; 2 INJECTION, POWDER, FOR SOLUTION INTRAMUSCULAR; INTRAVENOUS at 12:03

## 2023-11-26 RX ADMIN — IPRATROPIUM BROMIDE AND ALBUTEROL SULFATE 3 ML: 2.5; .5 SOLUTION RESPIRATORY (INHALATION) at 22:12

## 2023-11-26 RX ADMIN — ASPIRIN 81 MG: 81 TABLET, COATED ORAL at 15:00

## 2023-11-26 RX ADMIN — METRONIDAZOLE 500 MG: 500 INJECTION, SOLUTION INTRAVENOUS at 17:22

## 2023-11-26 RX ADMIN — INSULIN LISPRO 1 UNITS: 100 INJECTION, SOLUTION INTRAVENOUS; SUBCUTANEOUS at 17:32

## 2023-11-26 RX ADMIN — HYDROCHLOROTHIAZIDE 12.5 MG: 12.5 TABLET ORAL at 15:03

## 2023-11-26 RX ADMIN — FAMOTIDINE 20 MG: 20 TABLET ORAL at 15:03

## 2023-11-26 RX ADMIN — IPRATROPIUM BROMIDE AND ALBUTEROL SULFATE 3 ML: 2.5; .5 SOLUTION RESPIRATORY (INHALATION) at 18:16

## 2023-11-26 RX ADMIN — BUPROPION HYDROCHLORIDE 100 MG: 100 TABLET, FILM COATED ORAL at 17:23

## 2023-11-26 RX ADMIN — PREDNISONE 50 MG: 50 TABLET ORAL at 15:03

## 2023-11-26 RX ADMIN — FENTANYL CITRATE 50 MCG: 50 INJECTION, SOLUTION INTRAMUSCULAR; INTRAVENOUS at 13:11

## 2023-11-26 RX ADMIN — IOHEXOL 95 ML: 350 INJECTION, SOLUTION INTRAVENOUS at 13:15

## 2023-11-26 RX ADMIN — DOXYCYCLINE 100 MG: 100 TABLET, FILM COATED ORAL at 17:23

## 2023-11-26 RX ADMIN — HYDRALAZINE HYDROCHLORIDE 10 MG: 20 INJECTION, SOLUTION INTRAMUSCULAR; INTRAVENOUS at 17:34

## 2023-11-26 RX ADMIN — IPRATROPIUM BROMIDE AND ALBUTEROL SULFATE 3 ML: 2.5; .5 SOLUTION RESPIRATORY (INHALATION) at 13:31

## 2023-11-26 RX ADMIN — ACETAMINOPHEN 1000 MG: 500 TABLET ORAL at 20:29

## 2023-11-26 RX ADMIN — ACETAMINOPHEN 1000 MG: 500 TABLET ORAL at 15:03

## 2023-11-26 RX ADMIN — ONDANSETRON 4 MG: 2 INJECTION INTRAMUSCULAR; INTRAVENOUS at 15:06

## 2023-11-26 RX ADMIN — INSULIN LISPRO 2 UNITS: 100 INJECTION, SOLUTION INTRAVENOUS; SUBCUTANEOUS at 20:26

## 2023-11-26 RX ADMIN — ONDANSETRON 4 MG: 2 INJECTION INTRAMUSCULAR; INTRAVENOUS at 12:04

## 2023-11-26 RX ADMIN — CEFTRIAXONE SODIUM 2000 MG: 10 INJECTION, POWDER, FOR SOLUTION INTRAVENOUS at 17:19

## 2023-11-26 RX ADMIN — LOSARTAN POTASSIUM 100 MG: 50 TABLET, FILM COATED ORAL at 15:03

## 2023-11-26 RX ADMIN — ENOXAPARIN SODIUM 40 MG: 100 INJECTION SUBCUTANEOUS at 17:23

## 2023-11-26 ASSESSMENT — ENCOUNTER SYMPTOMS
ABDOMINAL PAIN: 1
MUSCULOSKELETAL NEGATIVE: 1
EYES NEGATIVE: 1
CHILLS: 1
FEVER: 1
WHEEZING: 0
EYE PAIN: 0
CHILLS: 1
SHORTNESS OF BREATH: 1
SORE THROAT: 0
SPUTUM PRODUCTION: 0
SHORTNESS OF BREATH: 0
PSYCHIATRIC NEGATIVE: 1
DIZZINESS: 1
COUGH: 1
ABDOMINAL PAIN: 1
NAUSEA: 1
VOMITING: 0
DIARRHEA: 0
COUGH: 0
NAUSEA: 0
CONSTIPATION: 0
FEVER: 0
MYALGIAS: 0
HEARTBURN: 1
WEAKNESS: 1
VOMITING: 1
CARDIOVASCULAR NEGATIVE: 1

## 2023-11-26 ASSESSMENT — COGNITIVE AND FUNCTIONAL STATUS - GENERAL
DAILY ACTIVITIY SCORE: 24
MOVING FROM LYING ON BACK TO SITTING ON SIDE OF FLAT BED: A LITTLE
WALKING IN HOSPITAL ROOM: A LITTLE
SUGGESTED CMS G CODE MODIFIER MOBILITY: CK
TURNING FROM BACK TO SIDE WHILE IN FLAT BAD: A LITTLE
MOVING TO AND FROM BED TO CHAIR: A LITTLE
CLIMB 3 TO 5 STEPS WITH RAILING: A LITTLE
STANDING UP FROM CHAIR USING ARMS: A LITTLE
SUGGESTED CMS G CODE MODIFIER DAILY ACTIVITY: CH
MOBILITY SCORE: 18

## 2023-11-26 ASSESSMENT — LIFESTYLE VARIABLES
ALCOHOL_USE: NO
HAVE PEOPLE ANNOYED YOU BY CRITICIZING YOUR DRINKING: NO
TOTAL SCORE: 0
AVERAGE NUMBER OF DAYS PER WEEK YOU HAVE A DRINK CONTAINING ALCOHOL: 0
DO YOU DRINK ALCOHOL: NO
HAVE YOU EVER FELT YOU SHOULD CUT DOWN ON YOUR DRINKING: NO
CONSUMPTION TOTAL: NEGATIVE
HOW MANY TIMES IN THE PAST YEAR HAVE YOU HAD 5 OR MORE DRINKS IN A DAY: 0
TOTAL SCORE: 0
EVER FELT BAD OR GUILTY ABOUT YOUR DRINKING: NO
ON A TYPICAL DAY WHEN YOU DRINK ALCOHOL HOW MANY DRINKS DO YOU HAVE: 0
EVER HAD A DRINK FIRST THING IN THE MORNING TO STEADY YOUR NERVES TO GET RID OF A HANGOVER: NO
TOTAL SCORE: 0

## 2023-11-26 ASSESSMENT — PATIENT HEALTH QUESTIONNAIRE - PHQ9
2. FEELING DOWN, DEPRESSED, IRRITABLE, OR HOPELESS: NOT AT ALL
1. LITTLE INTEREST OR PLEASURE IN DOING THINGS: NOT AT ALL
SUM OF ALL RESPONSES TO PHQ9 QUESTIONS 1 AND 2: 0

## 2023-11-26 ASSESSMENT — FIBROSIS 4 INDEX
FIB4 SCORE: 0.92
FIB4 SCORE: 0.92

## 2023-11-26 ASSESSMENT — PAIN DESCRIPTION - PAIN TYPE: TYPE: ACUTE PAIN

## 2023-11-26 NOTE — ASSESSMENT & PLAN NOTE
Presenting with worsening dyspnea and cough productive of sputum  Requiring 2 L to maintain saturation  Secondary to COPD exacerbation versus atypical pneumonia  RT/O2  COPD management  Titrate O2    Continue weaning off oxygen

## 2023-11-26 NOTE — ED NOTES
"Medicated per erp's order, pt refused pain medication despite pain level \"8 out of 10\".   Updated poc  "

## 2023-11-26 NOTE — H&P
Hospital Medicine History & Physical Note    Date of Service  11/26/2023    Primary Care Physician  Cheryl M. Demucha, A.P.R.N.    Consultants  None    Code Status  Full Code    Chief Complaint  Chief Complaint   Patient presents with    Abdominal Pain    N/V    Fever     At home     History of Presenting Illness  73-year-old female with a past medical history of hypertension, anxiety, CAD, type 2 diabetes and COPD who presents emergency department on 11/26/2023 with a 6-day history of worsening abdominal pain, dyspnea, cough, nausea and vomiting.  Patient reports that she has been having lower abdominal pain which worsened with drinking water.  She associates symptoms with fever, chills, nausea along with multiple bouts of nonbloody emesis.  She has been having worsening cough along with greenish sputum.  No loss of consciousness.    At the emergency department, vital signs with hypertension with SBP in the 200s.  Patient requiring 2 L maintain saturation (on room air at baseline).  CBC with leukocytosis at 18 and elevated polys.  Chemistry with hypokalemia.  Lactic acid low.  Chest x-ray with no acute cardiopulmonary process per my read.  Chest CTA consistent with emphysema.  Abdominal/pelvic CT with contrast showing Intrahepatic and extra hepatic biliary dilation with common bile duct measuring up to 2.2 cm and history of cholecystectomy.  Patient was admitted for acute hypoxic respiratory failure secondary to COPD exacerbation and possible atypical pneumonia.    I discussed the plan of care with patient and bedside RN.    Review of Systems  Review of Systems   Constitutional:  Positive for chills, fever and malaise/fatigue.   HENT: Negative.     Eyes: Negative.    Respiratory:  Positive for cough and shortness of breath.    Cardiovascular: Negative.    Gastrointestinal:  Positive for abdominal pain, heartburn, nausea and vomiting.   Genitourinary: Negative.    Musculoskeletal: Negative.    Skin: Negative.     Neurological:  Positive for weakness.   Endo/Heme/Allergies: Negative.    Psychiatric/Behavioral: Negative.         Past Medical History   has a past medical history of Abnormal screening cardiac CT (2/14/2020), Anxiety, Carotid arterial disease (HCC), COPD (chronic obstructive pulmonary disease) (HCC), Diabetes (HCC), HTN (hypertension), Hyperlipidemia, and Tobacco use.    Surgical History  Cholecystectomy    Family History  family history includes Cancer (age of onset: 47) in her mother.     Social History   reports that she has been smoking cigarettes. She has never used smokeless tobacco. She reports that she does not currently use drugs after having used the following drugs: Marijuana. She reports that she does not drink alcohol.    Allergies  Allergies   Allergen Reactions    Sulfa Drugs Hives    Ezetimibe Nausea     Diarrhea     Hmg-Coa-R Inhibitors Unspecified     Muscle cramps       Medications  Prior to Admission Medications   Prescriptions Last Dose Informant Patient Reported? Taking?   Cholecalciferol (VITAMIN D) 2000 UNIT Tab 11/23/2023 Patient Yes No   Sig: Take 2,000 Units by mouth every day.   Nebulizers Misc  Patient No No   Sig: Use nebulizer as directed up to 4x day prn sob   albuterol (PROAIR HFA) 108 (90 Base) MCG/ACT Aero Soln inhalation aerosol 11/25/2023 at PM Patient No No   Sig: USE 2 INHALATIONS EVERY 6 HOURS AS NEEDED FOR SHORTNESS OF BREATH   aspirin EC (ECOTRIN) 81 MG Tablet Delayed Response 11/23/2023 Patient Yes No   Sig: Take 1 Tablet by mouth every day.   buPROPion (WELLBUTRIN) 100 MG Tab 11/23/2023 Patient No No   Sig: Take 1 Tablet by mouth 2 times a day.   clobetasol (TEMOVATE) 0.05 % Ointment 1 WEEK Patient Yes No   Sig: Apply 1 Application topically 2 times a day. APPLY TO LEGS   famotidine (PEPCID) 20 MG Tab 11/23/2023 Patient Yes No   Sig: Take 20 mg by mouth every day.   fluticasone-umeclidin-vilant (TRELEGY ELLIPTA) 100-62.5-25 MCG/ACT AEROSOL POWDER, BREATH ACTIVATED  inhalation 11/26/2023 at AM Patient No No   Sig: Inhale 1 Inhalation every day. X 365 days   glucose blood (FREESTYLE LITE) strip  Patient No No   Sig: Use as directed.patient tests fasting blood sugar every am   ibuprofen (MOTRIN) 200 MG Tab 11/23/2023 Patient Yes No   Sig: Take 800 mg by mouth every 8 hours as needed for Inflammation or Mild Pain.   ipratropium-albuterol (DUONEB) 0.5-2.5 (3) MG/3ML nebulizer solution 11/23/2023 Patient No No   Sig: USE 1 VIAL (3 ML) VIA NEBULIZER FOUR TIMES A DAY   losartan-hydrochlorothiazide (HYZAAR) 100-12.5 MG per tablet 11/23/2023 Patient No No   Sig: TAKE 1 TABLET DAILY   metFORMIN ER (GLUCOPHAGE XR) 500 MG TABLET SR 24 HR 11/23/2023 Patient No No   Sig: TAKE 1 TABLET IN THE MORNING AND 2 TABLETS IN THE EVENING   nitroglycerin (NITROSTAT) 0.4 MG SL Tab > MONTHS at PRN Patient No No   Sig: Place 1 Tablet under the tongue as needed for Chest Pain (Once every 5 minutes up to 3 doses).   pantoprazole (PROTONIX) 20 MG tablet 11/26/2023 at AM Patient Yes No   Sig: Take 20 mg by mouth every day.      Facility-Administered Medications: None       Physical Exam  Temp:  [36.1 °C (97 °F)-37 °C (98.6 °F)] 37 °C (98.6 °F)  Pulse:  [] 93  Resp:  [14-24] 18  BP: (158-209)/(78-96) 186/84  SpO2:  [83 %-95 %] 94 %  Blood Pressure : (!) 186/84   Temperature: 37 °C (98.6 °F)   Pulse: 93   Respiration: 18   Pulse Oximetry: 94 %       Physical Exam  Constitutional:       Appearance: Normal appearance. She is not ill-appearing.   HENT:      Head: Normocephalic and atraumatic.      Mouth/Throat:      Mouth: Mucous membranes are moist.   Eyes:      Extraocular Movements: Extraocular movements intact.      Pupils: Pupils are equal, round, and reactive to light.   Cardiovascular:      Rate and Rhythm: Normal rate and regular rhythm.      Pulses: Normal pulses.      Heart sounds: Normal heart sounds.   Pulmonary:      Effort: Pulmonary effort is normal.      Breath sounds: Wheezing present.  "  Abdominal:      General: Bowel sounds are normal. There is no distension.      Palpations: Abdomen is soft.      Tenderness: There is abdominal tenderness.   Musculoskeletal:         General: No swelling. Normal range of motion.      Cervical back: Normal range of motion and neck supple.   Skin:     General: Skin is warm.      Coloration: Skin is not jaundiced.   Neurological:      General: No focal deficit present.      Mental Status: She is alert and oriented to person, place, and time. Mental status is at baseline.      Cranial Nerves: No cranial nerve deficit.   Psychiatric:         Mood and Affect: Mood normal.         Behavior: Behavior normal.         Thought Content: Thought content normal.         Judgment: Judgment normal.         Laboratory:  Recent Labs     11/26/23  1133   WBC 18.1*   RBC 5.03   HEMOGLOBIN 14.1   HEMATOCRIT 42.6   MCV 84.7   MCH 28.0   MCHC 33.1   RDW 42.3   PLATELETCT 418   MPV 9.6     Recent Labs     11/26/23  1133   SODIUM 137   POTASSIUM 3.3*   CHLORIDE 95*   CO2 28   GLUCOSE 191*   BUN 32*   CREATININE 0.58   CALCIUM 9.8     Recent Labs     11/26/23  1133   ALTSGPT 17   ASTSGOT 18   ALKPHOSPHAT 73   TBILIRUBIN 0.4   GLUCOSE 191*         No results for input(s): \"NTPROBNP\" in the last 72 hours.      Recent Labs     11/26/23  1133   TROPONINT 56*       Imaging:  CT-ABDOMEN-PELVIS WITH   Final Result      1.  Intrahepatic and extra hepatic biliary dilation with common bile duct measuring up to 2.2 cm      2.  Previous cholecystectomy      3.  No other significant finding      CT-CTA CHEST PULMONARY ARTERY W/ RECONS   Final Result      1.  No evidence of pulmonary embolus.      2.  Emphysema.            [id number]    (Results Pending)       Assessment/Plan:  Justification for Admission Status  I anticipate this patient will require at least two midnights for appropriate medical management, necessitating inpatient admission because of below      * Acute hypoxemic respiratory " failure (HCC)- (present on admission)  Assessment & Plan  Presenting with worsening dyspnea and cough productive of sputum  Requiring 2 L to maintain saturation  Secondary to COPD exacerbation versus atypical pneumonia  RT/O2  COPD management  Titrate O2    Acute exacerbation of chronic obstructive pulmonary disease (COPD) (HCC)- (present on admission)  Assessment & Plan  Increased sputum production, hypoxia and cough frequency  Wheezing auscultated  Admit to medicine  RT/O2  DuoNebs  Prednisone  Doxycycline  I-S  Labs on a.m.      Leukocytosis- (present on admission)  Assessment & Plan  Possibly secondary to early pneumonia  IV antibiotics for now  Labs on a.m.    Generalized abdominal pain- (present on admission)  Assessment & Plan  Abdominal/pelvic CT showing CBD dilation at 21 mm  Clear liquid diet for now  Continue IV antibiotics  MRCP    Essential hypertension- (present on admission)  Assessment & Plan  Continue home meds  As needed antihypertensives    Type 2 diabetes mellitus with complication, without long-term current use of insulin ([facility])- (present on admission)  Assessment & Plan  Diabetic diet  Insulin sliding scale  Frequent Accu-Cheks      CAD (coronary artery disease)- (present on admission)  Assessment & Plan  Continue aspirin    Anxiety- (present on admission)  Assessment & Plan  Continue home Wellbutrin      VTE prophylaxis: enoxaparin ppx    Greater than 51 minutes spent prepping to see patient (e.g. review of tests) obtaining and/or reviewing separately obtained history. Performing a medically appropriate examination and/ evaluation.  Counseling and educating the patient/family/caregiver.  Ordering medications, tests, or procedures.  Referring and communicating with other health care professionals.  Documenting clinical information in EPIC.  Independently interpreting results and communicating results to patient/family/caregiver.  Care coordination

## 2023-11-26 NOTE — ED TRIAGE NOTES
Chief Complaint   Patient presents with    Abdominal Pain    N/V    Fever     At home     Pt bib ems from urgent care c/o lower abdominal pain with n/v/ x6days. Pt also was 88% on room , placed 2L nc. Pt reports unable to keep food down and fever out home.   Sepsis score=4, protocol initiated.

## 2023-11-26 NOTE — ASSESSMENT & PLAN NOTE
Increased sputum production, hypoxia and cough frequency  Wheezing auscultated  Admit to medicine  RT/O2  DuoNebs  Prednisone  Doxycycline  I-S  Labs on a.m.    Continue weaning off oxygen  Stop IV fluids  Continue incentive spirometer procalcitonin is negative we will stop antibiotics.

## 2023-11-26 NOTE — ASSESSMENT & PLAN NOTE
Abdominal/pelvic CT showing CBD dilation at 21 mm  Clear liquid diet for now  Continue IV antibiotics  MRCP no acute finding.  No intervention as per GI.     CT pancreas today

## 2023-11-26 NOTE — PROGRESS NOTES
Subjective:   Jigna Allen is a 73 y.o. female who presents for Dizziness (X6days ), Abdominal Pain, and Emesis (/)      Dizziness  This is a new problem. The current episode started in the past 7 days. The problem occurs constantly. The problem has been gradually worsening. Associated symptoms include abdominal pain and chills. Pertinent negatives include no chest pain, coughing, fever, myalgias, nausea, rash, sore throat or vomiting. The symptoms are aggravated by walking. She has tried nothing for the symptoms.       Review of Systems   Constitutional:  Positive for chills and malaise/fatigue. Negative for fever.   HENT:  Negative for sore throat.    Eyes:  Negative for pain.   Respiratory:  Negative for cough, sputum production, shortness of breath and wheezing.    Cardiovascular:  Negative for chest pain.   Gastrointestinal:  Positive for abdominal pain. Negative for constipation, diarrhea, nausea and vomiting.   Genitourinary:  Negative for dysuria, frequency, hematuria and urgency.   Musculoskeletal:  Negative for myalgias.   Skin:  Negative for rash.   Neurological:  Positive for dizziness.       Medications:    albuterol Aers  ampicillin-sulbactam (UNASYN) 3 g IV-MBP  aspirin EC Tbec  buPROPion Tabs  clobetasol Oint  famotidine Tabs  fenofibrate Tabs  fentaNYL  FREESTYLE LITE Strp  ibuprofen Tabs  ipratropium-albuterol  losartan-hydrochlorothiazide  metFORMIN ER Tb24  Nebulizers Misc  nitroglycerin Subl  pantoprazole  Trelegy Ellipta Aepb  triamcinolone acetonide Crea  vitamin D Tabs    Allergies: Sulfa drugs, Ezetimibe, and Hmg-coa-r inhibitors    Problem List: Jigna Allen does not have any pertinent problems on file.    Surgical History:  No past surgical history on file.    Past Social Hx: Jigna Allen  reports that she has been smoking cigarettes. She has never used smokeless tobacco. She reports that she does not currently use drugs after having used the following drugs: Marijuana. She reports  "that she does not drink alcohol.     Past Family Hx:  Jigna Allen family history includes Cancer (age of onset: 47) in her mother.     Problem list, medications, and allergies reviewed by myself today in Epic.     Objective:     BP (!) 158/78   Pulse (!) 106   Temp 36.1 °C (97 °F) (Temporal)   Resp 14   Ht 1.6 m (5' 3\")   Wt 73.9 kg (163 lb)   SpO2 (!) 83%   BMI 28.87 kg/m²     Physical Exam  Constitutional:       General: She is not in acute distress.     Appearance: She is well-developed. She is ill-appearing.   HENT:      Head: Normocephalic and atraumatic.      Right Ear: Tympanic membrane normal.      Left Ear: Tympanic membrane normal.      Nose: Nose normal.      Right Sinus: No maxillary sinus tenderness or frontal sinus tenderness.      Left Sinus: No maxillary sinus tenderness or frontal sinus tenderness.      Mouth/Throat:      Mouth: Mucous membranes are moist.      Pharynx: Oropharynx is clear. Uvula midline. No posterior oropharyngeal erythema.      Tonsils: No tonsillar exudate or tonsillar abscesses.   Eyes:      General:         Right eye: No discharge.         Left eye: No discharge.      Conjunctiva/sclera: Conjunctivae normal.   Cardiovascular:      Rate and Rhythm: Normal rate and regular rhythm.   Pulmonary:      Effort: Pulmonary effort is normal. No respiratory distress.      Breath sounds: Normal breath sounds.   Abdominal:      General: Bowel sounds are normal.      Tenderness: There is generalized abdominal tenderness. There is no right CVA tenderness, left CVA tenderness, guarding or rebound. Negative signs include Pro's sign, Rovsing's sign, McBurney's sign, psoas sign and obturator sign.   Musculoskeletal:      Cervical back: No rigidity.   Lymphadenopathy:      Cervical: No cervical adenopathy.   Skin:     General: Skin is warm and dry.      Capillary Refill: Capillary refill takes less than 2 seconds.   Neurological:      Mental Status: She is alert and oriented to " person, place, and time.      Sensory: No sensory deficit.      Deep Tendon Reflexes: Reflexes are normal and symmetric.   Psychiatric:         Mood and Affect: Mood normal.         Behavior: Behavior normal.       Patient ambulated into exam room oxygen 83% on room air with no increased work of breathing or tachypnea.  Placed on 2 L nasal cannula improved to 96%  Assessment/Plan:     Diagnosis and associated orders:     1. Dizziness  POCT Glucose    POCT Urinalysis    CANCELED: POCT Urinalysis      2. Hypoxia  DX-CHEST-2 VIEWS         Comments/MDM:     I independently reviewed the patient's imaging and agree with the interpretation of the radiologist.    No active disease.       [id number]  UA glucose, trace blood, protein small bilirubin negative leukocytes or nitrates        At this time, I feel the patient requires a higher level of care including closer monitoring, stat lab work and/or imaging for further evaluation This has been discussed with the patient and they state agreement and understanding.  EMS contacted for transport.             Please note that this dictation was created using voice recognition software. I have made a reasonable attempt to correct obvious errors, but I expect that there are errors of grammar and possibly content that I did not discover before finalizing the note.    This note was electronically signed by Michael BRAUN.

## 2023-11-27 ENCOUNTER — APPOINTMENT (OUTPATIENT)
Dept: RADIOLOGY | Facility: MEDICAL CENTER | Age: 73
DRG: 189 | End: 2023-11-27
Attending: INTERNAL MEDICINE
Payer: MEDICARE

## 2023-11-27 PROBLEM — K83.8 DILATED CBD, ACQUIRED: Status: ACTIVE | Noted: 2023-11-27

## 2023-11-27 LAB
ALBUMIN SERPL BCP-MCNC: 4.1 G/DL (ref 3.2–4.9)
ALBUMIN/GLOB SERPL: 1.5 G/DL
ALP SERPL-CCNC: 68 U/L (ref 30–99)
ALT SERPL-CCNC: 32 U/L (ref 2–50)
ANION GAP SERPL CALC-SCNC: 11 MMOL/L (ref 7–16)
AST SERPL-CCNC: 23 U/L (ref 12–45)
BASOPHILS # BLD AUTO: 0.2 % (ref 0–1.8)
BASOPHILS # BLD: 0.03 K/UL (ref 0–0.12)
BILIRUB SERPL-MCNC: 0.3 MG/DL (ref 0.1–1.5)
BUN SERPL-MCNC: 26 MG/DL (ref 8–22)
CALCIUM ALBUM COR SERPL-MCNC: 9.2 MG/DL (ref 8.5–10.5)
CALCIUM SERPL-MCNC: 9.3 MG/DL (ref 8.5–10.5)
CHLORIDE SERPL-SCNC: 95 MMOL/L (ref 96–112)
CO2 SERPL-SCNC: 29 MMOL/L (ref 20–33)
CREAT SERPL-MCNC: 0.47 MG/DL (ref 0.5–1.4)
EOSINOPHIL # BLD AUTO: 0 K/UL (ref 0–0.51)
EOSINOPHIL NFR BLD: 0 % (ref 0–6.9)
ERYTHROCYTE [DISTWIDTH] IN BLOOD BY AUTOMATED COUNT: 42.2 FL (ref 35.9–50)
GFR SERPLBLD CREATININE-BSD FMLA CKD-EPI: 100 ML/MIN/1.73 M 2
GLOBULIN SER CALC-MCNC: 2.7 G/DL (ref 1.9–3.5)
GLUCOSE BLD STRIP.AUTO-MCNC: 175 MG/DL (ref 65–99)
GLUCOSE BLD STRIP.AUTO-MCNC: 199 MG/DL (ref 65–99)
GLUCOSE BLD STRIP.AUTO-MCNC: 214 MG/DL (ref 65–99)
GLUCOSE BLD STRIP.AUTO-MCNC: 218 MG/DL (ref 65–99)
GLUCOSE SERPL-MCNC: 200 MG/DL (ref 65–99)
HCT VFR BLD AUTO: 42.1 % (ref 37–47)
HGB BLD-MCNC: 14 G/DL (ref 12–16)
IMM GRANULOCYTES # BLD AUTO: 0.05 K/UL (ref 0–0.11)
IMM GRANULOCYTES NFR BLD AUTO: 0.3 % (ref 0–0.9)
LYMPHOCYTES # BLD AUTO: 2.25 K/UL (ref 1–4.8)
LYMPHOCYTES NFR BLD: 15.3 % (ref 22–41)
MCH RBC QN AUTO: 27.9 PG (ref 27–33)
MCHC RBC AUTO-ENTMCNC: 33.3 G/DL (ref 32.2–35.5)
MCV RBC AUTO: 83.9 FL (ref 81.4–97.8)
MONOCYTES # BLD AUTO: 1.36 K/UL (ref 0–0.85)
MONOCYTES NFR BLD AUTO: 9.2 % (ref 0–13.4)
NEUTROPHILS # BLD AUTO: 11.04 K/UL (ref 1.82–7.42)
NEUTROPHILS NFR BLD: 75 % (ref 44–72)
NRBC # BLD AUTO: 0 K/UL
NRBC BLD-RTO: 0 /100 WBC (ref 0–0.2)
PLATELET # BLD AUTO: 384 K/UL (ref 164–446)
PMV BLD AUTO: 9.4 FL (ref 9–12.9)
POTASSIUM SERPL-SCNC: 2.9 MMOL/L (ref 3.6–5.5)
PROT SERPL-MCNC: 6.8 G/DL (ref 6–8.2)
RBC # BLD AUTO: 5.02 M/UL (ref 4.2–5.4)
SODIUM SERPL-SCNC: 135 MMOL/L (ref 135–145)
WBC # BLD AUTO: 14.7 K/UL (ref 4.8–10.8)

## 2023-11-27 PROCEDURE — 94669 MECHANICAL CHEST WALL OSCILL: CPT

## 2023-11-27 PROCEDURE — 99222 1ST HOSP IP/OBS MODERATE 55: CPT | Mod: GC | Performed by: INTERNAL MEDICINE

## 2023-11-27 PROCEDURE — 36415 COLL VENOUS BLD VENIPUNCTURE: CPT

## 2023-11-27 PROCEDURE — 94760 N-INVAS EAR/PLS OXIMETRY 1: CPT

## 2023-11-27 PROCEDURE — A9270 NON-COVERED ITEM OR SERVICE: HCPCS | Performed by: STUDENT IN AN ORGANIZED HEALTH CARE EDUCATION/TRAINING PROGRAM

## 2023-11-27 PROCEDURE — 80053 COMPREHEN METABOLIC PANEL: CPT

## 2023-11-27 PROCEDURE — 700111 HCHG RX REV CODE 636 W/ 250 OVERRIDE (IP): Performed by: STUDENT IN AN ORGANIZED HEALTH CARE EDUCATION/TRAINING PROGRAM

## 2023-11-27 PROCEDURE — 94640 AIRWAY INHALATION TREATMENT: CPT

## 2023-11-27 PROCEDURE — 99223 1ST HOSP IP/OBS HIGH 75: CPT | Performed by: SPECIALIST

## 2023-11-27 PROCEDURE — A9270 NON-COVERED ITEM OR SERVICE: HCPCS | Performed by: INTERNAL MEDICINE

## 2023-11-27 PROCEDURE — 99222 1ST HOSP IP/OBS MODERATE 55: CPT | Performed by: INTERNAL MEDICINE

## 2023-11-27 PROCEDURE — 700102 HCHG RX REV CODE 250 W/ 637 OVERRIDE(OP): Performed by: STUDENT IN AN ORGANIZED HEALTH CARE EDUCATION/TRAINING PROGRAM

## 2023-11-27 PROCEDURE — 82962 GLUCOSE BLOOD TEST: CPT

## 2023-11-27 PROCEDURE — 700102 HCHG RX REV CODE 250 W/ 637 OVERRIDE(OP): Performed by: INTERNAL MEDICINE

## 2023-11-27 PROCEDURE — 94664 DEMO&/EVAL PT USE INHALER: CPT

## 2023-11-27 PROCEDURE — 700101 HCHG RX REV CODE 250: Performed by: STUDENT IN AN ORGANIZED HEALTH CARE EDUCATION/TRAINING PROGRAM

## 2023-11-27 PROCEDURE — 85025 COMPLETE CBC W/AUTO DIFF WBC: CPT

## 2023-11-27 PROCEDURE — 770006 HCHG ROOM/CARE - MED/SURG/GYN SEMI*

## 2023-11-27 PROCEDURE — 700105 HCHG RX REV CODE 258: Performed by: INTERNAL MEDICINE

## 2023-11-27 PROCEDURE — 700111 HCHG RX REV CODE 636 W/ 250 OVERRIDE (IP): Mod: JZ | Performed by: EMERGENCY MEDICINE

## 2023-11-27 PROCEDURE — 99233 SBSQ HOSP IP/OBS HIGH 50: CPT | Performed by: INTERNAL MEDICINE

## 2023-11-27 RX ORDER — LANOLIN ALCOHOL/MO/W.PET/CERES
400 CREAM (GRAM) TOPICAL 2 TIMES DAILY
Status: COMPLETED | OUTPATIENT
Start: 2023-11-27 | End: 2023-11-27

## 2023-11-27 RX ORDER — POLYETHYLENE GLYCOL 3350 17 G/17G
1 POWDER, FOR SOLUTION ORAL DAILY
Status: DISCONTINUED | OUTPATIENT
Start: 2023-11-27 | End: 2023-11-29

## 2023-11-27 RX ORDER — SODIUM CHLORIDE 9 MG/ML
INJECTION, SOLUTION INTRAVENOUS CONTINUOUS
Status: DISCONTINUED | OUTPATIENT
Start: 2023-11-27 | End: 2023-11-29

## 2023-11-27 RX ORDER — AMOXICILLIN 250 MG
2 CAPSULE ORAL 2 TIMES DAILY
Status: DISCONTINUED | OUTPATIENT
Start: 2023-11-27 | End: 2023-11-29

## 2023-11-27 RX ORDER — POTASSIUM CHLORIDE 20 MEQ/1
40 TABLET, EXTENDED RELEASE ORAL 3 TIMES DAILY
Status: DISPENSED | OUTPATIENT
Start: 2023-11-27 | End: 2023-11-28

## 2023-11-27 RX ORDER — BISACODYL 10 MG
10 SUPPOSITORY, RECTAL RECTAL
Status: DISCONTINUED | OUTPATIENT
Start: 2023-11-27 | End: 2023-11-29

## 2023-11-27 RX ADMIN — IPRATROPIUM BROMIDE AND ALBUTEROL SULFATE 3 ML: 2.5; .5 SOLUTION RESPIRATORY (INHALATION) at 06:58

## 2023-11-27 RX ADMIN — DOCUSATE SODIUM 50 MG AND SENNOSIDES 8.6 MG 2 TABLET: 8.6; 5 TABLET, FILM COATED ORAL at 18:21

## 2023-11-27 RX ADMIN — INSULIN HUMAN 3 UNITS: 100 INJECTION, SOLUTION PARENTERAL at 18:26

## 2023-11-27 RX ADMIN — POTASSIUM CHLORIDE 40 MEQ: 1500 TABLET, EXTENDED RELEASE ORAL at 18:21

## 2023-11-27 RX ADMIN — CEFTRIAXONE SODIUM 2000 MG: 10 INJECTION, POWDER, FOR SOLUTION INTRAVENOUS at 18:00

## 2023-11-27 RX ADMIN — BUPROPION HYDROCHLORIDE 100 MG: 100 TABLET, FILM COATED ORAL at 05:19

## 2023-11-27 RX ADMIN — HYDRALAZINE HYDROCHLORIDE 10 MG: 20 INJECTION, SOLUTION INTRAMUSCULAR; INTRAVENOUS at 04:00

## 2023-11-27 RX ADMIN — PREDNISONE 50 MG: 50 TABLET ORAL at 05:19

## 2023-11-27 RX ADMIN — IPRATROPIUM BROMIDE AND ALBUTEROL SULFATE 3 ML: 2.5; .5 SOLUTION RESPIRATORY (INHALATION) at 19:42

## 2023-11-27 RX ADMIN — LOSARTAN POTASSIUM 100 MG: 50 TABLET, FILM COATED ORAL at 05:23

## 2023-11-27 RX ADMIN — OMEPRAZOLE 20 MG: 20 CAPSULE, DELAYED RELEASE ORAL at 05:19

## 2023-11-27 RX ADMIN — FENTANYL CITRATE 50 MCG: 50 INJECTION, SOLUTION INTRAMUSCULAR; INTRAVENOUS at 18:41

## 2023-11-27 RX ADMIN — ASPIRIN 81 MG: 81 TABLET, COATED ORAL at 05:19

## 2023-11-27 RX ADMIN — DOXYCYCLINE 100 MG: 100 TABLET, FILM COATED ORAL at 18:21

## 2023-11-27 RX ADMIN — Medication 400 MG: at 18:21

## 2023-11-27 RX ADMIN — POTASSIUM CHLORIDE 40 MEQ: 1500 TABLET, EXTENDED RELEASE ORAL at 14:15

## 2023-11-27 RX ADMIN — Medication 400 MG: at 14:10

## 2023-11-27 RX ADMIN — FAMOTIDINE 20 MG: 20 TABLET ORAL at 05:19

## 2023-11-27 RX ADMIN — SODIUM CHLORIDE: 9 INJECTION, SOLUTION INTRAVENOUS at 14:11

## 2023-11-27 RX ADMIN — METRONIDAZOLE 500 MG: 500 INJECTION, SOLUTION INTRAVENOUS at 05:31

## 2023-11-27 RX ADMIN — INSULIN HUMAN 3 UNITS: 100 INJECTION, SOLUTION PARENTERAL at 20:26

## 2023-11-27 RX ADMIN — ACETAMINOPHEN 1000 MG: 500 TABLET ORAL at 09:22

## 2023-11-27 RX ADMIN — DOXYCYCLINE 100 MG: 100 TABLET, FILM COATED ORAL at 05:19

## 2023-11-27 RX ADMIN — INSULIN LISPRO 2 UNITS: 100 INJECTION, SOLUTION INTRAVENOUS; SUBCUTANEOUS at 09:20

## 2023-11-27 RX ADMIN — BUPROPION HYDROCHLORIDE 100 MG: 100 TABLET, FILM COATED ORAL at 18:21

## 2023-11-27 RX ADMIN — ENOXAPARIN SODIUM 40 MG: 100 INJECTION SUBCUTANEOUS at 18:21

## 2023-11-27 RX ADMIN — IPRATROPIUM BROMIDE AND ALBUTEROL SULFATE 3 ML: 2.5; .5 SOLUTION RESPIRATORY (INHALATION) at 14:22

## 2023-11-27 RX ADMIN — IPRATROPIUM BROMIDE AND ALBUTEROL SULFATE 3 ML: 2.5; .5 SOLUTION RESPIRATORY (INHALATION) at 01:53

## 2023-11-27 ASSESSMENT — ENCOUNTER SYMPTOMS
BLOOD IN STOOL: 0
PALPITATIONS: 0
ABDOMINAL PAIN: 1
CONSTIPATION: 1
SORE THROAT: 0
COUGH: 0
CARDIOVASCULAR NEGATIVE: 1
VOMITING: 0
DIARRHEA: 0
WHEEZING: 0
CHILLS: 0
RESPIRATORY NEGATIVE: 1
STRIDOR: 0
MUSCULOSKELETAL NEGATIVE: 1
NAUSEA: 1
CONSTITUTIONAL NEGATIVE: 1
NEUROLOGICAL NEGATIVE: 1
WEIGHT LOSS: 0
FEVER: 0
EYES NEGATIVE: 1
SHORTNESS OF BREATH: 0
CONSTIPATION: 0
VOMITING: 1
SPUTUM PRODUCTION: 0

## 2023-11-27 ASSESSMENT — FIBROSIS 4 INDEX: FIB4 SCORE: 0.77

## 2023-11-27 ASSESSMENT — PAIN DESCRIPTION - PAIN TYPE: TYPE: ACUTE PAIN

## 2023-11-27 NOTE — CONSULTS
Date of Service  November 27, 2023     Reason for Consult: Dilated common bile duct    Requested by: Jaskaran Reyes D.O.    Location: [facility]    Chief Complaint  Abdominal Pain, N/V, and Fever (At home)        HPI: Patient is a 73 year old female with COPD, CAD, HTN, and DM2 who presented tot he ER for abdominal pain that started on Monday. She was noted to be hypoxic and concern of sepsis. CT chest showed no PE or pneumonia. CT ABDOMEN/PELVIS on 11/26/23 noted intrahepatic and extra hepatic biliary dilation with common bile duct measuring up to 2.2 cm. MRCP on 11/26/23 and dilated CBD and intrahepatic bile ducts. LFTs are normal.     Patient informs me she has not had a BM since Monday or passed gas since Monday. She had emesis on Tuesday. She has been on a liquid diet. She has been seen by [facility] approximately two weeks ago. She cannot remember the location or provider but states it was two weeks ago. With priming she states it was with APARNA Garcia.     She has been seen previously for this by GI and has not had a diagnosis for this issue. The symptoms have worsened to the point where she is having severe pain.    Past Medical History  Past Medical History:   Diagnosis Date    Abnormal screening cardiac CT 2/14/2020    Anxiety     Carotid arterial disease (HCC)     COPD (chronic obstructive pulmonary disease) (HCC)     Diabetes (HCC)     HTN (hypertension)     Hyperlipidemia     Tobacco use        Past Surgical History  No past surgical history on file. Appendectomy and Cholecystectomy    Current Medications:   Scheduled Medications   Medication Dose Frequency    potassium chloride SA  40 mEq TID    magnesium oxide  400 mg BID    insulin regular  3-14 Units 4X/DAY ACHS    doxycycline monohydrate  100 mg Q12HRS    ipratropium-albuterol  3 mL Q4HRS (RT)    acetaminophen  1,000 mg TID    senna-docusate  2 Tablet BID    enoxaparin (LOVENOX) injection  40 mg DAILY AT 1800     predniSONE  50 mg DAILY    aspirin  81 mg DAILY    buPROPion  100 mg BID    famotidine  20 mg DAILY    fluticasone-umeclidinium-vilanterol  1 Puff QDAILY (RT)    omeprazole  20 mg DAILY    losartan  100 mg Q DAY    And    hydroCHLOROthiazide  12.5 mg Q DAY    cefTRIAXone (ROCEPHIN) IV  2,000 mg Q24HRS    metroNIDAZOLE (FLAGYL) IV  500 mg Q12HRS          Anticoagulation:  Lovenox    Allergies:   Allergies   Allergen Reactions    Sulfa Drugs Hives    Ezetimibe Nausea     Diarrhea     Hmg-Coa-R Inhibitors Unspecified     Muscle cramps       Problem List  Principal Problem:    Acute hypoxemic respiratory failure (HCC) (POA: Yes)  Active Problems:    Anxiety (POA: Yes)    CAD (coronary artery disease) (POA: Yes)    Type 2 diabetes mellitus with complication, without long-term current use of insulin (HCC) (POA: Yes)    Essential hypertension (POA: Yes)    Generalized abdominal pain (POA: Yes)    Acute exacerbation of chronic obstructive pulmonary disease (COPD) (HCC) (POA: Yes)    Leukocytosis (POA: Yes)  Resolved Problems:    * No resolved hospital problems. *       Subjective  Review of Systems   Constitutional:  Negative for chills and weight loss.        Had fevers but no longer has fevers currently   HENT:  Negative for sore throat.    Respiratory:  Negative for shortness of breath and stridor.    Cardiovascular:  Negative for chest pain.   Gastrointestinal:  Positive for abdominal pain, constipation, nausea and vomiting. Negative for diarrhea.   Genitourinary:  Negative for dysuria and urgency.        Complains of dark urine   Skin:  Negative for itching and rash.   All other systems reviewed and are negative.        Objective  Temp:  [36.2 °C (97.1 °F)-36.4 °C (97.5 °F)] 36.4 °C (97.5 °F)  Pulse:  [] 91  Resp:  [14-24] 14  BP: (153-209)/() 166/75  SpO2:  [88 %-99 %] 95 %      Physical Exam  Vitals and nursing note reviewed.   Constitutional:       Appearance: Normal appearance.   HENT:      Head:  "Normocephalic.      Right Ear: External ear normal.      Left Ear: External ear normal.      Nose: Nose normal.   Eyes:      General: No scleral icterus.  Cardiovascular:      Rate and Rhythm: Normal rate and regular rhythm.   Pulmonary:      Effort: Pulmonary effort is normal. No respiratory distress.   Abdominal:      General: There is no distension.      Palpations: Abdomen is soft. There is no mass.      Tenderness: There is abdominal tenderness.      Comments: Minimal tenderness   Musculoskeletal:         General: No swelling or tenderness.      Cervical back: Normal range of motion and neck supple.   Skin:     Coloration: Skin is not jaundiced.   Neurological:      Mental Status: She is alert and oriented to person, place, and time.   Psychiatric:         Mood and Affect: Mood normal.         Behavior: Behavior normal.          Fluids    Intake/Output Summary (Last 24 hours) at 11/27/2023 1249  Last data filed at 11/27/2023 0947  Gross per 24 hour   Intake 320 ml   Output --   Net 320 ml         Labs  Lab Results   Component Value Date/Time    WBC 14.7 (H) 11/27/2023 06:48 AM    RBC 5.02 11/27/2023 06:48 AM    HEMOGLOBIN 14.0 11/27/2023 06:48 AM    HEMATOCRIT 42.1 11/27/2023 06:48 AM    MCV 83.9 11/27/2023 06:48 AM    MCH 27.9 11/27/2023 06:48 AM    MCHC 33.3 11/27/2023 06:48 AM    MPV 9.4 11/27/2023 06:48 AM    NEUTSPOLYS 75.00 (H) 11/27/2023 06:48 AM    LYMPHOCYTES 15.30 (L) 11/27/2023 06:48 AM    MONOCYTES 9.20 11/27/2023 06:48 AM    EOSINOPHILS 0.00 11/27/2023 06:48 AM    BASOPHILS 0.20 11/27/2023 06:48 AM        Lab Results   Component Value Date/Time    SODIUM 135 11/27/2023 06:48 AM    POTASSIUM 2.9 (L) 11/27/2023 06:48 AM    CHLORIDE 95 (L) 11/27/2023 06:48 AM    CO2 29 11/27/2023 06:48 AM    GLUCOSE 200 (H) 11/27/2023 06:48 AM    BUN 26 (H) 11/27/2023 06:48 AM    CREATININE 0.47 (L) 11/27/2023 06:48 AM    BUNCREATRAT 30.0 (H) 05/12/2023 05:41 AM        No results found for: \"PROTHROMBTM\", \"INR\" "     Recent Labs     11/26/23  1133 11/27/23  0648   ASTSGOT 18 23   ALTSGPT 17 32   TBILIRUBIN 0.4 0.3   GLOBULIN 3.1 2.7       Imaging  CT ABDOMEN/PELVIS (11/26/23)  IMPRESSION:  1.  Intrahepatic and extra hepatic biliary dilation with common bile duct measuring up to 2.2 cm  2.  Previous cholecystectomy  3.  No other significant finding    MRCP (11/26/23)  IMPRESSION:  1. Dilated CBD and intrahepatic bile ducts.  2. No choledocholithiasis.    Principal Problem:    Acute hypoxemic respiratory failure (HCC) (POA: Yes)  Active Problems:    Anxiety (POA: Yes)    CAD (coronary artery disease) (POA: Yes)    Type 2 diabetes mellitus with complication, without long-term current use of insulin (HCC) (POA: Yes)    Essential hypertension (POA: Yes)    Generalized abdominal pain (POA: Yes)    Acute exacerbation of chronic obstructive pulmonary disease (COPD) (HCC) (POA: Yes)    Leukocytosis (POA: Yes)  Resolved Problems:    * No resolved hospital problems. *       Assessment and Plan  Patient is a  73 year old female with type 2 DM, HTN, COPD and CAD who presented to the ER for abdominal pain and concerns of sepsis. Imaging demonstrates a dilated common bile duct. The recommendation is an ERCP and EUS and plan to see her back in our office on an outpatient basis. I called [facility] and that is actually not their patient. I contacted [facility] and patient has been seen by Mireya BRAUN in May. Discussed with GI team. Patient also has not had a BM since Monday. Recommend aggressive bowel regimen/protocol. We will plan to see the patient on an outpatient basis after EUS possible ERCP. Non operative management. She is tentatively scheduled to return to our office on 12/19 with Dr. Jones.     The assessment and plan discussed with Dr. Jones.

## 2023-11-27 NOTE — CARE PLAN
The patient is Stable - Low risk of patient condition declining or worsening    Shift Goals  Clinical Goals: control pain and maintain o2 sat > 92%  Patient Goals: rest  Family Goals: alda    Progress made toward(s) clinical / shift goals:  Received patient in bed alert and oriented x4. Reports pain 3/10. Administered scheduled medications. Patient updated on plan of care and verbalized understanding. O2 at 2L via NC. Hourly rounds performed. Fall precautions in place and call light within reach.     Patient is not progressing towards the following goals:    Problem: Knowledge Deficit - Standard  Goal: Patient and family/care givers will demonstrate understanding of plan of care, disease process/condition, diagnostic tests and medications  Outcome: Not Progressing  Note: Plan to advance goal:  explain disease process/condition, treatment plan, diagnostic tests and medications every shift.

## 2023-11-27 NOTE — ED PROVIDER NOTES
ED Provider Note    CHIEF COMPLAINT  Chief Complaint   Patient presents with    Abdominal Pain    N/V    Fever     At home       EXTERNAL RECORDS REVIEWED  Outpatient Notes I reviewed the urgent care information from today and the patient had a chest x-ray which was reported as unremarkable, a point-of-care urinalysis was negative for nitrite leukocyte Estrace and blood there was glucose at 500 and ketones at 40 reported.  A fingerstick blood sugar at the urgent care showed a value of 188.  The patient apparently presented with dizziness and hypoxia and was found to have an O2 sat of 83% on room air and a heart rate of 106 with blood pressure of 159/78.  I also reviewed the urgent care EKG which was scanned into the media section of the electronic medical record and it shows a sinus rhythm 94 bpm no pathologic ST elevation there was a trace of ST depression in leads II and III and aVF CA interval 128 ms QTc interval 463 ms.    HPI/ROS    Jigna Allen is a 73 y.o. female who presents in the emergency department the patient tells me that she is here because of bilateral lower abdominal pain which began on Monday with associated nausea vomiting and soft stool.  She went to the urgent care today and was found to have a very low oxygen level.  The patient says that she has been mildly short of breath but she says her symptoms do not feel like her COPD.  She has had subjective fever and chills over the last 2 days.  She has not been able to take any of her medications including her blood pressure medicine since Tuesday due to nausea and vomiting.  The patient tells me she had myocardial infarction in 2010 and had a stent placed her symptoms at that time were unlike today's symptoms she said at that time she had a squeezing chest pain and she has not had that symptom this week.    PAST MEDICAL HISTORY   has a past medical history of Abnormal screening cardiac CT (2/14/2020), Anxiety, Carotid arterial disease (HCC), COPD  (chronic obstructive pulmonary disease) (HCC), Diabetes (HCC), HTN (hypertension), Hyperlipidemia, and Tobacco use.    SURGICAL HISTORY  patient denies any surgical history    FAMILY HISTORY  Family History   Problem Relation Age of Onset    Cancer Mother 47        breast       SOCIAL HISTORY  Social History     Tobacco Use    Smoking status: Every Day     Current packs/day: 0.50     Types: Cigarettes    Smokeless tobacco: Never   Vaping Use    Vaping Use: Never used   Substance and Sexual Activity    Alcohol use: No    Drug use: Not Currently     Types: Marijuana     Comment: CBD  occ    Sexual activity: Not Currently     Partners: Male     Comment:        CURRENT MEDICATIONS  Home Medications       Reviewed by Jacki Panda (Pharmacy Tech) on 11/26/23 at 1408  Med List Status: Complete     Medication Last Dose Status   albuterol (PROAIR HFA) 108 (90 Base) MCG/ACT Aero Soln inhalation aerosol 11/25/2023 Active   aspirin EC (ECOTRIN) 81 MG Tablet Delayed Response 11/23/2023 Active   buPROPion (WELLBUTRIN) 100 MG Tab 11/23/2023 Active   Cholecalciferol (VITAMIN D) 2000 UNIT Tab 11/23/2023 Active   clobetasol (TEMOVATE) 0.05 % Ointment 1 WEEK Active   famotidine (PEPCID) 20 MG Tab 11/23/2023 Active   fluticasone-umeclidin-vilant (TRELEGY ELLIPTA) 100-62.5-25 MCG/ACT AEROSOL POWDER, BREATH ACTIVATED inhalation 11/26/2023 Active   glucose blood (FREESTYLE LITE) strip  Active   ibuprofen (MOTRIN) 200 MG Tab 11/23/2023 Active   ipratropium-albuterol (DUONEB) 0.5-2.5 (3) MG/3ML nebulizer solution 11/23/2023 Active   losartan-hydrochlorothiazide (HYZAAR) 100-12.5 MG per tablet 11/23/2023 Active   metFORMIN ER (GLUCOPHAGE XR) 500 MG TABLET SR 24 HR 11/23/2023 Active   Nebulizers Misc  Active   nitroglycerin (NITROSTAT) 0.4 MG SL Tab > MONTHS Active   pantoprazole (PROTONIX) 20 MG tablet 11/26/2023 Active                    ALLERGIES  Allergies   Allergen Reactions    Sulfa Drugs Hives    Ezetimibe Nausea      "Diarrhea     Hmg-Coa-R Inhibitors Unspecified     Muscle cramps       PHYSICAL EXAM  VITAL SIGNS: BP (!) 189/107 Comment: nurse aware  Pulse 89   Temp 36.2 °C (97.1 °F) (Temporal)   Resp 17   Ht 1.6 m (5' 3\")   Wt 73.9 kg (163 lb)   SpO2 99%   BMI 28.87 kg/m²    Constitutional: Awake verbal moderately ill-appearing  HENT: Mucous membranes are dry  Eyes: No erythema discharge or jaundice  Neck: Trachea midline no JVD  Cardiovascular: Regular rate and rhythm  Respiratory: Clear bilaterally with no apparent difficulty breathing and I do not hear wheezes or crackles  Abdomen: Soft and nondistended bowel sounds are rare but present the patient has mild bilateral lower quadrant tenderness with palpation but no peritoneal findings and no upper quadrant tenderness  Skin: Warm and dry  Musculoskeletal: No leg edema or calf tenderness  Neurologic: The patient is awake lucid and verbal and moving all extremities        DIAGNOSTIC STUDIES / PROCEDURES  EKG  I have independently interpreted this EKG  Results for orders placed or performed during the hospital encounter of 23   EKG   Result Value Ref Range    Report       [facility] Emergency Dept.    Test Date:  2023  Pt Name:    MORRIS CRAIN                   Department: ER  MRN:        [id number]                      Room:       [facility]  Gender:     Female                       Technician: [id number]  :        1950                   Requested By:ER TRIAGE PROTOCOL  Order #:    [id number]                    Reading MD: MIGUEL MARSHALL MD    Measurements  Intervals                                Axis  Rate:       91                           P:          55  CO:         137                          QRS:        38  QRSD:       99                           T:          29  QT:         397  QTc:        489    Interpretive Statements  Sinus rhythm  Multiple premature complexes, vent & supraven  Probable left atrial enlargement  Borderline prolonged QT " interval  No previous ECG available for comparison  Electronically Signed On 11- 11:49:58 PST by MIGUEL MARSHALL MD       As noted above I also reviewed the EKG obtained at the urgent care    LABS  In the emergency department the CBC shows an elevated white blood cell count of 18.1 basic metabolic panel is unremarkable except for slightly elevated glucose of 191 and a potassium of 3.3.  LFTs are unremarkable lactic acid level was not elevated.  Troponin is elevated at 56, there are no old values available for comparison.  Viral testing is negative for COVID flu and RSV    RADIOLOGY  Radiologist interpretation:   CT-ABDOMEN-PELVIS WITH   Final Result      1.  Intrahepatic and extra hepatic biliary dilation with common bile duct measuring up to 2.2 cm      2.  Previous cholecystectomy      3.  No other significant finding      CT-CTA CHEST PULMONARY ARTERY W/ RECONS   Final Result      1.  No evidence of pulmonary embolus.      2.  Emphysema.            [id number]    (Results Pending)         COURSE & MEDICAL DECISION MAKING  In the emergency department an IV is established and the patient placed on the cardiac monitor.  The patient was started on intravenous fluids due to concerns for sepsis.  The patient told me that she had suffered from diverticulitis twice in the past and given her lower quadrant tenderness I was concerned about this and empirically ordered intravenous Unasyn.  Because of the patient's impressive hypoxia CT was obtained and there was no evidence of pneumonia or pulmonary emboli.  The troponin came back elevated at 56, the urgent care EKG does show trace ST depression in the inferior leads but I do not see ST elevation.  The patient was empirically given albuterol and Atrovent by nebulizer but this really did not seem to change much.  I reviewed all the findings thus far available with the patient and I have reviewed the case with the hospitalist and the patient is referred to the  hospitalist service for further evaluation and treatment    DISPOSITION AND DISCUSSIONS  I have discussed management of the patient with the following physicians and TERESA's: I have reviewed this case with the hospitalist and the patient is referred to the hospitalist service for further evaluation and treatment    Decision tools and prescription drugs considered including, but not limited to:  Patient qualifies for sepsis with heart rate greater than 90 white blood cell count greater than 12 and suspected infection .    CRITICAL CARE  The very real possibilty of a deterioration of this patient's condition required the highest level of my preparedness for sudden, emergent intervention.  I provided critical care services, which included medication orders, frequent reevaluations of the patient's condition and response to treatment, ordering and reviewing test results, and discussing the case with various consultants.  The critical care time associated with the care of the patient was 35 minutes. Review chart for interventions. This time is exclusive of any other billable procedures.      FINAL DIAGNOSIS  1. Nausea and vomiting, unspecified vomiting type    2. Abdominal pain of unknown etiology    3. Hypoxia    4. Chronic obstructive pulmonary disease, unspecified COPD type (HCC)    5. Sepsis, due to unspecified organism, unspecified whether acute organ dysfunction present (HCC)    6. Elevated troponin           Electronically signed by: Tony Rasmussen M.D., 11/26/2023 5:07 PM

## 2023-11-27 NOTE — CONSULTS
Pulmonary Consultation    Date of Service: 11/27/2023    Date of Admission:  11/26/2023 11:20 AM    Consulting Physician: Jaskaran Reyes D.O.    Chief Complaint:  Abdominal Pain, N/V, and Fever (At home)      History of Present Illness:   Patient is a 72yo female with PMH of COPD (no PFTs), CAD, HTN, and DM2 that presented 11/26/23 for abdominal pain, nausea, and vomiting. Found to require supplemental oxygen up to 2L. Pulmonary consulted for COPD exacerbation.    Patient does not use oxygen at home. No previous PFTs. Patient denies any shortness of breath, sputum production, or cough. Denies any previous COPD exacerbations. 50py smoker; currently 0.5ppd. States that they've been using their Trelegy Ellipta without issue and has not required use of rescue albuterol at home. CT-PE showing emphysematous changes. MRCP showing dilated CBD 15mm.     Review of Systems   Constitutional:  Negative for chills and fever.   Respiratory:  Negative for cough, sputum production, shortness of breath and wheezing.    Cardiovascular:  Negative for chest pain, palpitations and leg swelling.   Gastrointestinal:  Positive for abdominal pain and nausea. Negative for blood in stool, constipation, diarrhea, melena and vomiting.       Home Medications       Reviewed by Jacki Panda (Pharmacy Tech) on 11/26/23 at 1408  Med List Status: Complete     Medication Last Dose Status   albuterol (PROAIR HFA) 108 (90 Base) MCG/ACT Aero Soln inhalation aerosol 11/25/2023 Active   aspirin EC (ECOTRIN) 81 MG Tablet Delayed Response 11/23/2023 Active   buPROPion (WELLBUTRIN) 100 MG Tab 11/23/2023 Active   Cholecalciferol (VITAMIN D) 2000 UNIT Tab 11/23/2023 Active   clobetasol (TEMOVATE) 0.05 % Ointment 1 WEEK Active   famotidine (PEPCID) 20 MG Tab 11/23/2023 Active   fluticasone-umeclidin-vilant (TRELEGY ELLIPTA) 100-62.5-25 MCG/ACT AEROSOL POWDER, BREATH ACTIVATED inhalation 11/26/2023 Active   glucose blood (FREESTYLE LITE) strip  Active  "  ibuprofen (MOTRIN) 200 MG Tab 11/23/2023 Active   ipratropium-albuterol (DUONEB) 0.5-2.5 (3) MG/3ML nebulizer solution 11/23/2023 Active   losartan-hydrochlorothiazide (HYZAAR) 100-12.5 MG per tablet 11/23/2023 Active   metFORMIN ER (GLUCOPHAGE XR) 500 MG TABLET SR 24 HR 11/23/2023 Active   Nebulizers Misc  Active   nitroglycerin (NITROSTAT) 0.4 MG SL Tab > MONTHS Active   pantoprazole (PROTONIX) 20 MG tablet 11/26/2023 Active                    Social History     Tobacco Use    Smoking status: Every Day     Current packs/day: 0.50     Types: Cigarettes    Smokeless tobacco: Never   Vaping Use    Vaping Use: Never used   Substance Use Topics    Alcohol use: No    Drug use: Not Currently     Types: Marijuana     Comment: CBD  occ        Past Medical History:   Diagnosis Date    Abnormal screening cardiac CT 2/14/2020    Anxiety     Carotid arterial disease (HCC)     COPD (chronic obstructive pulmonary disease) (HCC)     Diabetes (HCC)     HTN (hypertension)     Hyperlipidemia     Tobacco use        No past surgical history on file.    Allergies: Sulfa drugs, Ezetimibe, and Hmg-coa-r inhibitors    Family History   Problem Relation Age of Onset    Cancer Mother 47        breast       Vitals:    11/26/23 1122 11/26/23 1123 11/26/23 1202 11/26/23 1220   Height: 1.6 m (5' 3\")      Weight: 73.9 kg (163 lb)      Weight % change since last entry.: 0 %      BP:  (!) 208/95 (!) 195/95 (!) 195/91   Pulse:  98 91 92   BMI (Calculated): 28.87      Resp:  16 14 17   Temp:  37 °C (98.6 °F)     TempSrc:  Temporal      11/26/23 1250 11/26/23 1303 11/26/23 1310 11/26/23 1333   Height:       Weight:       Weight % change since last entry.:       BP: (!) 209/96 (!) 196/95     Pulse: 97 92 93 89   BMI (Calculated):       Resp: 18 20 (!) 24 19   Temp:       TempSrc:        11/26/23 1353 11/26/23 1420 11/26/23 1450 11/26/23 1606   Height:       Weight:       Weight % change since last entry.:       BP: (!) 186/84 (!) 179/79 (!) 183/83 " (!) 189/107   Pulse: 93 (!) 103 92 89   BMI (Calculated):       Resp: 18 (!) 22 20 17   Temp:    36.2 °C (97.1 °F)   TempSrc:    Temporal    11/26/23 1700 11/26/23 1816 11/26/23 2014 11/26/23 2212   Height:       Weight:       Weight % change since last entry.:       BP: (!) 184/94  (!) 176/87    Pulse: 97 80 98 97   BMI (Calculated):       Resp:  16 16 16   Temp:   36.2 °C (97.2 °F)    TempSrc:   Temporal     11/27/23 0155 11/27/23 0350 11/27/23 0523 11/27/23 0700   Height:       Weight:       Weight % change since last entry.:       BP:  (!) 184/85 (!) 153/80    Pulse: 93 98  89   BMI (Calculated):       Resp: 16 16  14   Temp:  36.4 °C (97.5 °F)     TempSrc:  Temporal      11/27/23 0728 11/27/23 1100   Height:     Weight:     Weight % change since last entry.:     BP: (!) 166/75    Pulse: 99 91   BMI (Calculated):     Resp: 17 14   Temp: 36.4 °C (97.5 °F)    TempSrc: Temporal        Physical Examination    Vitals:    11/27/23 0523 11/27/23 0700 11/27/23 0728 11/27/23 1100   BP: (!) 153/80  (!) 166/75    Pulse:  89 99 91   Resp:  14 17 14   Temp:   36.4 °C (97.5 °F)    TempSrc:   Temporal    SpO2:  96% 96% 95%   Weight:       Height:         Physical Exam  Constitutional:       General: She is not in acute distress.     Appearance: She is not toxic-appearing or diaphoretic.   HENT:      Head: Normocephalic and atraumatic.      Mouth/Throat:      Mouth: Mucous membranes are moist.   Eyes:      General: No scleral icterus.     Extraocular Movements: Extraocular movements intact.      Conjunctiva/sclera: Conjunctivae normal.   Cardiovascular:      Rate and Rhythm: Normal rate and regular rhythm.      Heart sounds: Normal heart sounds. No murmur heard.     No friction rub. No gallop.   Pulmonary:      Effort: No respiratory distress.      Breath sounds: Normal breath sounds. No stridor. No wheezing, rhonchi or rales.   Abdominal:      General: There is no distension.      Palpations: Abdomen is soft. There is no mass.       Tenderness: There is abdominal tenderness. There is no guarding.      Hernia: No hernia is present.   Musculoskeletal:         General: No swelling or tenderness.      Right lower leg: No edema.      Left lower leg: No edema.   Skin:     General: Skin is warm and dry.      Coloration: Skin is not jaundiced.   Neurological:      Mental Status: She is alert and oriented to person, place, and time.      Cranial Nerves: No cranial nerve deficit.           Intake/Output Summary (Last 24 hours) at 11/27/2023 1211  Last data filed at 11/27/2023 0947  Gross per 24 hour   Intake 320 ml   Output --   Net 320 ml       Recent Labs     11/26/23  1133 11/27/23  0648   WBC 18.1* 14.7*   NEUTSPOLYS 78.80* 75.00*   LYMPHOCYTES 13.20* 15.30*   MONOCYTES 7.30 9.20   EOSINOPHILS 0.10 0.00   BASOPHILS 0.20 0.20   ASTSGOT 18 23   ALTSGPT 17 32   ALKPHOSPHAT 73 68   TBILIRUBIN 0.4 0.3     Recent Labs     11/26/23  1133 11/27/23  0648   SODIUM 137 135   POTASSIUM 3.3* 2.9*   CHLORIDE 95* 95*   CO2 28 29   BUN 32* 26*   CREATININE 0.58 0.47*   MAGNESIUM 1.9  --    CALCIUM 9.8 9.3     Recent Labs     11/26/23  1133 11/27/23  0648   ALTSGPT 17 32   ASTSGOT 18 23   ALKPHOSPHAT 73 68   TBILIRUBIN 0.4 0.3   GLUCOSE 191* 200*         [id number]   Final Result         1. Dilated CBD and intrahepatic bile ducts.      2. No choledocholithiasis.      CT-ABDOMEN-PELVIS WITH   Final Result      1.  Intrahepatic and extra hepatic biliary dilation with common bile duct measuring up to 2.2 cm      2.  Previous cholecystectomy      3.  No other significant finding      CT-CTA CHEST PULMONARY ARTERY W/ RECONS   Final Result      1.  No evidence of pulmonary embolus.      2.  Emphysema.                Assessment and Plan:  Patient is a 72yo female with PMH of COPD (no PFTs), CAD, HTN, and DM2 that presented 11/26/23 for abdominal pain, nausea, and vomiting. Found to require supplemental oxygen up to 2L. Patient oxygen improved to 1L NC. Unlikely  COPD exacerbation given patient without symptoms of COPD exacerbation, not required rescue albuterol at home, and additionally without PFTs. Adjustment of inhalers inappropriate at this time. Patient will need to follow-up in outpatient pulmonology clinic with formal PFTs.    #Acute hypoxic respiratory failure  -Recent CT-PE and CT thorax showing emphysematous changes  -Continue Trelegy Ellipta and albuterol PRN  -Discontinue prednisone  -Continue to wean O2 as tolerated  -Follow-up in outpatient pulmonology clinic; will need PFTs  -Pulmonology will sign off.    Discussed with my attending, Dr. Valentino.    Brian Mccallum MD  [facility] PGY-3

## 2023-11-27 NOTE — RESPIRATORY CARE
"COPD EDUCATION by COPD CLINICAL EDUCATOR  11/27/2023  at  2:54 PM by Aundrea Ochoa, RRT     Patient interviewed by COPD education team at the request of her admitting team. We discussed COPD/Emphysema and her current medications. She initially was admitted with exacerbation and abdominal pain. (Order set utilized)  She declined our information packet about COPD.  She states her  has COPD and she known all she needs to know.  She continues to smoke with no desire to quit. Action Plan updated as noted below.    COPD Screen  COPD Risk Screening  Do you have a history of COPD?: Yes  Do you have a Pulmonologist?: Yes    COPD Assessment  COPD Clinical Specialists ONLY  COPD Education Initiated: Yes--Short Intervention (met with pt she denies she is here for an exacerbation. We reviewed her current medications she takes to control symptoms. Denies prior PFT and declined bedside. She refused our literature and has no desire to quit smoking. I thanked her for her time)  Is this a COPD exacerbation patient?: Yes (IP Pulmonary notified of admission/orderset)  pending review   DME Company: none prior  DME Equipment Type: has nebulizer at home  Physician Follow Up Appointment: 02/08/24  Appt Time: 1020  Physician Name: C. Demucha, APN  Pulmonologist Name: Refused appt and information about Pulmonary teams in area  Smoking Cessation: Yes  $ Smoking Cessation 3-10 Minutes: No (Refused no desire to quit)  Home Health Care:  (TBD at this encounter.)  Mobile Urgent Care Services: N/A (Out of area)  [facility]: N/A  $ Demo/Eval of SVN's, MDI's and Aerosols: Yes (review of medications and caring for equipment)  (OP) Pulmonary Function Testing:  (denies ever having and declined bedside testing; of note-emphysema noted on recent CT's)  Interdisciplinary Rounds: Attendance at Rounds (30 Min)    PFT Results    No results found for: \"PFT\"    Meds to Beds  [facility] provides bedside medication delivery for all " "eligible patients at discharge.  Would you like to opt out of this program for any reason?: Yes - Opt Out  Reason the patient would like to opt out of Bedside Medication Delivery at Discharge?: Patient prefers another pharmacy     MY COPD ACTION PLAN     It is recommended that patients and physicians /healthcare providers complete this action plan together. This plan should be discussed at each physician visit and updated as needed.    The green, yellow and red zones show groups of symptoms of COPD. This list of symptoms is not comprehensive, and you may experience other symptoms. In the \"Actions\" column, your healthcare provider has recommended actions for you to take based on your symptoms.    Patient Name: Jigna Allen   YOB: 1950   Last Updated on:     Green Zone:  I am doing well today Actions     Usual activitiy and exercise level   Take daily medications     Usual amounts of cough and phlegm/mucus   Use oxygen as prescribed     Sleep well at night   Continue regular exercise/diet plan     Appetite is good   At all times avoid cigarette smoke, inhaled irritants     Daily Medications (these medications are taken every day):   Fluticasone and Umeclidinium and Vilanterol (Trelogy) 1 Puff Once daily     Additional Information:  Remember to rinse mouth after using this medication    Yellow Zone:  I am having a bad day or a COPD flare Actions     More breathless than usual   Continue daily medications     I have less energy for my daily activities   Use quick relief inhaler as ordered     Increased or thicker phlegm/mucus   Use oxygen as prescribed     Using quick relief inhaler/nebulizer more often   Get plenty of rest     Swelling of ankles more than usual   Use pursed lip breathing     More coughing than usual   At all times avoid cigarette smoke, inhaled irritants     I feel like I have a \"chest cold\"     Poor sleep and my symptoms woke me up     My appetite is not good     My medicine is not " helping      Call provider immediately if symptoms don’t improve     Continue daily medications, add rescue medications:   Albuterol  Albuterol/Ipratropium (Combivent, Duoneb) 2 Puffs  3mL via nebulizer Every 6 hours PRN         Medications to be used during a flare up, (as Discussed with Provider):           Additional Information:  Use your nebulizer as directed by your Doctor    Red Zone:  I need urgent medical care Actions     Severe shortness of breath even at rest   Call 911 or seek medical care immediately     Not able to do any activity because of breathing      Fever or shaking chills      Feeling confused or very drowsy       Chest pains      Coughing up blood

## 2023-11-27 NOTE — CONSULTS
Gastroenterology Initial Consult Note               Author:  Alisia Melo M.D. Date & Time Created: 11/27/2023 3:44 PM       Patient ID:  Name:             Jigna Allen  YOB: 1950  Age:                 73 y.o.  female  MRN:               [id number]      Referring Provider:  Tawanda Jones MD        Presenting Chief Complaint:  Abdominal pain      History of Present Illness:    This is a very pleasant 73 y.o. female with a 1 year history of mid abdominal pain, nonradiating, gnawing in nature and typically occurs in the middle of the night about 3:30.  She cannot tell me how long the pain lasts but she is able to fall back asleep.  She cannot identify a trigger although at 1 point she thought eating at 8:30 pm may have been related.  She does not know of any alleviating factors other than vomiting after pain starts and she will feel better.  Stated most recently she had dry heaves.  She does not take any medications for this.  She denies melena and hematochezia.  She takes daily ASA and occasional Advil.  She has not had a previous EGD or colonoscopy.  She denies change in bowel habits but occasionally has constipation.      History is notable for her being a smoker, CAD with history of MI and stent, TIA this year and type II DM.  The patient is not a good historian.    Chart review:  WBC 14.7, K 2.9, normal liver tests  11/26:  CT abd/pelvis with:  IHD and EHD with CBD 2.2cm, previous luz maria  11/26:  MRCP:  CBD 1.5cm with IHD, no CBD stone    April 2022 [facility] ER:  returns to ED c/o epigastric pain 10/10 and intractable vomiting.  CTA abd/pelvis:  moderate proximal SMA stenosis.  She saw Vascular Surgery who did not feel any intervention warranted.    2021 Cologard negative    Review of Systems:  Review of Systems   Constitutional: Negative.    HENT: Negative.     Eyes: Negative.    Respiratory: Negative.     Cardiovascular: Negative.    Gastrointestinal:  Positive for abdominal pain,  constipation and vomiting. Negative for blood in stool and melena.   Genitourinary: Negative.    Musculoskeletal: Negative.    Skin: Negative.    Neurological: Negative.              Past Medical History:  Past Medical History:   Diagnosis Date    Abnormal screening cardiac CT 2/14/2020    Anxiety     Carotid arterial disease (HCC)     COPD (chronic obstructive pulmonary disease) (HCC)     Diabetes (HCC)     HTN (hypertension)     Hyperlipidemia     Tobacco use      Active Hospital Problems    Diagnosis     Acute hypoxemic respiratory failure ([facility]) [J96.01]     Acute exacerbation of chronic obstructive pulmonary disease (COPD) ([facility]) [J44.1]     Leukocytosis [D72.829]     Generalized abdominal pain [R10.84]     Essential hypertension [I10]     Anxiety [F41.9]     Type 2 diabetes mellitus with complication, without long-term current use of insulin ([facility]) [E11.8]     CAD (coronary artery disease) [I25.10]          Past Surgical History:  No past surgical history on file.        Hospital Medications:  Current Facility-Administered Medications   Medication Dose Frequency Provider Last Rate Last Admin    potassium chloride SA (Kdur) tablet 40 mEq  40 mEq TID ZAFAR YiO.   40 mEq at 11/27/23 1415    magnesium oxide tablet 400 mg  400 mg BID ZAFAR YiO.   400 mg at 11/27/23 1410    insulin regular (HumuLIN R,NovoLIN R) injection  3-14 Units 4X/DAY ACHS Jaskaran Reyes D.O.        And    dextrose 10 % BOLUS 25 g  25 g Q15 MIN PRN Jaskaran Reyes D.O.        NS infusion   Continuous Jaskaran Reyes D.O. 100 mL/hr at 11/27/23 1411 New Bag at 11/27/23 1411    fentaNYL (Sublimaze) injection 50 mcg  50 mcg Q HOUR PRN Tony Rasmussen M.D.   50 mcg at 11/26/23 1311    doxycycline monohydrate (Adoxa) tablet 100 mg  100 mg Q12HRS Shashi Alonzo M.D.   100 mg at 11/27/23 0519    Respiratory Therapy Consult   Continuous RT Shashi Alonzo M.D.        ipratropium-albuterol (DUONEB) nebulizer  solution  3 mL Q4HRS (RT) Shashi Alonzo M.D.   3 mL at 11/27/23 1422    ipratropium-albuterol (DUONEB) nebulizer solution  3 mL Q2HRS PRN (RT) Shashi Alonzo M.D.        acetaminophen (Tylenol) tablet 1,000 mg  1,000 mg TID Shashi Alonzo M.D.   1,000 mg at 11/27/23 0922    hydrALAZINE (Apresoline) injection 10 mg  10 mg Q4HRS PRN Shashi Alonzo M.D.   10 mg at 11/27/23 0400    ondansetron (Zofran) syringe/vial injection 4 mg  4 mg Q4HRS PRN Shashi Alonzo M.D.   4 mg at 11/26/23 1506    ondansetron (Zofran ODT) dispertab 4 mg  4 mg Q4HRS PRN Shashi Alonzo M.D.        senna-docusate (Pericolace Or Senokot S) 8.6-50 MG per tablet 2 Tablet  2 Tablet BID Shashi Alonzo M.D.        And    polyethylene glycol/lytes (Miralax) PACKET 1 Packet  1 Packet QDAY PRN Shashi Alonzo M.D.        And    magnesium hydroxide (Milk Of Magnesia) suspension 30 mL  30 mL QDAY PRN Shashi Alonzo M.D.        And    bisacodyl (Dulcolax) suppository 10 mg  10 mg QDAY PRN Shashi Alonzo M.D.        guaiFENesin dextromethorphan (Robitussin DM) 100-10 MG/5ML syrup 10 mL  10 mL Q6HRS PRN Shashi Alonzo M.D.        enoxaparin (Lovenox) inj 40 mg  40 mg DAILY AT 1800 Shashi Alonzo M.D.   40 mg at 11/26/23 1723    predniSONE (Deltasone) tablet 50 mg  50 mg DAILY Shashi Alonzo M.D.   50 mg at 11/27/23 0519    aspirin EC tablet 81 mg  81 mg DAILY Shashi Alonzo M.D.   81 mg at 11/27/23 0519    buPROPion (Wellbutrin) tablet 100 mg  100 mg BID Shashi Alonzo M.D.   100 mg at 11/27/23 0519    famotidine (Pepcid) tablet 20 mg  20 mg DAILY Shashi Alonzo M.D.   20 mg at 11/27/23 0519    fluticasone-umeclidinium-vilanterol (Trelegy Ellipta) 100-62.5-25 mcg/act inhaler 1 Puff  1 Puff QDAILY (RT) Shashi Alonzo M.D.        omeprazole (PriLOSEC) capsule 20 mg  20 mg DAILY  Shashi Alonzo M.D.   20 mg at 11/27/23 0519    losartan (Cozaar) tablet 100 mg  100 mg Q DAY Shashi Alonzo M.D.   100 mg at 11/27/23 0523    And    hydroCHLOROthiazide tablet 12.5 mg  12.5 mg Q DAY Shashi Alonzo M.D.   12.5 mg at 11/26/23 1503    cefTRIAXone (Rocephin) syringe 2,000 mg  2,000 mg Q24HRS Shashi Alonzo M.D.   2,000 mg at 11/26/23 1719    metroNIDAZOLE (Flagyl) IVPB 500 mg  500 mg Q12HRS Shashi Alonzo M.D. 100 mL/hr at 11/27/23 0531 500 mg at 11/27/23 0531   Last reviewed on 11/26/2023  2:08 PM by Jacki Panda       Current Outpatient Medications:  Medications Prior to Admission   Medication Sig Dispense Refill Last Dose    clobetasol (TEMOVATE) 0.05 % Ointment Apply 1 Application topically 2 times a day. APPLY TO LEGS   1 WEEK    fluticasone-umeclidin-vilant (TRELEGY ELLIPTA) 100-62.5-25 MCG/ACT AEROSOL POWDER, BREATH ACTIVATED inhalation Inhale 1 Inhalation every day. X 365 days 90 Each 3 11/26/2023 at AM    famotidine (PEPCID) 20 MG Tab Take 20 mg by mouth every day.   11/23/2023    nitroglycerin (NITROSTAT) 0.4 MG SL Tab Place 1 Tablet under the tongue as needed for Chest Pain (Once every 5 minutes up to 3 doses). 100 Tablet 3 > MONTHS at PRN    albuterol (PROAIR HFA) 108 (90 Base) MCG/ACT Aero Soln inhalation aerosol USE 2 INHALATIONS EVERY 6 HOURS AS NEEDED FOR SHORTNESS OF BREATH 1 Each 11 11/25/2023 at PM    metFORMIN ER (GLUCOPHAGE XR) 500 MG TABLET SR 24 HR TAKE 1 TABLET IN THE MORNING AND 2 TABLETS IN THE EVENING 270 Tablet 3 11/23/2023    losartan-hydrochlorothiazide (HYZAAR) 100-12.5 MG per tablet TAKE 1 TABLET DAILY 90 Tablet 3 11/23/2023    pantoprazole (PROTONIX) 20 MG tablet Take 20 mg by mouth every day.   11/26/2023 at AM    ipratropium-albuterol (DUONEB) 0.5-2.5 (3) MG/3ML nebulizer solution USE 1 VIAL (3 ML) VIA NEBULIZER FOUR TIMES A  Each 3 11/23/2023    buPROPion (WELLBUTRIN) 100 MG Tab Take 1 Tablet by  mouth 2 times a day. 180 Tablet 3 11/23/2023    Nebulizers Misc Use nebulizer as directed up to 4x day prn sob 1 Each 1     glucose blood (FREESTYLE LITE) strip Use as directed.patient tests fasting blood sugar every am 100 Strip 3     Cholecalciferol (VITAMIN D) 2000 UNIT Tab Take 2,000 Units by mouth every day.   11/23/2023    aspirin EC (ECOTRIN) 81 MG Tablet Delayed Response Take 1 Tablet by mouth every day.   11/23/2023    ibuprofen (MOTRIN) 200 MG Tab Take 800 mg by mouth every 8 hours as needed for Inflammation or Mild Pain.   11/23/2023         Medication Allergies:  Allergies   Allergen Reactions    Sulfa Drugs Hives    Ezetimibe Nausea     Diarrhea     Hmg-Coa-R Inhibitors Unspecified     Muscle cramps         Family Medical History:  Family History   Problem Relation Age of Onset    Cancer Mother 47        breast         Social History:  Social History     Socioeconomic History    Marital status:      Spouse name: Not on file    Number of children: Not on file    Years of education: Not on file    Highest education level: Associate degree: occupational, technical, or vocational program   Occupational History    Not on file   Tobacco Use    Smoking status: Every Day     Current packs/day: 0.50     Types: Cigarettes    Smokeless tobacco: Never   Vaping Use    Vaping Use: Never used   Substance and Sexual Activity    Alcohol use: No    Drug use: Not Currently     Types: Marijuana     Comment: CBD  occ    Sexual activity: Not Currently     Partners: Male     Comment:    Other Topics Concern    Not on file   Social History Narrative    Not on file     Social Determinants of Health     Financial Resource Strain: Low Risk  (3/21/2022)    Overall Financial Resource Strain (CARDIA)     Difficulty of Paying Living Expenses: Not very hard   Food Insecurity: No Food Insecurity (3/21/2022)    Hunger Vital Sign     Worried About Running Out of Food in the Last Year: Never true     Ran Out of Food in the  "Last Year: Never true   Transportation Needs: No Transportation Needs (3/21/2022)    PRAPARE - Transportation     Lack of Transportation (Medical): No     Lack of Transportation (Non-Medical): No   Physical Activity: Unknown (3/21/2022)    Exercise Vital Sign     Days of Exercise per Week: 0 days     Minutes of Exercise per Session: Not on file   Stress: Stress Concern Present (3/21/2022)    [employer] - Occupational Stress Questionnaire     Feeling of Stress : Very much   Social Connections: Moderately Isolated (3/21/2022)    Social Connection and Isolation Panel [NHANES]     Frequency of Communication with Friends and Family: More than three times a week     Frequency of Social Gatherings with Friends and Family: Once a week     Attends [employer] Services: Never     Active Member of Clubs or Organizations: No     Attends Club or Organization Meetings: Never     Marital Status:    Intimate Partner Violence: Not on file   Housing Stability: Low Risk  (3/21/2022)    Housing Stability Vital Sign     Unable to Pay for Housing in the Last Year: No     Number of Places Lived in the Last Year: 1     Unstable Housing in the Last Year: No         Vital signs:  Weight/BMI: Body mass index is 28.87 kg/m².  BP (!) 146/73   Pulse 100   Temp 36.6 °C (97.9 °F) (Temporal)   Resp 18   Ht 1.6 m (5' 3\")   Wt 73.9 kg (163 lb)   SpO2 97%   Vitals:    11/27/23 0728 11/27/23 1100 11/27/23 1423 11/27/23 1512   BP: (!) 166/75   (!) 146/73   Pulse: 99 91 93 100   Resp: 17 14 18 18   Temp: 36.4 °C (97.5 °F)   36.6 °C (97.9 °F)   TempSrc: Temporal   Temporal   SpO2: 96% 95% 95% 97%   Weight:       Height:         Oxygen Therapy:  Pulse Oximetry: 97 %, O2 (LPM): 1, O2 Delivery Device: Nasal Cannula    Intake/Output Summary (Last 24 hours) at 11/27/2023 1544  Last data filed at 11/27/2023 1445  Gross per 24 hour   Intake 340 ml   Output --   Net 340 ml         Physical Exam:  Physical " Exam            Labs:  Recent Labs     11/26/23  1133 11/27/23  0648   SODIUM 137 135   POTASSIUM 3.3* 2.9*   CHLORIDE 95* 95*   CO2 28 29   BUN 32* 26*   CREATININE 0.58 0.47*   MAGNESIUM 1.9  --    CALCIUM 9.8 9.3     Recent Labs     11/26/23  1133 11/27/23  0648   ALTSGPT 17 32   ASTSGOT 18 23   ALKPHOSPHAT 73 68   TBILIRUBIN 0.4 0.3   GLUCOSE 191* 200*     Recent Labs     11/26/23  1133 11/27/23  0648   WBC 18.1* 14.7*   NEUTSPOLYS 78.80* 75.00*   LYMPHOCYTES 13.20* 15.30*   MONOCYTES 7.30 9.20   EOSINOPHILS 0.10 0.00   BASOPHILS 0.20 0.20   ASTSGOT 18 23   ALTSGPT 17 32   ALKPHOSPHAT 73 68   TBILIRUBIN 0.4 0.3     Recent Labs     11/26/23  1133 11/27/23  0648   RBC 5.03 5.02   HEMOGLOBIN 14.1 14.0   HEMATOCRIT 42.6 42.1   PLATELETCT 418 384     Recent Results (from the past 24 hour(s))   POCT glucose device results    Collection Time: 11/26/23  5:29 PM   Result Value Ref Range    POC Glucose, Blood 197 (H) 65 - 99 mg/dL   POCT glucose device results    Collection Time: 11/26/23  8:24 PM   Result Value Ref Range    POC Glucose, Blood 221 (H) 65 - 99 mg/dL   CBC with Differential    Collection Time: 11/27/23  6:48 AM   Result Value Ref Range    WBC 14.7 (H) 4.8 - 10.8 K/uL    RBC 5.02 4.20 - 5.40 M/uL    Hemoglobin 14.0 12.0 - 16.0 g/dL    Hematocrit 42.1 37.0 - 47.0 %    MCV 83.9 81.4 - 97.8 fL    MCH 27.9 27.0 - 33.0 pg    MCHC 33.3 32.2 - 35.5 g/dL    RDW 42.2 35.9 - 50.0 fL    Platelet Count 384 164 - 446 K/uL    MPV 9.4 9.0 - 12.9 fL    Neutrophils-Polys 75.00 (H) 44.00 - 72.00 %    Lymphocytes 15.30 (L) 22.00 - 41.00 %    Monocytes 9.20 0.00 - 13.40 %    Eosinophils 0.00 0.00 - 6.90 %    Basophils 0.20 0.00 - 1.80 %    Immature Granulocytes 0.30 0.00 - 0.90 %    Nucleated RBC 0.00 0.00 - 0.20 /100 WBC    Neutrophils (Absolute) 11.04 (H) 1.82 - 7.42 K/uL    Lymphs (Absolute) 2.25 1.00 - 4.80 K/uL    Monos (Absolute) 1.36 (H) 0.00 - 0.85 K/uL    Eos (Absolute) 0.00 0.00 - 0.51 K/uL    Baso (Absolute) 0.03  0.00 - 0.12 K/uL    Immature Granulocytes (abs) 0.05 0.00 - 0.11 K/uL    NRBC (Absolute) 0.00 K/uL   Comp Metabolic Panel (CMP)    Collection Time: 11/27/23  6:48 AM   Result Value Ref Range    Sodium 135 135 - 145 mmol/L    Potassium 2.9 (L) 3.6 - 5.5 mmol/L    Chloride 95 (L) 96 - 112 mmol/L    Co2 29 20 - 33 mmol/L    Anion Gap 11.0 7.0 - 16.0    Glucose 200 (H) 65 - 99 mg/dL    Bun 26 (H) 8 - 22 mg/dL    Creatinine 0.47 (L) 0.50 - 1.40 mg/dL    Calcium 9.3 8.5 - 10.5 mg/dL    Correct Calcium 9.2 8.5 - 10.5 mg/dL    AST(SGOT) 23 12 - 45 U/L    ALT(SGPT) 32 2 - 50 U/L    Alkaline Phosphatase 68 30 - 99 U/L    Total Bilirubin 0.3 0.1 - 1.5 mg/dL    Albumin 4.1 3.2 - 4.9 g/dL    Total Protein 6.8 6.0 - 8.2 g/dL    Globulin 2.7 1.9 - 3.5 g/dL    A-G Ratio 1.5 g/dL   ESTIMATED GFR    Collection Time: 11/27/23  6:48 AM   Result Value Ref Range    GFR (CKD-EPI) 100 >60 mL/min/1.73 m 2   POCT glucose device results    Collection Time: 11/27/23  9:08 AM   Result Value Ref Range    POC Glucose, Blood 218 (H) 65 - 99 mg/dL   POCT glucose device results    Collection Time: 11/27/23  1:46 PM   Result Value Ref Range    POC Glucose, Blood 214 (H) 65 - 99 mg/dL         Radiology Review:  [id number]   Final Result         1. Dilated CBD and intrahepatic bile ducts.      2. No choledocholithiasis.      CT-ABDOMEN-PELVIS WITH   Final Result      1.  Intrahepatic and extra hepatic biliary dilation with common bile duct measuring up to 2.2 cm      2.  Previous cholecystectomy      3.  No other significant finding      CT-CTA CHEST PULMONARY ARTERY W/ RECONS   Final Result      1.  No evidence of pulmonary embolus.      2.  Emphysema.                  MDM (Data Review):   -Records reviewed and summarized in current documentation  -I personally reviewed and interpreted the laboratory results  -I personally reviewed the radiology images    Assessment/Recommendations:   Impression:    Mid abdominal pain typically middle of the  night, gnawing in quality.  Not typical of chronic mesenteric ischemia but did have the moderate stenosis of SMA.  She is a smoker, diabetic with history of CAD.    Ductal dilation intrahepatic and extrahepatic post cholecystectomy with negative MRCP for CBD stone and normal liver tests.  Will need ERCP/EUS evaluation outpatient and I have contacted Dr. Preston re: this.    3.    Moderate stenosis of SMA    4.   COPD  5.   Type II DM  6.   CAD, history of MI and coronary stent  7.   Leukocytosis    Recs:   CTA abd/pelvis to evaluate for atherosclerotic disease     2.   If negative, EGD.  She is on Prednisone 50 mg + ASA but also on H2RA and PPI.  Don't feel she needs colonoscopy without anemia and Cologard was negative.  She has had recent constipation but denied change in bowel habits.  Can address further tomorrow    3.  If EGD negative, consider GES with history of GERD and uncomfortable after bigger meals    4.  To follow up with [facility] re: ERCP and EUS    5.  Does she need H2RA and PPI?    6.  Currently on cl liq diet.  ?may be able to adv soon        Alisia Melo M.D.          Core Quality Measures   Reviewed items:  Labs, Medications and Radiology reports reviewed

## 2023-11-27 NOTE — CARE PLAN
Problem: Bronchoconstriction  Goal: Improve in air movement and diminished wheezing  Description: Target End Date:  2 to 3 days    1.  Implement inhaled treatments  2.  Evaluate and manage medication effects  Outcome: Progressing   DUO Q4

## 2023-11-27 NOTE — PROGRESS NOTES
[facility] Medicine Daily Progress Note    Date of Service  11/27/2023    Chief Complaint  Jigna Allen is a 73 y.o. female admitted 11/26/2023 with   Chief Complaint   Patient presents with    Abdominal Pain    N/V    Fever     At home         Hospital Course  No notes on file    73-year-old female with a past medical history of hypertension, anxiety, CAD, type 2 diabetes and COPD who presents emergency department on 11/26/2023 with a 6-day history of worsening abdominal pain, dyspnea, cough, nausea and vomiting.  Patient reports that she has been having lower abdominal pain which worsened with drinking water.  She associates symptoms with fever, chills, nausea along with multiple bouts of nonbloody emesis.  She has been having worsening cough along with greenish sputum.  No loss of consciousness.     At the emergency department, vital signs with hypertension with SBP in the 200s.  Patient requiring 2 L maintain saturation (on room air at baseline).  CBC with leukocytosis at 18 and elevated polys.  Chemistry with hypokalemia.  Lactic acid low.  Chest x-ray with no acute cardiopulmonary process per my read.  Chest CTA consistent with emphysema.  Abdominal/pelvic CT with contrast showing Intrahepatic and extra hepatic biliary dilation with common bile duct measuring up to 2.2 cm and history of cholecystectomy.  Patient was admitted for acute hypoxic respiratory failure secondary to COPD exacerbation and possible atypical pneumonia.    Interval Problem Update   Patient was seen and examined at bedside.  I have personally reviewed and interpreted vitals, labs, and imaging.    11/27.  Afebrile.  Tachycardia is improved.  Hypertension is improved.  On 1-2 L nasal cannula.  Potassium 2.9.  Replete  Reports some abdominal pain and constipation. Denies fevers, chills, chest pain, shortness of breath.  Reviewed CT and MRCP with patient.  Discussed with GI and surgical oncology.  Plan for CT angiogram of the abdomen and  pelvis.  Will need outpatient EUS and ERCP.  Aggressive bowel regimen.    I have discussed this patient's plan of care and discharge plan at IDT rounds today with Case Management, Nursing, Nursing leadership, and other members of the IDT team.    Consultants/Specialty  GI, pulmonary, and vascular surgery    Code Status  Full Code    Disposition  The patient is not medically cleared for discharge to home or a post-acute facility.  Anticipate discharge to: home with organized home healthcare and close outpatient follow-up    I have placed the appropriate orders for post-discharge needs.    Review of Systems  Review of Systems   Gastrointestinal:  Positive for abdominal pain, constipation, nausea and vomiting.        Physical Exam  Temp:  [36.2 °C (97.1 °F)-37 °C (98.6 °F)] 36.4 °C (97.5 °F)  Pulse:  [] 99  Resp:  [14-24] 17  BP: (153-209)/() 166/75  SpO2:  [88 %-99 %] 96 %    Physical Exam  Vitals and nursing note reviewed.   Constitutional:       Appearance: Normal appearance. She is ill-appearing.   HENT:      Head: Normocephalic and atraumatic.      Right Ear: External ear normal.      Left Ear: External ear normal.      Nose: Nose normal.      Mouth/Throat:      Mouth: Mucous membranes are moist.      Pharynx: Oropharynx is clear. No oropharyngeal exudate or posterior oropharyngeal erythema.   Eyes:      Extraocular Movements: Extraocular movements intact.      Conjunctiva/sclera: Conjunctivae normal.   Cardiovascular:      Rate and Rhythm: Normal rate and regular rhythm.      Pulses: Normal pulses.      Heart sounds: Normal heart sounds. No murmur heard.  Pulmonary:      Effort: Pulmonary effort is normal. No respiratory distress.      Breath sounds: Normal breath sounds. No stridor. No wheezing or rales.   Abdominal:      General: Abdomen is flat. Bowel sounds are normal. There is no distension.      Palpations: Abdomen is soft. There is no mass.      Tenderness: There is abdominal tenderness.    Musculoskeletal:      Cervical back: Normal range of motion.   Skin:     General: Skin is warm.      Capillary Refill: Capillary refill takes less than 2 seconds.   Neurological:      General: No focal deficit present.      Mental Status: She is alert and oriented to person, place, and time. Mental status is at baseline.      Cranial Nerves: No cranial nerve deficit.   Psychiatric:         Mood and Affect: Mood normal.         Behavior: Behavior normal.         Fluids    Intake/Output Summary (Last 24 hours) at 11/27/2023 0920  Last data filed at 11/26/2023 2030  Gross per 24 hour   Intake 200 ml   Output --   Net 200 ml       Laboratory  Recent Labs     11/26/23  1133 11/27/23  0648   WBC 18.1* 14.7*   RBC 5.03 5.02   HEMOGLOBIN 14.1 14.0   HEMATOCRIT 42.6 42.1   MCV 84.7 83.9   MCH 28.0 27.9   MCHC 33.1 33.3   RDW 42.3 42.2   PLATELETCT 418 384   MPV 9.6 9.4     Recent Labs     11/26/23  1133 11/27/23  0648   SODIUM 137 135   POTASSIUM 3.3* 2.9*   CHLORIDE 95* 95*   CO2 28 29   GLUCOSE 191* 200*   BUN 32* 26*   CREATININE 0.58 0.47*   CALCIUM 9.8 9.3                   Imaging  [id number]   Final Result         1. Dilated CBD and intrahepatic bile ducts.      2. No choledocholithiasis.      CT-ABDOMEN-PELVIS WITH   Final Result      1.  Intrahepatic and extra hepatic biliary dilation with common bile duct measuring up to 2.2 cm      2.  Previous cholecystectomy      3.  No other significant finding      CT-CTA CHEST PULMONARY ARTERY W/ RECONS   Final Result      1.  No evidence of pulmonary embolus.      2.  Emphysema.            CTA ABDOMEN PELVIS W & W/O POST PROCESS    (Results Pending)        Assessment/Plan  * Acute hypoxemic respiratory failure (HCC)- (present on admission)  Assessment & Plan  11/27/2023  Presenting with worsening dyspnea and cough productive of sputum  Requiring 2 L to maintain saturation  Secondary to COPD exacerbation versus atypical pneumonia  RT/O2  COPD management  Titrate  O2    Dilated cbd, acquired  Assessment & Plan  11/27/2023  Likely cause of abdominal pain, constipation, nausea, vomiting.  LFTs normal    GI and surgical oncology following.  Ordered CT angiogram for atherosclerosis with concern for mesenteric ischemia    Leukocytosis- (present on admission)  Assessment & Plan  11/27/2023  Possibly secondary to early pneumonia  IV antibiotics for now  Labs on a.m.    Acute exacerbation of chronic obstructive pulmonary disease (COPD) (HCC)- (present on admission)  Assessment & Plan  11/27/2023  Increased sputum production, hypoxia and cough frequency  Wheezing auscultated  Admit to medicine  RT/O2  DuoNebs  Prednisone  Doxycycline  I-S  Labs on a.m.    Generalized abdominal pain- (present on admission)  Assessment & Plan  11/27/2023  Abdominal/pelvic CT showing CBD dilation at 21 mm  Clear liquid diet for now  Continue IV antibiotics  MRCP    Essential hypertension- (present on admission)  Assessment & Plan  11/27/2023  Continue home meds  As needed antihypertensives    Type 2 diabetes mellitus with complication, without long-term current use of insulin ([facility])- (present on admission)  Assessment & Plan  11/27/2023  Diabetic diet  Insulin sliding scale  Frequent Accu-Cheks    CAD (coronary artery disease)- (present on admission)  Assessment & Plan  11/27/2023  Continue aspirin    Anxiety- (present on admission)  Assessment & Plan  11/27/2023  Continue home Wellbutrin         VTE prophylaxis: Lovenox    I have performed a physical exam and reviewed and updated ROS and Plan today (11/27/2023). In review of yesterday's note (11/26/2023), there are no changes except as documented above.      Greater than 53 minutes spent prepping to see patient (e.g. review of tests) obtaining and/or reviewing separately obtained history. Performing a medically appropriate examination and/ evaluation.  Counseling and educating the patient/family/caregiver.  Ordering medications, tests, or procedures.   Referring and communicating with other health care professionals.  Documenting clinical information in EPIC.  Independently interpreting results and communicating results to patient/family/caregiver.  Care coordination.

## 2023-11-28 ENCOUNTER — APPOINTMENT (OUTPATIENT)
Dept: RADIOLOGY | Facility: MEDICAL CENTER | Age: 73
DRG: 189 | End: 2023-11-28
Attending: INTERNAL MEDICINE
Payer: MEDICARE

## 2023-11-28 LAB
ALBUMIN SERPL BCP-MCNC: 3.7 G/DL (ref 3.2–4.9)
ALBUMIN/GLOB SERPL: 1.4 G/DL
ALP SERPL-CCNC: 62 U/L (ref 30–99)
ALT SERPL-CCNC: 26 U/L (ref 2–50)
ANION GAP SERPL CALC-SCNC: 9 MMOL/L (ref 7–16)
AST SERPL-CCNC: 13 U/L (ref 12–45)
BASOPHILS # BLD AUTO: 0.2 % (ref 0–1.8)
BASOPHILS # BLD: 0.03 K/UL (ref 0–0.12)
BILIRUB SERPL-MCNC: 0.4 MG/DL (ref 0.1–1.5)
BUN SERPL-MCNC: 26 MG/DL (ref 8–22)
CALCIUM ALBUM COR SERPL-MCNC: 9.1 MG/DL (ref 8.5–10.5)
CALCIUM SERPL-MCNC: 8.9 MG/DL (ref 8.5–10.5)
CHLORIDE SERPL-SCNC: 98 MMOL/L (ref 96–112)
CO2 SERPL-SCNC: 32 MMOL/L (ref 20–33)
CREAT SERPL-MCNC: 0.6 MG/DL (ref 0.5–1.4)
EOSINOPHIL # BLD AUTO: 0.02 K/UL (ref 0–0.51)
EOSINOPHIL NFR BLD: 0.1 % (ref 0–6.9)
ERYTHROCYTE [DISTWIDTH] IN BLOOD BY AUTOMATED COUNT: 43.3 FL (ref 35.9–50)
GFR SERPLBLD CREATININE-BSD FMLA CKD-EPI: 95 ML/MIN/1.73 M 2
GLOBULIN SER CALC-MCNC: 2.6 G/DL (ref 1.9–3.5)
GLUCOSE BLD STRIP.AUTO-MCNC: 126 MG/DL (ref 65–99)
GLUCOSE BLD STRIP.AUTO-MCNC: 144 MG/DL (ref 65–99)
GLUCOSE BLD STRIP.AUTO-MCNC: 195 MG/DL (ref 65–99)
GLUCOSE BLD STRIP.AUTO-MCNC: 200 MG/DL (ref 65–99)
GLUCOSE SERPL-MCNC: 208 MG/DL (ref 65–99)
HCT VFR BLD AUTO: 43.4 % (ref 37–47)
HGB BLD-MCNC: 14.1 G/DL (ref 12–16)
IMM GRANULOCYTES # BLD AUTO: 0.07 K/UL (ref 0–0.11)
IMM GRANULOCYTES NFR BLD AUTO: 0.5 % (ref 0–0.9)
LIPASE SERPL-CCNC: 22 U/L (ref 11–82)
LYMPHOCYTES # BLD AUTO: 2.78 K/UL (ref 1–4.8)
LYMPHOCYTES NFR BLD: 17.9 % (ref 22–41)
MAGNESIUM SERPL-MCNC: 2 MG/DL (ref 1.5–2.5)
MCH RBC QN AUTO: 27.9 PG (ref 27–33)
MCHC RBC AUTO-ENTMCNC: 32.5 G/DL (ref 32.2–35.5)
MCV RBC AUTO: 85.9 FL (ref 81.4–97.8)
MONOCYTES # BLD AUTO: 1.47 K/UL (ref 0–0.85)
MONOCYTES NFR BLD AUTO: 9.5 % (ref 0–13.4)
NEUTROPHILS # BLD AUTO: 11.17 K/UL (ref 1.82–7.42)
NEUTROPHILS NFR BLD: 71.8 % (ref 44–72)
NRBC # BLD AUTO: 0 K/UL
NRBC BLD-RTO: 0 /100 WBC (ref 0–0.2)
PHOSPHATE SERPL-MCNC: 2 MG/DL (ref 2.5–4.5)
PLATELET # BLD AUTO: 386 K/UL (ref 164–446)
PMV BLD AUTO: 10 FL (ref 9–12.9)
POTASSIUM SERPL-SCNC: 3.1 MMOL/L (ref 3.6–5.5)
PROCALCITONIN SERPL-MCNC: 0.08 NG/ML
PROT SERPL-MCNC: 6.3 G/DL (ref 6–8.2)
RBC # BLD AUTO: 5.05 M/UL (ref 4.2–5.4)
SODIUM SERPL-SCNC: 139 MMOL/L (ref 135–145)
WBC # BLD AUTO: 15.5 K/UL (ref 4.8–10.8)

## 2023-11-28 PROCEDURE — 700101 HCHG RX REV CODE 250: Performed by: INTERNAL MEDICINE

## 2023-11-28 PROCEDURE — 83735 ASSAY OF MAGNESIUM: CPT

## 2023-11-28 PROCEDURE — 74174 CTA ABD&PLVS W/CONTRAST: CPT

## 2023-11-28 PROCEDURE — 700102 HCHG RX REV CODE 250 W/ 637 OVERRIDE(OP): Performed by: INTERNAL MEDICINE

## 2023-11-28 PROCEDURE — 84100 ASSAY OF PHOSPHORUS: CPT

## 2023-11-28 PROCEDURE — 700111 HCHG RX REV CODE 636 W/ 250 OVERRIDE (IP): Performed by: STUDENT IN AN ORGANIZED HEALTH CARE EDUCATION/TRAINING PROGRAM

## 2023-11-28 PROCEDURE — 700102 HCHG RX REV CODE 250 W/ 637 OVERRIDE(OP): Performed by: STUDENT IN AN ORGANIZED HEALTH CARE EDUCATION/TRAINING PROGRAM

## 2023-11-28 PROCEDURE — 83690 ASSAY OF LIPASE: CPT

## 2023-11-28 PROCEDURE — 82962 GLUCOSE BLOOD TEST: CPT | Mod: 91

## 2023-11-28 PROCEDURE — 94640 AIRWAY INHALATION TREATMENT: CPT

## 2023-11-28 PROCEDURE — 85025 COMPLETE CBC W/AUTO DIFF WBC: CPT

## 2023-11-28 PROCEDURE — A9270 NON-COVERED ITEM OR SERVICE: HCPCS | Performed by: HOSPITALIST

## 2023-11-28 PROCEDURE — 700101 HCHG RX REV CODE 250: Performed by: STUDENT IN AN ORGANIZED HEALTH CARE EDUCATION/TRAINING PROGRAM

## 2023-11-28 PROCEDURE — 99232 SBSQ HOSP IP/OBS MODERATE 35: CPT | Performed by: NURSE PRACTITIONER

## 2023-11-28 PROCEDURE — 700105 HCHG RX REV CODE 258: Performed by: INTERNAL MEDICINE

## 2023-11-28 PROCEDURE — 700117 HCHG RX CONTRAST REV CODE 255: Performed by: INTERNAL MEDICINE

## 2023-11-28 PROCEDURE — 770006 HCHG ROOM/CARE - MED/SURG/GYN SEMI*

## 2023-11-28 PROCEDURE — 99233 SBSQ HOSP IP/OBS HIGH 50: CPT | Performed by: HOSPITALIST

## 2023-11-28 PROCEDURE — 94669 MECHANICAL CHEST WALL OSCILL: CPT

## 2023-11-28 PROCEDURE — 84145 PROCALCITONIN (PCT): CPT

## 2023-11-28 PROCEDURE — 80053 COMPREHEN METABOLIC PANEL: CPT

## 2023-11-28 PROCEDURE — 94760 N-INVAS EAR/PLS OXIMETRY 1: CPT

## 2023-11-28 PROCEDURE — A9270 NON-COVERED ITEM OR SERVICE: HCPCS | Performed by: INTERNAL MEDICINE

## 2023-11-28 PROCEDURE — A9270 NON-COVERED ITEM OR SERVICE: HCPCS | Performed by: STUDENT IN AN ORGANIZED HEALTH CARE EDUCATION/TRAINING PROGRAM

## 2023-11-28 PROCEDURE — 36415 COLL VENOUS BLD VENIPUNCTURE: CPT

## 2023-11-28 PROCEDURE — 700102 HCHG RX REV CODE 250 W/ 637 OVERRIDE(OP): Performed by: HOSPITALIST

## 2023-11-28 RX ORDER — IPRATROPIUM BROMIDE AND ALBUTEROL SULFATE 2.5; .5 MG/3ML; MG/3ML
3 SOLUTION RESPIRATORY (INHALATION)
Status: DISCONTINUED | OUTPATIENT
Start: 2023-11-28 | End: 2023-11-29

## 2023-11-28 RX ORDER — METRONIDAZOLE 500 MG/1
500 TABLET ORAL EVERY 12 HOURS
Status: DISCONTINUED | OUTPATIENT
Start: 2023-11-28 | End: 2023-11-30 | Stop reason: HOSPADM

## 2023-11-28 RX ORDER — POTASSIUM CHLORIDE 20 MEQ/1
40 TABLET, EXTENDED RELEASE ORAL ONCE
Status: DISCONTINUED | OUTPATIENT
Start: 2023-11-28 | End: 2023-11-29

## 2023-11-28 RX ADMIN — DOCUSATE SODIUM 50 MG AND SENNOSIDES 8.6 MG 2 TABLET: 8.6; 5 TABLET, FILM COATED ORAL at 04:55

## 2023-11-28 RX ADMIN — ASPIRIN 81 MG: 81 TABLET, COATED ORAL at 04:56

## 2023-11-28 RX ADMIN — LOSARTAN POTASSIUM 100 MG: 50 TABLET, FILM COATED ORAL at 05:01

## 2023-11-28 RX ADMIN — IOHEXOL 100 ML: 350 INJECTION, SOLUTION INTRAVENOUS at 16:08

## 2023-11-28 RX ADMIN — DOXYCYCLINE 100 MG: 100 TABLET, FILM COATED ORAL at 04:56

## 2023-11-28 RX ADMIN — CEFTRIAXONE SODIUM 2000 MG: 10 INJECTION, POWDER, FOR SOLUTION INTRAVENOUS at 20:27

## 2023-11-28 RX ADMIN — FAMOTIDINE 20 MG: 20 TABLET ORAL at 04:57

## 2023-11-28 RX ADMIN — INSULIN HUMAN 3 UNITS: 100 INJECTION, SOLUTION PARENTERAL at 17:12

## 2023-11-28 RX ADMIN — ACETAMINOPHEN 1000 MG: 500 TABLET ORAL at 07:41

## 2023-11-28 RX ADMIN — SODIUM CHLORIDE: 9 INJECTION, SOLUTION INTRAVENOUS at 03:07

## 2023-11-28 RX ADMIN — HYDROCHLOROTHIAZIDE 12.5 MG: 12.5 TABLET ORAL at 05:01

## 2023-11-28 RX ADMIN — METRONIDAZOLE 500 MG: 500 INJECTION, SOLUTION INTRAVENOUS at 05:29

## 2023-11-28 RX ADMIN — DOXYCYCLINE 100 MG: 100 TABLET, FILM COATED ORAL at 17:06

## 2023-11-28 RX ADMIN — METRONIDAZOLE 500 MG: 500 TABLET ORAL at 17:06

## 2023-11-28 RX ADMIN — FLUTICASONE FUROATE, UMECLIDINIUM BROMIDE AND VILANTEROL TRIFENATATE 1 PUFF: 100; 62.5; 25 POWDER RESPIRATORY (INHALATION) at 06:49

## 2023-11-28 RX ADMIN — INSULIN HUMAN 3 UNITS: 100 INJECTION, SOLUTION PARENTERAL at 07:46

## 2023-11-28 RX ADMIN — BUPROPION HYDROCHLORIDE 100 MG: 100 TABLET, FILM COATED ORAL at 17:06

## 2023-11-28 RX ADMIN — IPRATROPIUM BROMIDE AND ALBUTEROL SULFATE 3 ML: 2.5; .5 SOLUTION RESPIRATORY (INHALATION) at 06:44

## 2023-11-28 RX ADMIN — POLYETHYLENE GLYCOL 3350 1 PACKET: 17 POWDER, FOR SOLUTION ORAL at 04:57

## 2023-11-28 RX ADMIN — MAGNESIUM HYDROXIDE 30 ML: 1200 LIQUID ORAL at 17:06

## 2023-11-28 RX ADMIN — DOCUSATE SODIUM 50 MG AND SENNOSIDES 8.6 MG 2 TABLET: 8.6; 5 TABLET, FILM COATED ORAL at 17:06

## 2023-11-28 RX ADMIN — OMEPRAZOLE 20 MG: 20 CAPSULE, DELAYED RELEASE ORAL at 04:57

## 2023-11-28 RX ADMIN — ONDANSETRON 4 MG: 4 TABLET, ORALLY DISINTEGRATING ORAL at 05:34

## 2023-11-28 RX ADMIN — BUPROPION HYDROCHLORIDE 100 MG: 100 TABLET, FILM COATED ORAL at 04:56

## 2023-11-28 RX ADMIN — IPRATROPIUM BROMIDE AND ALBUTEROL SULFATE 3 ML: 2.5; .5 SOLUTION RESPIRATORY (INHALATION) at 20:12

## 2023-11-28 RX ADMIN — ACETAMINOPHEN 1000 MG: 500 TABLET ORAL at 20:27

## 2023-11-28 ASSESSMENT — ENCOUNTER SYMPTOMS
NERVOUS/ANXIOUS: 0
CHILLS: 0
COUGH: 0
BLOOD IN STOOL: 0
PALPITATIONS: 0
FEVER: 0
CONSTIPATION: 0
HEMOPTYSIS: 0
SORE THROAT: 0
VOMITING: 0
NAUSEA: 0
BACK PAIN: 0
MYALGIAS: 0
WHEEZING: 0
PND: 0
BRUISES/BLEEDS EASILY: 0
DIZZINESS: 0
VOMITING: 1
BLURRED VISION: 0
FALLS: 0
DIARRHEA: 0
ABDOMINAL PAIN: 1
NAUSEA: 1
CONSTIPATION: 1
WEAKNESS: 0
FLANK PAIN: 0
SHORTNESS OF BREATH: 0
DOUBLE VISION: 0
CLAUDICATION: 0
HEADACHES: 0
DEPRESSION: 0
HEARTBURN: 0

## 2023-11-28 ASSESSMENT — PAIN DESCRIPTION - PAIN TYPE
TYPE: ACUTE PAIN
TYPE: ACUTE PAIN

## 2023-11-28 ASSESSMENT — LIFESTYLE VARIABLES: SUBSTANCE_ABUSE: 0

## 2023-11-28 NOTE — ASSESSMENT & PLAN NOTE
Likely cause of abdominal pain, constipation, nausea, vomiting.  LFTs normal    GI and surgical oncology following.  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  I have discussed with vascular surgery who will review imaging.    Discussed with GI will get CT pancreas

## 2023-11-28 NOTE — PROGRESS NOTES
Gastroenterology Progress Note               Author:  OMAYRA Bob Date & Time Created: 11/28/2023 2:23 PM       Patient ID:  Name:             Jigna Allen  YOB: 1950  Age:                 73 y.o.  female  MRN:               [id number]        Medical Decision Making, by Problem:  Active Hospital Problems    Diagnosis     Dilated cbd, acquired [K83.8]     Acute hypoxemic respiratory failure (HCC) [J96.01]     Acute exacerbation of chronic obstructive pulmonary disease (COPD) (HCC) [J44.1]     Leukocytosis [D72.829]     Generalized abdominal pain [R10.84]     Essential hypertension [I10]     Anxiety [F41.9]     Type 2 diabetes mellitus with complication, without long-term current use of insulin (HCC) [E11.8]     CAD (coronary artery disease) [I25.10]            Presenting Chief Complaint:  Abdominal pain        History of Present Illness:    This is a very pleasant 73 y.o. female with a 1 year history of mid abdominal pain, nonradiating, gnawing in nature and typically occurs in the middle of the night about 3:30.  She cannot tell me how long the pain lasts but she is able to fall back asleep.  She cannot identify a trigger although at 1 point she thought eating at 8:30 pm may have been related.  She does not know of any alleviating factors other than vomiting after pain starts and she will feel better.  Stated most recently she had dry heaves.  She does not take any medications for this.  She denies melena and hematochezia.  She takes daily ASA and occasional Advil.  She has not had a previous EGD or colonoscopy.  She denies change in bowel habits but occasionally has constipation.       History is notable for her being a smoker, CAD with history of MI and stent, TIA this year and type II DM.  The patient is not a good historian.     Chart review:  WBC 14.7, K 2.9, normal liver tests  11/26:  CT abd/pelvis with:  IHD and EHD with CBD 2.2cm, previous luz maria  11/26:  MRCP:  CBD 1.5cm with  IHD, no CBD stone     April 2022 [facility] ER:  returns to ED c/o epigastric pain 10/10 and intractable vomiting.  CTA abd/pelvis:  moderate proximal SMA stenosis.  She saw Vascular Surgery who did not feel any intervention warranted.     2021 Cologard negative      Interval History:  11/20/2023: Patient seen at bedside.  Reports abdominal pain that is constant, not resolving overnight.  Overnight, patient lost PIV access.  Pending CT scan is scheduled at 1600 today.  Tolerating clear liquid diet.  Denies bowel movement.        Hospital Medications:  Current Facility-Administered Medications   Medication Dose Frequency Provider Last Rate Last Admin    ipratropium-albuterol (DUONEB) nebulizer solution  3 mL 4X/DAY (RT) ZAFAR YiORick   3 mL at 11/28/23 0644    ipratropium-albuterol (DUONEB) nebulizer solution  3 mL Q2HRS PRN (RT) Jaskaran Reyes D.O.        insulin regular (HumuLIN R,NovoLIN R) injection  3-14 Units 4X/DAY ACHS Jaskaran Reyes D.O.   3 Units at 11/28/23 0746    And    dextrose 10 % BOLUS 25 g  25 g Q15 MIN PRN Jaskaran Reyes D.O.        NS infusion   Continuous Jaskaran Reyes D.O. 100 mL/hr at 11/28/23 0307 New Bag at 11/28/23 0307    senna-docusate (Pericolace Or Senokot S) 8.6-50 MG per tablet 2 Tablet  2 Tablet BID Jaskaran Reyes D.O.   2 Tablet at 11/28/23 0455    And    polyethylene glycol/lytes (Miralax) PACKET 1 Packet  1 Packet DAILY Jaskaran Reyes D.O.   1 Packet at 11/28/23 0457    And    magnesium hydroxide (Milk Of Magnesia) suspension 30 mL  30 mL QDAY PRN Jaskaran Reyes D.O.        And    bisacodyl (Dulcolax) suppository 10 mg  10 mg QDAY PRN Jaskaran Reyes D.O.        fentaNYL (Sublimaze) injection 50 mcg  50 mcg Q HOUR PRN Tony Rasmussen M.D.   50 mcg at 11/27/23 1841    doxycycline monohydrate (Adoxa) tablet 100 mg  100 mg Q12HRS Shashi Alonzo M.D.   100 mg at 11/28/23 0456    Respiratory Therapy Consult   Continuous RT Shashi Alonzo M.D.         acetaminophen (Tylenol) tablet 1,000 mg  1,000 mg TID Shashi Alonzo M.D.   1,000 mg at 11/28/23 0741    hydrALAZINE (Apresoline) injection 10 mg  10 mg Q4HRS PRN Shashi Alonzo M.D.   10 mg at 11/27/23 0400    ondansetron (Zofran) syringe/vial injection 4 mg  4 mg Q4HRS PRN Shashi Alonzo M.D.   4 mg at 11/26/23 1506    ondansetron (Zofran ODT) dispertab 4 mg  4 mg Q4HRS PRN Shashi Alonzo M.D.   4 mg at 11/28/23 0534    guaiFENesin dextromethorphan (Robitussin DM) 100-10 MG/5ML syrup 10 mL  10 mL Q6HRS PRN Shsahi Alonzo M.D.        enoxaparin (Lovenox) inj 40 mg  40 mg DAILY AT 1800 Shashi Alonzo M.D.   40 mg at 11/27/23 1821    aspirin EC tablet 81 mg  81 mg DAILY Shashi Alonzo M.D.   81 mg at 11/28/23 0456    buPROPion (Wellbutrin) tablet 100 mg  100 mg BID Shashi Alonzo M.D.   100 mg at 11/28/23 0456    famotidine (Pepcid) tablet 20 mg  20 mg DAILY Shashi Alonzo M.D.   20 mg at 11/28/23 0457    fluticasone-umeclidinium-vilanterol (Trelegy Ellipta) 100-62.5-25 mcg/act inhaler 1 Puff  1 Puff QDAILY (RT) Shashi Alonzo M.D.   1 Puff at 11/28/23 0649    omeprazole (PriLOSEC) capsule 20 mg  20 mg DAILY Shashi Alonzo M.D.   20 mg at 11/28/23 0457    losartan (Cozaar) tablet 100 mg  100 mg Q DAY Shashimarjan Alonzo M.D.   100 mg at 11/28/23 0501    And    hydroCHLOROthiazide tablet 12.5 mg  12.5 mg Q DAY Shashi Alonzo M.D.   12.5 mg at 11/28/23 0501    cefTRIAXone (Rocephin) syringe 2,000 mg  2,000 mg Q24HRS Shashi Alonzo M.D.   2,000 mg at 11/27/23 1800    metroNIDAZOLE (Flagyl) IVPB 500 mg  500 mg Q12HRS Shashi Alonzo M.D. 100 mL/hr at 11/28/23 0529 500 mg at 11/28/23 0529   Last reviewed on 11/26/2023  2:08 PM by Alexia Cornelius PhT       Review of Systems:  Review of Systems   Constitutional:  Negative for chills, fever and malaise/fatigue.  "  HENT:  Negative for congestion and sore throat.    Respiratory:  Negative for cough and shortness of breath.    Cardiovascular:  Negative for chest pain and leg swelling.   Gastrointestinal:  Positive for abdominal pain. Negative for blood in stool, constipation, diarrhea, melena, nausea and vomiting.   Genitourinary:  Negative for dysuria and flank pain.   Musculoskeletal:  Negative for falls and myalgias.   Neurological:  Negative for weakness and headaches.   Psychiatric/Behavioral:  Negative for substance abuse. The patient is not nervous/anxious.    All other systems reviewed and are negative.        Vital signs:  Weight/BMI: Body mass index is 29.91 kg/m².  BP (!) 170/75   Pulse 88   Temp 36.6 °C (97.8 °F) (Temporal)   Resp 18   Ht 1.6 m (5' 3\")   Wt 76.6 kg (168 lb 14 oz)   SpO2 97%   Vitals:    11/27/23 2037 11/28/23 0325 11/28/23 0646 11/28/23 0705   BP:  (!) 151/88  (!) 170/75   Pulse:  94 80 88   Resp:  18 16 18   Temp:  36.1 °C (97 °F)  36.6 °C (97.8 °F)   TempSrc:  Temporal  Temporal   SpO2:  97% 99% 97%   Weight: 76.6 kg (168 lb 14 oz)      Height:         Oxygen Therapy:  Pulse Oximetry: 97 %, O2 (LPM): 1.5, O2 Delivery Device: Silicone Nasal Cannula    Intake/Output Summary (Last 24 hours) at 11/28/2023 1423  Last data filed at 11/28/2023 1000  Gross per 24 hour   Intake 718 ml   Output --   Net 718 ml         Physical Exam:  Physical Exam  Vitals and nursing note reviewed.   Constitutional:       General: She is awake.      Appearance: She is overweight. She is not ill-appearing or toxic-appearing.   HENT:      Head: Normocephalic and atraumatic.      Nose: Nose normal.      Mouth/Throat:      Mouth: Mucous membranes are moist.      Pharynx: Oropharynx is clear. No oropharyngeal exudate.   Eyes:      General: No scleral icterus.     Extraocular Movements: Extraocular movements intact.      Conjunctiva/sclera: Conjunctivae normal.   Cardiovascular:      Rate and Rhythm: Normal rate and " regular rhythm.      Pulses: Normal pulses.      Heart sounds: Normal heart sounds. No murmur heard.  Pulmonary:      Effort: Pulmonary effort is normal. No respiratory distress.      Breath sounds: Normal breath sounds. No wheezing.   Abdominal:      General: Abdomen is flat. Bowel sounds are normal. There is no distension.      Palpations: Abdomen is soft.      Tenderness: There is abdominal tenderness (Epigastric tenderness to palpation).   Musculoskeletal:      Right lower leg: No edema.      Left lower leg: No edema.   Skin:     General: Skin is warm and dry.      Capillary Refill: Capillary refill takes less than 2 seconds.      Coloration: Skin is not jaundiced.   Neurological:      General: No focal deficit present.      Mental Status: She is alert and oriented to person, place, and time. Mental status is at baseline.      Motor: No weakness.   Psychiatric:         Mood and Affect: Mood normal.         Behavior: Behavior normal. Behavior is cooperative.         Thought Content: Thought content normal.         Judgment: Judgment normal.             Labs:  Recent Labs     11/26/23 1133 11/27/23  0648 11/28/23  0653   SODIUM 137 135 139   POTASSIUM 3.3* 2.9* 3.1*   CHLORIDE 95* 95* 98   CO2 28 29 32   BUN 32* 26* 26*   CREATININE 0.58 0.47* 0.60   MAGNESIUM 1.9  --  2.0   PHOSPHORUS  --   --  2.0*   CALCIUM 9.8 9.3 8.9     Recent Labs     11/26/23  1133 11/27/23  0648 11/28/23  0653   ALTSGPT 17 32 26   ASTSGOT 18 23 13   ALKPHOSPHAT 73 68 62   TBILIRUBIN 0.4 0.3 0.4   LIPASE  --   --  22   GLUCOSE 191* 200* 208*     Recent Labs     11/26/23  1133 11/27/23  0648 11/28/23  0653   WBC 18.1* 14.7* 15.5*   NEUTSPOLYS 78.80* 75.00* 71.80   LYMPHOCYTES 13.20* 15.30* 17.90*   MONOCYTES 7.30 9.20 9.50   EOSINOPHILS 0.10 0.00 0.10   BASOPHILS 0.20 0.20 0.20   ASTSGOT 18 23 13   ALTSGPT 17 32 26   ALKPHOSPHAT 73 68 62   TBILIRUBIN 0.4 0.3 0.4     Recent Labs     11/26/23  1133 11/27/23  0648 11/28/23  0653   RBC 5.03  5.02 5.05   HEMOGLOBIN 14.1 14.0 14.1   HEMATOCRIT 42.6 42.1 43.4   PLATELETCT 418 384 386     Recent Results (from the past 24 hour(s))   POCT glucose device results    Collection Time: 11/27/23  5:43 PM   Result Value Ref Range    POC Glucose, Blood 199 (H) 65 - 99 mg/dL   POCT glucose device results    Collection Time: 11/27/23  8:20 PM   Result Value Ref Range    POC Glucose, Blood 175 (H) 65 - 99 mg/dL   CBC WITH DIFFERENTIAL    Collection Time: 11/28/23  6:53 AM   Result Value Ref Range    WBC 15.5 (H) 4.8 - 10.8 K/uL    RBC 5.05 4.20 - 5.40 M/uL    Hemoglobin 14.1 12.0 - 16.0 g/dL    Hematocrit 43.4 37.0 - 47.0 %    MCV 85.9 81.4 - 97.8 fL    MCH 27.9 27.0 - 33.0 pg    MCHC 32.5 32.2 - 35.5 g/dL    RDW 43.3 35.9 - 50.0 fL    Platelet Count 386 164 - 446 K/uL    MPV 10.0 9.0 - 12.9 fL    Neutrophils-Polys 71.80 44.00 - 72.00 %    Lymphocytes 17.90 (L) 22.00 - 41.00 %    Monocytes 9.50 0.00 - 13.40 %    Eosinophils 0.10 0.00 - 6.90 %    Basophils 0.20 0.00 - 1.80 %    Immature Granulocytes 0.50 0.00 - 0.90 %    Nucleated RBC 0.00 0.00 - 0.20 /100 WBC    Neutrophils (Absolute) 11.17 (H) 1.82 - 7.42 K/uL    Lymphs (Absolute) 2.78 1.00 - 4.80 K/uL    Monos (Absolute) 1.47 (H) 0.00 - 0.85 K/uL    Eos (Absolute) 0.02 0.00 - 0.51 K/uL    Baso (Absolute) 0.03 0.00 - 0.12 K/uL    Immature Granulocytes (abs) 0.07 0.00 - 0.11 K/uL    NRBC (Absolute) 0.00 K/uL   Comp Metabolic Panel    Collection Time: 11/28/23  6:53 AM   Result Value Ref Range    Sodium 139 135 - 145 mmol/L    Potassium 3.1 (L) 3.6 - 5.5 mmol/L    Chloride 98 96 - 112 mmol/L    Co2 32 20 - 33 mmol/L    Anion Gap 9.0 7.0 - 16.0    Glucose 208 (H) 65 - 99 mg/dL    Bun 26 (H) 8 - 22 mg/dL    Creatinine 0.60 0.50 - 1.40 mg/dL    Calcium 8.9 8.5 - 10.5 mg/dL    Correct Calcium 9.1 8.5 - 10.5 mg/dL    AST(SGOT) 13 12 - 45 U/L    ALT(SGPT) 26 2 - 50 U/L    Alkaline Phosphatase 62 30 - 99 U/L    Total Bilirubin 0.4 0.1 - 1.5 mg/dL    Albumin 3.7 3.2 - 4.9 g/dL     Total Protein 6.3 6.0 - 8.2 g/dL    Globulin 2.6 1.9 - 3.5 g/dL    A-G Ratio 1.4 g/dL   MAGNESIUM    Collection Time: 11/28/23  6:53 AM   Result Value Ref Range    Magnesium 2.0 1.5 - 2.5 mg/dL   PHOSPHORUS    Collection Time: 11/28/23  6:53 AM   Result Value Ref Range    Phosphorus 2.0 (L) 2.5 - 4.5 mg/dL   PROCALCITONIN    Collection Time: 11/28/23  6:53 AM   Result Value Ref Range    Procalcitonin 0.08 <0.25 ng/mL   LIPASE    Collection Time: 11/28/23  6:53 AM   Result Value Ref Range    Lipase 22 11 - 82 U/L   ESTIMATED GFR    Collection Time: 11/28/23  6:53 AM   Result Value Ref Range    GFR (CKD-EPI) 95 >60 mL/min/1.73 m 2   POCT glucose device results    Collection Time: 11/28/23  7:43 AM   Result Value Ref Range    POC Glucose, Blood 200 (H) 65 - 99 mg/dL   POCT glucose device results    Collection Time: 11/28/23 12:53 PM   Result Value Ref Range    POC Glucose, Blood 144 (H) 65 - 99 mg/dL       Radiology Review:  [id number]   Final Result         1. Dilated CBD and intrahepatic bile ducts.      2. No choledocholithiasis.      CT-ABDOMEN-PELVIS WITH   Final Result      1.  Intrahepatic and extra hepatic biliary dilation with common bile duct measuring up to 2.2 cm      2.  Previous cholecystectomy      3.  No other significant finding      CT-CTA CHEST PULMONARY ARTERY W/ RECONS   Final Result      1.  No evidence of pulmonary embolus.      2.  Emphysema.            CTA ABDOMEN PELVIS W & W/O POST PROCESS    (Results Pending)         MDM (Data Review):   -Records reviewed and summarized in current documentation  -I personally reviewed and interpreted the laboratory results  -I personally reviewed the radiology images    Assessment/Recommendations:  Mid abdominal pain typically middle of the night, gnawing in quality.  Not typical of chronic mesenteric ischemia but did have the moderate stenosis of SMA.  She is a smoker, diabetic with history of CAD.     Ductal dilation intrahepatic and extrahepatic  post cholecystectomy with negative MRCP for CBD stone and normal liver tests.  Will need ERCP/EUS evaluation outpatient and I have contacted Dr. Preston re: this.     3.    Moderate stenosis of SMA  4.   COPD  5.   Type II DM  6.   CAD, history of MI and coronary stent  7.   Leukocytosis    MDM:  This 73-year-old female with a past medical history as listed above who presented to [facility] on 11/26/2023 with acute approximate heart failure.  Of note, she is reported chronic mid abdominal pain that is typically in the middle of the night that usually resolves on its own.  However overnight, pain has not resolved.  Initial imaging showing moderate stenosis of SMA.  She is pending CTA abdomen pelvis to evaluate for atherosclerotic disease, possible ischemic bowel.  Unfortunately overnight, she left PIV access and CT was rescheduled for 1600 today.    Plan:  CT abdomen pelvis to evaluate for atherosclerotic disease  If negative, EGD.  She is on Prednisone 50 mg + ASA but also on H2RA and PPI.  Don't feel she needs colonoscopy without anemia and Cologard was negative.  She has had recent constipation but denied change in bowel habits. If EGD negative, consider GES with history of GERD and uncomfortable after bigger meals   Continue clear liquid diet, and results of CT abdomen pelvis, may consider advancing soon.  She will need to follow-up with GI consultants regarding ERCP and EUS.   May de-escalate H2 RA and PPI down to monotherapy.    Gi to follow.     Discussed with patient, nursing, Dr. Nielson        Core Quality Measures   Reviewed items::  Labs, Medications and Radiology reports reviewed

## 2023-11-28 NOTE — PROGRESS NOTES
Subjective:      Primary care physician: Cheryl M. Demucha, A.P.R.N.  Referring Provider: Hospital consult    Chief Complaint: No chief complaint on file.    Diagnosis:   1. Dilated cbd, acquired        2. Generalized abdominal pain        3. Type 2 diabetes mellitus with complication, without long-term current use of insulin (HCC)        4. Obesity (BMI 30-39.9)          History of presenting illness:    Jigna Allen is a pleasant 73 y.o. female with history including COPD, CAD with history of MI with stent, HTN, and DM2 who presented to the ER for abdominal pain that started on 11/26/23. She was noted to be hypoxic and concern of sepsis. CT chest showed no PE or pneumonia. CT ABDOMEN/PELVIS on 11/26/23 noted intrahepatic and extra hepatic biliary dilation with common bile duct measuring up to 2.2 cm. MRCP on 11/26/23 and dilated CBD and intrahepatic bile ducts, no stone. LFTs are normal. She has not had BM or flatus for multiple days.     She has been seen previously for this by GI and has not had a diagnosis for this issue. The symptoms have worsened to the point where she is having severe pain.    CT PANCREAS on 11/29/23 noted common bile and intrahepatic hepatic duct dilatation. Hepatomegaly. Small hiatal hernia.     Update 12/19/23  EGD/EUS/ BX/ERCP by Dr Mars on 2/9/24 noted  EGD: esophagus unremarkable. HH 37-39 cm. Gastric antrum erythema with biopsy for H. Pylori. Duodenum: unremarkable.   EUS: celiac axis/pancreas body/tail/pancreatic duct/ampulla/head of pancreas: unremarkable. CBD: dilated and tortuous up to 13.6 mm at mid duct. Distal filling defect concerning for stone/sludge.  ERCP: Ampulla: slightly bulbous, slowly rhythmically invaginating then protruding.   CBD: dilated and tortuous up to 13.6 mm mid duct subtle distal filling defect, several small stones and debris. Sphincterotomy. Biopsy of ampulla    Pathology negative for malignancy        She is here for follow up from recent  hospitalization.        Past Medical History:   Diagnosis Date    Abnormal screening cardiac CT 2/14/2020    Anxiety     Carotid arterial disease (HCC)     COPD (chronic obstructive pulmonary disease) (HCC)     Diabetes (HCC)     HTN (hypertension)     Hyperlipidemia     Tobacco use      No past surgical history on file.  Allergies   Allergen Reactions    Sulfa Drugs Hives    Ezetimibe Nausea     Diarrhea     Hmg-Coa-R Inhibitors Unspecified     Muscle cramps     Facility-Administered Encounter Medications as of 12/19/2023   Medication Dose Route Frequency Provider Last Rate Last Admin    ipratropium-albuterol (DUONEB) nebulizer solution  3 mL Nebulization 4X/DAY (RT) Jaskaran Reyes D.O.   3 mL at 11/28/23 0644    ipratropium-albuterol (DUONEB) nebulizer solution  3 mL Nebulization Q2HRS PRN (RT) Jaskaran Reyes D.O.        insulin regular (HumuLIN R,NovoLIN R) injection  3-14 Units Subcutaneous 4X/DAY ACHS Jaskaran Reyes D.O.   3 Units at 11/28/23 0746    And    dextrose 10 % BOLUS 25 g  25 g Intravenous Q15 MIN PRN Jaskaran Reyes D.O.        NS infusion   Intravenous Continuous Jaskaran Reyes D.O. 100 mL/hr at 11/28/23 0307 New Bag at 11/28/23 0307    senna-docusate (Pericolace Or Senokot S) 8.6-50 MG per tablet 2 Tablet  2 Tablet Oral BID Jaskaran Reyes D.O.   2 Tablet at 11/28/23 0455    And    polyethylene glycol/lytes (Miralax) PACKET 1 Packet  1 Packet Oral DAILY Jaskaran Reyes D.O.   1 Packet at 11/28/23 0457    And    magnesium hydroxide (Milk Of Magnesia) suspension 30 mL  30 mL Oral QDAY PRN Jaskaran Reyes D.O.        And    bisacodyl (Dulcolax) suppository 10 mg  10 mg Rectal QDAY PRN Jaskaran Reyes D.O.        fentaNYL (Sublimaze) injection 50 mcg  50 mcg Intravenous Q HOUR PRN Tony Rasmussen M.D.   50 mcg at 11/27/23 1841    doxycycline monohydrate (Adoxa) tablet 100 mg  100 mg Oral Q12HRS Shashi Alonzo M.D.   100 mg at 11/28/23 8956    Respiratory Therapy Consult    Nebulization Continuous RT Shashi Alonzo M.D.        acetaminophen (Tylenol) tablet 1,000 mg  1,000 mg Oral TID Shashi Alonzo M.D.   1,000 mg at 11/28/23 0741    hydrALAZINE (Apresoline) injection 10 mg  10 mg Intravenous Q4HRS PRN Shashi Alonzo M.D.   10 mg at 11/27/23 0400    ondansetron (Zofran) syringe/vial injection 4 mg  4 mg Intravenous Q4HRS PRN Shashi Alonzo M.D.   4 mg at 11/26/23 1506    ondansetron (Zofran ODT) dispertab 4 mg  4 mg Oral Q4HRS PRN Shashi Alonzo M.D.   4 mg at 11/28/23 0534    guaiFENesin dextromethorphan (Robitussin DM) 100-10 MG/5ML syrup 10 mL  10 mL Oral Q6HRS PRN Shashi Alonzo M.D.        enoxaparin (Lovenox) inj 40 mg  40 mg Subcutaneous DAILY AT 1800 Shashi Alonzo M.D.   40 mg at 11/27/23 1821    aspirin EC tablet 81 mg  81 mg Oral DAILY Shashi Alonzo M.D.   81 mg at 11/28/23 0456    buPROPion (Wellbutrin) tablet 100 mg  100 mg Oral BID Shashi Alonzo M.D.   100 mg at 11/28/23 0456    famotidine (Pepcid) tablet 20 mg  20 mg Oral DAILY Shashi Alonzo M.D.   20 mg at 11/28/23 0457    fluticasone-umeclidinium-vilanterol (Trelegy Ellipta) 100-62.5-25 mcg/act inhaler 1 Puff  1 Puff Inhalation QDAILY (RT) Shashi Alonzo M.D.   1 Puff at 11/28/23 0649    omeprazole (PriLOSEC) capsule 20 mg  20 mg Oral DAILY Shashi Alonzo M.D.   20 mg at 11/28/23 0457    losartan (Cozaar) tablet 100 mg  100 mg Oral Q DAY Shashi Alonzo M.D.   100 mg at 11/28/23 0501    And    hydroCHLOROthiazide tablet 12.5 mg  12.5 mg Oral Q DAY Shashi Alonzo M.D.   12.5 mg at 11/28/23 0501    cefTRIAXone (Rocephin) syringe 2,000 mg  2,000 mg Intravenous Q24HRS Shashi Alonzo M.D.   2,000 mg at 11/27/23 1800    metroNIDAZOLE (Flagyl) IVPB 500 mg  500 mg Intravenous Q12HRS Shashi Alonzo M.D. 100 mL/hr at 11/28/23 0529 500 mg at 11/28/23  0529     Outpatient Encounter Medications as of 12/19/2023   Medication Sig Dispense Refill    clobetasol (TEMOVATE) 0.05 % Ointment Apply 1 Application topically 2 times a day. APPLY TO LEGS      fluticasone-umeclidin-vilant (TRELEGY ELLIPTA) 100-62.5-25 MCG/ACT AEROSOL POWDER, BREATH ACTIVATED inhalation Inhale 1 Inhalation every day. X 365 days 90 Each 3    famotidine (PEPCID) 20 MG Tab Take 20 mg by mouth every day.      nitroglycerin (NITROSTAT) 0.4 MG SL Tab Place 1 Tablet under the tongue as needed for Chest Pain (Once every 5 minutes up to 3 doses). 100 Tablet 3    albuterol (PROAIR HFA) 108 (90 Base) MCG/ACT Aero Soln inhalation aerosol USE 2 INHALATIONS EVERY 6 HOURS AS NEEDED FOR SHORTNESS OF BREATH 1 Each 11    metFORMIN ER (GLUCOPHAGE XR) 500 MG TABLET SR 24 HR TAKE 1 TABLET IN THE MORNING AND 2 TABLETS IN THE EVENING 270 Tablet 3    losartan-hydrochlorothiazide (HYZAAR) 100-12.5 MG per tablet TAKE 1 TABLET DAILY 90 Tablet 3    pantoprazole (PROTONIX) 20 MG tablet Take 20 mg by mouth every day.      ipratropium-albuterol (DUONEB) 0.5-2.5 (3) MG/3ML nebulizer solution USE 1 VIAL (3 ML) VIA NEBULIZER FOUR TIMES A  Each 3    buPROPion (WELLBUTRIN) 100 MG Tab Take 1 Tablet by mouth 2 times a day. 180 Tablet 3    Nebulizers Misc Use nebulizer as directed up to 4x day prn sob 1 Each 1    glucose blood (FREESTYLE LITE) strip Use as directed.patient tests fasting blood sugar every am 100 Strip 3    Cholecalciferol (VITAMIN D) 2000 UNIT Tab Take 2,000 Units by mouth every day.      aspirin EC (ECOTRIN) 81 MG Tablet Delayed Response Take 1 Tablet by mouth every day.      ibuprofen (MOTRIN) 200 MG Tab Take 800 mg by mouth every 8 hours as needed for Inflammation or Mild Pain.       Social History     Socioeconomic History    Marital status:      Spouse name: Not on file    Number of children: Not on file    Years of education: Not on file    Highest education level: Associate degree: occupational,  technical, or vocational program   Occupational History    Not on file   Tobacco Use    Smoking status: Every Day     Current packs/day: 0.50     Types: Cigarettes    Smokeless tobacco: Never   Vaping Use    Vaping Use: Never used   Substance and Sexual Activity    Alcohol use: No    Drug use: Not Currently     Types: Marijuana     Comment: CBD  occ    Sexual activity: Not Currently     Partners: Male     Comment:    Other Topics Concern    Not on file   Social History Narrative    Not on file     Social Determinants of Health     Financial Resource Strain: Low Risk  (3/21/2022)    Overall Financial Resource Strain (CARDIA)     Difficulty of Paying Living Expenses: Not very hard   Food Insecurity: No Food Insecurity (3/21/2022)    Hunger Vital Sign     Worried About Running Out of Food in the Last Year: Never true     Ran Out of Food in the Last Year: Never true   Transportation Needs: No Transportation Needs (3/21/2022)    PRAPARE - Transportation     Lack of Transportation (Medical): No     Lack of Transportation (Non-Medical): No   Physical Activity: Unknown (3/21/2022)    Exercise Vital Sign     Days of Exercise per Week: 0 days     Minutes of Exercise per Session: Not on file   Stress: Stress Concern Present (3/21/2022)    [employer] - Occupational Stress Questionnaire     Feeling of Stress : Very much   Social Connections: Moderately Isolated (3/21/2022)    Social Connection and Isolation Panel [NHANES]     Frequency of Communication with Friends and Family: More than three times a week     Frequency of Social Gatherings with Friends and Family: Once a week     Attends Evangelical Services: Never     Active Member of Clubs or Organizations: No     Attends Club or Organization Meetings: Never     Marital Status:    Intimate Partner Violence: Not on file   Housing Stability: Low Risk  (3/21/2022)    Housing Stability Vital Sign     Unable to Pay for Housing in the Last  Year: No     Number of Places Lived in the Last Year: 1     Unstable Housing in the Last Year: No      Social History     Tobacco Use   Smoking Status Every Day    Current packs/day: 0.50    Types: Cigarettes   Smokeless Tobacco Never     Social History     Substance and Sexual Activity   Alcohol Use No     Social History     Substance and Sexual Activity   Drug Use Not Currently    Types: Marijuana    Comment: CBD  occ      Family History   Problem Relation Age of Onset    Cancer Mother 47        breast     ROS     Objective:   There were no vitals taken for this visit.    Physical Exam    Labs   Latest Reference Range & Units 11/29/23 05:59   WBC 4.8 - 10.8 K/uL 12.1 (H)   RBC 4.20 - 5.40 M/uL 4.55   Hemoglobin 12.0 - 16.0 g/dL 12.6   Hematocrit 37.0 - 47.0 % 38.9   MCV 81.4 - 97.8 fL 85.5   MCH 27.0 - 33.0 pg 27.7   MCHC 32.2 - 35.5 g/dL 32.4   RDW 35.9 - 50.0 fL 42.8   Platelet Count 164 - 446 K/uL 327   MPV 9.0 - 12.9 fL 9.8   (H): Data is abnormally high     Latest Reference Range & Units 11/30/23 04:35   Sodium 135 - 145 mmol/L 137   Potassium 3.6 - 5.5 mmol/L 2.9 (L)   Chloride 96 - 112 mmol/L 101   Co2 20 - 33 mmol/L 28   Anion Gap 7.0 - 16.0  8.0   Glucose 65 - 99 mg/dL 92   Bun 8 - 22 mg/dL 23 (H)   Creatinine 0.50 - 1.40 mg/dL 0.60   GFR (CKD-EPI) >60 mL/min/1.73 m 2 95   Calcium 8.5 - 10.5 mg/dL 8.5   (L): Data is abnormally low  (H): Data is abnormally high     Imaging  CT ABDOMEN (11/29/23)  IMPRESSION:  1.  Common bile and intrahepatic hepatic duct dilatation, commonly associated with postcholecystectomy physiology, consider other causes of biliary obstruction with additional workup as clinically appropriate.  2.  Hepatomegaly  3.  Small hiatal hernia  4.  Atherosclerosis and atherosclerotic coronary artery disease    CT ABDOMEN (11/26/23)  IMPRESSION:     1.  Intrahepatic and extra hepatic biliary dilation with common bile duct measuring up to 2.2 cm     2.  Previous cholecystectomy     3.  No other  significant finding    MRCP (11/26/23)  IMPRESSION:    1. Dilated CBD and intrahepatic bile ducts.     2. No choledocholithiasis    Pathology  BX on 2/9/24  FINAL DIAGNOSIS:   A. Duodenum tissue:          Duodenal mucosa with no significant diagnostic abnormality.          Morphologic features of celiac disease are not seen.   B. Gastric biopsy:          Gastric antral/ body mucosa with focal erosion and reactive           gastropathy-type changes.          Negative for H. pylori organisms by Warthin-Starry special stain           (with appropriately staining controls).   C. Ampulla, biopsy:          Ampullary mucosa with focal active inflammation, erosion,           gastric foveolar metaplasia, and reactive mucin loss, negative           for dysplasia.     Procedures  EGD/EUS/ERCP 2/9/24 performed by Dr. Mars due to dilated biliary duct and pain.   EGD: esophagus unremarkable. HH 37-39 cm. Gastric antrum erythema with biopsy for H. Pylori. Duodenum: unremarkable.   EUS: celiac axis/pancreas body/tail/pancreatic duct/ampulla/head of pancreas: unremarkable. CBD: dilated and tortuous up to 13.6 mm at mid duct. Distal filling defect concerning for stone/sludge.  ERCP: Ampulla: slightly bulbous, slowly rhythmically invaginating then protruding.   CBD: dilated and tortuous up to 13.6 mm mid duct subtle distal filling defect, several small stones and debris. Sphincterotomy. Biopsy of ampulla    Diagnosis:     1. Dilated cbd, acquired        2. Generalized abdominal pain        3. Type 2 diabetes mellitus with complication, without long-term current use of insulin (HCC)        4. Obesity (BMI 30-39.9)            Medical Decision Making:  Today's Assessment / Status / Plan:     ***    I, Dr Jones, have entered, reviewed and confirmed the above diagnoses related to this patient on this date of service, as per the time and date noted at top of this note.

## 2023-11-28 NOTE — PROGRESS NOTES
The patient went down for CTA, but the procedure couldn't be performed due to a dislodged IV line. The RN attempted to reinsert it twice mid or above AC without success. The charge nurse was informed and asked trained ultrasound guided IV staff for assistance, but we're still waiting for them.

## 2023-11-28 NOTE — CARE PLAN
The patient is Stable - Low risk of patient condition declining or worsening    Shift Goals  Clinical Goals: pain control, CT Scan  Patient Goals: rest and comfort  Family Goals: alda    Progress made toward(s) clinical / shift goals:    Axo4. Able to make needs known. VSS WNL. Due medications given as ordered. Afebrile. Denies pain. On o2 therapy 2lpm NC. Able to ambulate by self to the bathroom. Reminded pt how to use call button if she wants help. Placed call light and personal belongings within reach.      Problem: Pain - Standard  Goal: Alleviation of pain or a reduction in pain to the patient’s comfort goal  Outcome: Progressing     Problem: Knowledge Deficit - Standard  Goal: Patient and family/care givers will demonstrate understanding of plan of care, disease process/condition, diagnostic tests and medications  Outcome: Progressing     Problem: Self Care  Goal: Patient will have the ability to perform ADLs independently or with assistance (bathe, groom, dress, toilet and feed)  Outcome: Progressing

## 2023-11-28 NOTE — CARE PLAN
Problem: Pain - Standard  Goal: Alleviation of pain or a reduction in pain to the patient’s comfort goal  Outcome: Progressing     Problem: Knowledge Deficit - Standard  Goal: Patient and family/care givers will demonstrate understanding of plan of care, disease process/condition, diagnostic tests and medications  Outcome: Progressing     Problem: Knowledge Deficit - COPD  Goal: Patient/significant other demonstrates understanding of disease process, utilization of the Action Plan, medications and discharge instruction  Outcome: Progressing     Problem: Risk for Infection - COPD  Goal: Patient will remain free from signs and symptoms of infection  Outcome: Progressing     Problem: Nutrition - Advanced  Goal: Patient will display progressive weight gain toward goal have adequate food and fluid intake  Outcome: Progressing     Problem: Ineffective Airway Clearance  Goal: Patient will maintain patent airway with clear/clearing breath sounds  Outcome: Progressing     Problem: Impaired Gas Exchange  Goal: Patient will demonstrate improved ventilation and adequate oxygenation and participate in treatment regimen within the level of ability/situation.  Outcome: Progressing     Problem: Risk for Aspiration  Goal: Patient's risk for aspiration will be absent or decrease  Outcome: Progressing     Problem: Self Care  Goal: Patient will have the ability to perform ADLs independently or with assistance (bathe, groom, dress, toilet and feed)  Outcome: Progressing   The patient is Stable - Low risk of patient condition declining or worsening    Shift Goals  Clinical Goals: control pain and maintain o2 sat > 92%  Patient Goals: rest  Family Goals: alda    Progress made toward(s) clinical / shift goals:  Did not report pain or nausea for this shift.    Patient is not progressing towards the following goals:

## 2023-11-28 NOTE — CARE PLAN
Problem: Bronchoconstriction  Goal: Improve in air movement and diminished wheezing  Description: Target End Date:  2 to 3 days    1.  Implement inhaled treatments  2.  Evaluate and manage medication effects  11/28/2023 0403 by Meir Yanez, RRT  Outcome: Progressing  11/28/2023 0402 by Meir Yanez, RRT  Outcome: Progressing     Problem: Bronchopulmonary Hygiene  Goal: Increase mobilization of retained secretions  Description: Target End Date:  2 to 3 days    1.  Perform bronchopulmonary therapy as indicated by assessment  2.  Perform airway suctioning  3.  Perform actions to maintain patient airway  Outcome: Progressing   Duoneb/tatianna QID

## 2023-11-28 NOTE — CARE PLAN
The patient is Stable - Low risk of patient condition declining or worsening    Shift Goals  Clinical Goals: control pain and maintain O2 sat > 92%  Patient Goals: rest  Family Goals: alda    Progress made toward(s) clinical / shift goals:  Received patient in bed alert and oriented x4. Administered scheduled medications. Patient updated on plan of care and verbalized understanding. O2 at 2L via NC. Hourly rounds performed. Fall precautions in place and call light within reach.      Patient is not progressing towards the following goals:

## 2023-11-29 ENCOUNTER — APPOINTMENT (OUTPATIENT)
Dept: RADIOLOGY | Facility: MEDICAL CENTER | Age: 73
DRG: 189 | End: 2023-11-29
Attending: NURSE PRACTITIONER
Payer: MEDICARE

## 2023-11-29 LAB
ALBUMIN SERPL BCP-MCNC: 3.1 G/DL (ref 3.2–4.9)
ALBUMIN/GLOB SERPL: 1.3 G/DL
ALP SERPL-CCNC: 55 U/L (ref 30–99)
ALT SERPL-CCNC: 20 U/L (ref 2–50)
ANION GAP SERPL CALC-SCNC: 10 MMOL/L (ref 7–16)
AST SERPL-CCNC: 13 U/L (ref 12–45)
BILIRUB SERPL-MCNC: 0.3 MG/DL (ref 0.1–1.5)
BUN SERPL-MCNC: 21 MG/DL (ref 8–22)
C DIFF DNA SPEC QL NAA+PROBE: NEGATIVE
C DIFF TOX GENS STL QL NAA+PROBE: NEGATIVE
CALCIUM ALBUM COR SERPL-MCNC: 9 MG/DL (ref 8.5–10.5)
CALCIUM SERPL-MCNC: 8.3 MG/DL (ref 8.5–10.5)
CHLORIDE SERPL-SCNC: 100 MMOL/L (ref 96–112)
CO2 SERPL-SCNC: 29 MMOL/L (ref 20–33)
CREAT SERPL-MCNC: 0.5 MG/DL (ref 0.5–1.4)
ERYTHROCYTE [DISTWIDTH] IN BLOOD BY AUTOMATED COUNT: 42.8 FL (ref 35.9–50)
GFR SERPLBLD CREATININE-BSD FMLA CKD-EPI: 99 ML/MIN/1.73 M 2
GLOBULIN SER CALC-MCNC: 2.3 G/DL (ref 1.9–3.5)
GLUCOSE BLD STRIP.AUTO-MCNC: 113 MG/DL (ref 65–99)
GLUCOSE BLD STRIP.AUTO-MCNC: 129 MG/DL (ref 65–99)
GLUCOSE BLD STRIP.AUTO-MCNC: 214 MG/DL (ref 65–99)
GLUCOSE SERPL-MCNC: 117 MG/DL (ref 65–99)
HCT VFR BLD AUTO: 38.9 % (ref 37–47)
HGB BLD-MCNC: 12.6 G/DL (ref 12–16)
MCH RBC QN AUTO: 27.7 PG (ref 27–33)
MCHC RBC AUTO-ENTMCNC: 32.4 G/DL (ref 32.2–35.5)
MCV RBC AUTO: 85.5 FL (ref 81.4–97.8)
PLATELET # BLD AUTO: 327 K/UL (ref 164–446)
PMV BLD AUTO: 9.8 FL (ref 9–12.9)
POTASSIUM SERPL-SCNC: 2.7 MMOL/L (ref 3.6–5.5)
POTASSIUM SERPL-SCNC: 3.1 MMOL/L (ref 3.6–5.5)
PROT SERPL-MCNC: 5.4 G/DL (ref 6–8.2)
RBC # BLD AUTO: 4.55 M/UL (ref 4.2–5.4)
SODIUM SERPL-SCNC: 139 MMOL/L (ref 135–145)
WBC # BLD AUTO: 12.1 K/UL (ref 4.8–10.8)

## 2023-11-29 PROCEDURE — A9270 NON-COVERED ITEM OR SERVICE: HCPCS | Performed by: STUDENT IN AN ORGANIZED HEALTH CARE EDUCATION/TRAINING PROGRAM

## 2023-11-29 PROCEDURE — 84132 ASSAY OF SERUM POTASSIUM: CPT

## 2023-11-29 PROCEDURE — 87493 C DIFF AMPLIFIED PROBE: CPT

## 2023-11-29 PROCEDURE — 770001 HCHG ROOM/CARE - MED/SURG/GYN PRIV*

## 2023-11-29 PROCEDURE — 99233 SBSQ HOSP IP/OBS HIGH 50: CPT | Performed by: NURSE PRACTITIONER

## 2023-11-29 PROCEDURE — 85027 COMPLETE CBC AUTOMATED: CPT

## 2023-11-29 PROCEDURE — A9270 NON-COVERED ITEM OR SERVICE: HCPCS | Performed by: HOSPITALIST

## 2023-11-29 PROCEDURE — 700102 HCHG RX REV CODE 250 W/ 637 OVERRIDE(OP): Performed by: HOSPITALIST

## 2023-11-29 PROCEDURE — 700117 HCHG RX CONTRAST REV CODE 255: Performed by: NURSE PRACTITIONER

## 2023-11-29 PROCEDURE — 8E0ZXY6 ISOLATION: ICD-10-PCS | Performed by: HOSPITALIST

## 2023-11-29 PROCEDURE — 99233 SBSQ HOSP IP/OBS HIGH 50: CPT | Performed by: HOSPITALIST

## 2023-11-29 PROCEDURE — 700102 HCHG RX REV CODE 250 W/ 637 OVERRIDE(OP): Performed by: STUDENT IN AN ORGANIZED HEALTH CARE EDUCATION/TRAINING PROGRAM

## 2023-11-29 PROCEDURE — 74170 CT ABD WO CNTRST FLWD CNTRST: CPT

## 2023-11-29 PROCEDURE — 99222 1ST HOSP IP/OBS MODERATE 55: CPT | Performed by: SURGERY

## 2023-11-29 PROCEDURE — 700111 HCHG RX REV CODE 636 W/ 250 OVERRIDE (IP): Performed by: HOSPITALIST

## 2023-11-29 PROCEDURE — 82962 GLUCOSE BLOOD TEST: CPT | Mod: 91

## 2023-11-29 PROCEDURE — 80053 COMPREHEN METABOLIC PANEL: CPT

## 2023-11-29 RX ORDER — IPRATROPIUM BROMIDE AND ALBUTEROL SULFATE 2.5; .5 MG/3ML; MG/3ML
3 SOLUTION RESPIRATORY (INHALATION)
Status: DISCONTINUED | OUTPATIENT
Start: 2023-11-29 | End: 2023-11-30 | Stop reason: HOSPADM

## 2023-11-29 RX ORDER — POTASSIUM CHLORIDE 7.45 MG/ML
10 INJECTION INTRAVENOUS
Status: COMPLETED | OUTPATIENT
Start: 2023-11-29 | End: 2023-11-29

## 2023-11-29 RX ORDER — POTASSIUM CHLORIDE 20 MEQ/1
20 TABLET, EXTENDED RELEASE ORAL ONCE
Status: COMPLETED | OUTPATIENT
Start: 2023-11-29 | End: 2023-11-29

## 2023-11-29 RX ORDER — LACTOBACILLUS RHAMNOSUS GG 10B CELL
1 CAPSULE ORAL
Status: DISCONTINUED | OUTPATIENT
Start: 2023-11-30 | End: 2023-11-30 | Stop reason: HOSPADM

## 2023-11-29 RX ADMIN — BUPROPION HYDROCHLORIDE 100 MG: 100 TABLET, FILM COATED ORAL at 17:52

## 2023-11-29 RX ADMIN — POTASSIUM CHLORIDE 10 MEQ: 7.46 INJECTION, SOLUTION INTRAVENOUS at 11:05

## 2023-11-29 RX ADMIN — INSULIN HUMAN 3 UNITS: 100 INJECTION, SOLUTION PARENTERAL at 18:19

## 2023-11-29 RX ADMIN — POTASSIUM CHLORIDE 20 MEQ: 1500 TABLET, EXTENDED RELEASE ORAL at 17:52

## 2023-11-29 RX ADMIN — IOHEXOL 100 ML: 350 INJECTION, SOLUTION INTRAVENOUS at 19:22

## 2023-11-29 RX ADMIN — ACETAMINOPHEN 1000 MG: 500 TABLET ORAL at 21:26

## 2023-11-29 RX ADMIN — INSULIN HUMAN 4 UNITS: 100 INJECTION, SOLUTION PARENTERAL at 12:39

## 2023-11-29 RX ADMIN — ACETAMINOPHEN 1000 MG: 500 TABLET ORAL at 15:01

## 2023-11-29 RX ADMIN — INSULIN HUMAN 4 UNITS: 100 INJECTION, SOLUTION PARENTERAL at 21:42

## 2023-11-29 RX ADMIN — POTASSIUM CHLORIDE 10 MEQ: 7.46 INJECTION, SOLUTION INTRAVENOUS at 08:29

## 2023-11-29 RX ADMIN — METRONIDAZOLE 500 MG: 500 TABLET ORAL at 17:52

## 2023-11-29 RX ADMIN — POTASSIUM CHLORIDE 10 MEQ: 7.46 INJECTION, SOLUTION INTRAVENOUS at 12:17

## 2023-11-29 RX ADMIN — POTASSIUM CHLORIDE 10 MEQ: 7.46 INJECTION, SOLUTION INTRAVENOUS at 09:32

## 2023-11-29 ASSESSMENT — ENCOUNTER SYMPTOMS
FEVER: 0
BLURRED VISION: 0
CONSTIPATION: 0
HEADACHES: 0
SHORTNESS OF BREATH: 0
HEMOPTYSIS: 0
FALLS: 0
HEARTBURN: 0
NAUSEA: 0
BRUISES/BLEEDS EASILY: 0
DIARRHEA: 1
DEPRESSION: 0
WHEEZING: 0
MYALGIAS: 0
CLAUDICATION: 0
SORE THROAT: 0
NERVOUS/ANXIOUS: 0
DIZZINESS: 0
ABDOMINAL PAIN: 0
DIARRHEA: 0
PND: 0
PALPITATIONS: 0
FLANK PAIN: 0
VOMITING: 0
BLOOD IN STOOL: 0
DOUBLE VISION: 0
BACK PAIN: 0
COUGH: 0
CHILLS: 0
WEAKNESS: 0

## 2023-11-29 ASSESSMENT — LIFESTYLE VARIABLES: SUBSTANCE_ABUSE: 0

## 2023-11-29 ASSESSMENT — PAIN DESCRIPTION - PAIN TYPE
TYPE: CHRONIC PAIN
TYPE: ACUTE PAIN

## 2023-11-29 NOTE — CARE PLAN
Problem: Bronchoconstriction  Goal: Improve in air movement and diminished wheezing  Description: Target End Date:  2 to 3 days    1.  Implement inhaled treatments  2.  Evaluate and manage medication effects  Outcome: Progressing     Duoneb Q4 hours    Problem: Bronchopulmonary Hygiene  Goal: Increase mobilization of retained secretions  Description: Target End Date:  2 to 3 days    1.  Perform bronchopulmonary therapy as indicated by assessment  2.  Perform airway suctioning  3.  Perform actions to maintain patient airway  Outcome: Progressing    Flutter QID

## 2023-11-29 NOTE — CARE PLAN
The patient is Stable - Low risk of patient condition declining or worsening    Shift Goals  Clinical Goals: potassium to normal range, Abx therapy  Patient Goals: rest, comfort and free ffrom injury  Family Goals: alda    Progress made toward(s) clinical / shift goals:    Axo4. Able to make needs known. VSS WNL. Afebrile. Denies pain. Due medications given as ordered. Able to ambulate by self. On room air with saturation 93%.     Problem: Pain - Standard  Goal: Alleviation of pain or a reduction in pain to the patient’s comfort goal  Outcome: Progressing     Problem: Knowledge Deficit - Standard  Goal: Patient and family/care givers will demonstrate understanding of plan of care, disease process/condition, diagnostic tests and medications  Outcome: Progressing

## 2023-11-29 NOTE — DOCUMENTATION QUERY
"                                                                         [facility]                                                                       Query Response Note      PATIENT:               MORRIS CRAIN  ACCT #:                  [id number]  MRN:                     [id number]  :                      1950  ADMIT DATE:       2023 11:20 AM  DISCH DATE:          RESPONDING  PROVIDER #:        [id number]           QUERY TEXT:    Early pneumonia is documented as \"possible\" and \"COPD exacerbation versus atypical pneumonia\" is documented in the H&P and Progress Notes. Patient is being treated with antibiotics.     Please clarify status of pneumonia after study.       The patient's Clinical Indicators include:   -  H&P & PN's:   -- Leukocytosis - Possibly secondary to early pneumonia. IV antibiotics for now.    -- Acute hypoxemic respiratory failure - worsening dyspnea & cough productive of sputum. Requiring 2 L to maintain saturation, secondary to COPD exacerbation versus atypical pneumonia.     CT: The lung bases are clear.; Procalcitonin: <0.05   CT: Trace BILATERAL pleural effusions with overlying atelectasis.   -  WBC: 18.1 -> 14.7 -> 15.5 -> 12.1    Treatment: Unasyn --> Rocephin; Doxycycline; Flagyl; Duoneb; Deltasone; O2; lab/imaging  Risk Factors: COPD exacerbation; smoking; DM 2    Thank You,  Cherry Vera RN  Senior Clinical    [email or web address]  Connect via Voalte Messenger  Options provided:   -- Pneumonia is ruled in   -- Pneumonia is ruled out   -- Other explanation, (please specify other explanation)   -- Unable to determine      Query created by: Cherry Vera on 2023 7:30 AM    RESPONSE TEXT:    Unable to determine          Electronically signed by:  LUCIAN ACUNA MD 2023 8:10 AM              "

## 2023-11-29 NOTE — PROGRESS NOTES
[facility] Medicine Daily Progress Note    Date of Service  11/28/2023    Chief Complaint  Jigna Allen is a 73 y.o. female admitted 11/26/2023 with   Chief Complaint   Patient presents with    Abdominal Pain    N/V    Fever     At home         Hospital Course  No notes on file    73-year-old female with a past medical history of hypertension, anxiety, CAD, type 2 diabetes and COPD who presents emergency department on 11/26/2023 with a 6-day history of worsening abdominal pain, dyspnea, cough, nausea and vomiting.  Patient reports that she has been having lower abdominal pain which worsened with drinking water.  She associates symptoms with fever, chills, nausea along with multiple bouts of nonbloody emesis.  She has been having worsening cough along with greenish sputum.  No loss of consciousness.     At the emergency department, vital signs with hypertension with SBP in the 200s.  Patient requiring 2 L maintain saturation (on room air at baseline).  CBC with leukocytosis at 18 and elevated polys.  Chemistry with hypokalemia.  Lactic acid low.  Chest x-ray with no acute cardiopulmonary process per my read.  Chest CTA consistent with emphysema.  Abdominal/pelvic CT with contrast showing Intrahepatic and extra hepatic biliary dilation with common bile duct measuring up to 2.2 cm and history of cholecystectomy.  Patient was admitted for acute hypoxic respiratory failure secondary to COPD exacerbation and possible atypical pneumonia.    Interval Problem Update   Patient was seen and examined at bedside.  I have personally reviewed and interpreted vitals, labs, and imaging.    11/27.  Afebrile.  Tachycardia is improved.  Hypertension is improved.  On 1-2 L nasal cannula.  Potassium 2.9.  Replete  Reports some abdominal pain and constipation. Denies fevers, chills, chest pain, shortness of breath.  Reviewed CT and MRCP with patient.  Discussed with GI and surgical oncology.  Plan for CT angiogram of the abdomen and  pelvis.  Will need outpatient EUS and ERCP.  Aggressive bowel regimen.        11/28 patient is new to me today, patient is in bed, c/o abdominal pain, mid abdomen, no radiated to back, on CLD, she is low oral intake she said she does not like the food, tolerating liquids. CTA abdomen results reviewed, discussed with GI team, I have asked vascular surgery for recommendation Dr Ríos will review imaging.     I have discussed this patient's plan of care and discharge plan at IDT rounds today with Case Management, Nursing, Nursing leadership, and other members of the IDT team.    Consultants/Specialty  GI, pulmonary, and vascular surgery  Surgery.     Code Status  Full Code    Disposition  The patient is not medically cleared for discharge to home or a post-acute facility.      I have placed the appropriate orders for post-discharge needs.    Review of Systems  Review of Systems   Constitutional:  Negative for chills and fever.   Eyes:  Negative for blurred vision and double vision.   Respiratory:  Negative for cough, hemoptysis and wheezing.    Cardiovascular:  Negative for chest pain, palpitations, claudication, leg swelling and PND.   Gastrointestinal:  Positive for abdominal pain, constipation, nausea and vomiting. Negative for heartburn.   Genitourinary:  Negative for hematuria and urgency.   Musculoskeletal:  Negative for back pain and myalgias.   Skin:  Negative for rash.   Neurological:  Negative for dizziness and headaches.   Endo/Heme/Allergies:  Does not bruise/bleed easily.   Psychiatric/Behavioral:  Negative for depression.         Physical Exam  Temp:  [36.1 °C (97 °F)-36.6 °C (97.8 °F)] 36.2 °C (97.2 °F)  Pulse:  [78-97] 78  Resp:  [16-18] 18  BP: (151-171)/(75-90) 152/78  SpO2:  [95 %-99 %] 98 %    Physical Exam  Vitals and nursing note reviewed.   Constitutional:       Appearance: Normal appearance. She is ill-appearing.   HENT:      Head: Normocephalic and atraumatic.      Right Ear: External ear  normal.      Left Ear: External ear normal.      Nose: Nose normal.      Mouth/Throat:      Mouth: Mucous membranes are moist.      Pharynx: Oropharynx is clear. No oropharyngeal exudate or posterior oropharyngeal erythema.   Eyes:      General: No scleral icterus.        Right eye: No discharge.         Left eye: No discharge.   Cardiovascular:      Rate and Rhythm: Normal rate and regular rhythm.      Pulses: Normal pulses.      Heart sounds: Normal heart sounds. No murmur heard.  Pulmonary:      Effort: Pulmonary effort is normal. No respiratory distress.      Breath sounds: Normal breath sounds. No stridor. No wheezing or rales.   Abdominal:      General: Abdomen is flat. Bowel sounds are normal. There is no distension.      Palpations: Abdomen is soft. There is no mass.      Tenderness: There is abdominal tenderness.   Musculoskeletal:      Cervical back: Normal range of motion.   Skin:     General: Skin is warm.      Capillary Refill: Capillary refill takes less than 2 seconds.   Neurological:      General: No focal deficit present.      Mental Status: She is alert and oriented to person, place, and time. Mental status is at baseline.      Cranial Nerves: No cranial nerve deficit.   Psychiatric:         Mood and Affect: Mood normal.         Behavior: Behavior normal.         Fluids    Intake/Output Summary (Last 24 hours) at 11/28/2023 1725  Last data filed at 11/28/2023 1431  Gross per 24 hour   Intake 838 ml   Output --   Net 838 ml         Laboratory  Recent Labs     11/26/23  1133 11/27/23  0648 11/28/23  0653   WBC 18.1* 14.7* 15.5*   RBC 5.03 5.02 5.05   HEMOGLOBIN 14.1 14.0 14.1   HEMATOCRIT 42.6 42.1 43.4   MCV 84.7 83.9 85.9   MCH 28.0 27.9 27.9   MCHC 33.1 33.3 32.5   RDW 42.3 42.2 43.3   PLATELETCT 418 384 386   MPV 9.6 9.4 10.0       Recent Labs     11/26/23  1133 11/27/23  0648 11/28/23  0653   SODIUM 137 135 139   POTASSIUM 3.3* 2.9* 3.1*   CHLORIDE 95* 95* 98   CO2 28 29 32   GLUCOSE 191* 200*  208*   BUN 32* 26* 26*   CREATININE 0.58 0.47* 0.60   CALCIUM 9.8 9.3 8.9                     Imaging  CTA ABDOMEN PELVIS W & W/O POST PROCESS   Final Result      1.  Atherosclerosis   2.  Mild aneurysmal dilatation of the infrarenal abdominal aorta maximum diameter 2.1 cm   3.  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  Estimated greater than 75% stenosis of the proximal superior mesenteric artery with some degree of stenosis extending through its first 3 to 4 cm.   5.  Inferior mesenteric artery not well evaluated due to small caliber. It appears to be patent.   6.  Prior cholecystectomy   7.  Trace BILATERAL pleural effusions      [id number]   Final Result         1. Dilated CBD and intrahepatic bile ducts.      2. No choledocholithiasis.      CT-ABDOMEN-PELVIS WITH   Final Result      1.  Intrahepatic and extra hepatic biliary dilation with common bile duct measuring up to 2.2 cm      2.  Previous cholecystectomy      3.  No other significant finding      CT-CTA CHEST PULMONARY ARTERY W/ RECONS   Final Result      1.  No evidence of pulmonary embolus.      2.  Emphysema.                 Assessment/Plan  * Acute hypoxemic respiratory failure (HCC)- (present on admission)  Assessment & Plan    Presenting with worsening dyspnea and cough productive of sputum  Requiring 2 L to maintain saturation  Secondary to COPD exacerbation versus atypical pneumonia  RT/O2  COPD management  Titrate O2    Continue weaning off oxygen    Dilated cbd, acquired  Assessment & Plan    Likely cause of abdominal pain, constipation, nausea, vomiting.  LFTs normal    GI and surgical oncology following.  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  I have discussed with vascular surgery who will review imaging.    Leukocytosis- (present on  admission)  Assessment & Plan    Possibly secondary to early pneumonia  IV antibiotics for now  Labs on a.m.    Acute exacerbation of chronic obstructive pulmonary disease (COPD) (HCC)- (present on admission)  Assessment & Plan    Increased sputum production, hypoxia and cough frequency  Wheezing auscultated  Admit to medicine  RT/O2  DuoNebs  Prednisone  Doxycycline  I-S  Labs on a.m.    Continue weaning off oxygen    Generalized abdominal pain- (present on admission)  Assessment & Plan    Abdominal/pelvic CT showing CBD dilation at 21 mm  Clear liquid diet for now  Continue IV antibiotics  MRCP no acute finding.  No intervention as per GI.     Essential hypertension- (present on admission)  Assessment & Plan    Continue home meds  As needed antihypertensives    Type 2 diabetes mellitus with complication, without long-term current use of insulin (HCC)- (present on admission)  Assessment & Plan    Diabetic diet  Insulin sliding scale  Frequent Accu-Cheks    CAD (coronary artery disease)- (present on admission)  Assessment & Plan    Continue aspirin    Anxiety- (present on admission)  Assessment & Plan    Continue home Wellbutrin         VTE prophylaxis: Lovenox    I have performed a physical exam and reviewed and updated ROS and Plan today (11/28/2023). In review of yesterday's note (11/27/2023), there are no changes except as documented above.    Greater than 53 minutes spent prepping to see patient (e.g. reviewing  tests/imaging results, notes from consultants, bedside nurse, night shift ) obtaining and/or reviewing separately obtained history. Performing a medically appropriate examination and evaluation.  Counseling and educating the patient.  Ordering medications, tests, or procedures.  Referring and communicating with other health care professionals.  Documenting clinical information in EPIC.  Independently interpreting results and communicating results to patient.  Care coordination.

## 2023-11-29 NOTE — CARE PLAN
The patient is Stable - Low risk of patient condition declining or worsening    Shift Goals  Clinical Goals: IV abx, pain control  Patient Goals: rest  Family Goals: alda    Progress made toward(s) clinical / shift goals:    Problem: Pain - Standard  Goal: Alleviation of pain or a reduction in pain to the patient’s comfort goal  Outcome: Progressing     Problem: Knowledge Deficit - Standard  Goal: Patient and family/care givers will demonstrate understanding of plan of care, disease process/condition, diagnostic tests and medications  Outcome: Progressing  Note: Pt educated on plan of care, pt verbalizes understanding and agrees to comply.        Patient is not progressing towards the following goals:

## 2023-11-29 NOTE — CONSULTS
Vascular Surgery          New Patient Consultation    Patient:Jigna Allen  MRN:[id number]    Date: 11/29/2023    Referring Provider: Lalit Alcantar M.d.    Consulting Physician: Clint Ríos MD  _____________________________________________________    Reason for consultation:  Evaluate patient for possible vascular cause of abdominal pain    HPI:  This is a 73 y.o. female who was admitted 3 days ago for worsening abdominal pain dyspnea cough nausea vomiting and diarrhea.  Patient has a history of hypertension anxiety coronary artery disease diabetes and COPD.  Patient reports approximately 5 to 6-day history of worsening abdominal pain associated with diarrhea.  Prior to this episode of abdominal pain the patient denies any postprandial symptoms.  Patient also denies any postprandial symptoms over the past 6 days is just been constant although this morning she reports that she is pain-free.  During the course of her hospitalization a CTA was performed which demonstrated atherosclerotic occlusive disease of the perivisceral aorta and superior mesenteric artery as well as a moderate stenosis of her celiac artery.  The vessel was patent with a moderate to severe stenosis within the superior mesenteric artery.  The patient denies any weight loss.  Patient is overweight.    Past Medical History:   Diagnosis Date    Abnormal screening cardiac CT 2/14/2020    Anxiety     Carotid arterial disease (HCC)     COPD (chronic obstructive pulmonary disease) (HCC)     Diabetes (HCC)     HTN (hypertension)     Hyperlipidemia     Tobacco use        No past surgical history on file.    Current Facility-Administered Medications   Medication Dose Route Frequency Provider Last Rate Last Admin    ipratropium-albuterol (DUONEB) nebulizer solution  3 mL Nebulization Q2HRS PRN (RT) Lalit Alcantar M.D.        potassium chloride (Kcl) ivpb 10 mEq  10 mEq Intravenous Q HOUR Lalit Alcantar M.D.         metroNIDAZOLE (Flagyl) tablet 500 mg  500 mg Oral Q12HRS Lalit Baird M.D.   500 mg at 11/28/23 1706    insulin regular (HumuLIN R,NovoLIN R) injection  3-14 Units Subcutaneous 4X/DAY ACHS Jaskaran Reyes D.O.   3 Units at 11/28/23 1712    And    dextrose 10 % BOLUS 25 g  25 g Intravenous Q15 MIN PRN Jaskaran Reyes D.O.        NS infusion   Intravenous Continuous Lalit Baird M.D. 75 mL/hr at 11/28/23 1814 Rate Change at 11/28/23 1814    senna-docusate (Pericolace Or Senokot S) 8.6-50 MG per tablet 2 Tablet  2 Tablet Oral BID Jaskaran Reyes D.O.   2 Tablet at 11/28/23 1706    And    polyethylene glycol/lytes (Miralax) PACKET 1 Packet  1 Packet Oral DAILY Jaskaran Reyes D.O.   1 Packet at 11/28/23 0457    And    magnesium hydroxide (Milk Of Magnesia) suspension 30 mL  30 mL Oral QDAY PRN Jaskaran Reyes D.O.   30 mL at 11/28/23 1706    And    bisacodyl (Dulcolax) suppository 10 mg  10 mg Rectal QDAY PRN Jaskaran Reyes D.O.        fentaNYL (Sublimaze) injection 50 mcg  50 mcg Intravenous Q HOUR PRN Tony Rasmussen M.D.   50 mcg at 11/27/23 1841    doxycycline monohydrate (Adoxa) tablet 100 mg  100 mg Oral Q12HRS Shashi Alonzo M.D.   100 mg at 11/28/23 1706    Respiratory Therapy Consult   Nebulization Continuous RT Shashi Alonzo M.D.        acetaminophen (Tylenol) tablet 1,000 mg  1,000 mg Oral TID Shashi Alonzo M.D.   1,000 mg at 11/28/23 2027    hydrALAZINE (Apresoline) injection 10 mg  10 mg Intravenous Q4HRS PRN Shashi Alonzo M.D.   10 mg at 11/27/23 0400    ondansetron (Zofran) syringe/vial injection 4 mg  4 mg Intravenous Q4HRS PRN Shashi Alonzo M.D.   4 mg at 11/26/23 1506    ondansetron (Zofran ODT) dispertab 4 mg  4 mg Oral Q4HRS PRN Shashi Alonzo M.D.   4 mg at 11/28/23 0534    guaiFENesin dextromethorphan (Robitussin DM) 100-10 MG/5ML syrup 10 mL  10 mL Oral Q6HRS PRN Shashi Alonzo M.D.         enoxaparin (Lovenox) inj 40 mg  40 mg Subcutaneous DAILY AT 1800 Shashi Alonzo M.D.   40 mg at 11/27/23 1821    aspirin EC tablet 81 mg  81 mg Oral DAILY Shashi Alonzo M.D.   81 mg at 11/28/23 0456    buPROPion (Wellbutrin) tablet 100 mg  100 mg Oral BID Shashi Alonzo M.D.   100 mg at 11/28/23 1706    famotidine (Pepcid) tablet 20 mg  20 mg Oral DAILY Shashi Alonzo M.D.   20 mg at 11/28/23 0457    fluticasone-umeclidinium-vilanterol (Trelegy Ellipta) 100-62.5-25 mcg/act inhaler 1 Puff  1 Puff Inhalation QDAILY (RT) Shashi Alonzo M.D.   1 Puff at 11/28/23 0649    omeprazole (PriLOSEC) capsule 20 mg  20 mg Oral DAILY Shashi Alonzo M.D.   20 mg at 11/28/23 0457    losartan (Cozaar) tablet 100 mg  100 mg Oral Q DAY Shashi Alonzo M.D.   100 mg at 11/28/23 0501    And    hydroCHLOROthiazide tablet 12.5 mg  12.5 mg Oral Q DAY Shashi Alonzo M.D.   12.5 mg at 11/28/23 0501    cefTRIAXone (Rocephin) syringe 2,000 mg  2,000 mg Intravenous Q24HRS Shashi Alonzo M.D.   2,000 mg at 11/28/23 2027       Social History     Socioeconomic History    Marital status:      Spouse name: Not on file    Number of children: Not on file    Years of education: Not on file    Highest education level: Associate degree: occupational, technical, or vocational program   Occupational History    Not on file   Tobacco Use    Smoking status: Every Day     Current packs/day: 0.50     Types: Cigarettes    Smokeless tobacco: Never   Vaping Use    Vaping Use: Never used   Substance and Sexual Activity    Alcohol use: No    Drug use: Not Currently     Types: Marijuana     Comment: CBD  occ    Sexual activity: Not Currently     Partners: Male     Comment:    Other Topics Concern    Not on file   Social History Narrative    Not on file     Social Determinants of Health     Financial Resource Strain: Low Risk  (3/21/2022)    Overall  Financial Resource Strain (CARDIA)     Difficulty of Paying Living Expenses: Not very hard   Food Insecurity: No Food Insecurity (3/21/2022)    Hunger Vital Sign     Worried About Running Out of Food in the Last Year: Never true     Ran Out of Food in the Last Year: Never true   Transportation Needs: No Transportation Needs (3/21/2022)    PRAPARE - Transportation     Lack of Transportation (Medical): No     Lack of Transportation (Non-Medical): No   Physical Activity: Unknown (3/21/2022)    Exercise Vital Sign     Days of Exercise per Week: 0 days     Minutes of Exercise per Session: Not on file   Stress: Stress Concern Present (3/21/2022)    [employer] [place] [employer] - Occupational Stress Questionnaire     Feeling of Stress : Very much   Social Connections: Moderately Isolated (3/21/2022)    Social Connection and Isolation Panel [NHANES]     Frequency of Communication with Friends and Family: More than three times a week     Frequency of Social Gatherings with Friends and Family: Once a week     Attends Rastafari Services: Never     Active Member of Clubs or Organizations: No     Attends Club or Organization Meetings: Never     Marital Status:    Intimate Partner Violence: Not on file   Housing Stability: Low Risk  (3/21/2022)    Housing Stability Vital Sign     Unable to Pay for Housing in the Last Year: No     Number of Places Lived in the Last Year: 1     Unstable Housing in the Last Year: No       Family History   Problem Relation Age of Onset    Cancer Mother 47        breast       Allergies:  Sulfa drugs, Ezetimibe, and Hmg-coa-r inhibitors    Review of Systems:    Constitutional: Patient reported associated symptoms of chills upon admission  HENT:   Negative for hearing loss or tinnitus    Eyes:    Negative for blurred vision or loss of vision  Respiratory:  Patient reported a cough on admission  Cardiac:  Negative for chest pain or palpitations    Gastrointestinal: Patient reports  "nausea vomiting and diarrhea upon admission but reports that she currently has had no emesis or diarrhea   in the last day or so.    Genitourinary: Negative for dysuria or hematuria   Musculoskeletal: Negative for myalgias or acute joint pain  Skin:   Negative for itching or rash  Neurological:  Negative for dizziness or headaches     Negative for speech disturbance     Negative for extremity weakness or paresthesias  Endo/Heme:  Negative for easy bruising or bleeding    Physical Exam:  /41   Pulse 74   Temp 36.1 °C (96.9 °F) (Temporal)   Resp 18   Ht 1.6 m (5' 3\")   Wt 76.6 kg (168 lb 14 oz)   SpO2 98%   BMI 29.91 kg/m²     Constitutional: Alert, oriented, no acute distress  HEENT:  Normocephalic and atraumatic, EOMI  Neck:   Supple, no JVD,   Cardiovascular: Regular rate and rhythm,   Pulmonary:  Good air entry bilaterally,    Abdominal:  Soft, non-tender, non-distended         Musculoskeletal: No tenderness, no deformity  Neurological:  CN II-XII grossly intact, no focal deficits  Skin:   Skin is warm and dry. No rash noted.  Vascular exam: Lower extremities well perfused       Labs:  Recent Labs     11/27/23  0648 11/28/23  0653 11/29/23  0559   WBC 14.7* 15.5* 12.1*   RBC 5.02 5.05 4.55   HEMOGLOBIN 14.0 14.1 12.6   HEMATOCRIT 42.1 43.4 38.9   MCV 83.9 85.9 85.5   MCH 27.9 27.9 27.7   MCHC 33.3 32.5 32.4   RDW 42.2 43.3 42.8   PLATELETCT 384 386 327   MPV 9.4 10.0 9.8     Recent Labs     11/27/23  0648 11/28/23  0653 11/29/23  0559   SODIUM 135 139 139   POTASSIUM 2.9* 3.1* 2.7*   CHLORIDE 95* 98 100   CO2 29 32 29   GLUCOSE 200* 208* 117*   BUN 26* 26* 21   CREATININE 0.47* 0.60 0.50   CALCIUM 9.3 8.9 8.3*         Recent Labs     11/27/23  0648 11/28/23  0653 11/29/23  0559   ASTSGOT 23 13 13   ALTSGPT 32 26 20   TBILIRUBIN 0.3 0.4 0.3   ALKPHOSPHAT 68 62 55   GLOBULIN 2.7 2.6 2.3         Radiology:  CTA abdomen 11/28/2023  IMPRESSION:     1.  Atherosclerosis  2.  Mild aneurysmal dilatation of the " infrarenal abdominal aorta maximum diameter 2.1 cm  3.  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  Estimated greater than 75% stenosis of the proximal superior mesenteric artery with some degree of stenosis extending through its first 3 to 4 cm.  5.  Inferior mesenteric artery not well evaluated due to small caliber. It appears to be patent.  6.  Prior cholecystectomy  7.  Trace BILATERAL pleural effusions      Assessment/Plan:   -Although the patient does have atherosclerotic occlusive disease noted within the proximal superior mesenteric artery and perivisceral aorta the symptoms are not consistent with chronic mesenteric ischemia.  The patient this morning is asymptomatic.  She denies previous history of postprandial abdominal pain.  This episode was associated with chills and diarrhea emesis and is more consistent with that of a gastrointestinal illness as opposed to mesenteric ischemia.  The patient is also had no interval weight loss and is overweight.  She reports that her weight has been very stable.    With this taken into consideration the patient will be followed conservatively with respect to her atherosclerotic occlusive disease of her visceral vessels.  Patient will follow-up with vascular surgery and will be monitored for any symptoms.    Recommend advancing the patient's diet  Vascular surgery will follow along while inpatient        Clint Ríos MD  [facility]   [place] preferred or call my office [phone]  __________________________________________________________________  Patient:Jigna Allen   MRN:[id number]   CSN:[id number]

## 2023-11-29 NOTE — PROGRESS NOTES
Gastroenterology Progress Note               Author:  OMAYRA Bob Date & Time Created: 11/29/2023 3:22 PM       Patient ID:  Name:             Jigna Allen  YOB: 1950  Age:                 73 y.o.  female  MRN:               [id number]        Medical Decision Making, by Problem:  Active Hospital Problems    Diagnosis     Dilated cbd, acquired [K83.8]     Acute hypoxemic respiratory failure (HCC) [J96.01]     Acute exacerbation of chronic obstructive pulmonary disease (COPD) (HCC) [J44.1]     Leukocytosis [D72.829]     Generalized abdominal pain [R10.84]     Essential hypertension [I10]     Anxiety [F41.9]     Type 2 diabetes mellitus with complication, without long-term current use of insulin (HCC) [E11.8]     CAD (coronary artery disease) [I25.10]            Presenting Chief Complaint:  Abdominal pain        History of Present Illness:    This is a very pleasant 73 y.o. female with a 1 year history of mid abdominal pain, nonradiating, gnawing in nature and typically occurs in the middle of the night about 3:30.  She cannot tell me how long the pain lasts but she is able to fall back asleep.  She cannot identify a trigger although at 1 point she thought eating at 8:30 pm may have been related.  She does not know of any alleviating factors other than vomiting after pain starts and she will feel better.  Stated most recently she had dry heaves.  She does not take any medications for this.  She denies melena and hematochezia.  She takes daily ASA and occasional Advil.  She has not had a previous EGD or colonoscopy.  She denies change in bowel habits but occasionally has constipation.       History is notable for her being a smoker, CAD with history of MI and stent, TIA this year and type II DM.  The patient is not a good historian.     Chart review:  WBC 14.7, K 2.9, normal liver tests  11/26:  CT abd/pelvis with:  IHD and EHD with CBD 2.2cm, previous luz maria  11/26:  MRCP:  CBD 1.5cm with  IHD, no CBD stone     April 2022 [facility] ER:  returns to ED c/o epigastric pain 10/10 and intractable vomiting.  CTA abd/pelvis:  moderate proximal SMA stenosis.  She saw Vascular Surgery who did not feel any intervention warranted.     2021 Cologard negative      Interval History:  11/28/2023: Patient seen at bedside.  Reports abdominal pain that is constant, not resolving overnight.  Overnight, patient lost PIV access.  Pending CT scan is scheduled at 1600 today.  Tolerating clear liquid diet.  Denies bowel movement.    11/29/2023: Patient seen at bedside.  She reports that she is pain-free, denies nausea, vomiting, diarrhea.  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  Vascular consulted and recommended conservative management.  Upon reviewing imaging, patient with dilated common bile duct-14 mm in May 2023, 22 mm on current CT.  Patient reports she has appointment with GI consultants this upcoming Monday/Tuesday.  Updated Dr. Preston (who patient has seen in the past) regarding common bile duct dilation.  CT pancreas ordered per request.        Hospital Medications:  Current Facility-Administered Medications   Medication Dose Frequency Provider Last Rate Last Admin    ipratropium-albuterol (DUONEB) nebulizer solution  3 mL Q2HRS PRN (RT) Lalit Baird M.D.        [START ON 11/30/2023] lactobacillus rhamnosus (Culturelle) capsule 1 Capsule  1 Capsule QDAY with Breakfast Lalit Baird M.D.        Pharmacy Consult Request - C. difficile consult  1 Each PHARMACY TO DOSE Lalit Baird M.D.        metroNIDAZOLE (Flagyl) tablet 500 mg  500 mg Q12HRS Lalit Baird M.D.   500 mg at 11/28/23 1706    insulin regular (HumuLIN R,NovoLIN R) injection  3-14 Units 4X/DAY ACHS ZAFAR YiORick   4 Units at 11/29/23 1239    And    dextrose 10 % BOLUS 25 g  25 g Q15 MIN PRN Jaskaran Reyes D.O.        Respiratory Therapy Consult   Continuous RT  Shashi Alonzo M.D.        acetaminophen (Tylenol) tablet 1,000 mg  1,000 mg TID Shashi Alonzo M.D.   1,000 mg at 11/29/23 1501    hydrALAZINE (Apresoline) injection 10 mg  10 mg Q4HRS PRN Shashi Alonzo M.D.   10 mg at 11/27/23 0400    ondansetron (Zofran) syringe/vial injection 4 mg  4 mg Q4HRS PRN Shashi Alonzo M.D.   4 mg at 11/26/23 1506    ondansetron (Zofran ODT) dispertab 4 mg  4 mg Q4HRS PRN Shashi Alonzo M.D.   4 mg at 11/28/23 0534    guaiFENesin dextromethorphan (Robitussin DM) 100-10 MG/5ML syrup 10 mL  10 mL Q6HRS PRN Shashi Alonzo M.D.        enoxaparin (Lovenox) inj 40 mg  40 mg DAILY AT 1800 Shashi Alonzo M.D.   40 mg at 11/27/23 1821    aspirin EC tablet 81 mg  81 mg DAILY Shashi Alonzo M.D.   81 mg at 11/28/23 0456    buPROPion (Wellbutrin) tablet 100 mg  100 mg BID Shashi Alonzo M.D.   100 mg at 11/28/23 1706    famotidine (Pepcid) tablet 20 mg  20 mg DAILY Shashi Alonzo M.D.   20 mg at 11/28/23 0457    fluticasone-umeclidinium-vilanterol (Trelegy Ellipta) 100-62.5-25 mcg/act inhaler 1 Puff  1 Puff QDAILY (RT) Shashi Alonzo M.D.   1 Puff at 11/28/23 0649    omeprazole (PriLOSEC) capsule 20 mg  20 mg DAILY Shashi Alonzo M.D.   20 mg at 11/28/23 0457    losartan (Cozaar) tablet 100 mg  100 mg Q DAY Shashi Alonzo M.D.   100 mg at 11/28/23 0501    And    hydroCHLOROthiazide tablet 12.5 mg  12.5 mg Q DAY Shashi Alonzo M.D.   12.5 mg at 11/28/23 0501   Last reviewed on 11/26/2023  2:08 PM by Alexia Corenlius, T       Review of Systems:  Review of Systems   Constitutional:  Negative for chills, fever and malaise/fatigue.   HENT:  Negative for congestion and sore throat.    Respiratory:  Negative for cough and shortness of breath.    Cardiovascular:  Negative for chest pain and leg swelling.   Gastrointestinal:  Negative for abdominal  "pain, blood in stool, constipation, diarrhea, melena, nausea and vomiting.   Genitourinary:  Negative for dysuria and flank pain.   Musculoskeletal:  Negative for falls and myalgias.   Neurological:  Negative for weakness and headaches.   Psychiatric/Behavioral:  Negative for substance abuse. The patient is not nervous/anxious.    All other systems reviewed and are negative.        Vital signs:  Weight/BMI: Body mass index is 29.91 kg/m².  /41   Pulse 74   Temp 36.1 °C (96.9 °F) (Temporal)   Resp 18   Ht 1.6 m (5' 3\")   Wt 76.6 kg (168 lb 14 oz)   SpO2 98%   Vitals:    11/28/23 2012 11/28/23 2055 11/29/23 0420 11/29/23 0705   BP:  114/62 119/59 121/41   Pulse: 75 74 65 74   Resp: 16 16 16 18   Temp:  36.6 °C (97.9 °F) 36.6 °C (97.8 °F) 36.1 °C (96.9 °F)   TempSrc:  Temporal Temporal Temporal   SpO2: 95% 97% 97% 98%   Weight:       Height:         Oxygen Therapy:  Pulse Oximetry: 98 %, O2 (LPM): 2, O2 Delivery Device: Silicone Nasal Cannula    Intake/Output Summary (Last 24 hours) at 11/29/2023 1522  Last data filed at 11/29/2023 1000  Gross per 24 hour   Intake 240 ml   Output --   Net 240 ml           Physical Exam:  Physical Exam  Vitals and nursing note reviewed.   Constitutional:       General: She is awake.      Appearance: She is overweight. She is not ill-appearing or toxic-appearing.   HENT:      Head: Normocephalic and atraumatic.      Nose: Nose normal.      Mouth/Throat:      Mouth: Mucous membranes are moist.      Pharynx: Oropharynx is clear. No oropharyngeal exudate.   Eyes:      General: No scleral icterus.     Extraocular Movements: Extraocular movements intact.      Conjunctiva/sclera: Conjunctivae normal.   Cardiovascular:      Rate and Rhythm: Normal rate and regular rhythm.      Pulses: Normal pulses.      Heart sounds: Normal heart sounds. No murmur heard.  Pulmonary:      Effort: Pulmonary effort is normal. No respiratory distress.      Breath sounds: Normal breath sounds. No " wheezing.   Abdominal:      General: Abdomen is flat. Bowel sounds are normal. There is no distension.      Palpations: Abdomen is soft.      Tenderness: There is abdominal tenderness (Epigastric tenderness to palpation).   Musculoskeletal:      Right lower leg: No edema.      Left lower leg: No edema.   Skin:     General: Skin is warm and dry.      Capillary Refill: Capillary refill takes less than 2 seconds.      Coloration: Skin is not jaundiced.   Neurological:      General: No focal deficit present.      Mental Status: She is alert and oriented to person, place, and time. Mental status is at baseline.      Motor: No weakness.   Psychiatric:         Mood and Affect: Mood normal.         Behavior: Behavior normal. Behavior is cooperative.         Thought Content: Thought content normal.         Judgment: Judgment normal.             Labs:  Recent Labs     11/27/23 0648 11/28/23  0653 11/29/23  0559 11/29/23  1203   SODIUM 135 139 139  --    POTASSIUM 2.9* 3.1* 2.7* 3.1*   CHLORIDE 95* 98 100  --    CO2 29 32 29  --    BUN 26* 26* 21  --    CREATININE 0.47* 0.60 0.50  --    MAGNESIUM  --  2.0  --   --    PHOSPHORUS  --  2.0*  --   --    CALCIUM 9.3 8.9 8.3*  --        Recent Labs     11/27/23 0648 11/28/23  0653 11/29/23  0559   ALTSGPT 32 26 20   ASTSGOT 23 13 13   ALKPHOSPHAT 68 62 55   TBILIRUBIN 0.3 0.4 0.3   LIPASE  --  22  --    GLUCOSE 200* 208* 117*       Recent Labs     11/27/23 0648 11/28/23  0653 11/29/23  0559   WBC 14.7* 15.5* 12.1*   NEUTSPOLYS 75.00* 71.80  --    LYMPHOCYTES 15.30* 17.90*  --    MONOCYTES 9.20 9.50  --    EOSINOPHILS 0.00 0.10  --    BASOPHILS 0.20 0.20  --    ASTSGOT 23 13 13   ALTSGPT 32 26 20   ALKPHOSPHAT 68 62 55   TBILIRUBIN 0.3 0.4 0.3       Recent Labs     11/27/23 0648 11/28/23  0653 11/29/23  0559   RBC 5.02 5.05 4.55   HEMOGLOBIN 14.0 14.1 12.6   HEMATOCRIT 42.1 43.4 38.9   PLATELETCT 384 688 484       Recent Results (from the past 24 hour(s))   POCT glucose device  results    Collection Time: 11/28/23  5:10 PM   Result Value Ref Range    POC Glucose, Blood 195 (H) 65 - 99 mg/dL   POCT glucose device results    Collection Time: 11/28/23  8:39 PM   Result Value Ref Range    POC Glucose, Blood 126 (H) 65 - 99 mg/dL   Comp Metabolic Panel    Collection Time: 11/29/23  5:59 AM   Result Value Ref Range    Sodium 139 135 - 145 mmol/L    Potassium 2.7 (LL) 3.6 - 5.5 mmol/L    Chloride 100 96 - 112 mmol/L    Co2 29 20 - 33 mmol/L    Anion Gap 10.0 7.0 - 16.0    Glucose 117 (H) 65 - 99 mg/dL    Bun 21 8 - 22 mg/dL    Creatinine 0.50 0.50 - 1.40 mg/dL    Calcium 8.3 (L) 8.5 - 10.5 mg/dL    Correct Calcium 9.0 8.5 - 10.5 mg/dL    AST(SGOT) 13 12 - 45 U/L    ALT(SGPT) 20 2 - 50 U/L    Alkaline Phosphatase 55 30 - 99 U/L    Total Bilirubin 0.3 0.1 - 1.5 mg/dL    Albumin 3.1 (L) 3.2 - 4.9 g/dL    Total Protein 5.4 (L) 6.0 - 8.2 g/dL    Globulin 2.3 1.9 - 3.5 g/dL    A-G Ratio 1.3 g/dL   CBC WITHOUT DIFFERENTIAL    Collection Time: 11/29/23  5:59 AM   Result Value Ref Range    WBC 12.1 (H) 4.8 - 10.8 K/uL    RBC 4.55 4.20 - 5.40 M/uL    Hemoglobin 12.6 12.0 - 16.0 g/dL    Hematocrit 38.9 37.0 - 47.0 %    MCV 85.5 81.4 - 97.8 fL    MCH 27.7 27.0 - 33.0 pg    MCHC 32.4 32.2 - 35.5 g/dL    RDW 42.8 35.9 - 50.0 fL    Platelet Count 327 164 - 446 K/uL    MPV 9.8 9.0 - 12.9 fL   ESTIMATED GFR    Collection Time: 11/29/23  5:59 AM   Result Value Ref Range    GFR (CKD-EPI) 99 >60 mL/min/1.73 m 2   POCT glucose device results    Collection Time: 11/29/23  8:32 AM   Result Value Ref Range    POC Glucose, Blood 129 (H) 65 - 99 mg/dL   POTASSIUM SERUM (K)    Collection Time: 11/29/23 12:03 PM   Result Value Ref Range    Potassium 3.1 (L) 3.6 - 5.5 mmol/L   POCT glucose device results    Collection Time: 11/29/23 12:38 PM   Result Value Ref Range    POC Glucose, Blood 214 (H) 65 - 99 mg/dL       Radiology Review:  CTA ABDOMEN PELVIS W & W/O POST PROCESS   Final Result      1.  Atherosclerosis   2.  Mild  aneurysmal dilatation of the infrarenal abdominal aorta maximum diameter 2.1 cm   3.  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  Estimated greater than 75% stenosis of the proximal superior mesenteric artery with some degree of stenosis extending through its first 3 to 4 cm.   5.  Inferior mesenteric artery not well evaluated due to small caliber. It appears to be patent.   6.  Prior cholecystectomy   7.  Trace BILATERAL pleural effusions      [id number]   Final Result         1. Dilated CBD and intrahepatic bile ducts.      2. No choledocholithiasis.      CT-ABDOMEN-PELVIS WITH   Final Result      1.  Intrahepatic and extra hepatic biliary dilation with common bile duct measuring up to 2.2 cm      2.  Previous cholecystectomy      3.  No other significant finding      CT-CTA CHEST PULMONARY ARTERY W/ RECONS   Final Result      1.  No evidence of pulmonary embolus.      2.  Emphysema.            CT-PANCREAS AND ABDOMEN WITH & W/O    (Results Pending)         MDM (Data Review):   -Records reviewed and summarized in current documentation  -I personally reviewed and interpreted the laboratory results  -I personally reviewed the radiology images    Assessment/Recommendations:  Mid abdominal pain typically middle of the night, gnawing in quality.  Not typical of chronic mesenteric ischemia but did have the moderate stenosis of SMA.  She is a smoker, diabetic with history of CAD.     Ductal dilation intrahepatic and extrahepatic post cholecystectomy with negative MRCP for CBD stone and normal liver tests.  Will need ERCP/EUS evaluation outpatient and I have contacted Dr. Preston re: this.     3.    Moderate stenosis of SMA  4.   COPD  5.   Type II DM  6.   CAD, history of MI and coronary stent  7.   Leukocytosis    MDM:  This 73-year-old female with a past medical history as listed above who presented to [facility] on [facility]23 with acute hypoxic respiratory failure. Of note, she  is reported chronic mid abdominal pain that is typically in the middle of the night that usually resolves on its own, however, pain had not resolved. Initial imaging showing moderate stenosis of SMA.  She is CTA abdomen pelvis to  showing estimated 75% stenosis of the ostium of the celiac artery, and Estimated greater than 75% stenosis of the proximal superior mesenteric artery with some degree of stenosis extending through its first 3 to 4 cm. Vascular consulted and recommending conservative management at this time.  CT abdomen 11/26/2023 showed intrahepatic and extrahepatic biliary dilation common bile duct measuring up to of 22 mm.  Follow-up MRI confirmed findings with biliary dilation to 15 mm.  Upon chart review, patient with common bile duct measuring 14 mm smoothly tapering a pancreatic head on 5/10/2023.  Patient states that she has appointment with [facility] consultants this upcoming Monday/Tuesday.  She states that GI consultants has been aware of her dilated common bile duct and recommended observation.  Patient seen Dr. Preston in the past.  Reach out to Dr. Preston via volt to notify about, bile duct measurements seen inpatient.  He recommended CT pancreas protocol for further evaluation prior to discharge.  CT pancreas protocol to further evaluate for any possible pancreatic head mass.  Patient may need further evaluation with ERCP or possible EUS but given that her LFTs are normal, this can be done on an outpatient basis.     plan:  CT pancreas protocol  Patient is NPO for pancreatic protocol. May consider advancing diet as tolerated as after imaging.  She will need to follow-up with GI consultants regarding ERCP and EUS.   May de-escalate H2 RA and PPI down to monotherapy.      Discussed with patient, nursing, Dr. Preston, Dr. Diaz, Dr. Preston      Core Quality Measures   Reviewed items::  Labs, Medications and Radiology reports reviewed

## 2023-11-30 VITALS
RESPIRATION RATE: 18 BRPM | SYSTOLIC BLOOD PRESSURE: 131 MMHG | HEART RATE: 77 BPM | WEIGHT: 168.87 LBS | OXYGEN SATURATION: 91 % | DIASTOLIC BLOOD PRESSURE: 55 MMHG | BODY MASS INDEX: 29.92 KG/M2 | TEMPERATURE: 99 F | HEIGHT: 63 IN

## 2023-11-30 PROBLEM — R10.84 GENERALIZED ABDOMINAL PAIN: Status: RESOLVED | Noted: 2022-04-12 | Resolved: 2023-11-30

## 2023-11-30 PROBLEM — J44.1 ACUTE EXACERBATION OF CHRONIC OBSTRUCTIVE PULMONARY DISEASE (COPD) (HCC): Status: RESOLVED | Noted: 2023-11-26 | Resolved: 2023-11-30

## 2023-11-30 PROBLEM — R19.7 DIARRHEA: Status: RESOLVED | Noted: 2022-11-30 | Resolved: 2023-11-30

## 2023-11-30 PROBLEM — J96.01 ACUTE HYPOXEMIC RESPIRATORY FAILURE (HCC): Status: RESOLVED | Noted: 2023-11-26 | Resolved: 2023-11-30

## 2023-11-30 PROBLEM — D72.829 LEUKOCYTOSIS: Status: RESOLVED | Noted: 2023-11-26 | Resolved: 2023-11-30

## 2023-11-30 LAB
ANION GAP SERPL CALC-SCNC: 8 MMOL/L (ref 7–16)
BUN SERPL-MCNC: 23 MG/DL (ref 8–22)
CALCIUM SERPL-MCNC: 8.5 MG/DL (ref 8.5–10.5)
CHLORIDE SERPL-SCNC: 101 MMOL/L (ref 96–112)
CO2 SERPL-SCNC: 28 MMOL/L (ref 20–33)
CREAT SERPL-MCNC: 0.6 MG/DL (ref 0.5–1.4)
GFR SERPLBLD CREATININE-BSD FMLA CKD-EPI: 95 ML/MIN/1.73 M 2
GLUCOSE BLD STRIP.AUTO-MCNC: 185 MG/DL (ref 65–99)
GLUCOSE BLD STRIP.AUTO-MCNC: 207 MG/DL (ref 65–99)
GLUCOSE BLD STRIP.AUTO-MCNC: 231 MG/DL (ref 65–99)
GLUCOSE SERPL-MCNC: 92 MG/DL (ref 65–99)
MAGNESIUM SERPL-MCNC: 2.2 MG/DL (ref 1.5–2.5)
POTASSIUM SERPL-SCNC: 2.9 MMOL/L (ref 3.6–5.5)
POTASSIUM SERPL-SCNC: 2.9 MMOL/L (ref 3.6–5.5)
POTASSIUM SERPL-SCNC: 3.2 MMOL/L (ref 3.6–5.5)
SODIUM SERPL-SCNC: 137 MMOL/L (ref 135–145)

## 2023-11-30 PROCEDURE — A9270 NON-COVERED ITEM OR SERVICE: HCPCS | Performed by: STUDENT IN AN ORGANIZED HEALTH CARE EDUCATION/TRAINING PROGRAM

## 2023-11-30 PROCEDURE — 82962 GLUCOSE BLOOD TEST: CPT | Mod: 91

## 2023-11-30 PROCEDURE — A9270 NON-COVERED ITEM OR SERVICE: HCPCS | Mod: JZ | Performed by: HOSPITALIST

## 2023-11-30 PROCEDURE — 83735 ASSAY OF MAGNESIUM: CPT

## 2023-11-30 PROCEDURE — 700102 HCHG RX REV CODE 250 W/ 637 OVERRIDE(OP): Performed by: STUDENT IN AN ORGANIZED HEALTH CARE EDUCATION/TRAINING PROGRAM

## 2023-11-30 PROCEDURE — 84132 ASSAY OF SERUM POTASSIUM: CPT | Mod: 91

## 2023-11-30 PROCEDURE — 80048 BASIC METABOLIC PNL TOTAL CA: CPT

## 2023-11-30 PROCEDURE — 99239 HOSP IP/OBS DSCHRG MGMT >30: CPT | Performed by: HOSPITALIST

## 2023-11-30 PROCEDURE — 700102 HCHG RX REV CODE 250 W/ 637 OVERRIDE(OP): Mod: JZ | Performed by: HOSPITALIST

## 2023-11-30 PROCEDURE — 99232 SBSQ HOSP IP/OBS MODERATE 35: CPT | Performed by: NURSE PRACTITIONER

## 2023-11-30 RX ORDER — POTASSIUM CHLORIDE 20 MEQ/1
40 TABLET, EXTENDED RELEASE ORAL EVERY 4 HOURS
Status: COMPLETED | OUTPATIENT
Start: 2023-11-30 | End: 2023-11-30

## 2023-11-30 RX ORDER — POTASSIUM CHLORIDE 20 MEQ/1
40 TABLET, EXTENDED RELEASE ORAL ONCE
Status: DISCONTINUED | OUTPATIENT
Start: 2023-11-30 | End: 2023-11-30

## 2023-11-30 RX ORDER — POTASSIUM CHLORIDE 20 MEQ/1
20 TABLET, EXTENDED RELEASE ORAL ONCE
Status: COMPLETED | OUTPATIENT
Start: 2023-11-30 | End: 2023-11-30

## 2023-11-30 RX ORDER — LACTOBACILLUS RHAMNOSUS GG 10B CELL
1 CAPSULE ORAL
Qty: 30 CAPSULE | Refills: 0 | Status: SHIPPED | OUTPATIENT
Start: 2023-12-01

## 2023-11-30 RX ADMIN — POTASSIUM CHLORIDE 40 MEQ: 1500 TABLET, EXTENDED RELEASE ORAL at 10:33

## 2023-11-30 RX ADMIN — LOSARTAN POTASSIUM 100 MG: 50 TABLET, FILM COATED ORAL at 05:28

## 2023-11-30 RX ADMIN — ACETAMINOPHEN 1000 MG: 500 TABLET ORAL at 10:31

## 2023-11-30 RX ADMIN — FLUTICASONE FUROATE, UMECLIDINIUM BROMIDE AND VILANTEROL TRIFENATATE 1 PUFF: 100; 62.5; 25 POWDER RESPIRATORY (INHALATION) at 10:34

## 2023-11-30 RX ADMIN — OMEPRAZOLE 20 MG: 20 CAPSULE, DELAYED RELEASE ORAL at 05:27

## 2023-11-30 RX ADMIN — ASPIRIN 81 MG: 81 TABLET, COATED ORAL at 05:27

## 2023-11-30 RX ADMIN — METRONIDAZOLE 500 MG: 500 TABLET ORAL at 05:27

## 2023-11-30 RX ADMIN — Medication 1 CAPSULE: at 10:32

## 2023-11-30 RX ADMIN — POTASSIUM CHLORIDE 20 MEQ: 1500 TABLET, EXTENDED RELEASE ORAL at 14:49

## 2023-11-30 RX ADMIN — INSULIN HUMAN 3 UNITS: 100 INJECTION, SOLUTION PARENTERAL at 13:33

## 2023-11-30 RX ADMIN — FAMOTIDINE 20 MG: 20 TABLET ORAL at 06:00

## 2023-11-30 RX ADMIN — BUPROPION HYDROCHLORIDE 100 MG: 100 TABLET, FILM COATED ORAL at 05:27

## 2023-11-30 RX ADMIN — POTASSIUM CHLORIDE 40 MEQ: 1500 TABLET, EXTENDED RELEASE ORAL at 13:24

## 2023-11-30 ASSESSMENT — LIFESTYLE VARIABLES: SUBSTANCE_ABUSE: 0

## 2023-11-30 ASSESSMENT — ENCOUNTER SYMPTOMS
SHORTNESS OF BREATH: 0
FLANK PAIN: 0
DIARRHEA: 0
MYALGIAS: 0
CONSTIPATION: 0
HEADACHES: 0
NAUSEA: 0
BLOOD IN STOOL: 0
FALLS: 0
COUGH: 0
NERVOUS/ANXIOUS: 0
CHILLS: 0
VOMITING: 0
ABDOMINAL PAIN: 0
SORE THROAT: 0
WEAKNESS: 0
FEVER: 0

## 2023-11-30 NOTE — PROGRESS NOTES
Gastroenterology Progress Note               Author:  OMAYRA Bob Date & Time Created: 11/30/2023 2:44 PM       Patient ID:  Name:             Jigna Allen  YOB: 1950  Age:                 73 y.o.  female  MRN:               [id number]        Medical Decision Making, by Problem:  Active Hospital Problems    Diagnosis     Dilated cbd, acquired [K83.8]     Essential hypertension [I10]     Anxiety [F41.9]     Type 2 diabetes mellitus with complication, without long-term current use of insulin (HCC) [E11.8]     CAD (coronary artery disease) [I25.10]            Presenting Chief Complaint:  Abdominal pain        History of Present Illness:    This is a very pleasant 73 y.o. female with a 1 year history of mid abdominal pain, nonradiating, gnawing in nature and typically occurs in the middle of the night about 3:30.  She cannot tell me how long the pain lasts but she is able to fall back asleep.  She cannot identify a trigger although at 1 point she thought eating at 8:30 pm may have been related.  She does not know of any alleviating factors other than vomiting after pain starts and she will feel better.  Stated most recently she had dry heaves.  She does not take any medications for this.  She denies melena and hematochezia.  She takes daily ASA and occasional Advil.  She has not had a previous EGD or colonoscopy.  She denies change in bowel habits but occasionally has constipation.       History is notable for her being a smoker, CAD with history of MI and stent, TIA this year and type II DM.  The patient is not a good historian.     Chart review:  WBC 14.7, K 2.9, normal liver tests  11/26:  CT abd/pelvis with:  IHD and EHD with CBD 2.2cm, previous luz maria  11/26:  MRCP:  CBD 1.5cm with IHD, no CBD stone     April 2022 [facility] ER:  returns to ED c/o epigastric pain 10/10 and intractable vomiting.  CTA abd/pelvis:  moderate proximal SMA stenosis.  She saw Vascular Surgery who did not feel any  intervention warranted.     2021 Cologard negative      Interval History:  11/28/2023: Patient seen at bedside.  Reports abdominal pain that is constant, not resolving overnight.  Overnight, patient lost PIV access.  Pending CT scan is scheduled at 1600 today.  Tolerating clear liquid diet.  Denies bowel movement.    11/29/2023: Patient seen at bedside.  She reports that she is pain-free, denies nausea, vomiting, diarrhea.  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  Vascular consulted and recommended conservative management.  Upon reviewing imaging, patient with dilated common bile duct-14 mm in May 2023, 22 mm on current CT.  Patient reports she has appointment with GI consultants this upcoming Monday/Tuesday.  Updated Dr. Preston (who patient has seen in the past) regarding common bile duct dilation.  CT pancreas ordered per request.    11/30/2023: Patient seen at bedside.  Reports feeling well without nausea, vomiting, abdominal pain.  She denies diarrhea to this provider but had diarrhea earlier.  This was negative for C. difficile.  She is tolerating oral intake without difficulty.    Hospital Medications:  Current Facility-Administered Medications   Medication Dose Frequency Provider Last Rate Last Admin    potassium chloride SA (Kdur) tablet 20 mEq  20 mEq Once Lalit Baird M.D.        ipratropium-albuterol (DUONEB) nebulizer solution  3 mL Q2HRS PRN (RT) Lalit Baird M.D.        lactobacillus rhamnosus (Culturelle) capsule 1 Capsule  1 Capsule QDAY with Breakfast Lalit Baird M.D.   1 Capsule at 11/30/23 1032    metroNIDAZOLE (Flagyl) tablet 500 mg  500 mg Q12HRS Lalit Baird M.D.   500 mg at 11/30/23 0527    insulin regular (HumuLIN R,NovoLIN R) injection  3-14 Units 4X/DAY ACHS Jaskaran Reyes DRickO.   3 Units at 11/30/23 1333    And    dextrose 10 % BOLUS 25 g  25 g Q15 MIN PRN Jaskaran Reyes D.O.        Respiratory  Therapy Consult   Continuous RT Shashi Alonzo M.D.        hydrALAZINE (Apresoline) injection 10 mg  10 mg Q4HRS PRN Shashi Alonzo M.D.   10 mg at 11/27/23 0400    ondansetron (Zofran) syringe/vial injection 4 mg  4 mg Q4HRS PRN Shashi Alonzo M.D.   4 mg at 11/26/23 1506    ondansetron (Zofran ODT) dispertab 4 mg  4 mg Q4HRS PRN Shashi Alonzo M.D.   4 mg at 11/28/23 0534    guaiFENesin dextromethorphan (Robitussin DM) 100-10 MG/5ML syrup 10 mL  10 mL Q6HRS PRN Shashi Alonzo M.D.        enoxaparin (Lovenox) inj 40 mg  40 mg DAILY AT 1800 Shashi Alonzo M.D.   40 mg at 11/27/23 1821    aspirin EC tablet 81 mg  81 mg DAILY Shashi Alonzo M.D.   81 mg at 11/30/23 0527    buPROPion (Wellbutrin) tablet 100 mg  100 mg BID Shashi Alonzo M.D.   100 mg at 11/30/23 0527    fluticasone-umeclidinium-vilanterol (Trelegy Ellipta) 100-62.5-25 mcg/act inhaler 1 Puff  1 Puff QDAILY (RT) Shashi Alonzo M.D.   1 Puff at 11/30/23 1034    omeprazole (PriLOSEC) capsule 20 mg  20 mg DAILY Shashi Alonzo M.D.   20 mg at 11/30/23 0527    losartan (Cozaar) tablet 100 mg  100 mg Q DAY Shashi Alonzo M.D.   100 mg at 11/30/23 0528    And    hydroCHLOROthiazide tablet 12.5 mg  12.5 mg Q DAY Shashi Alonzo M.D.   12.5 mg at 11/28/23 0501   Last reviewed on 11/26/2023  2:08 PM by Alexia Cornelius T       Review of Systems:  Review of Systems   Constitutional:  Negative for chills, fever and malaise/fatigue.   HENT:  Negative for congestion and sore throat.    Respiratory:  Negative for cough and shortness of breath.    Cardiovascular:  Negative for chest pain and leg swelling.   Gastrointestinal:  Negative for abdominal pain, blood in stool, constipation, diarrhea, melena, nausea and vomiting.   Genitourinary:  Negative for dysuria and flank pain.   Musculoskeletal:  Negative for falls and myalgias.  "  Neurological:  Negative for weakness and headaches.   Psychiatric/Behavioral:  Negative for substance abuse. The patient is not nervous/anxious.    All other systems reviewed and are negative.        Vital signs:  Weight/BMI: Body mass index is 29.91 kg/m².  /55   Pulse 77   Temp 37.2 °C (99 °F) (Oral)   Resp 18   Ht 1.6 m (5' 3\")   Wt 76.6 kg (168 lb 14 oz)   SpO2 91%   Vitals:    11/29/23 1957 11/30/23 0352 11/30/23 0526 11/30/23 0838   BP: 136/56 124/50 (!) 145/56 131/55   Pulse: 89 75 74 77   Resp: 18 18 18 18   Temp: 36.6 °C (97.9 °F) 36.9 °C (98.4 °F)  37.2 °C (99 °F)   TempSrc: Temporal Temporal  Oral   SpO2: 92% 93% 91% 91%   Weight:       Height:         Oxygen Therapy:  Pulse Oximetry: 91 %, O2 (LPM): 0, O2 Delivery Device: None - Room Air    Intake/Output Summary (Last 24 hours) at 11/30/2023 1444  Last data filed at 11/29/2023 2003  Gross per 24 hour   Intake 210 ml   Output --   Net 210 ml           Physical Exam:  Physical Exam  Vitals and nursing note reviewed.   Constitutional:       General: She is awake.      Appearance: She is overweight. She is not ill-appearing or toxic-appearing.   HENT:      Head: Normocephalic and atraumatic.      Nose: Nose normal.      Mouth/Throat:      Mouth: Mucous membranes are moist.      Pharynx: Oropharynx is clear. No oropharyngeal exudate.   Eyes:      General: No scleral icterus.     Extraocular Movements: Extraocular movements intact.      Conjunctiva/sclera: Conjunctivae normal.   Cardiovascular:      Rate and Rhythm: Normal rate and regular rhythm.      Pulses: Normal pulses.      Heart sounds: Normal heart sounds. No murmur heard.  Pulmonary:      Effort: Pulmonary effort is normal. No respiratory distress.      Breath sounds: Normal breath sounds. No wheezing.   Abdominal:      General: Abdomen is flat. Bowel sounds are normal. There is no distension.      Palpations: Abdomen is soft.      Tenderness: There is no abdominal tenderness. There is " no guarding.   Musculoskeletal:      Right lower leg: No edema.      Left lower leg: No edema.   Skin:     General: Skin is warm and dry.      Capillary Refill: Capillary refill takes less than 2 seconds.      Coloration: Skin is not jaundiced.   Neurological:      General: No focal deficit present.      Mental Status: She is alert and oriented to person, place, and time. Mental status is at baseline.      Motor: No weakness.   Psychiatric:         Mood and Affect: Mood normal.         Behavior: Behavior normal. Behavior is cooperative.         Thought Content: Thought content normal.         Judgment: Judgment normal.             Labs:  Recent Labs     11/28/23  0653 11/29/23  0559 11/29/23  1203 11/30/23  0435 11/30/23  1002 11/30/23  1301   SODIUM 139 139  --  137  --   --    POTASSIUM 3.1* 2.7*   < > 2.9* 2.9* 3.2*   CHLORIDE 98 100  --  101  --   --    CO2 32 29  --  28  --   --    BUN 26* 21  --  23*  --   --    CREATININE 0.60 0.50  --  0.60  --   --    MAGNESIUM 2.0  --   --  2.2  --   --    PHOSPHORUS 2.0*  --   --   --   --   --    CALCIUM 8.9 8.3*  --  8.5  --   --     < > = values in this interval not displayed.       Recent Labs     11/28/23  0653 11/29/23  0559 11/30/23  0435   ALTSGPT 26 20  --    ASTSGOT 13 13  --    ALKPHOSPHAT 62 55  --    TBILIRUBIN 0.4 0.3  --    LIPASE 22  --   --    GLUCOSE 208* 117* 92       Recent Labs     11/28/23  0653 11/29/23  0559   WBC 15.5* 12.1*   NEUTSPOLYS 71.80  --    LYMPHOCYTES 17.90*  --    MONOCYTES 9.50  --    EOSINOPHILS 0.10  --    BASOPHILS 0.20  --    ASTSGOT 13 13   ALTSGPT 26 20   ALKPHOSPHAT 62 55   TBILIRUBIN 0.4 0.3       Recent Labs     11/28/23  0653 11/29/23  0559   RBC 5.05 4.55   HEMOGLOBIN 14.1 12.6   HEMATOCRIT 43.4 38.9   PLATELETCT 386 327       Recent Results (from the past 24 hour(s))   POCT glucose device results    Collection Time: 11/29/23  5:42 PM   Result Value Ref Range    POC Glucose, Blood 113 (H) 65 - 99 mg/dL   POCT glucose device  results    Collection Time: 11/29/23  9:24 PM   Result Value Ref Range    POC Glucose, Blood 207 (H) 65 - 99 mg/dL   Basic Metabolic Panel    Collection Time: 11/30/23  4:35 AM   Result Value Ref Range    Sodium 137 135 - 145 mmol/L    Potassium 2.9 (L) 3.6 - 5.5 mmol/L    Chloride 101 96 - 112 mmol/L    Co2 28 20 - 33 mmol/L    Glucose 92 65 - 99 mg/dL    Bun 23 (H) 8 - 22 mg/dL    Creatinine 0.60 0.50 - 1.40 mg/dL    Calcium 8.5 8.5 - 10.5 mg/dL    Anion Gap 8.0 7.0 - 16.0   MAGNESIUM    Collection Time: 11/30/23  4:35 AM   Result Value Ref Range    Magnesium 2.2 1.5 - 2.5 mg/dL   ESTIMATED GFR    Collection Time: 11/30/23  4:35 AM   Result Value Ref Range    GFR (CKD-EPI) 95 >60 mL/min/1.73 m 2   POTASSIUM SERUM (K)    Collection Time: 11/30/23 10:02 AM   Result Value Ref Range    Potassium 2.9 (L) 3.6 - 5.5 mmol/L   POCT glucose device results    Collection Time: 11/30/23 10:29 AM   Result Value Ref Range    POC Glucose, Blood 231 (H) 65 - 99 mg/dL   POTASSIUM SERUM (K)    Collection Time: 11/30/23  1:01 PM   Result Value Ref Range    Potassium 3.2 (L) 3.6 - 5.5 mmol/L   POCT glucose device results    Collection Time: 11/30/23  1:23 PM   Result Value Ref Range    POC Glucose, Blood 185 (H) 65 - 99 mg/dL       Radiology Review:  CT-PANCREAS AND ABDOMEN WITH & W/O   Final Result         1.  Common bile and intrahepatic hepatic duct dilatation, commonly associated with postcholecystectomy physiology, consider other causes of biliary obstruction with additional workup as clinically appropriate.   2.  Hepatomegaly   3.  Small hiatal hernia   4.  Atherosclerosis and atherosclerotic coronary artery disease      CTA ABDOMEN PELVIS W & W/O POST PROCESS   Final Result      1.  Atherosclerosis   2.  Mild aneurysmal dilatation of the infrarenal abdominal aorta maximum diameter 2.1 cm   3.  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  Estimated greater than 75% stenosis of the proximal superior mesenteric  artery with some degree of stenosis extending through its first 3 to 4 cm.   5.  Inferior mesenteric artery not well evaluated due to small caliber. It appears to be patent.   6.  Prior cholecystectomy   7.  Trace BILATERAL pleural effusions      [id number]   Final Result         1. Dilated CBD and intrahepatic bile ducts.      2. No choledocholithiasis.      CT-ABDOMEN-PELVIS WITH   Final Result      1.  Intrahepatic and extra hepatic biliary dilation with common bile duct measuring up to 2.2 cm      2.  Previous cholecystectomy      3.  No other significant finding      CT-CTA CHEST PULMONARY ARTERY W/ RECONS   Final Result      1.  No evidence of pulmonary embolus.      2.  Emphysema.                  MDM (Data Review):   -Records reviewed and summarized in current documentation  -I personally reviewed and interpreted the laboratory results  -I personally reviewed the radiology images    Assessment/Recommendations:  Mid abdominal pain typically middle of the night, gnawing in quality.  Not typical of chronic mesenteric ischemia but did have the moderate stenosis of SMA.  She is a smoker, diabetic with history of CAD.     Ductal dilation intrahepatic and extrahepatic post cholecystectomy with negative MRCP for CBD stone and normal liver tests.  Will need ERCP/EUS evaluation outpatient and I have contacted Dr. Preston re: this.     3.    Moderate stenosis of SMA  4.   COPD  5.   Type II DM  6.   CAD, history of MI and coronary stent  7.   Leukocytosis    MDM:  This 73-year-old female with a past medical history as listed above who presented to [facility] on [facility]/26/2023 with acute hypoxic respiratory failure. Of note, she is reported chronic mid abdominal pain that is typically in the middle of the night that usually resolves on its own, however, pain had not resolved. Initial imaging showing moderate stenosis of SMA.  She is CTA abdomen pelvis to  showing estimated 75% stenosis of the ostium of  the celiac artery, and Estimated greater than 75% stenosis of the proximal superior mesenteric artery with some degree of stenosis extending through its first 3 to 4 cm. Vascular consulted and recommending conservative management at this time.  CT abdomen 11/26/2023 showed intrahepatic and extrahepatic biliary dilation common bile duct measuring up to of 22 mm.  Follow-up MRI confirmed findings with biliary dilation to 15 mm.  Upon chart review, patient with common bile duct measuring 14 mm smoothly tapering a pancreatic head on 5/10/2023.  Patient states that she has appointment with [facility] consultants this upcoming Monday/Tuesday.  She states that GI consultants has been aware of her dilated common bile duct and recommended observation.  Patient seen Dr. Preston in the past.  Reach out to Dr. Preston via [employer] to notify about, bile duct measurements seen inpatient.  CT pancreas protocol was obtained per his request.  Common bile duct and intrahepatic duct dilation was seen with common bile duct measuring 12 mm.  Hepatomegaly a small hiatal hernia and atherosclerosis were also notable.  Pancreas grossly normal.  Updated Dr. Preston regarding findings via [employer].  Patient may need further evaluation with ERCP or possible EUS but given that her LFTs are normal, this can be done on an outpatient basis and discussed with patient's appointment this upcoming week..     plan:  She will need to follow-up with GI consultants regarding ERCP and EUS.   May de-escalate H2 RA and PPI down to monotherapy.    No further inventions from acute GI team.  GI to sign off.  Please reconsult for any further questions or concerns.    Discussed with patient, nursing, Dr. Preston, Dr. Welsh, Dr. Ballard      Core Quality Measures   Reviewed items::  Labs, Medications and Radiology reports reviewed

## 2023-11-30 NOTE — DISCHARGE PLANNING
Care Transition Team Assessment    LMSW met with pt at bedside to complete assessment. Pt A&Ox4 and able to verify the information on the face sheet.  Pt lives with her S/O in a single-story house that has one step to enter. Prior to this hospitalization pt was independent at home with ADLs and most IADLs. Pt does not use any DME at baseline. Pt reported that her S/O is good support for her along with her daughter and is requesting that her daughter is the primary contact as she can understand more than the S/O. Pt is retired and receives SSI monthly deposits. Pt denies any SA or MH concerns. Pt does not have an advance directive.      LMSW spoke with pt HH. Pt agreeable to  and agreeable to [facility] services.     Information Source  Orientation Level: Oriented X4  Information Given By: Patient  Who is responsible for making decisions for patient? : Patient    Readmission Evaluation  Is this a readmission?: No    Elopement Risk  Legal Hold: No  Ambulatory or Self Mobile in Wheelchair: Yes  Disoriented: No  Psychiatric Symptoms: None  History of Wandering: No  Elopement this Admit: No  Vocalizing Wanting to Leave: No  Displays Behaviors, Body Language Wanting to Leave: No-Not at Risk for Elopement  Elopement Risk: Not at Risk for Elopement    Interdisciplinary Discharge Planning  Lives with - Patient's Self Care Capacity: Spouse  Patient or legal guardian wants to designate a caregiver: No  Support Systems: Family Member(s), Friends / Neighbors  Housing / Facility: 1 Story House  Durable Medical Equipment: Not Applicable    Discharge Preparedness  What is your plan after discharge?: Home with help  What are your discharge supports?: Child, Spouse  Prior Functional Level: Independent with Activities of Daily Living  Difficulity with ADLs: None  Difficulity with IADLs: None    Functional Assesment  Prior Functional Level: Independent with Activities of Daily Living    Finances  Financial Barriers to Discharge:  No  Prescription Coverage: Yes    Vision / Hearing Impairment  Vision Impairment : Yes  Right Eye Vision: Impaired, Wears Glasses  Left Eye Vision: Impaired, Wears Glasses  Hearing Impairment : No    Advance Directive  Advance Directive?: None    Domestic Abuse  Have you ever been the victim of abuse or violence?: No  Physical Abuse or Sexual Abuse: No  Verbal Abuse or Emotional Abuse: No  Possible Abuse/Neglect Reported to:: Not Applicable    Psychological Assessment  History of Substance Abuse: None  History of Psychiatric Problems: No  Non-compliant with Treatment: No  Newly Diagnosed Illness: No    Discharge Risks or Barriers  Discharge risks or barriers?: No    Anticipated Discharge Information  Discharge Disposition: D/T to home under A care in anticipation of covered skilled care (06)

## 2023-11-30 NOTE — DISCHARGE PLANNING
Case Management Discharge Planning    Admission Date: 11/26/2023  GMLOS: 3.6  ALOS: 4    6-Clicks ADL Score: 24  6-Clicks Mobility Score: 18      Anticipated Discharge Dispo: Discharge Disposition: Discharged to home/self care (01)    DME Needed: No    Action(s) Taken: LMSW sent referral for HH to [facility] as the pt lives in [place] and pt is agreeable to referral.     Escalations Completed: None    Medically Clear: Yes    Next Steps: LMSW to follow as needed for DC plan.     Barriers to Discharge: None    Is the patient up for discharge tomorrow: No

## 2023-11-30 NOTE — PROGRESS NOTES
[facility] Medicine Daily Progress Note    Date of Service  11/29/2023    Chief Complaint  Jigna Allen is a 73 y.o. female admitted 11/26/2023 with   Chief Complaint   Patient presents with    Abdominal Pain    N/V    Fever     At home         Hospital Course  No notes on file    73-year-old female with a past medical history of hypertension, anxiety, CAD, type 2 diabetes and COPD who presents emergency department on 11/26/2023 with a 6-day history of worsening abdominal pain, dyspnea, cough, nausea and vomiting.  Patient reports that she has been having lower abdominal pain which worsened with drinking water.  She associates symptoms with fever, chills, nausea along with multiple bouts of nonbloody emesis.  She has been having worsening cough along with greenish sputum.  No loss of consciousness.     At the emergency department, vital signs with hypertension with SBP in the 200s.  Patient requiring 2 L maintain saturation (on room air at baseline).  CBC with leukocytosis at 18 and elevated polys.  Chemistry with hypokalemia.  Lactic acid low.  Chest x-ray with no acute cardiopulmonary process per my read.  Chest CTA consistent with emphysema.  Abdominal/pelvic CT with contrast showing Intrahepatic and extra hepatic biliary dilation with common bile duct measuring up to 2.2 cm and history of cholecystectomy.  Patient was admitted for acute hypoxic respiratory failure secondary to COPD exacerbation and possible atypical pneumonia.    Interval Problem Update   Patient was seen and examined at bedside.  I have personally reviewed and interpreted vitals, labs, and imaging.    11/27.  Afebrile.  Tachycardia is improved.  Hypertension is improved.  On 1-2 L nasal cannula.  Potassium 2.9.  Replete  Reports some abdominal pain and constipation. Denies fevers, chills, chest pain, shortness of breath.  Reviewed CT and MRCP with patient.  Discussed with GI and surgical oncology.  Plan for CT angiogram of the abdomen and  pelvis.  Will need outpatient EUS and ERCP.  Aggressive bowel regimen.        11/28 patient is new to me today, patient is in bed, c/o abdominal pain, mid abdomen, no radiated to back, on CLD, she is low oral intake she said she does not like the food, tolerating liquids. CTA abdomen results reviewed, discussed with GI team, I have asked vascular surgery for recommendation Dr Ríos will review imaging.   11/29 patient is resting in bed, she feels better pain is improved, patient now having diarrhea, started on probiotics, since patient has been on IV antibiotics I have ordered to check for C. difficile, vascular surgery evaluated patient and recommended no intervention at this time and follow-up as outpatient, discussed with GI will get CT pancreas, patient had severe hyper kalemia potassium 2.9, replacement with IV and I had added extra potassium p.o. since patient is having diarrhea, discussed with nurse staff, all questions been answered.    I have discussed this patient's plan of care and discharge plan at IDT rounds today with Case Management, Nursing, Nursing leadership, and other members of the IDT team.    Consultants/Specialty  GI, pulmonary, and vascular surgery  Surgery.     Code Status  Full Code    Disposition  The patient is not medically cleared for discharge to home or a post-acute facility.      I have placed the appropriate orders for post-discharge needs.    Review of Systems  Review of Systems   Constitutional:  Negative for chills and fever.   Eyes:  Negative for blurred vision and double vision.   Respiratory:  Negative for cough, hemoptysis and wheezing.    Cardiovascular:  Negative for chest pain, palpitations, claudication, leg swelling and PND.   Gastrointestinal:  Positive for diarrhea. Negative for abdominal pain, constipation, heartburn, nausea and vomiting.   Genitourinary:  Negative for hematuria and urgency.   Musculoskeletal:  Negative for back pain and myalgias.   Skin:  Negative  for rash.   Neurological:  Negative for dizziness and headaches.   Endo/Heme/Allergies:  Does not bruise/bleed easily.   Psychiatric/Behavioral:  Negative for depression.         Physical Exam  Temp:  [36.1 °C (96.9 °F)-36.6 °C (97.9 °F)] 36.5 °C (97.7 °F)  Pulse:  [65-80] 80  Resp:  [16-18] 18  BP: (110-121)/(41-62) 110/45  SpO2:  [95 %-98 %] 95 %    Physical Exam  Vitals and nursing note reviewed.   Constitutional:       Appearance: Normal appearance. She is ill-appearing.   HENT:      Head: Normocephalic and atraumatic.      Right Ear: External ear normal.      Left Ear: External ear normal.      Nose: Nose normal.      Mouth/Throat:      Mouth: Mucous membranes are moist.      Pharynx: Oropharynx is clear. No oropharyngeal exudate or posterior oropharyngeal erythema.   Eyes:      General: No scleral icterus.        Right eye: No discharge.         Left eye: No discharge.   Cardiovascular:      Rate and Rhythm: Normal rate and regular rhythm.      Pulses: Normal pulses.      Heart sounds: Normal heart sounds. No murmur heard.  Pulmonary:      Effort: Pulmonary effort is normal. No respiratory distress.      Breath sounds: Normal breath sounds. No stridor. No wheezing or rales.   Abdominal:      General: Abdomen is flat. Bowel sounds are normal. There is no distension.      Palpations: Abdomen is soft. There is no mass.      Tenderness: There is abdominal tenderness.   Musculoskeletal:      Cervical back: Normal range of motion.   Skin:     General: Skin is warm.      Capillary Refill: Capillary refill takes less than 2 seconds.   Neurological:      General: No focal deficit present.      Mental Status: She is alert and oriented to person, place, and time. Mental status is at baseline.      Cranial Nerves: No cranial nerve deficit.   Psychiatric:         Mood and Affect: Mood normal.         Behavior: Behavior normal.         Fluids    Intake/Output Summary (Last 24 hours) at 11/29/2023 1632  Last data filed at  11/29/2023 1430  Gross per 24 hour   Intake 480 ml   Output --   Net 480 ml         Laboratory  Recent Labs     11/27/23  0648 11/28/23  0653 11/29/23  0559   WBC 14.7* 15.5* 12.1*   RBC 5.02 5.05 4.55   HEMOGLOBIN 14.0 14.1 12.6   HEMATOCRIT 42.1 43.4 38.9   MCV 83.9 85.9 85.5   MCH 27.9 27.9 27.7   MCHC 33.3 32.5 32.4   RDW 42.2 43.3 42.8   PLATELETCT 384 386 327   MPV 9.4 10.0 9.8       Recent Labs     11/27/23  0648 11/28/23  0653 11/29/23  0559 11/29/23  1203   SODIUM 135 139 139  --    POTASSIUM 2.9* 3.1* 2.7* 3.1*   CHLORIDE 95* 98 100  --    CO2 29 32 29  --    GLUCOSE 200* 208* 117*  --    BUN 26* 26* 21  --    CREATININE 0.47* 0.60 0.50  --    CALCIUM 9.3 8.9 8.3*  --                      Imaging  CTA ABDOMEN PELVIS W & W/O POST PROCESS   Final Result      1.  Atherosclerosis   2.  Mild aneurysmal dilatation of the infrarenal abdominal aorta maximum diameter 2.1 cm   3.  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  Estimated greater than 75% stenosis of the proximal superior mesenteric artery with some degree of stenosis extending through its first 3 to 4 cm.   5.  Inferior mesenteric artery not well evaluated due to small caliber. It appears to be patent.   6.  Prior cholecystectomy   7.  Trace BILATERAL pleural effusions      DY-VGUYFES-O/O   Final Result         1. Dilated CBD and intrahepatic bile ducts.      2. No choledocholithiasis.      CT-ABDOMEN-PELVIS WITH   Final Result      1.  Intrahepatic and extra hepatic biliary dilation with common bile duct measuring up to 2.2 cm      2.  Previous cholecystectomy      3.  No other significant finding      CT-CTA CHEST PULMONARY ARTERY W/ RECONS   Final Result      1.  No evidence of pulmonary embolus.      2.  Emphysema.            CT-PANCREAS AND ABDOMEN WITH & W/O    (Results Pending)        Assessment/Plan  * Acute hypoxemic respiratory failure (HCC)- (present on admission)  Assessment & Plan    Presenting with worsening dyspnea and  cough productive of sputum  Requiring 2 L to maintain saturation  Secondary to COPD exacerbation versus atypical pneumonia  RT/O2  COPD management  Titrate O2    Continue weaning off oxygen    Dilated cbd, acquired  Assessment & Plan    Likely cause of abdominal pain, constipation, nausea, vomiting.  LFTs normal    GI and surgical oncology following.  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  I have discussed with vascular surgery who will review imaging.    Discussed with GI will get CT pancreas    Leukocytosis- (present on admission)  Assessment & Plan    Possibly secondary to early pneumonia  IV antibiotics for now  Labs on a.m.    Acute exacerbation of chronic obstructive pulmonary disease (COPD) (HCC)- (present on admission)  Assessment & Plan    Increased sputum production, hypoxia and cough frequency  Wheezing auscultated  Admit to medicine  RT/O2  DuoNebs  Prednisone  Doxycycline  I-S  Labs on a.m.    Continue weaning off oxygen  Stop IV fluids  Continue incentive spirometer procalcitonin is negative we will stop antibiotics.      Diarrhea- (present on admission)  Assessment & Plan  Patient has been on antibiotics, started probiotics  Check for C. difficile    Generalized abdominal pain- (present on admission)  Assessment & Plan    Abdominal/pelvic CT showing CBD dilation at 21 mm  Clear liquid diet for now  Continue IV antibiotics  MRCP no acute finding.  No intervention as per GI.     Essential hypertension- (present on admission)  Assessment & Plan    Continue home meds  As needed antihypertensives    Type 2 diabetes mellitus with complication, without long-term current use of insulin ([facility])- (present on admission)  Assessment & Plan    Diabetic diet  Insulin sliding scale  Frequent Accu-Cheks    CAD (coronary artery disease)-  (present on admission)  Assessment & Plan    Continue aspirin    Anxiety- (present on admission)  Assessment & Plan    Continue home Wellbutrin         VTE prophylaxis: Lovenox    I have performed a physical exam and reviewed and updated ROS and Plan today (11/29/2023). In review of yesterday's note (11/28/2023), there are no changes except as documented above.      Greater than 51 minutes spent prepping to see patient (e.g. reviewing  tests/imaging results, notes from consultants, bedside nurse, night shift ) obtaining and/or reviewing separately obtained history. Performing a medically appropriate examination and evaluation.  Counseling and educating the patient.  Ordering medications, tests, or procedures.  Referring and communicating with other health care professionals.  Documenting clinical information in EPIC.  Independently interpreting results and communicating results to patient.  Care coordination.

## 2023-11-30 NOTE — DISCHARGE PLANNING
Received Choice form at 4213  Agency/Facility Name: [facility]  Referral sent per Choice form @ 2112

## 2023-11-30 NOTE — CARE PLAN
Problem: Knowledge Deficit - Standard  Goal: Patient and family/care givers will demonstrate understanding of plan of care, disease process/condition, diagnostic tests and medications  Outcome: Progressing     The patient is Stable - Low risk of patient condition declining or worsening    Shift Goals  Clinical Goals: patient will be free from falls this shift  Patient Goals: rest, comfort and free ffrom injury  Family Goals: alda    Progress made toward(s) clinical / shift goals:  patient cooperative and involved with care    Patient is not progressing towards the following goals:

## 2023-12-01 ENCOUNTER — PATIENT OUTREACH (OUTPATIENT)
Dept: MEDICAL GROUP | Facility: PHYSICIAN GROUP | Age: 73
End: 2023-12-01
Payer: MEDICARE

## 2023-12-01 DIAGNOSIS — I10 ESSENTIAL HYPERTENSION: ICD-10-CM

## 2023-12-01 DIAGNOSIS — I25.10 CORONARY ARTERY DISEASE INVOLVING NATIVE CORONARY ARTERY OF NATIVE HEART WITHOUT ANGINA PECTORIS: ICD-10-CM

## 2023-12-01 DIAGNOSIS — J44.9 CHRONIC OBSTRUCTIVE PULMONARY DISEASE, UNSPECIFIED COPD TYPE (HCC): ICD-10-CM

## 2023-12-01 LAB
BACTERIA BLD CULT: NORMAL
BACTERIA BLD CULT: NORMAL
SIGNIFICANT IND 70042: NORMAL
SIGNIFICANT IND 70042: NORMAL
SITE SITE: NORMAL
SITE SITE: NORMAL
SOURCE SOURCE: NORMAL
SOURCE SOURCE: NORMAL

## 2023-12-01 NOTE — DISCHARGE SUMMARY
Discharge Summary    CHIEF COMPLAINT ON ADMISSION  Chief Complaint   Patient presents with    Abdominal Pain    N/V    Fever     At home       Reason for Admission  EMS     Admission Date  11/26/2023    CODE STATUS  Full Code    HPI & HOSPITAL COURSE    73-year-old female with a past medical history of hypertension, anxiety, CAD, type 2 diabetes and COPD who presents emergency department on 11/26/2023 with a 6-day history of worsening abdominal pain, dyspnea, cough, nausea and vomiting.  Patient reports that she has been having lower abdominal pain which worsened with drinking water.  She associates symptoms with fever, chills, nausea along with multiple bouts of nonbloody emesis.  She has been having worsening cough along with greenish sputum.  No loss of consciousness.     At the emergency department, vital signs with hypertension with SBP in the 200s.  Patient requiring 2 L maintain saturation (on room air at baseline).  CBC with leukocytosis at 18 and elevated polys.  Chemistry with hypokalemia.  Lactic acid low.  Chest x-ray with no acute cardiopulmonary process per my read.  Chest CTA consistent with emphysema.  Abdominal/pelvic CT with contrast showing Intrahepatic and extra hepatic biliary dilation with common bile duct measuring up to 2.2 cm and history of cholecystectomy.  Patient was admitted for acute hypoxic respiratory failure secondary to COPD exacerbation and possible atypical pneumonia.    Patient was started on IV antibiotics, patient CT and MRCP showing common bile duct dilatation, patient continued having abdominal pain, GI was consulted and recommended CTA abdomen to rule out ischemic bowel, patient will need EUS and ERCP as outpatient as per GI, patient had CTA abdomen and vascular surgery was consulted and recommended follow-up as outpatient, GI recommended CT pancreas which is stable at this time per GI no further intervention and recommended to continue to follow-up with GI as outpatient,  patient has already on appointment with Dr. Preston.  Patient had diarrhea, C. difficile was negative, diarrhea gradually improved and resolved, patient had hypokalemia with potassium 2.9 patient had potassium replacement, patient is feeling much better she is tolerating diet she is afebrile she is not requiring any oxygen she is on room air, patient is eager to go home, order for home health care has been placed and discussed with , at this time patient is going to be discharged home in stable condition, again she needs to follow-up with primary care doctor, and GI, patient has expressed understanding of her plan of care and agree with it request have been answered.          Therefore, she is discharged in good and stable condition to home with organized home healthcare and close outpatient follow-up.    The patient met 2-midnight criteria for an inpatient stay at the time of discharge.    Discharge Date  11/30/2023    FOLLOW UP ITEMS POST DISCHARGE  Primary care physician  GI    DISCHARGE DIAGNOSES  Principal Problem (Resolved):    Acute hypoxemic respiratory failure (HCC) (POA: Yes)  Active Problems:    Anxiety (POA: Yes)    CAD (coronary artery disease) (POA: Yes)    Type 2 diabetes mellitus with complication, without long-term current use of insulin (HCC) (POA: Yes)    Essential hypertension (POA: Yes)    Dilated cbd, acquired (POA: Unknown)  Resolved Problems:    Generalized abdominal pain (POA: Yes)    Diarrhea (POA: Yes)    Acute exacerbation of chronic obstructive pulmonary disease (COPD) (HCC) (POA: Yes)    Leukocytosis (POA: Yes)      FOLLOW UP  Future Appointments   Date Time Provider Department Center   12/7/2023  3:00 PM [facility] None   12/19/2023  2:30 PM Patrick Jones M.D. [facility] None   2/8/2024 10:20 AM Cheryl M. Demucha, A.P.R.N. [facility] Jones M.D.  [address]  [facility] [address]  [facility] [address]  [phone]    Follow up on  12/19/2023  2:30 PM    Cheryl M. Demucha, A.P.R.NRick  [address] [place] [address] [phone]    Follow up in 1 week(s)      Roderick Preston M.D.  [address]  [place] [id number] [phone]    Follow up      [place] [id number] [facility] [address]  [place] [address]  [phone]          MEDICATIONS ON DISCHARGE     Medication List        START taking these medications        Instructions   lactobacillus rhamnosus Caps capsule  Start taking on: December 1, 2023   Take 1 Capsule by mouth every morning with breakfast.  Dose: 1 Capsule            CONTINUE taking these medications        Instructions   albuterol 108 (90 Base) MCG/ACT Aers inhalation aerosol  Commonly known as: ProAir HFA   USE 2 INHALATIONS EVERY 6 HOURS AS NEEDED FOR SHORTNESS OF BREATH     aspirin EC 81 MG Tbec  Commonly known as: Ecotrin   Take 1 Tablet by mouth every day.  Dose: 81 mg     buPROPion 100 MG Tabs  Commonly known as: Wellbutrin   Doctor's comments: Increasing dose from 75 to 100 mg  Take 1 Tablet by mouth 2 times a day.  Dose: 100 mg     clobetasol 0.05 % Oint  Commonly known as: Temovate   Apply 1 Application topically 2 times a day. APPLY TO LEGS  Dose: 1 Application     FREESTYLE LITE strip  Generic drug: glucose blood   Use as directed.patient tests fasting blood sugar every am     ipratropium-albuterol 0.5-2.5 (3) MG/3ML nebulizer solution  Commonly known as: Duoneb   Doctor's comments: Sent the wrong rx last time; should've been for 90 days  USE 1 VIAL (3 ML) VIA NEBULIZER FOUR TIMES A DAY     losartan-hydrochlorothiazide 100-12.5 MG per tablet  Commonly known as: Hyzaar   TAKE 1 TABLET DAILY     metFORMIN  MG Tb24  Commonly known as: Glucophage XR   TAKE 1 TABLET IN THE MORNING AND 2 TABLETS IN THE EVENING     Nebulizers Misc   Doctor's comments: Pt needs tubing and cup for her nebulizer as hers is cracked  Use nebulizer as directed up to 4x day prn sob     nitroglycerin 0.4 MG  Subl  Commonly known as: Nitrostat   Place 1 Tablet under the tongue as needed for Chest Pain (Once every 5 minutes up to 3 doses).  Dose: 0.4 mg     pantoprazole 20 MG tablet  Commonly known as: Protonix   Take 20 mg by mouth every day.  Dose: 20 mg     Trelegy Ellipta 100-62.5-25 mcg/act inhaler  Generic drug: fluticasone-umeclidinium-vilanterol   Doctor's comments: D/c spiriva and wexela d/t ins issue  Inhale 1 Inhalation every day. X 365 days  Dose: 1 Inhalation     vitamin D 2000 UNIT Tabs   Take 2,000 Units by mouth every day.  Dose: 2,000 Units            STOP taking these medications      famotidine 20 MG Tabs  Commonly known as: Pepcid     ibuprofen 200 MG Tabs  Commonly known as: Motrin              Allergies  Allergies   Allergen Reactions    Sulfa Drugs Hives    Ezetimibe Nausea     Diarrhea     Hmg-Coa-R Inhibitors Unspecified     Muscle cramps       DIET  Orders Placed This Encounter   Procedures    Diet Order Diet: Consistent CHO (Diabetic)     Standing Status:   Standing     Number of Occurrences:   1     Order Specific Question:   Diet:     Answer:   Consistent CHO (Diabetic) [4]       ACTIVITY  As tolerated.  Weight bearing as tolerated    CONSULTATIONS  GI  Surgery  Vascular surgery    PROCEDURES  None    LABORATORY  Lab Results   Component Value Date    SODIUM 137 11/30/2023    POTASSIUM 3.2 (L) 11/30/2023    CHLORIDE 101 11/30/2023    CO2 28 11/30/2023    GLUCOSE 92 11/30/2023    BUN 23 (H) 11/30/2023    CREATININE 0.60 11/30/2023        Lab Results   Component Value Date    WBC 12.1 (H) 11/29/2023    HEMOGLOBIN 12.6 11/29/2023    HEMATOCRIT 38.9 11/29/2023    PLATELETCT 327 11/29/2023   Potassium was supplemented before discharge    Total time of the discharge process exceeds 34 minutes.

## 2023-12-01 NOTE — DISCHARGE INSTRUCTIONS
Assessment/Recommendations:  Mid abdominal pain typically middle of the night, gnawing in quality.  Not typical of chronic mesenteric ischemia but did have the moderate stenosis of SMA.  She is a smoker, diabetic with history of CAD.     Ductal dilation intrahepatic and extrahepatic post cholecystectomy with negative MRCP for CBD stone and normal liver tests.  Will need ERCP/EUS evaluation outpatient and I have contacted Dr. Preston re: this.     3.    Moderate stenosis of SMA  4.   COPD  5.   Type II DM  6.   CAD, history of MI and coronary stent  7.   Leukocytosis     MDM:  This 73-year-old female with a past medical history as listed above who presented to [facility] on 11/26/2023 with acute hypoxic respiratory failure. Of note, she is reported chronic mid abdominal pain that is typically in the middle of the night that usually resolves on its own, however, pain had not resolved. Initial imaging showing moderate stenosis of SMA.  She is CTA abdomen pelvis to  showing estimated 75% stenosis of the ostium of the celiac artery, and Estimated greater than 75% stenosis of the proximal superior mesenteric artery with some degree of stenosis extending through its first 3 to 4 cm. Vascular consulted and recommending conservative management at this time.  CT abdomen 11/26/2023 showed intrahepatic and extrahepatic biliary dilation common bile duct measuring up to of 22 mm.  Follow-up MRI confirmed findings with biliary dilation to 15 mm.  Upon chart review, patient with common bile duct measuring 14 mm smoothly tapering a pancreatic head on 5/10/2023.  Patient states that she has appointment with [facility] consultants this upcoming Monday/Tuesday.  She states that GI consultants has been aware of her dilated common bile duct and recommended observation.  Patient seen Dr. Preston in the past.  Reach out to Dr. Preston via [employer] to notify about, bile duct measurements seen inpatient.  CT pancreas protocol was  obtained per his request.  Common bile duct and intrahepatic duct dilation was seen with common bile duct measuring 12 mm.  Hepatomegaly a small hiatal hernia and atherosclerosis were also notable.  Pancreas grossly normal.  Updated Dr. Preston regarding findings via [employer].  Patient may need further evaluation with ERCP or possible EUS but given that her LFTs are normal, this can be done on an outpatient basis and discussed with patient's appointment this upcoming week..      plan:  She will need to follow-up with GI consultants regarding ERCP and EUS.   May de-escalate H2 RA and PPI down to monotherapy.     No further inventions from acute GI team.  GI to sign off.  Please reconsult for any further questions or concerns.     Discussed with patient, nursing, Dr. Preston, Dr. Welsh, Dr. Ballard

## 2023-12-02 NOTE — PROGRESS NOTES
Transitional Care Management  TCM Outreach Date and Time: Filed (12/1/2023  4:02 PM)    Discharge Questions  Actual Discharge Date: 11/30/23  Now that you are home, how are you feeling?: Good  Did you receive any new prescriptions?: Yes  Were you able to get them filled?: Yes  Meds to Bed or Pharmacy filled?: Pharmacy  Do you have any questions about your current medications or new medications (Review Med Rec)?: No  Did you have any durable medical equipment ordered?: No  Do you have a follow up appointment scheduled with your PCP?: No  Was an appointment scheduled for the patient?: No  Any issues or paperwork you wish to discuss with your PCP?: No  Are you (patient) able to get to the appointment?: Yes  Reason not scheduled?: Declines (Patient has appt with GI)  If Home Health was ordered, have they contacted you (Patient): Yes (Home Health saw patient and declined services)  Did you have enough support after your last discharge?: Yes  Does this patient qualify for the CCM program?: No    Transitional Care  Number of attempts made to contact patient: 1  Current or previous attempts competed within two business days of discharge? : Yes  Provided education regarding treatment plan, medications, self-management, ADLs?: Yes  Has patient completed an Advanced Directive?: No  Has the Care Manager's phone number provided?: Yes  Is there anything else I can help you with?: No    Discharge Summary  Chief Complaint: abdominal pain, SOB, cough, SOB  Admitting Diagnosis: Acute hypoxic respiratory failure  Discharge Diagnosis: Acute hypoxic respiratory failure    Patient declines HF appointment with PCP. She has a appointment with GI next week to follow up hospital admission.

## 2023-12-07 ENCOUNTER — APPOINTMENT (OUTPATIENT)
Dept: SLEEP MEDICINE | Facility: MEDICAL CENTER | Age: 73
End: 2023-12-07
Attending: NURSE PRACTITIONER
Payer: MEDICARE

## 2024-01-15 ENCOUNTER — OFFICE VISIT (OUTPATIENT)
Dept: URGENT CARE | Facility: PHYSICIAN GROUP | Age: 74
End: 2024-01-15
Payer: MEDICARE

## 2024-01-15 ENCOUNTER — APPOINTMENT (OUTPATIENT)
Dept: ADMISSIONS | Facility: MEDICAL CENTER | Age: 74
DRG: 444 | End: 2024-01-15
Attending: INTERNAL MEDICINE
Payer: MEDICARE

## 2024-01-15 VITALS
TEMPERATURE: 97.9 F | HEART RATE: 86 BPM | DIASTOLIC BLOOD PRESSURE: 72 MMHG | WEIGHT: 169 LBS | BODY MASS INDEX: 29.95 KG/M2 | SYSTOLIC BLOOD PRESSURE: 130 MMHG | RESPIRATION RATE: 18 BRPM | HEIGHT: 63 IN | OXYGEN SATURATION: 95 %

## 2024-01-15 DIAGNOSIS — J44.1 COPD EXACERBATION (HCC): ICD-10-CM

## 2024-01-15 PROCEDURE — 99214 OFFICE O/P EST MOD 30 MIN: CPT | Performed by: FAMILY MEDICINE

## 2024-01-15 PROCEDURE — 3078F DIAST BP <80 MM HG: CPT | Performed by: FAMILY MEDICINE

## 2024-01-15 PROCEDURE — 3075F SYST BP GE 130 - 139MM HG: CPT | Performed by: FAMILY MEDICINE

## 2024-01-15 RX ORDER — AMOXICILLIN AND CLAVULANATE POTASSIUM 875; 125 MG/1; MG/1
1 TABLET, FILM COATED ORAL 2 TIMES DAILY
Qty: 14 TABLET | Refills: 0 | Status: SHIPPED | OUTPATIENT
Start: 2024-01-15 | End: 2024-01-22

## 2024-01-15 RX ORDER — LACTOBACILLUS RHAMNOSUS GG 15B CELL
CAPSULE, SPRINKLE ORAL
COMMUNITY
Start: 2023-11-30 | End: 2024-01-17

## 2024-01-15 RX ORDER — BENZONATATE 100 MG/1
100 CAPSULE ORAL 3 TIMES DAILY PRN
Qty: 60 CAPSULE | Refills: 0 | Status: SHIPPED | OUTPATIENT
Start: 2024-01-15 | End: 2024-02-08

## 2024-01-15 RX ORDER — METHYLPREDNISOLONE 4 MG/1
TABLET ORAL
Qty: 21 TABLET | Refills: 0 | Status: SHIPPED | OUTPATIENT
Start: 2024-01-15 | End: 2024-02-08

## 2024-01-15 RX ORDER — AZITHROMYCIN 250 MG/1
TABLET, FILM COATED ORAL
Qty: 6 TABLET | Refills: 0 | Status: SHIPPED | OUTPATIENT
Start: 2024-01-15 | End: 2024-02-08

## 2024-01-15 ASSESSMENT — FIBROSIS 4 INDEX: FIB4 SCORE: 0.65

## 2024-01-15 NOTE — PROGRESS NOTES
"      Chief Complaint   Patient presents with    Cough     Sx nov 30th getting worse. Wheezing, Yellow mucous production          Cough  This is a new problem. The current episode started 1 mth  ago. The problem has been gradually worsening. The problem occurs constantly. The cough is productive of sputum. Associated symptoms include: low grade fever, wheezing. Pertinent negatives include no   headaches, sweats, weight loss. Nothing aggravates the symptoms.  Patient has tried albuterol, atrovent for the symptoms - minor improvement.   Has hx of COPD         Social History     Tobacco Use    Smoking status: Every Day     Current packs/day: 0.50     Types: Cigarettes    Smokeless tobacco: Never   Vaping Use    Vaping Use: Never used   Substance Use Topics    Alcohol use: No    Drug use: Not Currently     Types: Marijuana     Comment: CBD  occ           Past Medical History:   Diagnosis Date    Abnormal screening cardiac CT 2/14/2020    Anxiety     Carotid arterial disease (HCC)     COPD (chronic obstructive pulmonary disease) (HCC)     Diabetes (HCC)     HTN (hypertension)     Hyperlipidemia     Tobacco use          Family History   Problem Relation Age of Onset    Cancer Mother 47        breast           Review of Systems   Constitutional: Negative for fever and weight loss.   HENT: negative for ear pain.    Cardiovascular - denies chest pain or dyspnea  Respiratory: Positive for cough.  Cough is productive.  Negative for wheezing.    Neurological: Negative for headaches.   GI - denies nausea, vomiting or diarrhea  Neuro - denies numbness or tingling.            Objective:     /72   Pulse 86   Temp 36.6 °C (97.9 °F) (Temporal)   Resp 18   Ht 1.6 m (5' 3\")   Wt 76.7 kg (169 lb)   SpO2 95%       Physical Exam   Constitutional: patient is oriented to person, place, and time. Patient appears well-developed and well-nourished. No distress.   HENT:   Head: Normocephalic and atraumatic.   Right Ear: External ear " normal.   Left Ear: External ear normal.   Nose: Mucosal edema and rhinorrhea present. Right sinus exhibits no maxillary sinus tenderness. Left sinus exhibits no maxillary sinus tenderness.   Mouth/Throat: Mucous membranes are normal. No oral lesions. Posterior oropharyngeal erythema present. No oropharyngeal exudate or posterior oropharyngeal edema.   Eyes: Conjunctivae and EOM are normal. Pupils are equal, round, and reactive to light. Right eye exhibits no discharge. Left eye exhibits no discharge. No scleral icterus.   Neck: Normal range of motion. Neck supple. No tracheal deviation present.   Cardiovascular: Normal rate, regular rhythm and normal heart sounds.  Exam reveals no friction rub.    Pulmonary/Chest: Effort normal. No respiratory distress.  Patient has rhonchi and wheezing. Patient has no rales.    Musculoskeletal:  exhibits no edema.   Lymphadenopathy:     Patient has cervical adenopathy.   Neurological: patient is alert and oriented to person, place, and time.   Skin: Skin is warm and dry. No rash noted. No erythema.   Psychiatric: patient  has a normal mood and affect.  behavior is normal.   Nursing note and vitals reviewed.              Assessment/Plan:      1. COPD exacerbation (HCC)     - benzonatate (TESSALON) 100 MG Cap; Take 1 Capsule by mouth 3 times a day as needed for Cough.  Dispense: 60 Capsule; Refill: 0  - amoxicillin-clavulanate (AUGMENTIN) 875-125 MG Tab; Take 1 Tablet by mouth 2 times a day for 7 days.  Dispense: 14 Tablet; Refill: 0  - azithromycin (ZITHROMAX) 250 MG Tab; Take as directed  Dispense: 6 Tablet; Refill: 0  - methylPREDNISolone (MEDROL DOSEPAK) 4 MG Tablet Therapy Pack; Follow schedule on package instructions.  Dispense: 21 Tablet; Refill: 0      Differential diagnosis, natural history, supportive care, and indications for immediate follow-up discussed. All questions answered. Patient agrees with the plan of care.     Follow-up as needed if symptoms worsen or fail to  improve to PCP, Urgent care or Emergency Room.     I have personally reviewed prior external notes and test results pertinent to today's visit.  I have independently reviewed and interpreted all diagnostics ordered during this urgent care acute visit.          Supportive care, differential diagnoses, and indications for immediate follow-up discussed with patient.   Pathogenesis of diagnosis discussed including typical length and natural progression.   Instructed to return to clinic or nearest emergency department for any change in condition, further concerns, or worsening of symptoms.  Patient states understanding of the plan of care and discharge instructions.

## 2024-01-17 ENCOUNTER — PRE-ADMISSION TESTING (OUTPATIENT)
Dept: ADMISSIONS | Facility: MEDICAL CENTER | Age: 74
DRG: 444 | End: 2024-01-17
Attending: INTERNAL MEDICINE
Payer: MEDICARE

## 2024-01-17 VITALS — BODY MASS INDEX: 29.94 KG/M2 | HEIGHT: 63 IN

## 2024-01-17 DIAGNOSIS — Z01.812 PRE-OPERATIVE LABORATORY EXAMINATION: ICD-10-CM

## 2024-01-17 RX ORDER — ACETAMINOPHEN 325 MG/1
650 TABLET ORAL EVERY 4 HOURS PRN
Status: ON HOLD | COMMUNITY
End: 2024-02-11

## 2024-01-17 NOTE — OR NURSING
PreAdmit Telephone Appointment: Reviewed the Preparing for your procedure handout with patient over the phone. Patient instructed per pharmacy guidelines regarding taking, holding or contacting provider for instructions on regularly prescribed medications before surgery. Instructed to take the following medications the day of surgery with a sip of water per pharmacy medication guidelines: Wellbutrin, Trelegy Ellipta inhaler, Protonix, Duoneb inhaler, Albuterol if needed, Tylenol if needed. Instructed to hold Hyzaar 24hrs prior to surgery. Hold Metformin DOS. Hold vitamins/Supplements & days prior to procedure.      Confirmed with patient where to check in morning of surgery. Handouts/Brochure/Video emailed to patient.    Pt states she will get labs done at [facility] week of 1/22.    Pt states she is currently sick with Pneumonia and is on antibiotics and has been improving. States she was hospitalized. Instructed to inform Dr Mars office if any symptoms worsen or not improving.

## 2024-01-29 ENCOUNTER — HOSPITAL ENCOUNTER (OUTPATIENT)
Dept: LAB | Facility: MEDICAL CENTER | Age: 74
End: 2024-01-29
Attending: INTERNAL MEDICINE
Payer: MEDICARE

## 2024-01-29 DIAGNOSIS — F33.41 RECURRENT MAJOR DEPRESSIVE DISORDER, IN PARTIAL REMISSION (HCC): ICD-10-CM

## 2024-01-29 DIAGNOSIS — Z01.812 PRE-OPERATIVE LABORATORY EXAMINATION: ICD-10-CM

## 2024-01-29 LAB
ANION GAP SERPL CALC-SCNC: 16 MMOL/L (ref 7–16)
BUN SERPL-MCNC: 16 MG/DL (ref 8–22)
CALCIUM SERPL-MCNC: 10 MG/DL (ref 8.5–10.5)
CHLORIDE SERPL-SCNC: 102 MMOL/L (ref 96–112)
CO2 SERPL-SCNC: 24 MMOL/L (ref 20–33)
CREAT SERPL-MCNC: 0.56 MG/DL (ref 0.5–1.4)
ERYTHROCYTE [DISTWIDTH] IN BLOOD BY AUTOMATED COUNT: 46.5 FL (ref 35.9–50)
EST. AVERAGE GLUCOSE BLD GHB EST-MCNC: 174 MG/DL
GFR SERPLBLD CREATININE-BSD FMLA CKD-EPI: 96 ML/MIN/1.73 M 2
GLUCOSE SERPL-MCNC: 159 MG/DL (ref 65–99)
HBA1C MFR BLD: 7.7 % (ref 4–5.6)
HCT VFR BLD AUTO: 43 % (ref 37–47)
HGB BLD-MCNC: 13.6 G/DL (ref 12–16)
MCH RBC QN AUTO: 27.7 PG (ref 27–33)
MCHC RBC AUTO-ENTMCNC: 31.6 G/DL (ref 32.2–35.5)
MCV RBC AUTO: 87.6 FL (ref 81.4–97.8)
PLATELET # BLD AUTO: 420 K/UL (ref 164–446)
PMV BLD AUTO: 9.7 FL (ref 9–12.9)
POTASSIUM SERPL-SCNC: 4.6 MMOL/L (ref 3.6–5.5)
RBC # BLD AUTO: 4.91 M/UL (ref 4.2–5.4)
SODIUM SERPL-SCNC: 142 MMOL/L (ref 135–145)
WBC # BLD AUTO: 10.6 K/UL (ref 4.8–10.8)

## 2024-01-29 PROCEDURE — 80048 BASIC METABOLIC PNL TOTAL CA: CPT

## 2024-01-29 PROCEDURE — 36415 COLL VENOUS BLD VENIPUNCTURE: CPT

## 2024-01-29 PROCEDURE — 85027 COMPLETE CBC AUTOMATED: CPT | Mod: GA

## 2024-01-29 PROCEDURE — 83036 HEMOGLOBIN GLYCOSYLATED A1C: CPT | Mod: GA

## 2024-01-30 RX ORDER — BUPROPION HYDROCHLORIDE 100 MG/1
TABLET ORAL
Qty: 180 TABLET | Refills: 3 | Status: SHIPPED | OUTPATIENT
Start: 2024-01-30

## 2024-01-30 NOTE — TELEPHONE ENCOUNTER
Requested Prescriptions     Pending Prescriptions Disp Refills    buPROPion (WELLBUTRIN) 100 MG Tab [Pharmacy Med Name: BUPROPION HCL TABS 100MG] 180 Tablet 3     Sig: TAKE 1 TABLET TWICE A DAY (DOSE INCREASED)      Last office visit: 8/8/23  Last lab: 1/29/24

## 2024-02-08 ENCOUNTER — OFFICE VISIT (OUTPATIENT)
Dept: MEDICAL GROUP | Facility: PHYSICIAN GROUP | Age: 74
End: 2024-02-08
Payer: MEDICARE

## 2024-02-08 VITALS
WEIGHT: 168.4 LBS | HEART RATE: 96 BPM | SYSTOLIC BLOOD PRESSURE: 126 MMHG | DIASTOLIC BLOOD PRESSURE: 70 MMHG | OXYGEN SATURATION: 95 % | RESPIRATION RATE: 18 BRPM | TEMPERATURE: 97.3 F | BODY MASS INDEX: 29.84 KG/M2 | HEIGHT: 63 IN

## 2024-02-08 DIAGNOSIS — Z76.0 MEDICATION REFILL: ICD-10-CM

## 2024-02-08 DIAGNOSIS — J44.9 CHRONIC OBSTRUCTIVE PULMONARY DISEASE, UNSPECIFIED COPD TYPE (HCC): ICD-10-CM

## 2024-02-08 DIAGNOSIS — E78.2 MIXED HYPERLIPIDEMIA: ICD-10-CM

## 2024-02-08 DIAGNOSIS — I10 ESSENTIAL HYPERTENSION: ICD-10-CM

## 2024-02-08 DIAGNOSIS — Z09 HOSPITAL DISCHARGE FOLLOW-UP: ICD-10-CM

## 2024-02-08 DIAGNOSIS — E11.8 TYPE 2 DIABETES MELLITUS WITH COMPLICATION, WITHOUT LONG-TERM CURRENT USE OF INSULIN (HCC): ICD-10-CM

## 2024-02-08 PROCEDURE — 3074F SYST BP LT 130 MM HG: CPT | Performed by: NURSE PRACTITIONER

## 2024-02-08 PROCEDURE — 99214 OFFICE O/P EST MOD 30 MIN: CPT | Performed by: NURSE PRACTITIONER

## 2024-02-08 PROCEDURE — 3078F DIAST BP <80 MM HG: CPT | Performed by: NURSE PRACTITIONER

## 2024-02-08 RX ORDER — IPRATROPIUM BROMIDE AND ALBUTEROL SULFATE 2.5; .5 MG/3ML; MG/3ML
SOLUTION RESPIRATORY (INHALATION)
Qty: 360 EACH | Refills: 3 | Status: SHIPPED | OUTPATIENT
Start: 2024-02-08

## 2024-02-08 RX ORDER — LOSARTAN POTASSIUM AND HYDROCHLOROTHIAZIDE 12.5; 1 MG/1; MG/1
1 TABLET ORAL DAILY
Qty: 90 TABLET | Refills: 3 | Status: SHIPPED | OUTPATIENT
Start: 2024-02-08 | End: 2024-03-19

## 2024-02-08 ASSESSMENT — PATIENT HEALTH QUESTIONNAIRE - PHQ9
9. THOUGHTS THAT YOU WOULD BE BETTER OFF DEAD, OR OF HURTING YOURSELF: NOT AT ALL
7. TROUBLE CONCENTRATING ON THINGS, SUCH AS READING THE NEWSPAPER OR WATCHING TELEVISION: NOT AT ALL
1. LITTLE INTEREST OR PLEASURE IN DOING THINGS: NEARLY EVERY DAY
8. MOVING OR SPEAKING SO SLOWLY THAT OTHER PEOPLE COULD HAVE NOTICED. OR THE OPPOSITE, BEING SO FIGETY OR RESTLESS THAT YOU HAVE BEEN MOVING AROUND A LOT MORE THAN USUAL: NOT AT ALL
5. POOR APPETITE OR OVEREATING: NEARLY EVERY DAY
6. FEELING BAD ABOUT YOURSELF - OR THAT YOU ARE A FAILURE OR HAVE LET YOURSELF OR YOUR FAMILY DOWN: NOT AL ALL
2. FEELING DOWN, DEPRESSED, IRRITABLE, OR HOPELESS: NEARLY EVERY DAY
SUM OF ALL RESPONSES TO PHQ QUESTIONS 1-9: 13
4. FEELING TIRED OR HAVING LITTLE ENERGY: MORE THAN HALF THE DAYS
3. TROUBLE FALLING OR STAYING ASLEEP OR SLEEPING TOO MUCH: MORE THAN HALF THE DAYS
SUM OF ALL RESPONSES TO PHQ9 QUESTIONS 1 AND 2: 6

## 2024-02-08 ASSESSMENT — FIBROSIS 4 INDEX: FIB4 SCORE: 0.51

## 2024-02-08 NOTE — ASSESSMENT & PLAN NOTE
11/26 ER visit  for abd pain/nausea has procedure ERCP scheduled tomorrow w/f/up in near future   See notes below:     Patient was started on IV antibiotics, patient CT and MRCP showing common bile duct dilatation, patient continued having abdominal pain, GI was consulted and recommended CTA abdomen to rule out ischemic bowel, patient will need EUS and ERCP as outpatient as per GI, patient had CTA abdomen and vascular surgery was consulted and recommended follow-up as outpatient, GI recommended CT pancreas which is stable at this time per GI no further intervention and recommended to continue to follow-up with GI as outpatient, patient has already on appointment with Dr. Preston.    Patient had diarrhea, C. difficile was negative, diarrhea gradually improved and resolved, patient had hypokalemia with potassium 2.9 patient had potassium replacement, patient is feeling much better she is tolerating diet she is afebrile she is not requiring any oxygen she is on room air, patient is eager to go home, order for home health care has been placed and discussed with , at this time patient is going to be discharged home in stable condition, again she needs to follow-up with primary care doctor, and GI, patient has expressed understanding of her plan of care and agree with it request have been answered.

## 2024-02-08 NOTE — PROGRESS NOTES
Subjective:     CC:   Chief Complaint   Patient presents with    Diabetes Follow-up       HPI:   Jigna presents today with    Hospital discharge follow-up  11/26 ER visit  for abd pain/nausea has procedure ERCP scheduled tomorrow w/f/up in near future   See notes below:     Patient was started on IV antibiotics, patient CT and MRCP showing common bile duct dilatation, patient continued having abdominal pain, GI was consulted and recommended CTA abdomen to rule out ischemic bowel, patient will need EUS and ERCP as outpatient as per GI, patient had CTA abdomen and vascular surgery was consulted and recommended follow-up as outpatient, GI recommended CT pancreas which is stable at this time per GI no further intervention and recommended to continue to follow-up with GI as outpatient, patient has already on appointment with Dr. Preston.    Patient had diarrhea, C. difficile was negative, diarrhea gradually improved and resolved, patient had hypokalemia with potassium 2.9 patient had potassium replacement, patient is feeling much better she is tolerating diet she is afebrile she is not requiring any oxygen she is on room air, patient is eager to go home, order for home health care has been placed and discussed with , at this time patient is going to be discharged home in stable condition, again she needs to follow-up with primary care doctor, and GI, patient has expressed understanding of her plan of care and agree with it request have been answered.             Type 2 diabetes mellitus with complication, without long-term current use of insulin (HCC)  A1c has increased from 7 to 7.7  Craves sweets after eating  Likes cookies, donuts      Medication refill  Needs refill of several meds today        HCC Gap Form    Last edited 02/08/24 11:37 PST by Cheryl M. Demucha, A.P.R.N. ROS per HPI    Objective:     Exam:  /70 (BP Location: Right arm, Patient Position: Sitting, BP Cuff Size: Small adult)   " Pulse 96   Temp 36.3 °C (97.3 °F) (Temporal)   Resp 18   Ht 1.6 m (5' 3\")   Wt 76.4 kg (168 lb 6.4 oz)   SpO2 95%   BMI 29.83 kg/m²  Body mass index is 29.83 kg/m².    Physical Exam:  Constitutional: Well-developed and well-nourished female. Not diaphoretic. No distress.   Skin: warm, dry, intact, no evidence of rash or concerning lesions  Head: Atraumatic without lesions.  Eyes: Conjunctivae are normal. Pupils are equal, round. No scleral icterus.   Ears:  External ears unremarkable.   Neck: Supple, trachea midline. No thyromegaly present. No cervical or supraclavicular lymphadenopathy.  Cardiovascular: Regular rate and rhythm without murmur.   Pulmonary: Clear to ausculation. Normal effort. No rales, ronchi, or wheezing.  Extremities: No cyanosis, clubbing, erythema, nor edema.   Neurological: Alert and oriented x 3.   Psychiatric:  Behavior, mood, and affect are appropriate.        Assessment & Plan:     73 y.o. female with the following -     1. Hospital discharge follow-up    2. Type 2 diabetes mellitus with complication, without long-term current use of insulin ([facility])  Pt to start metformin to her procedure also recommended the supplement Cinsulin to help reduce sugar cravings  - metformin (GLUCOPHAGE) 1000 MG tablet; Take 1 Tablet by mouth 2 times a day with meals.  Dispense: 180 Tablet; Refill: 3  - Microalbumin Creat Ratio Urine (Lab Collect); Future  - HEMOGLOBIN A1C; Future    3. Chronic obstructive pulmonary disease, unspecified COPD type ([facility])  - ipratropium-albuterol (DUONEB) 0.5-2.5 (3) MG/3ML nebulizer solution; USE 1 VIAL (3 ML) VIA NEBULIZER FOUR TIMES A DAY  Dispense: 360 Each; Refill: 3    4. Essential hypertension  - losartan-hydrochlorothiazide (HYZAAR) 100-12.5 MG per tablet; Take 1 Tablet by mouth every day.  Dispense: 90 Tablet; Refill: 3    5. Mixed hyperlipidemia  - Lipid Profile; Future    6. Medication refill   See above       Return in about 3 months (around 5/22/2024) for lab " results.    Please note that this dictation was created using voice recognition software. I have made every reasonable attempt to correct obvious errors, but I expect that there are errors of grammar and possibly content that I did not discover before finalizing the note.

## 2024-02-09 ENCOUNTER — APPOINTMENT (OUTPATIENT)
Dept: RADIOLOGY | Facility: MEDICAL CENTER | Age: 74
DRG: 444 | End: 2024-02-09
Attending: INTERNAL MEDICINE
Payer: MEDICARE

## 2024-02-09 ENCOUNTER — ANESTHESIA EVENT (OUTPATIENT)
Dept: SURGERY | Facility: MEDICAL CENTER | Age: 74
DRG: 444 | End: 2024-02-09
Payer: MEDICARE

## 2024-02-09 ENCOUNTER — HOSPITAL ENCOUNTER (OUTPATIENT)
Facility: MEDICAL CENTER | Age: 74
DRG: 444 | End: 2024-02-09
Attending: INTERNAL MEDICINE | Admitting: INTERNAL MEDICINE
Payer: MEDICARE

## 2024-02-09 ENCOUNTER — ANESTHESIA (OUTPATIENT)
Dept: SURGERY | Facility: MEDICAL CENTER | Age: 74
DRG: 444 | End: 2024-02-09
Payer: MEDICARE

## 2024-02-09 VITALS
SYSTOLIC BLOOD PRESSURE: 151 MMHG | HEIGHT: 63 IN | TEMPERATURE: 97.3 F | WEIGHT: 166.01 LBS | HEART RATE: 92 BPM | OXYGEN SATURATION: 93 % | BODY MASS INDEX: 29.41 KG/M2 | DIASTOLIC BLOOD PRESSURE: 80 MMHG | RESPIRATION RATE: 16 BRPM

## 2024-02-09 LAB
GLUCOSE BLD STRIP.AUTO-MCNC: 138 MG/DL (ref 65–99)
PATHOLOGY CONSULT NOTE: NORMAL

## 2024-02-09 PROCEDURE — 160035 HCHG PACU - 1ST 60 MINS PHASE I: Performed by: INTERNAL MEDICINE

## 2024-02-09 PROCEDURE — 160208 HCHG ENDO MINUTES - EA ADDL 1 MIN LEVEL 4: Performed by: INTERNAL MEDICINE

## 2024-02-09 PROCEDURE — 700102 HCHG RX REV CODE 250 W/ 637 OVERRIDE(OP): Performed by: ANESTHESIOLOGY

## 2024-02-09 PROCEDURE — 88312 SPECIAL STAINS GROUP 1: CPT

## 2024-02-09 PROCEDURE — 160048 HCHG OR STATISTICAL LEVEL 1-5: Performed by: INTERNAL MEDICINE

## 2024-02-09 PROCEDURE — C1769 GUIDE WIRE: HCPCS | Performed by: INTERNAL MEDICINE

## 2024-02-09 PROCEDURE — 88305 TISSUE EXAM BY PATHOLOGIST: CPT | Mod: 59

## 2024-02-09 PROCEDURE — A9270 NON-COVERED ITEM OR SERVICE: HCPCS | Performed by: ANESTHESIOLOGY

## 2024-02-09 PROCEDURE — 700111 HCHG RX REV CODE 636 W/ 250 OVERRIDE (IP): Performed by: ANESTHESIOLOGY

## 2024-02-09 PROCEDURE — 700101 HCHG RX REV CODE 250: Performed by: ANESTHESIOLOGY

## 2024-02-09 PROCEDURE — 0DJ08ZZ INSPECTION OF UPPER INTESTINAL TRACT, VIA NATURAL OR ARTIFICIAL OPENING ENDOSCOPIC: ICD-10-PCS | Performed by: INTERNAL MEDICINE

## 2024-02-09 PROCEDURE — 0FBC8ZX EXCISION OF AMPULLA OF VATER, VIA NATURAL OR ARTIFICIAL OPENING ENDOSCOPIC, DIAGNOSTIC: ICD-10-PCS | Performed by: INTERNAL MEDICINE

## 2024-02-09 PROCEDURE — 0DB68ZX EXCISION OF STOMACH, VIA NATURAL OR ARTIFICIAL OPENING ENDOSCOPIC, DIAGNOSTIC: ICD-10-PCS | Performed by: INTERNAL MEDICINE

## 2024-02-09 PROCEDURE — 82962 GLUCOSE BLOOD TEST: CPT

## 2024-02-09 PROCEDURE — 160036 HCHG PACU - EA ADDL 30 MINS PHASE I: Performed by: INTERNAL MEDICINE

## 2024-02-09 PROCEDURE — 160046 HCHG PACU - 1ST 60 MINS PHASE II: Performed by: INTERNAL MEDICINE

## 2024-02-09 PROCEDURE — 160009 HCHG ANES TIME/MIN: Performed by: INTERNAL MEDICINE

## 2024-02-09 PROCEDURE — 700105 HCHG RX REV CODE 258: Performed by: INTERNAL MEDICINE

## 2024-02-09 PROCEDURE — 160002 HCHG RECOVERY MINUTES (STAT): Performed by: INTERNAL MEDICINE

## 2024-02-09 PROCEDURE — 0DB98ZX EXCISION OF DUODENUM, VIA NATURAL OR ARTIFICIAL OPENING ENDOSCOPIC, DIAGNOSTIC: ICD-10-PCS | Performed by: INTERNAL MEDICINE

## 2024-02-09 PROCEDURE — 160025 RECOVERY II MINUTES (STATS): Performed by: INTERNAL MEDICINE

## 2024-02-09 PROCEDURE — 160203 HCHG ENDO MINUTES - 1ST 30 MINS LEVEL 4: Performed by: INTERNAL MEDICINE

## 2024-02-09 RX ORDER — ACETAMINOPHEN 325 MG/1
650 TABLET ORAL ONCE
Status: COMPLETED | OUTPATIENT
Start: 2024-02-09 | End: 2024-02-09

## 2024-02-09 RX ORDER — HALOPERIDOL 5 MG/ML
1 INJECTION INTRAMUSCULAR
Status: DISCONTINUED | OUTPATIENT
Start: 2024-02-09 | End: 2024-02-09 | Stop reason: HOSPADM

## 2024-02-09 RX ORDER — OXYCODONE HCL 5 MG/5 ML
5 SOLUTION, ORAL ORAL
Status: DISCONTINUED | OUTPATIENT
Start: 2024-02-09 | End: 2024-02-09 | Stop reason: HOSPADM

## 2024-02-09 RX ORDER — EPHEDRINE SULFATE 50 MG/ML
5 INJECTION, SOLUTION INTRAVENOUS
Status: DISCONTINUED | OUTPATIENT
Start: 2024-02-09 | End: 2024-02-09 | Stop reason: HOSPADM

## 2024-02-09 RX ORDER — LIDOCAINE HYDROCHLORIDE 40 MG/ML
SOLUTION TOPICAL PRN
Status: DISCONTINUED | OUTPATIENT
Start: 2024-02-09 | End: 2024-02-09 | Stop reason: SURG

## 2024-02-09 RX ORDER — SODIUM CHLORIDE, SODIUM LACTATE, POTASSIUM CHLORIDE, CALCIUM CHLORIDE 600; 310; 30; 20 MG/100ML; MG/100ML; MG/100ML; MG/100ML
INJECTION, SOLUTION INTRAVENOUS CONTINUOUS
Status: DISCONTINUED | OUTPATIENT
Start: 2024-02-09 | End: 2024-02-09 | Stop reason: HOSPADM

## 2024-02-09 RX ORDER — METOPROLOL TARTRATE 1 MG/ML
1 INJECTION, SOLUTION INTRAVENOUS
Status: DISCONTINUED | OUTPATIENT
Start: 2024-02-09 | End: 2024-02-09 | Stop reason: HOSPADM

## 2024-02-09 RX ORDER — ONDANSETRON 2 MG/ML
4 INJECTION INTRAMUSCULAR; INTRAVENOUS
Status: DISCONTINUED | OUTPATIENT
Start: 2024-02-09 | End: 2024-02-09 | Stop reason: HOSPADM

## 2024-02-09 RX ORDER — ROCURONIUM BROMIDE 10 MG/ML
INJECTION, SOLUTION INTRAVENOUS PRN
Status: DISCONTINUED | OUTPATIENT
Start: 2024-02-09 | End: 2024-02-09 | Stop reason: SURG

## 2024-02-09 RX ORDER — LIDOCAINE HYDROCHLORIDE 20 MG/ML
INJECTION, SOLUTION EPIDURAL; INFILTRATION; INTRACAUDAL; PERINEURAL PRN
Status: DISCONTINUED | OUTPATIENT
Start: 2024-02-09 | End: 2024-02-09 | Stop reason: SURG

## 2024-02-09 RX ORDER — AMOXICILLIN AND CLAVULANATE POTASSIUM 875; 125 MG/1; MG/1
1 TABLET, FILM COATED ORAL 2 TIMES DAILY
Status: ON HOLD | COMMUNITY
End: 2024-02-16

## 2024-02-09 RX ORDER — AZITHROMYCIN 250 MG/1
250 TABLET, FILM COATED ORAL DAILY
Status: ON HOLD | COMMUNITY
End: 2024-02-16

## 2024-02-09 RX ORDER — OXYCODONE HCL 5 MG/5 ML
10 SOLUTION, ORAL ORAL
Status: DISCONTINUED | OUTPATIENT
Start: 2024-02-09 | End: 2024-02-09 | Stop reason: HOSPADM

## 2024-02-09 RX ORDER — MIDAZOLAM HYDROCHLORIDE 1 MG/ML
INJECTION INTRAMUSCULAR; INTRAVENOUS PRN
Status: DISCONTINUED | OUTPATIENT
Start: 2024-02-09 | End: 2024-02-09 | Stop reason: SURG

## 2024-02-09 RX ORDER — HYDROMORPHONE HYDROCHLORIDE 1 MG/ML
0.2 INJECTION, SOLUTION INTRAMUSCULAR; INTRAVENOUS; SUBCUTANEOUS
Status: DISCONTINUED | OUTPATIENT
Start: 2024-02-09 | End: 2024-02-09 | Stop reason: HOSPADM

## 2024-02-09 RX ORDER — HYDROMORPHONE HYDROCHLORIDE 1 MG/ML
0.1 INJECTION, SOLUTION INTRAMUSCULAR; INTRAVENOUS; SUBCUTANEOUS
Status: DISCONTINUED | OUTPATIENT
Start: 2024-02-09 | End: 2024-02-09 | Stop reason: HOSPADM

## 2024-02-09 RX ORDER — DIPHENHYDRAMINE HYDROCHLORIDE 50 MG/ML
12.5 INJECTION INTRAMUSCULAR; INTRAVENOUS
Status: DISCONTINUED | OUTPATIENT
Start: 2024-02-09 | End: 2024-02-09 | Stop reason: HOSPADM

## 2024-02-09 RX ORDER — HYDROMORPHONE HYDROCHLORIDE 1 MG/ML
0.4 INJECTION, SOLUTION INTRAMUSCULAR; INTRAVENOUS; SUBCUTANEOUS
Status: DISCONTINUED | OUTPATIENT
Start: 2024-02-09 | End: 2024-02-09 | Stop reason: HOSPADM

## 2024-02-09 RX ORDER — DEXAMETHASONE SODIUM PHOSPHATE 4 MG/ML
INJECTION, SOLUTION INTRA-ARTICULAR; INTRALESIONAL; INTRAMUSCULAR; INTRAVENOUS; SOFT TISSUE PRN
Status: DISCONTINUED | OUTPATIENT
Start: 2024-02-09 | End: 2024-02-09 | Stop reason: SURG

## 2024-02-09 RX ORDER — ONDANSETRON 2 MG/ML
INJECTION INTRAMUSCULAR; INTRAVENOUS PRN
Status: DISCONTINUED | OUTPATIENT
Start: 2024-02-09 | End: 2024-02-09 | Stop reason: SURG

## 2024-02-09 RX ORDER — BENZONATATE 100 MG/1
100 CAPSULE ORAL 3 TIMES DAILY PRN
COMMUNITY
End: 2024-02-21

## 2024-02-09 RX ORDER — HYDRALAZINE HYDROCHLORIDE 20 MG/ML
5 INJECTION INTRAMUSCULAR; INTRAVENOUS
Status: DISCONTINUED | OUTPATIENT
Start: 2024-02-09 | End: 2024-02-09 | Stop reason: HOSPADM

## 2024-02-09 RX ADMIN — DEXAMETHASONE SODIUM PHOSPHATE 8 MG: 4 INJECTION INTRA-ARTICULAR; INTRALESIONAL; INTRAMUSCULAR; INTRAVENOUS; SOFT TISSUE at 11:33

## 2024-02-09 RX ADMIN — LIDOCAINE HYDROCHLORIDE 50 MG: 20 INJECTION, SOLUTION EPIDURAL; INFILTRATION; INTRACAUDAL at 11:19

## 2024-02-09 RX ADMIN — PROPOFOL 20 MG: 10 INJECTION, EMULSION INTRAVENOUS at 11:31

## 2024-02-09 RX ADMIN — ONDANSETRON 4 MG: 2 INJECTION INTRAMUSCULAR; INTRAVENOUS at 11:34

## 2024-02-09 RX ADMIN — ACETAMINOPHEN 650 MG: 325 TABLET ORAL at 12:45

## 2024-02-09 RX ADMIN — LIDOCAINE HYDROCHLORIDE 40 MG: 20 INJECTION, SOLUTION EPIDURAL; INFILTRATION; INTRACAUDAL at 11:59

## 2024-02-09 RX ADMIN — PROPOFOL 10 MG: 10 INJECTION, EMULSION INTRAVENOUS at 11:54

## 2024-02-09 RX ADMIN — SODIUM CHLORIDE, POTASSIUM CHLORIDE, SODIUM LACTATE AND CALCIUM CHLORIDE: 600; 310; 30; 20 INJECTION, SOLUTION INTRAVENOUS at 10:45

## 2024-02-09 RX ADMIN — PROPOFOL 15 MG: 10 INJECTION, EMULSION INTRAVENOUS at 11:38

## 2024-02-09 RX ADMIN — FENTANYL CITRATE 50 MCG: 50 INJECTION, SOLUTION INTRAMUSCULAR; INTRAVENOUS at 11:51

## 2024-02-09 RX ADMIN — MIDAZOLAM HYDROCHLORIDE 2 MG: 1 INJECTION, SOLUTION INTRAMUSCULAR; INTRAVENOUS at 11:17

## 2024-02-09 RX ADMIN — FENTANYL CITRATE 50 MCG: 50 INJECTION, SOLUTION INTRAMUSCULAR; INTRAVENOUS at 11:41

## 2024-02-09 RX ADMIN — LIDOCAINE HYDROCHLORIDE 4 ML: 40 SOLUTION TOPICAL at 11:21

## 2024-02-09 RX ADMIN — PROPOFOL 115 MG: 10 INJECTION, EMULSION INTRAVENOUS at 11:19

## 2024-02-09 RX ADMIN — ROCURONIUM BROMIDE 33 MG: 50 INJECTION, SOLUTION INTRAVENOUS at 11:19

## 2024-02-09 ASSESSMENT — PAIN DESCRIPTION - PAIN TYPE
TYPE: SURGICAL PAIN
TYPE: ACUTE PAIN

## 2024-02-09 ASSESSMENT — FIBROSIS 4 INDEX: FIB4 SCORE: 0.51

## 2024-02-09 NOTE — ANESTHESIA PREPROCEDURE EVALUATION
Case: [id number] Date/Time: 02/09/24 1115    Procedures:       UPPER ENDOSCOPIC ULTRASOUND WITH FINE NEEDLE ASPIRATION WITH POSSIBLE ENDOSCOPIC RETROGRADE CHOLANGIOPANCREATOGRAPHY AND ESOPHAGOGASTRODUODENOSCOPY      EGD, WITH ENDOSCOPIC US      GASTROSCOPY    Pre-op diagnosis: SUPERIOR MESENTERIC ARTERY ARTEROSCLEROSIS    Location:  ENDOSCOPIC ULTRASOUND ROOM / SURGERY [facility]    Surgeons: Adrian Mars M.D.          Hx MI  Relevant Problems   PULMONARY   (positive) COPD (chronic obstructive pulmonary disease) (HCC)      CARDIAC   (positive) CAD (coronary artery disease)   (positive) Carotid artery stenosis without cerebral infarction   (positive) Essential hypertension   (positive) Mesenteric artery stenosis (HCC)   (positive) Stented coronary artery      ENDO   (positive) Type 2 diabetes mellitus with complication, without long-term current use of insulin (HCC)       Physical Exam    Airway   Mallampati: II  TM distance: >3 FB  Neck ROM: full       Cardiovascular - normal exam  Rhythm: regular  Rate: normal  (-) murmur     Dental - normal exam           Pulmonary - normal exam  Breath sounds clear to auscultation     Abdominal    Neurological - normal exam                   Anesthesia Plan    ASA 3   ASA physical status 3 criteria: COPD - poorly controlled and MI or angina - history (> 3 months)    Plan - general       Airway plan will be ETT          Induction: intravenous    Postoperative Plan: Postoperative administration of opioids is intended.    Pertinent diagnostic labs and testing reviewed    Informed Consent:    Anesthetic plan and risks discussed with patient.    Use of blood products discussed with: patient whom consented to blood products.

## 2024-02-09 NOTE — ANESTHESIA TIME REPORT
Anesthesia Start and Stop Event Times       Date Time Event    2/9/2024 1110 Ready for Procedure     1115 Anesthesia Start     1213 Anesthesia Stop          Responsible Staff  02/09/24      Name Role Begin End    Angel Tierney M.D. Anesth 1115 1213          Overtime Reason:  no overtime (within assigned shift)    Comments:

## 2024-02-09 NOTE — DISCHARGE INSTRUCTIONS
What to Expect Post Anesthesia    Rest and take it easy for the first 24 hours.  A responsible adult is recommended to remain with you during that time.  It is normal to feel sleepy.  We encourage you to not do anything that requires balance, judgment or coordination.    FOR 24 HOURS DO NOT:  Drive, operate machinery or run household appliances.  Drink beer or alcoholic beverages.  Make important decisions or sign legal documents.    To avoid nausea, slowly advance diet as tolerated, avoiding spicy or greasy foods for the first day.  Add more substantial food to your diet according to your provider's instructions.  Babies can be fed formula or breast milk as soon as they are hungry.  INCREASE FLUIDS AND FIBER TO AVOID CONSTIPATION.    MILD FLU-LIKE SYMPTOMS ARE NORMAL.  YOU MAY EXPERIENCE GENERALIZED MUSCLE ACHES, THROAT IRRITATION, HEADACHE AND/OR SOME NAUSEA.    If any questions arise, call your provider.  If your provider is not available, please feel free to call the [facility] at [phone].    MEDICATIONS: Resume taking daily medication.  Take prescribed pain medication with food.  If no medication is prescribed, you may take non-aspirin pain medication if needed.  PAIN MEDICATION CAN BE VERY CONSTIPATING.  Take a stool softener or laxative such as senokot, pericolace, or milk of magnesia if needed.    Last pain medication given at: 650 mg tylenol at 12:45    ENDOSCOPY HOME CARE INSTRUCTIONS    GASTROSCOPY OR ERCP  1. Don't eat or drink anything for about an hour after the test. You can then resume your regular diet.  2. Don't drive or drink alcohol for 24 hours. The medication you received will make you too drowsy.  3. If you begin to vomit bloody material, or develop black or bloody stools, call your doctor as soon as possible.  4. If you have any neck, chest, abdominal pain or temp of 100 degrees, call your doctor.  5. See your doctor as previously scheduled. You will receive a letter in the mail  in 1-2 weeks with biopsy results.     You should call 911 if you develop problems with breathing or chest pain.  If any questions arise, call your doctor. If your doctor is not available, please feel free to call [phone]. You can also call the [employer] HOTLINE open 24 hours/day, 7 days/week and speak to a nurse at [phone], or toll free [phone].    Notes from Dr. Mars:  Findings:              EGD                          Esophagus                                      Unremarkable mucosa                          Stomach                                      Hiatal hernia 37 to 39 cm                                      Gastric antrum erythema edema and mosaic mucosal pattern biopsied for H. pylori                          Duodenum                                      Unremarkable mucosa                                      Biopsies taken for celiac sprue                 EUS                          Celiac axis                                      No adenopathy                          Pancreas body/tail                                      Unremarkable parenchyma                          Pancreatic duct                                      Normal course and caliber                          Gallbladder                                      Absent                          Ampulla                                      Unremarkable                          CBD                                      Dilated and tortuous up to 13.6 mm at mid duct                                      Distal filling defect concerning for soft stone/sludge versus neoplasm at the level of the ampulla                          Head of pancreas                                      Normal dorsal ventral transition                                      Unremarkable parenchyma                 ERCP                          Ampulla                                      Slightly bulbous                                      Slowly  rhythmically invaginating then protruding                          Pancreatic duct                                      Neither injected nor cannulated                          CBD                                      Dilated and tortuous down to level of ampulla                                      Subtle distal filling defect     Plan:                Follow-up pathology                          Follow liver enzymes                          Follow-up in the office

## 2024-02-09 NOTE — ANESTHESIA PROCEDURE NOTES
Airway    Date/Time: 2/9/2024 11:21 AM    Performed by: Angel Tierney M.D.  Authorized by: Angel Tierney M.D.    Location:  OR  Urgency:  Elective  Indications for Airway Management:  Anesthesia      Spontaneous Ventilation: absent    Sedation Level:  Deep  Preoxygenated: Yes    Patient Position:  Sniffing  Final Airway Type:  Endotracheal airway  Final Endotracheal Airway:  ETT  Cuffed: Yes    Technique Used for Successful ETT Placement:  Direct laryngoscopy    Insertion Site:  Oral  Blade Type:  Collins  Laryngoscope Blade/Videolaryngoscope Blade Size:  2  ETT Size (mm):  7.0  Measured from:  Teeth  ETT to Teeth (cm):  21  Placement Verified by: auscultation and capnometry    Cormack-Lehane Classification:  Grade IIa - partial view of glottis  Number of Attempts at Approach:  1   LTA lido then ET. View improved with cricoid pressure

## 2024-02-09 NOTE — OR NURSING
0928: Brought patient back to pre-op and assumed care.  1102: Patient allergies and NPO status verified, home medication reconciliation completed and belongings secured. Patient verbalizes understanding of pain scale, expected course of stay and plan of care. Surgical site verified with patient. IV access established.

## 2024-02-09 NOTE — OR NURSING
1211: Patient arrived to PACU from OR via gurney. Report received from anesthesia and RN. Respirations are spontaneous and unlabored. VSS on 6L. One hour STOP BANG hold initiated per protocol.    1230: Patient reports HARRIS. Dr. Tierney aware; order for tylenol received. Dr. Cruz at bedside.     1245: Medicated for 7/10 HA. Family updated via phone, in WR.    1257: Report to Carmela OLIVEIRA

## 2024-02-09 NOTE — OR NURSING
1319 Received report from Celina OLIVEIRA. Pt transferred to stage 2, assisted into clothing.     1325 Pt family brought into stage 2.     1400 Pt discharged home with family  after an uneventful stay in stage 2. Discharge instructions reviewed, questions answered. PIV removed, tip intact. Pt escorted out in wheelchair by CNA.

## 2024-02-09 NOTE — ANESTHESIA POSTPROCEDURE EVALUATION
Patient: Jigna Allen    Procedure Summary       Date: 02/09/24 Room / Location:  ENDOSCOPIC ULTRASOUND ROOM / SURGERY [facility]    Anesthesia Start: 1115 Anesthesia Stop: 1213    Procedures:       UPPER ENDOSCOPIC ULTRASOUND WITH FINE NEEDLE ASPIRATION WITH  ENDOSCOPIC RETROGRADE CHOLANGIOPANCREATOGRAPHY AND ESOPHAGOGASTRODUODENOSCOPY (Esophagus)      EGD, WITH ENDOSCOPIC US (Esophagus)      GASTROSCOPY (Esophagus) Diagnosis: (SUPERIOR MESENTERIC ARTERY ARTEROSCLEROSIS)    Surgeons: Adrian Mars M.D. Responsible Provider: Angel Tierney M.D.    Anesthesia Type: general ASA Status: 3            Final Anesthesia Type: general  Last vitals  BP   Blood Pressure : 137/68    Temp   36.2 °C (97.2 °F)    Pulse   77   Resp   16    SpO2   94 %      Anesthesia Post Evaluation    Patient location during evaluation: PACU  Patient participation: complete - patient participated  Level of consciousness: awake and alert    Airway patency: patent  Anesthetic complications: no  Cardiovascular status: hemodynamically stable  Respiratory status: acceptable  Hydration status: euvolemic    PONV: none          No notable events documented.     Nurse Pain Score: 0 (NPRS)

## 2024-02-09 NOTE — OR SURGEON
EGD, EUS and ERCP operative note    PreOp Diagnosis: Abdominal pain and dilated bile duct      PostOp Diagnosis: Same      Procedure(s):  UPPER ENDOSCOPIC ULTRASOUND WITH FINE NEEDLE ASPIRATION WITH  ENDOSCOPIC RETROGRADE CHOLANGIOPANCREATOGRAPHY AND ESOPHAGOGASTRODUODENOSCOPY - Wound Class: None  EGD, WITH ENDOSCOPIC US - Wound Class: None  GASTROSCOPY - Wound Class: None    Surgeon(s):  Adrian Mars M.D.    Anesthesiologist/Type of Anesthesia:  Anesthesiologist: Angel Tierney M.D./General    Surgical Staff:  Circulator: Barbie Botello R.N.  Endoscopy Technician: Tigre Varghese    Specimens removed if any:  ID Type Source Tests Collected by Time Destination   A : r/o celiac disease Tissue Duodenum PATHOLOGY SPECIMEN Adrian Mars M.D. 2/9/2024 11:33 AM    B : r/o h pylori Tissue Antrum PATHOLOGY SPECIMEN Adrian Mars M.D. 2/9/2024 11:33 AM    C : ampulla Other Other PATHOLOGY SPECIMEN Adrian Mars M.D. 2/9/2024 11:54 AM      Dr. Mars  [facility]  [facility]  [phone]    EGD with: Biopsy    EUS    ERCP with: Biliary sphincterotomy    Bile duct stone/sludge extraction    Biliary ampulla biopsy    Radiologic interpretation of static and dynamic fluoroscopic images    Indication: Dilated bile duct and abdominal pain    Sedation: GA    Findings:   EGD    Esophagus     Unremarkable mucosa    Stomach     Hiatal hernia 37 to 39 cm     Gastric antrum erythema edema and mosaic mucosal pattern biopsied for H. pylori    Duodenum     Unremarkable mucosa     Biopsies taken for celiac sprue     EUS    Celiac axis     No adenopathy    Pancreas body/tail     Unremarkable parenchyma    Pancreatic duct     Normal course and caliber    Gallbladder     Absent    Ampulla     Unremarkable    CBD     Dilated and tortuous up to 13.6 mm at mid duct     Distal filling defect concerning for soft stone/sludge versus neoplasm at the level of the ampulla    Head of pancreas     Normal  dorsal ventral transition     Unremarkable parenchyma     ERCP    Ampulla     Slightly bulbous     Slowly rhythmically invaginating then protruding    Pancreatic duct     Neither injected nor cannulated    CBD     Dilated and tortuous down to level of ampulla     Subtle distal filling defect     Therapeutics    12 millimeter biliary sphincterotomy with both papillotome over covered wire    12-15 mm balloon sweep productive of small amount of soft stone debris    Biopsy of biliary aspect of ampulla at end of case    Plan:  Follow-up pathology    Follow liver enzymes    Follow-up in the office    Procedure detail:    Prior to procedure, informed consent was obtained.  Risks, benefits, alternatives, including but not limited to risk of bleeding, infection, perforation, adverse reaction to sedating medicine, failure to identify pathology, pancreatitis and death were explained to the patient who accepted all risks.    Patient was prepped in the prone position after intubation and sedation was provided by anesthesia.    EGD  Scope tip of the Olympus flexible gastroscope was passed in the proximal esophagus.  Proximal, middle and distal thirds of the esophagus were well-visualized and were unremarkable.  The stomach was entered.  There was a small hiatal hernia from 37 to 39 cm.  The gastric pool was suctioned.  Air was insufflated.  Scope was retroflexed view the body fundus and cardia which were unremarkable with the exception of a hiatal hernia.  Scope was straightened to view the antrum and incisura.  There was a mosaic mucosal pattern and mild erythema and edema throughout biopsies taken for H. pylori.  The pylorus was patent.  Duodenal bulb, sweep, second and third portion were unremarkable.  The biliary ampulla was not seen.  Biopsies were taken for celiac sprue.  The gastroscope was withdrawn and we proceeded with endoscopic ultrasound.    EUS  The flexible radial echoendoscope was passed to the proximal stomach.   Imaging of the celiac axis was unremarkable with no adenopathy.  Imaging of the pancreatic body and tail demonstrated very normal-appearing parenchyma throughout and a normal-sized gland.  The pancreatic duct had a normal course and caliber through to the tail.  The scope was advanced to the antrum and the gallbladder was surgically absent.  Scope was advanced to the duodenum.  The biliary ampulla was visualized and examined endosonographically in centimeters unremarkable.  The head of the pancreas had a normal dorsal ventral transition with unremarkable parenchyma.  The common bile duct was seen terminating at the ampulla and in the distal duct was a hypoechoic amorphous filling defect suggestive of a soft stone versus possible neoplasm.  The upstream duct was tortuous and significantly dilated at least 13.6 mm in the mid duct.  No other filling defects were appreciated.  The radial echoendoscope was withdrawn at this point and we proceeded with endoscopic retrograde cholangiopancreatography.    ERCP  The flexible side-viewing TGF Olympus duodena scope was passed to the level of the biliary ampulla in the short position.  The ampulla was well-visualized and was slowly but rhythmically invaginating and then protruding.  An [employer] cut bow papillotome with a 0.025 revolution wire was utilized for selective cannulation of the common bile duct.  This was achieved on the very first attempt and at no time was the pancreatic duct injected or cannulated.  With the wire along the expected course of the bile duct this was followed with a cannula and aspiration was positive for bile.  Injection of contrast demonstrated a tortuous massively dilated bile duct at least 15 mm.  Intrahepatic biliary tree was also dilated.  With wire left in place a 12 mm biliary sphincterotomy was performed with the bow papillotome over the covered wire.  No lesions were seen in the distal duct but the edges of the sphincterotomy did have a  slightly polypoid appearance.  The wire was left in place and the tome was removed and exchanged for a 12-15 mm occlusion balloon.  This was passed to the common hepatic duct inflated to 50 mm and withdrawn down the duct with delivery of soft stone debris on the first week.  Repeat sweeps were negative.  The polypoid appearing tissue along the sphincterotomy edges were slightly more prominent so biopsy forceps were exchanged down the scope channel and biopsies were taken of this tissue taking care to avoid the pancreatic orifice.  Once this was complete, the procedure was deemed complete.  Air and liquid were suctioned.  The scope was withdrawn.  Patient tolerated the procedure well and was sent to recovery without immediate complications.        2/9/2024 12:12 PM Adrian Mars M.D.

## 2024-02-09 NOTE — OR NURSING
1300 awake, vss, tolerating clear liquids, denies pain, states headache almost gone    1315 no change    1319 transfer to phase 2

## 2024-02-10 ENCOUNTER — HOSPITAL ENCOUNTER (INPATIENT)
Facility: MEDICAL CENTER | Age: 74
LOS: 5 days | DRG: 444 | End: 2024-02-16
Attending: EMERGENCY MEDICINE | Admitting: HOSPITALIST
Payer: MEDICARE

## 2024-02-10 DIAGNOSIS — J44.9 CHRONIC OBSTRUCTIVE PULMONARY DISEASE, UNSPECIFIED COPD TYPE (HCC): ICD-10-CM

## 2024-02-10 DIAGNOSIS — A41.9 SEPSIS WITHOUT ACUTE ORGAN DYSFUNCTION, DUE TO UNSPECIFIED ORGANISM (HCC): ICD-10-CM

## 2024-02-10 DIAGNOSIS — K85.90 ACUTE PANCREATITIS, UNSPECIFIED COMPLICATION STATUS, UNSPECIFIED PANCREATITIS TYPE: ICD-10-CM

## 2024-02-10 DIAGNOSIS — I16.0 HYPERTENSIVE URGENCY: ICD-10-CM

## 2024-02-10 DIAGNOSIS — R91.1 PULMONARY NODULE: ICD-10-CM

## 2024-02-10 DIAGNOSIS — R09.02 HYPOXIA: ICD-10-CM

## 2024-02-10 DIAGNOSIS — E27.8 ADRENAL NODULE (HCC): ICD-10-CM

## 2024-02-10 DIAGNOSIS — K83.09 ACUTE CHOLANGITIS: ICD-10-CM

## 2024-02-10 DIAGNOSIS — N30.00 ACUTE CYSTITIS WITHOUT HEMATURIA: ICD-10-CM

## 2024-02-10 LAB
ALBUMIN SERPL BCP-MCNC: 4.3 G/DL (ref 3.2–4.9)
ALBUMIN/GLOB SERPL: 1.3 G/DL
ALP SERPL-CCNC: 94 U/L (ref 30–99)
ALT SERPL-CCNC: 15 U/L (ref 2–50)
ANION GAP SERPL CALC-SCNC: 17 MMOL/L (ref 7–16)
AST SERPL-CCNC: 14 U/L (ref 12–45)
BASOPHILS # BLD AUTO: 0.3 % (ref 0–1.8)
BASOPHILS # BLD: 0.08 K/UL (ref 0–0.12)
BILIRUB SERPL-MCNC: 0.6 MG/DL (ref 0.1–1.5)
BUN SERPL-MCNC: 19 MG/DL (ref 8–22)
CALCIUM ALBUM COR SERPL-MCNC: 9.6 MG/DL (ref 8.5–10.5)
CALCIUM SERPL-MCNC: 9.8 MG/DL (ref 8.4–10.2)
CHLORIDE SERPL-SCNC: 96 MMOL/L (ref 96–112)
CO2 SERPL-SCNC: 23 MMOL/L (ref 20–33)
CREAT SERPL-MCNC: 0.61 MG/DL (ref 0.5–1.4)
EOSINOPHIL # BLD AUTO: 0.11 K/UL (ref 0–0.51)
EOSINOPHIL NFR BLD: 0.5 % (ref 0–6.9)
ERYTHROCYTE [DISTWIDTH] IN BLOOD BY AUTOMATED COUNT: 43.8 FL (ref 35.9–50)
GFR SERPLBLD CREATININE-BSD FMLA CKD-EPI: 94 ML/MIN/1.73 M 2
GLOBULIN SER CALC-MCNC: 3.3 G/DL (ref 1.9–3.5)
GLUCOSE SERPL-MCNC: 218 MG/DL (ref 65–99)
HCT VFR BLD AUTO: 40.8 % (ref 37–47)
HGB BLD-MCNC: 13.4 G/DL (ref 12–16)
IMM GRANULOCYTES # BLD AUTO: 0.14 K/UL (ref 0–0.11)
IMM GRANULOCYTES NFR BLD AUTO: 0.6 % (ref 0–0.9)
LIPASE SERPL-CCNC: 48 U/L (ref 11–82)
LYMPHOCYTES # BLD AUTO: 2.39 K/UL (ref 1–4.8)
LYMPHOCYTES NFR BLD: 9.9 % (ref 22–41)
MCH RBC QN AUTO: 27.6 PG (ref 27–33)
MCHC RBC AUTO-ENTMCNC: 32.8 G/DL (ref 32.2–35.5)
MCV RBC AUTO: 84.1 FL (ref 81.4–97.8)
MONOCYTES # BLD AUTO: 1.49 K/UL (ref 0–0.85)
MONOCYTES NFR BLD AUTO: 6.2 % (ref 0–13.4)
NEUTROPHILS # BLD AUTO: 19.93 K/UL (ref 1.82–7.42)
NEUTROPHILS NFR BLD: 82.5 % (ref 44–72)
NRBC # BLD AUTO: 0 K/UL
NRBC BLD-RTO: 0 /100 WBC (ref 0–0.2)
PLATELET # BLD AUTO: 371 K/UL (ref 164–446)
PMV BLD AUTO: 9.8 FL (ref 9–12.9)
POTASSIUM SERPL-SCNC: 3.6 MMOL/L (ref 3.6–5.5)
PROT SERPL-MCNC: 7.6 G/DL (ref 6–8.2)
RBC # BLD AUTO: 4.85 M/UL (ref 4.2–5.4)
SODIUM SERPL-SCNC: 136 MMOL/L (ref 135–145)
WBC # BLD AUTO: 24.1 K/UL (ref 4.8–10.8)

## 2024-02-10 PROCEDURE — 36415 COLL VENOUS BLD VENIPUNCTURE: CPT

## 2024-02-10 PROCEDURE — 80053 COMPREHEN METABOLIC PANEL: CPT

## 2024-02-10 PROCEDURE — 85025 COMPLETE CBC W/AUTO DIFF WBC: CPT

## 2024-02-10 PROCEDURE — 83605 ASSAY OF LACTIC ACID: CPT | Mod: 91

## 2024-02-10 PROCEDURE — 96374 THER/PROPH/DIAG INJ IV PUSH: CPT

## 2024-02-10 PROCEDURE — 83690 ASSAY OF LIPASE: CPT

## 2024-02-10 PROCEDURE — 99285 EMERGENCY DEPT VISIT HI MDM: CPT

## 2024-02-10 PROCEDURE — 85610 PROTHROMBIN TIME: CPT

## 2024-02-10 PROCEDURE — 96375 TX/PRO/DX INJ NEW DRUG ADDON: CPT

## 2024-02-10 PROCEDURE — 87040 BLOOD CULTURE FOR BACTERIA: CPT | Mod: 91

## 2024-02-10 PROCEDURE — 700111 HCHG RX REV CODE 636 W/ 250 OVERRIDE (IP): Performed by: EMERGENCY MEDICINE

## 2024-02-10 RX ORDER — SODIUM CHLORIDE, SODIUM LACTATE, POTASSIUM CHLORIDE, AND CALCIUM CHLORIDE .6; .31; .03; .02 G/100ML; G/100ML; G/100ML; G/100ML
30 INJECTION, SOLUTION INTRAVENOUS ONCE
Status: COMPLETED | OUTPATIENT
Start: 2024-02-11 | End: 2024-02-11

## 2024-02-10 RX ORDER — ONDANSETRON 2 MG/ML
4 INJECTION INTRAMUSCULAR; INTRAVENOUS ONCE
Status: COMPLETED | OUTPATIENT
Start: 2024-02-10 | End: 2024-02-10

## 2024-02-10 RX ADMIN — FENTANYL CITRATE 50 MCG: 50 INJECTION, SOLUTION INTRAMUSCULAR; INTRAVENOUS at 23:42

## 2024-02-10 RX ADMIN — ONDANSETRON 4 MG: 2 INJECTION INTRAMUSCULAR; INTRAVENOUS at 23:43

## 2024-02-10 ASSESSMENT — LIFESTYLE VARIABLES
TOTAL SCORE: 0
HAVE PEOPLE ANNOYED YOU BY CRITICIZING YOUR DRINKING: NO
HOW MANY TIMES IN THE PAST YEAR HAVE YOU HAD 5 OR MORE DRINKS IN A DAY: 0
CONSUMPTION TOTAL: NEGATIVE
AVERAGE NUMBER OF DAYS PER WEEK YOU HAVE A DRINK CONTAINING ALCOHOL: 0
HAVE YOU EVER FELT YOU SHOULD CUT DOWN ON YOUR DRINKING: NO
EVER FELT BAD OR GUILTY ABOUT YOUR DRINKING: NO
DO YOU DRINK ALCOHOL: NO
ON A TYPICAL DAY WHEN YOU DRINK ALCOHOL HOW MANY DRINKS DO YOU HAVE: 0
EVER HAD A DRINK FIRST THING IN THE MORNING TO STEADY YOUR NERVES TO GET RID OF A HANGOVER: NO

## 2024-02-10 ASSESSMENT — FIBROSIS 4 INDEX: FIB4 SCORE: 0.51

## 2024-02-11 ENCOUNTER — APPOINTMENT (OUTPATIENT)
Dept: RADIOLOGY | Facility: MEDICAL CENTER | Age: 74
DRG: 444 | End: 2024-02-11
Attending: EMERGENCY MEDICINE
Payer: MEDICARE

## 2024-02-11 PROBLEM — R65.10 SIRS (SYSTEMIC INFLAMMATORY RESPONSE SYNDROME) (HCC): Status: ACTIVE | Noted: 2024-02-11

## 2024-02-11 PROBLEM — K83.09 ACUTE CHOLANGITIS: Status: ACTIVE | Noted: 2024-02-11

## 2024-02-11 PROBLEM — I16.0 HYPERTENSIVE URGENCY: Status: ACTIVE | Noted: 2024-02-11

## 2024-02-11 LAB
APPEARANCE UR: ABNORMAL
BACTERIA #/AREA URNS HPF: ABNORMAL /HPF
BILIRUB UR QL STRIP.AUTO: NEGATIVE
COLOR UR: YELLOW
EPI CELLS #/AREA URNS HPF: ABNORMAL /HPF
GLUCOSE BLD STRIP.AUTO-MCNC: 129 MG/DL (ref 65–99)
GLUCOSE BLD STRIP.AUTO-MCNC: 182 MG/DL (ref 65–99)
GLUCOSE BLD STRIP.AUTO-MCNC: 226 MG/DL (ref 65–99)
GLUCOSE BLD STRIP.AUTO-MCNC: 240 MG/DL (ref 65–99)
GLUCOSE UR STRIP.AUTO-MCNC: 250 MG/DL
INR PPP: 0.9 (ref 0.87–1.13)
KETONES UR STRIP.AUTO-MCNC: ABNORMAL MG/DL
LACTATE SERPL-SCNC: 1.7 MMOL/L (ref 0.5–2)
LACTATE SERPL-SCNC: 1.8 MMOL/L (ref 0.5–2)
LACTATE SERPL-SCNC: 2.2 MMOL/L (ref 0.5–2)
LEUKOCYTE ESTERASE UR QL STRIP.AUTO: ABNORMAL
MICRO URNS: ABNORMAL
MUCOUS THREADS #/AREA URNS HPF: ABNORMAL /HPF
NITRITE UR QL STRIP.AUTO: NEGATIVE
PH UR STRIP.AUTO: 6 [PH] (ref 5–8)
PROT UR QL STRIP: NEGATIVE MG/DL
PROTHROMBIN TIME: 12.5 SEC (ref 12–14.6)
RBC # URNS HPF: ABNORMAL /HPF
RBC UR QL AUTO: ABNORMAL
SP GR UR STRIP.AUTO: 1.01
WBC #/AREA URNS HPF: ABNORMAL /HPF

## 2024-02-11 PROCEDURE — 99223 1ST HOSP IP/OBS HIGH 75: CPT | Mod: AI | Performed by: HOSPITALIST

## 2024-02-11 PROCEDURE — 700111 HCHG RX REV CODE 636 W/ 250 OVERRIDE (IP): Mod: JZ | Performed by: HOSPITALIST

## 2024-02-11 PROCEDURE — 36415 COLL VENOUS BLD VENIPUNCTURE: CPT

## 2024-02-11 PROCEDURE — 96375 TX/PRO/DX INJ NEW DRUG ADDON: CPT

## 2024-02-11 PROCEDURE — 700105 HCHG RX REV CODE 258: Performed by: EMERGENCY MEDICINE

## 2024-02-11 PROCEDURE — 94760 N-INVAS EAR/PLS OXIMETRY 1: CPT

## 2024-02-11 PROCEDURE — 74177 CT ABD & PELVIS W/CONTRAST: CPT

## 2024-02-11 PROCEDURE — 700111 HCHG RX REV CODE 636 W/ 250 OVERRIDE (IP): Mod: JZ | Performed by: EMERGENCY MEDICINE

## 2024-02-11 PROCEDURE — 87086 URINE CULTURE/COLONY COUNT: CPT

## 2024-02-11 PROCEDURE — 82962 GLUCOSE BLOOD TEST: CPT | Mod: 91

## 2024-02-11 PROCEDURE — 81001 URINALYSIS AUTO W/SCOPE: CPT

## 2024-02-11 PROCEDURE — 83605 ASSAY OF LACTIC ACID: CPT

## 2024-02-11 PROCEDURE — 770020 HCHG ROOM/CARE - TELE (206)

## 2024-02-11 PROCEDURE — 700105 HCHG RX REV CODE 258: Performed by: HOSPITALIST

## 2024-02-11 PROCEDURE — 700117 HCHG RX CONTRAST REV CODE 255: Performed by: EMERGENCY MEDICINE

## 2024-02-11 PROCEDURE — 94640 AIRWAY INHALATION TREATMENT: CPT

## 2024-02-11 PROCEDURE — 700102 HCHG RX REV CODE 250 W/ 637 OVERRIDE(OP): Performed by: HOSPITALIST

## 2024-02-11 RX ORDER — HYDRALAZINE HYDROCHLORIDE 20 MG/ML
10 INJECTION INTRAMUSCULAR; INTRAVENOUS ONCE
Status: COMPLETED | OUTPATIENT
Start: 2024-02-11 | End: 2024-02-11

## 2024-02-11 RX ORDER — ONDANSETRON 2 MG/ML
4 INJECTION INTRAMUSCULAR; INTRAVENOUS EVERY 4 HOURS PRN
Status: DISCONTINUED | OUTPATIENT
Start: 2024-02-11 | End: 2024-02-16 | Stop reason: HOSPADM

## 2024-02-11 RX ORDER — AMOXICILLIN 250 MG
2 CAPSULE ORAL 2 TIMES DAILY
Status: DISCONTINUED | OUTPATIENT
Start: 2024-02-11 | End: 2024-02-15

## 2024-02-11 RX ORDER — DEXTROSE MONOHYDRATE 25 G/50ML
25 INJECTION, SOLUTION INTRAVENOUS
Status: DISCONTINUED | OUTPATIENT
Start: 2024-02-11 | End: 2024-02-12

## 2024-02-11 RX ORDER — FAMOTIDINE 20 MG/1
20 TABLET, FILM COATED ORAL EVERY EVENING
COMMUNITY

## 2024-02-11 RX ORDER — SENNOSIDES 8.6 MG
1300 CAPSULE ORAL EVERY 6 HOURS PRN
COMMUNITY

## 2024-02-11 RX ORDER — POLYETHYLENE GLYCOL 3350 17 G/17G
1 POWDER, FOR SOLUTION ORAL
Status: DISCONTINUED | OUTPATIENT
Start: 2024-02-11 | End: 2024-02-15

## 2024-02-11 RX ORDER — ACETAMINOPHEN 325 MG/1
650 TABLET ORAL EVERY 6 HOURS PRN
Status: DISCONTINUED | OUTPATIENT
Start: 2024-02-11 | End: 2024-02-16 | Stop reason: HOSPADM

## 2024-02-11 RX ORDER — LABETALOL HYDROCHLORIDE 5 MG/ML
20 INJECTION, SOLUTION INTRAVENOUS ONCE
Status: COMPLETED | OUTPATIENT
Start: 2024-02-11 | End: 2024-02-11

## 2024-02-11 RX ORDER — HYDROMORPHONE HYDROCHLORIDE 1 MG/ML
0.5 INJECTION, SOLUTION INTRAMUSCULAR; INTRAVENOUS; SUBCUTANEOUS
Status: DISCONTINUED | OUTPATIENT
Start: 2024-02-11 | End: 2024-02-14

## 2024-02-11 RX ORDER — MORPHINE SULFATE 4 MG/ML
4 INJECTION INTRAVENOUS ONCE
Status: COMPLETED | OUTPATIENT
Start: 2024-02-11 | End: 2024-02-11

## 2024-02-11 RX ORDER — ONDANSETRON 4 MG/1
4 TABLET, ORALLY DISINTEGRATING ORAL EVERY 4 HOURS PRN
Status: DISCONTINUED | OUTPATIENT
Start: 2024-02-11 | End: 2024-02-16 | Stop reason: HOSPADM

## 2024-02-11 RX ORDER — METHYLPREDNISOLONE 4 MG/1
4 TABLET ORAL DAILY
Status: ON HOLD | COMMUNITY
End: 2024-02-16

## 2024-02-11 RX ORDER — SODIUM CHLORIDE, SODIUM LACTATE, POTASSIUM CHLORIDE, AND CALCIUM CHLORIDE .6; .31; .03; .02 G/100ML; G/100ML; G/100ML; G/100ML
500 INJECTION, SOLUTION INTRAVENOUS
Status: DISCONTINUED | OUTPATIENT
Start: 2024-02-11 | End: 2024-02-16 | Stop reason: HOSPADM

## 2024-02-11 RX ORDER — HYDROMORPHONE HYDROCHLORIDE 1 MG/ML
1 INJECTION, SOLUTION INTRAMUSCULAR; INTRAVENOUS; SUBCUTANEOUS
Status: DISCONTINUED | OUTPATIENT
Start: 2024-02-11 | End: 2024-02-14

## 2024-02-11 RX ADMIN — HYDROMORPHONE HYDROCHLORIDE 1 MG: 1 INJECTION, SOLUTION INTRAMUSCULAR; INTRAVENOUS; SUBCUTANEOUS at 05:29

## 2024-02-11 RX ADMIN — IOHEXOL 100 ML: 350 INJECTION, SOLUTION INTRAVENOUS at 00:59

## 2024-02-11 RX ADMIN — HYDROMORPHONE HYDROCHLORIDE 1 MG: 1 INJECTION, SOLUTION INTRAMUSCULAR; INTRAVENOUS; SUBCUTANEOUS at 22:52

## 2024-02-11 RX ADMIN — FLUTICASONE FUROATE, UMECLIDINIUM BROMIDE AND VILANTEROL TRIFENATATE 1 PUFF: 100; 62.5; 25 POWDER RESPIRATORY (INHALATION) at 07:01

## 2024-02-11 RX ADMIN — ONDANSETRON 4 MG: 2 INJECTION INTRAMUSCULAR; INTRAVENOUS at 16:22

## 2024-02-11 RX ADMIN — PIPERACILLIN AND TAZOBACTAM 4.5 G: 4; .5 INJECTION, POWDER, FOR SOLUTION INTRAVENOUS at 02:20

## 2024-02-11 RX ADMIN — INSULIN HUMAN 2 UNITS: 100 INJECTION, SOLUTION PARENTERAL at 06:30

## 2024-02-11 RX ADMIN — PIPERACILLIN SODIUM AND TAZOBACTAM SODIUM 3.38 G: 3; .375 INJECTION, POWDER, LYOPHILIZED, FOR SOLUTION INTRAVENOUS at 13:38

## 2024-02-11 RX ADMIN — MORPHINE SULFATE 4 MG: 4 INJECTION, SOLUTION INTRAMUSCULAR; INTRAVENOUS at 01:48

## 2024-02-11 RX ADMIN — HYDROMORPHONE HYDROCHLORIDE 1 MG: 1 INJECTION, SOLUTION INTRAMUSCULAR; INTRAVENOUS; SUBCUTANEOUS at 16:22

## 2024-02-11 RX ADMIN — INSULIN HUMAN 2 UNITS: 100 INJECTION, SOLUTION PARENTERAL at 03:26

## 2024-02-11 RX ADMIN — INSULIN HUMAN 1 UNITS: 100 INJECTION, SOLUTION PARENTERAL at 18:00

## 2024-02-11 RX ADMIN — HYDRALAZINE HYDROCHLORIDE 10 MG: 20 INJECTION, SOLUTION INTRAMUSCULAR; INTRAVENOUS at 02:12

## 2024-02-11 RX ADMIN — SODIUM CHLORIDE, POTASSIUM CHLORIDE, SODIUM LACTATE AND CALCIUM CHLORIDE 2286 ML: 600; 310; 30; 20 INJECTION, SOLUTION INTRAVENOUS at 00:00

## 2024-02-11 RX ADMIN — PIPERACILLIN SODIUM AND TAZOBACTAM SODIUM 3.38 G: 3; .375 INJECTION, POWDER, LYOPHILIZED, FOR SOLUTION INTRAVENOUS at 05:39

## 2024-02-11 RX ADMIN — LABETALOL HYDROCHLORIDE 20 MG: 5 INJECTION, SOLUTION INTRAVENOUS at 01:30

## 2024-02-11 RX ADMIN — PIPERACILLIN SODIUM AND TAZOBACTAM SODIUM 3.38 G: 3; .375 INJECTION, POWDER, LYOPHILIZED, FOR SOLUTION INTRAVENOUS at 21:06

## 2024-02-11 ASSESSMENT — COGNITIVE AND FUNCTIONAL STATUS - GENERAL
EATING MEALS: A LITTLE
DRESSING REGULAR LOWER BODY CLOTHING: A LITTLE
TOILETING: A LITTLE
DRESSING REGULAR UPPER BODY CLOTHING: A LITTLE
WALKING IN HOSPITAL ROOM: A LITTLE
SUGGESTED CMS G CODE MODIFIER MOBILITY: CK
MOVING TO AND FROM BED TO CHAIR: A LITTLE
MOBILITY SCORE: 18
DAILY ACTIVITIY SCORE: 18
HELP NEEDED FOR BATHING: A LITTLE
PERSONAL GROOMING: A LITTLE
CLIMB 3 TO 5 STEPS WITH RAILING: A LITTLE
SUGGESTED CMS G CODE MODIFIER DAILY ACTIVITY: CK
MOVING FROM LYING ON BACK TO SITTING ON SIDE OF FLAT BED: A LITTLE
STANDING UP FROM CHAIR USING ARMS: A LITTLE
TURNING FROM BACK TO SIDE WHILE IN FLAT BAD: A LITTLE

## 2024-02-11 ASSESSMENT — PAIN DESCRIPTION - PAIN TYPE
TYPE: ACUTE PAIN
TYPE: ACUTE PAIN

## 2024-02-11 ASSESSMENT — ENCOUNTER SYMPTOMS
INSOMNIA: 0
PSYCHIATRIC NEGATIVE: 1
SORE THROAT: 0
HEADACHES: 0
NECK PAIN: 0
PALPITATIONS: 0
DOUBLE VISION: 0
RESPIRATORY NEGATIVE: 1
CARDIOVASCULAR NEGATIVE: 1
NEUROLOGICAL NEGATIVE: 1
NAUSEA: 0
FEVER: 0
BLURRED VISION: 0
BRUISES/BLEEDS EASILY: 0
SHORTNESS OF BREATH: 0
MYALGIAS: 0
DEPRESSION: 0
CONSTITUTIONAL NEGATIVE: 1
COUGH: 0
ABDOMINAL PAIN: 1
WEAKNESS: 0
DIZZINESS: 0
VOMITING: 0
EYES NEGATIVE: 1
MUSCULOSKELETAL NEGATIVE: 1

## 2024-02-11 ASSESSMENT — PATIENT HEALTH QUESTIONNAIRE - PHQ9
2. FEELING DOWN, DEPRESSED, IRRITABLE, OR HOPELESS: NOT AT ALL
2. FEELING DOWN, DEPRESSED, IRRITABLE, OR HOPELESS: NOT AT ALL
SUM OF ALL RESPONSES TO PHQ9 QUESTIONS 1 AND 2: 0
1. LITTLE INTEREST OR PLEASURE IN DOING THINGS: NOT AT ALL
SUM OF ALL RESPONSES TO PHQ9 QUESTIONS 1 AND 2: 0
1. LITTLE INTEREST OR PLEASURE IN DOING THINGS: NOT AT ALL
2. FEELING DOWN, DEPRESSED, IRRITABLE, OR HOPELESS: NOT AT ALL
SUM OF ALL RESPONSES TO PHQ9 QUESTIONS 1 AND 2: 0
1. LITTLE INTEREST OR PLEASURE IN DOING THINGS: NOT AT ALL

## 2024-02-11 ASSESSMENT — LIFESTYLE VARIABLES
TOTAL SCORE: 0
HAVE YOU EVER FELT YOU SHOULD CUT DOWN ON YOUR DRINKING: NO
ON A TYPICAL DAY WHEN YOU DRINK ALCOHOL HOW MANY DRINKS DO YOU HAVE: 0
ALCOHOL_USE: NO
HOW MANY TIMES IN THE PAST YEAR HAVE YOU HAD 5 OR MORE DRINKS IN A DAY: 0
TOTAL SCORE: 0
CONSUMPTION TOTAL: NEGATIVE
EVER FELT BAD OR GUILTY ABOUT YOUR DRINKING: NO
EVER HAD A DRINK FIRST THING IN THE MORNING TO STEADY YOUR NERVES TO GET RID OF A HANGOVER: NO
AVERAGE NUMBER OF DAYS PER WEEK YOU HAVE A DRINK CONTAINING ALCOHOL: 0
HAVE PEOPLE ANNOYED YOU BY CRITICIZING YOUR DRINKING: NO
TOTAL SCORE: 0

## 2024-02-11 ASSESSMENT — FIBROSIS 4 INDEX: FIB4 SCORE: 0.71

## 2024-02-11 NOTE — CARE PLAN
The patient is Watcher - Medium risk of patient condition declining or worsening    Shift Goals  Clinical Goals: Pain management   Patient Goals: rest    Progress made toward(s) clinical / shift goals:        Problem: Knowledge Deficit - Standard  Goal: Patient and family/care givers will demonstrate understanding of plan of care, disease process/condition, diagnostic tests and medications  Outcome: Progressing     Problem: Respiratory  Goal: Patient will achieve/maintain optimum respiratory ventilation and gas exchange  Outcome: Progressing     Problem: Fall Risk  Goal: Patient will remain free from falls  Outcome: Progressing     Problem: Pain - Standard  Goal: Alleviation of pain or a reduction in pain to the patient’s comfort goal  Outcome: Progressing       Patient is not progressing towards the following goals:

## 2024-02-11 NOTE — ASSESSMENT & PLAN NOTE
-Initial blood pressure was 217/100.  Patient was seen severe pain on arrival.    2/13: Patient still with significant elevated BP. Resume losartan and HTZD.    2/14: Continue losartan and HTZD    2/15: Patient still with increased elevated blood pressure.  We have started the patient on as needed hydralazine as well as amlodipine.  Continue to monitor closely.

## 2024-02-11 NOTE — CARE PLAN
Problem: Respiratory  Goal: Patient will achieve/maintain optimum respiratory ventilation and gas exchange  Outcome: Progressing     Problem: Fall Risk  Goal: Patient will remain free from falls  Outcome: Progressing  Note: Educated on fall prevention measures.Fall precautions in place   The patient is Stable - Low risk of patient condition declining or worsening    Shift Goals  Clinical Goals: pain control,blood sugar  Patient Goals: rest    Progress made toward(s) clinical / shift goals:  Educated on fall prevention measures,encouraged use of call light,updated on plan of care    Patient is not progressing towards the following goals:

## 2024-02-11 NOTE — ED PROVIDER NOTES
ED Provider Note    CHIEF COMPLAINT  Chief Complaint   Patient presents with    Abdominal Pain     PT BIB EMS for abdominal pain x 2 days. Pt had a upper endoscopy with needle aspiration on 2/9. Pt was dx home with no pain but once home started to have pain.        EXTERNAL RECORDS REVIEWED  Inpatient Notes reviewed discharge summary dated November 30, 2023 by Dr. Solares.  Patient seen for abdominal pain with fever and nausea and vomiting.  Admitted November 26.  Admitted for hypoxic respiratory failure.  Had diarrhea but CT was negative.  Potassium low at 2.9 but tolerated electrolyte repletion.  Able to be discharged on the 30th.    HPI/ROS  LIMITATION TO HISTORY   Select: : None  OUTSIDE HISTORIAN(S):  EMS patient transported with acute abdominal pain status post upper endoscopy 2 days ago    Jigna Allen is a 73 y.o. female who presents for evaluation of acute abdominal pain.  Patient underwent EGD, EUS, and ERCP with needle aspirations February 9 by Dr. Santiago.  She notes she did well but began to have mild pain on the ninth.  Pain worse today.  Pain is localized to bilateral upper quadrants without radiation to the back.  Initially mild, now severe.  Nausea, numerous episodes of emesis with difficulty tolerating oral intake.  No diarrhea, no known fever.  No dysuria, no hematuria.    PAST MEDICAL HISTORY   has a past medical history of Abnormal screening cardiac CT (02/14/2020), Anxiety, Asthma, Bowel habit changes, Breath shortness, Bronchitis (2018), Carotid arterial disease (HCC), Cataract, COPD (chronic obstructive pulmonary disease) (HCC), Diabetes (HCC), Emphysema of lung (HCC) (2023), Goiter (03/25/2016), Hiatus hernia syndrome (2023), HTN (hypertension), Hyperlipidemia, Hyperlipidemia (03/25/2016), Indigestion, Myocardial infarct (HCC) (2010), Pain (2023), Pneumonia (2022), PONV (postoperative nausea and vomiting) (1989), Tobacco use, and Urinary incontinence (2023).    SURGICAL HISTORY    "has a past surgical history that includes other cardiac surgery (2010); other orthopedic surgery; other abdominal surgery; ercp,diagnostic (N/A, 2/9/2024); upper gi endoscopy,diagnosis (N/A, 2/9/2024); and egd w/endoscopic ultrasound (N/A, 2/9/2024).    FAMILY HISTORY  Family History   Problem Relation Age of Onset    Cancer Mother 47        breast    Breast Cancer Mother         Mets to bone and liver    Cancer Father         Stomach    Hypertension Father     Alcohol abuse Father     Stroke Maternal Grandmother     Alcohol abuse Maternal Grandmother     Stroke Paternal Grandmother     Cancer Brother         Glioblastoma    Alcohol abuse Paternal Uncle     Alcohol abuse Paternal Uncle        SOCIAL HISTORY  Social History     Tobacco Use    Smoking status: Every Day     Current packs/day: 0.50     Average packs/day: 0.5 packs/day for 59.4 years (29.7 ttl pk-yrs)     Types: Cigarettes     Start date: 9/1/1964    Smokeless tobacco: Never    Tobacco comments:     I enjoy smoking   Vaping Use    Vaping Use: Never used   Substance and Sexual Activity    Alcohol use: No    Drug use: Not Currently     Types: Marijuana     Comment: CBD  occ    Sexual activity: Not Currently     Partners: Male     Comment:        CURRENT MEDICATIONS  Home Medications    **Home medications have not yet been reviewed for this encounter**         ALLERGIES  Allergies   Allergen Reactions    Sulfa Drugs Hives    Ezetimibe Nausea     Diarrhea     Hmg-Coa-R Inhibitors Unspecified     Muscle cramps       PHYSICAL EXAM  VITAL SIGNS: /52   Pulse 76   Temp 37.3 °C (99.1 °F) (Temporal)   Resp 20   Ht 1.6 m (5' 3\")   Wt 76.2 kg (168 lb)   SpO2 91%   BMI 29.76 kg/m²    General: Alert, mild acute distress, appears uncomfortable  Skin: Warm, dry, normal for ethnicity  Head: Normocephalic, atraumatic  Neck: Trachea midline, no tenderness  Eye: PERRL, normal conjunctiva  ENMT: Oral mucosa pink and dry, no pharyngeal erythema or " exudate  Cardiovascular: Regular rate and rhythm, No murmur, Normal peripheral perfusion  Respiratory: Lungs CTA, respirations are non-labored, breath sounds are equal  Gastrointestinal: Soft, periumbilical and bilateral upper quadrant tenderness, no guarding, no rebound, no rigidity.  Bowel sounds hypoactive, abdomen nondistended.  Musculoskeletal: No swelling, no deformity  Neurological: Alert and oriented to person, place, time, and situation  Lymphatics: No lymphadenopathy  Psychiatric: Cooperative, appropriate mood & affect     DIAGNOSTIC STUDIES / PROCEDURES      LABS  Results for orders placed or performed during the hospital encounter of 02/10/24   CBC WITH DIFFERENTIAL   Result Value Ref Range    WBC 24.1 (H) 4.8 - 10.8 K/uL    RBC 4.85 4.20 - 5.40 M/uL    Hemoglobin 13.4 12.0 - 16.0 g/dL    Hematocrit 40.8 37.0 - 47.0 %    MCV 84.1 81.4 - 97.8 fL    MCH 27.6 27.0 - 33.0 pg    MCHC 32.8 32.2 - 35.5 g/dL    RDW 43.8 35.9 - 50.0 fL    Platelet Count 371 164 - 446 K/uL    MPV 9.8 9.0 - 12.9 fL    Neutrophils-Polys 82.50 (H) 44.00 - 72.00 %    Lymphocytes 9.90 (L) 22.00 - 41.00 %    Monocytes 6.20 0.00 - 13.40 %    Eosinophils 0.50 0.00 - 6.90 %    Basophils 0.30 0.00 - 1.80 %    Immature Granulocytes 0.60 0.00 - 0.90 %    Nucleated RBC 0.00 0.00 - 0.20 /100 WBC    Neutrophils (Absolute) 19.93 (H) 1.82 - 7.42 K/uL    Lymphs (Absolute) 2.39 1.00 - 4.80 K/uL    Monos (Absolute) 1.49 (H) 0.00 - 0.85 K/uL    Eos (Absolute) 0.11 0.00 - 0.51 K/uL    Baso (Absolute) 0.08 0.00 - 0.12 K/uL    Immature Granulocytes (abs) 0.14 (H) 0.00 - 0.11 K/uL    NRBC (Absolute) 0.00 K/uL   CMP   Result Value Ref Range    Sodium 136 135 - 145 mmol/L    Potassium 3.6 3.6 - 5.5 mmol/L    Chloride 96 96 - 112 mmol/L    Co2 23 20 - 33 mmol/L    Anion Gap 17.0 (H) 7.0 - 16.0    Glucose 218 (H) 65 - 99 mg/dL    Bun 19 8 - 22 mg/dL    Creatinine 0.61 0.50 - 1.40 mg/dL    Calcium 9.8 8.4 - 10.2 mg/dL    Correct Calcium 9.6 8.5 - 10.5 mg/dL     AST(SGOT) 14 12 - 45 U/L    ALT(SGPT) 15 2 - 50 U/L    Alkaline Phosphatase 94 30 - 99 U/L    Total Bilirubin 0.6 0.1 - 1.5 mg/dL    Albumin 4.3 3.2 - 4.9 g/dL    Total Protein 7.6 6.0 - 8.2 g/dL    Globulin 3.3 1.9 - 3.5 g/dL    A-G Ratio 1.3 g/dL   LIPASE   Result Value Ref Range    Lipase 48 11 - 82 U/L   Lactic Acid   Result Value Ref Range    Lactic Acid 1.8 0.5 - 2.0 mmol/L   Urinalysis    Specimen: Urine   Result Value Ref Range    Color Yellow     Character Hazy (A)     Specific Gravity 1.010 <1.035    Ph 6.0 5.0 - 8.0    Glucose 250 (A) Negative mg/dL    Ketones Trace (A) Negative mg/dL    Protein Negative Negative mg/dL    Bilirubin Negative Negative    Nitrite Negative Negative    Leukocyte Esterase Moderate (A) Negative    Occult Blood Trace (A) Negative    Micro Urine Req Microscopic    ESTIMATED GFR   Result Value Ref Range    GFR (CKD-EPI) 94 >60 mL/min/1.73 m 2   URINE MICROSCOPIC (W/UA)   Result Value Ref Range    WBC 20-50 (A) /hpf    RBC 0-2 /hpf    Bacteria Moderate (A) None /hpf    Epithelial Cells Moderate (A) Few /hpf    Mucous Threads Few /hpf        RADIOLOGY  I have independently interpreted the diagnostic imaging associated with this visit and am waiting the final reading from the radiologist.   My preliminary interpretation is as follows: Fat stranding approximately common bile duct  Radiologist interpretation:   CT-ABDOMEN-PELVIS WITH   Final Result      1.  Features concerning for acute cholangitis and/or mild pancreatitis.   2.  Possible reactive duodenitis.   3.  Small amount of biliary gas presumably related to recent procedure.   4.  Mildly dilated common bile duct likely related to prior cholecystectomy.   5.  Distal colonic diverticulosis.   6.  Hepatomegaly.           COURSE & MEDICAL DECISION MAKING    ED Observation Status? Yes; I am placing the patient in to an observation status due to a diagnostic uncertainty as well as therapeutic intensity. Patient placed in observation  "status at 11:33 PM, 2/10/2024.     Observation plan is as follows: Patient medicated with fentanyl and Zofran, 30 mill per kilogram LR bolus initiated.  Metabolic workup and CT imaging abdomen pelvis to be obtained.  Differential diagnosis at this point includes was not restricted to pancreatitis, perforation, sepsis    0107 patient is impressively hypertensive: She is feeling better with analgesia.  Systolic blood pressures over 200.  She does take antihypertensive at home and this is not a new diagnosis.  I will labetalol 20 mg IV at this time.    0135: Patient still very hypertensive despite labetalol, 220/116.  Her pain has recurred.  Have ordered morphine 4 mg IV and have also ordered hydralazine 10 mg IV for persistent hypertension.  I have also ordered Zosyn 4.5 g IV given CT concern for cholangitis.  Have paged hospitalist for my concerns and need for admission. I examined the patient 2/11/2024 1:35 AM  Vital Signs:/52   Pulse 76   Temp 37.3 °C (99.1 °F) (Temporal)   Resp 20   Ht 1.6 m (5' 3\")   Wt 76.2 kg (168 lb)   SpO2 91%   BMI 29.76 kg/m²   Cardiac examination significant for Tachycardia  Pulmonary examination significant for Clear lung fileds  Capillary refill is brisk  Peripheral Pulse is 2+   Skin is pale     0138: I have consulted with hospitalist Dr. Gaitan who concurs with plan of care thus far.  Will continue to monitor blood pressure, if remains elevated she will have to be admitted to medical telemetry in improved but guarded condition.  Given concern for cholangitis and recent EGD/ERCP      0206: Blood pressure is not last improving, she has not yet had the labetalol yet, currently 192/88.    0231: I spoke again with Dr. Gaitan with regard to improving blood pressure, currently 190/91.  Given the patient's recent procedure with her gastroenterologist Dr. Mars she request that I have paged the on-call for gastroenterology.  We will comply with the page for consultation.    0246: " "Blood pressure continues to improve after hydralazine given at 0212, currently 148/68.    0250: I have heard back from the on-call gastroenterologist Dr. Stratton who is on for Dr. Mars, he concurs with plan of care thus far and will consult on the case.    Patient Vitals for the past 24 hrs:   BP Temp Temp src Pulse Resp SpO2 Height Weight   02/11/24 0232 (!) 149/68 -- -- 88 -- 94 % -- --   02/11/24 0217 (!) 167/72 -- -- 89 20 95 % -- --   02/11/24 0209 (!) 190/91 -- -- 85 20 95 % -- --   02/11/24 0202 (!) 192/88 -- -- 85 18 95 % -- --   02/11/24 0147 (!) 195/90 -- -- 87 -- 95 % -- --   02/11/24 0125 (!) 217/91 -- -- (!) 110 16 -- -- --   02/11/24 0058 (!) 220/116 -- -- (!) 103 -- 95 % -- --   02/11/24 0055 (!) 215/98 -- -- -- -- -- -- --   02/11/24 0018 (!) 215/98 -- -- 98 -- 96 % -- --   02/10/24 2338 (!) 217/100 37.3 °C (99.1 °F) Temporal (!) 103 17 94 % -- --   02/10/24 2306 -- -- -- -- -- -- 1.6 m (5' 3\") 76.2 kg (168 lb)        CRITICAL CARE  The very real possibilty of a deterioration of this patient's condition required the highest level of my preparedness for sudden, emergent intervention.  I provided critical care services, which included medication orders, frequent reevaluations of the patient's condition and response to treatment, ordering and reviewing test results, and discussing the case with various consultants.  The critical care time associated with the care of the patient was 173 minutes. Review chart for interventions. This time is exclusive of any other billable procedures.      Upon Reevaluation, the patient's condition has: not improved; and will be escalated to hospitalization.    Patient discharged from ED Observation status at 0250 (Time) 2/11/24 (Date).     INITIAL ASSESSMENT, COURSE AND PLAN  Care Narrative: Very pleasant 73-year-old female presents for evaluation of severe upper abdominal pain with nausea and vomiting.  She is tachycardic, she has leukocytosis, and there is concern " for cholangitis on imaging; as such given intra-abdominal infectious sources concerning for cholangitis SIRS criteria for sepsis are met.  Cultures drawn, antibiotics initiated after cultures, appropriate fluid resuscitation initiated.  In addition patient is very hypertensive throughout her stay with minimal response to labetalol.  I have also ordered hydralazine for her, plan is clearly for admission.  HYDRATION: Based on the patient's presentation of Acute Vomiting and Dehydration the patient was given IV fluids. IV Hydration was used because oral hydration was not as rapid as required. Upon recheck following hydration, the patient was doing better, heart rate improving, currently 85.      ADDITIONAL PROBLEM LIST  UTI, cholangitis, sepsis, hypertensive urgency  DISPOSITION AND DISCUSSIONS  I have discussed management of the patient with the following physicians and TERESA's:  I have consulted with hospitalist Dr. Gaitan who concurs with plan of care thus far.  Will continue to monitor blood pressure, if remains elevated she will have to be admitted to medical telemetry in improved but guarded condition.  Given concern for cholangitis and recent EGD/ERCP      Have additionally consulted the on-call gastroenterologist Dr. Stratton who is on for Dr. Mars, he concurs with plan of care thus far and will consult on the case.    Discussion of management with other [facility] or appropriate source(s): None     Escalation of care considered, and ultimately not performed:diagnostic imaging    Barriers to care at this time, including but not limited to: Patient lacks transportation .     Decision tools and prescription drugs considered including, but not limited to:  SIRS criteria for sepsis are met .    FINAL DIAGNOSIS  1. Acute cholangitis    2. Sepsis without acute organ dysfunction, due to unspecified organism (HCC)    3. Hypertensive urgency    4. Acute pancreatitis, unspecified complication status, unspecified pancreatitis  type    5. Acute cystitis without hematuria           Electronically signed by: Dorcas Cruz M.D., 2/10/2024 11:24 PM

## 2024-02-11 NOTE — PROGRESS NOTES
Medication history reviewed with pt. Med rec is complete.  Allergies reviewed, per pt    Patient has had outpatient antibiotics in the last 30 days, pt started AUGMENTIN 875-125MG and Z-PARIS on 1/15/2024, per pt reports that she did finish course of antibiotics.     Pt is not on any anticoagulants

## 2024-02-11 NOTE — PROGRESS NOTES
Telemetry Shift Summary     Rhythm SR  HR Range 85  Ectopy   Measurements  .16/.08/.36  Per strip printed 0400     Normal Values  Rhythm SR  HR Range    Measurements 0.12-0.20 / 0.06-0.10  / 0.30-0.52

## 2024-02-11 NOTE — ED TRIAGE NOTES
"Chief Complaint   Patient presents with    Abdominal Pain     PT BIB EMS for abdominal pain x 2 days. Pt had a upper endoscopy with needle aspiration on 2/9. Pt was dx home with no pain but once home started to have pain.      Ht 1.6 m (5' 3\")   Wt 76.2 kg (168 lb)   BMI 29.76 kg/m²     "

## 2024-02-11 NOTE — PROGRESS NOTES
Patient admitted early this morning by my colleague for concerns of cholangitis vs pancreatitis. GI recommends IVF and IV abx for now. Will continue with fluids, antibiotics, supportive care for now. Full note to follow in the AM.     Charly Frank M.D.

## 2024-02-11 NOTE — PROGRESS NOTES
Called the pharmacist to clarify if I can administer the Insulin this time.last dose given at 0326 am.Pharmacist agreed to give the dose.

## 2024-02-11 NOTE — ASSESSMENT & PLAN NOTE
-Inpatient status on medical floor.  -Patient underwent ERCP/EUS today by Dr. Mars.  She has common bile duct dilation and chronic abdominal pain since November.  -She reports significantly worse abdominal pain after procedure.  -Will keep n.p.o.  -She has elevated WBC at 24.1 and tachycardic therefore meeting SIRS criteria but no organ dysfunction.  She is not septic on admission per sepsis 3 criteria.  -CT of the abdomen and pelvis done in the emergency department is showing concern for acute cholangitis and or mild pancreatitis with possible reactive duodenitis.  Her lipase is within normal limits.  -I appreciate GI consult recommendations.  ERP discussed this case with Dr. Stratton.  -Pain control as needed.  -Patient was started on Zosyn in the emergency department which I will continue.    2/13: Concern for acute cholangitis vs pancreatitis. For now we will continue antibiotics and IVF and pain management. We appreciate further recommendations by GI.    2/15: Continue antibiotics.

## 2024-02-11 NOTE — CONSULTS
Gastroenterology Consult Note:    OMAYRA Tapia  Date & Time note created:    2/11/2024   10:29 AM     Referring MD:  Dr. Frank    Patient ID:  Name:             Jigna Allen   YOB: 1950  Age:                 73 y.o.  female   MRN:               [id number]                                                             Reason for Consult:      Abdominal pain   S/p EGD/EUS/ERCP 2/9/24    History of Present Illness:    This is a 74 yo female PMH HTN, Type 2 DM, CAD, COPD, moderate stenosis of SMA (followed by vascular surgery) who was seen in consultation for severe epigastric abdominal pain after having an ERCP/EUS/EGD on 2/9/24. Hypertensive 217/100. Notable Labs: WBC: 24.1. Glucose: 218. LFT's and lipase was normal. Started on IV Zosyn.     CT scan:   Features concerning for acute cholangitis and/or mild pancreatitis.  2.  Possible reactive duodenitis.  3.  Small amount of biliary gas presumably related to recent procedure.  4.  Mildly dilated common bile duct likely related to prior cholecystectomy.  5.  Distal colonic diverticulosis.  6.  Hepatomegaly.    EGD/EUS/ERCP 2/9/24 performed by Dr. Mars due to dilated biliary duct and pain.   EGD: esophagus unremarkable. HH 37-39 cm. Gastric antrum erythema with biopsy for H. Pylori. Duodenum: unremarkable.   EUS: celiac axis/pancreas body/tail/pancreatic duct/ampulla/head of pancreas: unremarkable. CBD: dilated and tortuous up to 13.6 mm at mid duct. Distal filling defect concerning for stone/sludge.  ERCP: Ampulla: slightly bulbous, slowly rhythmically invaginating then protruding.   CBD: dilated and tortuous up to 13.6 mm mid duct subtle distal filling defect, several small stones and debris. Sphincterotomy. Biopsy of ampulla                      Review of Systems:      Review of Systems   Constitutional: Negative.    HENT: Negative.     Eyes: Negative.    Respiratory: Negative.     Cardiovascular: Negative.    Gastrointestinal:   Positive for abdominal pain.   Genitourinary: Negative.    Musculoskeletal: Negative.    Skin: Negative.    Neurological: Negative.    Endo/Heme/Allergies: Negative.    Psychiatric/Behavioral: Negative.               Physical Exam:  Vitals/ General Appearance:   Weight/BMI: Body mass index is 29.76 kg/m².    Vitals:    02/11/24 0247 02/11/24 0328 02/11/24 0529 02/11/24 0701   BP: 126/52 124/44 (!) 153/94 110/53   Pulse: 76 86  80   Resp:  18 18 18   Temp:  36.3 °C (97.4 °F)  36.2 °C (97.2 °F)   TempSrc:  Axillary  Axillary   SpO2: 91% 93% 93% 94%   Weight:       Height:         Oxygen Therapy:  Pulse Oximetry: 94 %, O2 (LPM): 2, O2 Delivery Device: Silicone Nasal Cannula    Physical Exam  Vitals reviewed.   Constitutional:       Appearance: She is obese.   HENT:      Head: Normocephalic and atraumatic.      Mouth/Throat:      Mouth: Mucous membranes are moist.   Eyes:      Extraocular Movements: Extraocular movements intact.      Pupils: Pupils are equal, round, and reactive to light.   Cardiovascular:      Rate and Rhythm: Normal rate and regular rhythm.      Pulses: Normal pulses.      Heart sounds: Normal heart sounds.   Pulmonary:      Effort: Pulmonary effort is normal.      Breath sounds: Normal breath sounds.   Abdominal:      General: Bowel sounds are normal.      Palpations: Abdomen is soft.      Tenderness: There is abdominal tenderness.   Musculoskeletal:         General: Normal range of motion.      Cervical back: Normal range of motion and neck supple.   Skin:     General: Skin is warm and dry.      Capillary Refill: Capillary refill takes 2 to 3 seconds.   Neurological:      General: No focal deficit present.      Mental Status: She is alert and oriented to person, place, and time.   Psychiatric:         Mood and Affect: Mood normal.         Behavior: Behavior normal.         Thought Content: Thought content normal.         Judgment: Judgment normal.         Past Medical History:   Past Medical  History:   Diagnosis Date    Abnormal screening cardiac CT 02/14/2020    Anxiety     Asthma     Bowel habit changes     Diarrhea    Breath shortness     Occasionally    Bronchitis 2018    Carotid arterial disease (HCC)     Cataract     Corrected with surgery    COPD (chronic obstructive pulmonary disease) (HCC)     Diabetes (HCC)     Emphysema of lung (HCC) 2023    Goiter 03/25/2016    Hiatus hernia syndrome 2023    HTN (hypertension)     Hyperlipidemia     Hyperlipidemia 03/25/2016    Indigestion     Myocardial infarct (HCC) 2010    Pain 2023    Pneumonia 2022    PONV (postoperative nausea and vomiting) 1989    Wasn’t a factor with last 2 surgeries    Tobacco use     Urinary incontinence 2023       Past Surgical History:  Past Surgical History:   Procedure Laterality Date    RI ERCP,DIAGNOSTIC N/A 2/9/2024    Procedure: UPPER ENDOSCOPIC ULTRASOUND WITH FINE NEEDLE ASPIRATION WITH  ENDOSCOPIC RETROGRADE CHOLANGIOPANCREATOGRAPHY AND ESOPHAGOGASTRODUODENOSCOPY;  Surgeon: Adrian Mars M.D.;  Location: [facility];  Service: EUS    RI UPPER GI ENDOSCOPY,DIAGNOSIS N/A 2/9/2024    Procedure: GASTROSCOPY;  Surgeon: Adrian Mars M.D.;  Location: [facility];  Service: EUS    EGD W/ENDOSCOPIC ULTRASOUND N/A 2/9/2024    Procedure: EGD, WITH ENDOSCOPIC US;  Surgeon: Adrian Mars M.D.;  Location: [facility];  Service: EUS    OTHER ABDOMINAL SURGERY      Appendix and gall bladder    OTHER CARDIAC SURGERY  2010    Just 1 stent    OTHER ORTHOPEDIC SURGERY      [facility] Medications:    Current Facility-Administered Medications:     fluticasone-umeclidinium-vilanterol (Trelegy Ellipta) 100-62.5-25 mcg/act inhaler 1 Puff, 1 Puff, Inhalation, DAILY, Lilli Smith M.D., 1 Puff at 02/11/24 0701    acetaminophen (Tylenol) tablet 650 mg, 650 mg, Oral, Q6HRS PRN, Lilli Smith M.D.    senna-docusate (Pericolace Or Senokot S) 8.6-50 MG per tablet 2  Tablet, 2 Tablet, Oral, BID **AND** polyethylene glycol/lytes (Miralax) Packet 1 Packet, 1 Packet, Oral, QDAY PRN, Lilli Smith M.D.    ondansetron (Zofran) syringe/vial injection 4 mg, 4 mg, Intravenous, Q4HRS PRN, Lilli Smith M.D.    ondansetron (Zofran ODT) dispertab 4 mg, 4 mg, Oral, Q4HRS PRN, Lilli Smith M.D.    HYDROmorphone (Dilaudid) injection 0.5 mg, 0.5 mg, Intravenous, Q3HRS PRN, Lilli Smith M.D.    HYDROmorphone (Dilaudid) injection 1 mg, 1 mg, Intravenous, Q3HRS PRN, Lilli Smith M.D., 1 mg at 02/11/24 0529    insulin regular (HumuLIN R,NovoLIN R) injection, 1-6 Units, Subcutaneous, Q6HRS, 2 Units at 02/11/24 0630 **AND** POC blood glucose manual result, , , Q6H **AND** NOTIFY MD and PharmD, , , Once **AND** Administer 20 grams of glucose (approximately 8 ounces of fruit juice) every 15 minutes PRN FSBG less than 70 mg/dL, , , PRN **AND** dextrose 50% (D50W) injection 25 g, 25 g, Intravenous, Q15 MIN PRN, Lilli Smith M.D.    LR (Bolus) infusion 500 mL, 500 mL, Intravenous, Once PRN, Lilli Smith M.D.    [DISCONTINUED] piperacillin-tazobactam (Zosyn) 3.375 g in  mL IVPB, 3.375 g, Intravenous, Once **AND** piperacillin-tazobactam (Zosyn) 3.375 g in  mL IVPB, 3.375 g, Intravenous, Q8HRS, Lilli Smith M.D., Stopped at 02/11/24 0939    Current Outpatient Medications:  Current Facility-Administered Medications   Medication Dose Route Frequency Provider Last Rate Last Admin    fluticasone-umeclidinium-vilanterol (Trelegy Ellipta) 100-62.5-25 mcg/act inhaler 1 Puff  1 Puff Inhalation DAILY Lilli Smith M.D.   1 Puff at 02/11/24 0701    acetaminophen (Tylenol) tablet 650 mg  650 mg Oral Q6HRS PRN Lilli Smith M.D.        senna-docusate (Pericolace Or Senokot S) 8.6-50 MG per tablet 2 Tablet  2 Tablet Oral BID Lilli Smith M.D.        And    polyethylene glycol/lytes (Miralax)  Packet 1 Packet  1 Packet Oral QDAY PRN Lilli Smith M.D.        ondansetron (Zofran) syringe/vial injection 4 mg  4 mg Intravenous Q4HRS PRN Lilli Smith M.D.        ondansetron (Zofran ODT) dispertab 4 mg  4 mg Oral Q4HRS PRN Lilli Smith M.D.        HYDROmorphone (Dilaudid) injection 0.5 mg  0.5 mg Intravenous Q3HRS PRN Lilli Smith M.D.        HYDROmorphone (Dilaudid) injection 1 mg  1 mg Intravenous Q3HRS PRN Lilli Smith M.D.   1 mg at 02/11/24 0529    insulin regular (HumuLIN R,NovoLIN R) injection  1-6 Units Subcutaneous Q6HRS Lilli Smith M.D.   2 Units at 02/11/24 0630    And    dextrose 50% (D50W) injection 25 g  25 g Intravenous Q15 MIN PRN Lilli Smith M.D.        LR (Bolus) infusion 500 mL  500 mL Intravenous Once PRN Lilli Smith M.D.        piperacillin-tazobactam (Zosyn) 3.375 g in  mL IVPB  3.375 g Intravenous Q8HRS Lilli Smith M.D.   Stopped at 02/11/24 0939       Medication Allergy:  Allergies   Allergen Reactions    Sulfa Drugs Hives    Ezetimibe Nausea     Diarrhea     Hmg-Coa-R Inhibitors Unspecified     Muscle cramps       Family History:  Family History   Problem Relation Age of Onset    Cancer Mother 47        breast    Breast Cancer Mother         Mets to bone and liver    Cancer Father         Stomach    Hypertension Father     Alcohol abuse Father     Stroke Maternal Grandmother     Alcohol abuse Maternal Grandmother     Stroke Paternal Grandmother     Cancer Brother         Glioblastoma    Alcohol abuse Paternal Uncle     Alcohol abuse Paternal Uncle        Social History:  Social History     Socioeconomic History    Marital status:      Spouse name: Not on file    Number of children: Not on file    Years of education: Not on file    Highest education level: Associate degree: occupational, technical, or vocational program   Occupational History    Not on file   Tobacco Use     Smoking status: Every Day     Current packs/day: 0.50     Average packs/day: 0.5 packs/day for 59.4 years (29.7 ttl pk-yrs)     Types: Cigarettes     Start date: 9/1/1964    Smokeless tobacco: Never    Tobacco comments:     I enjoy smoking   Vaping Use    Vaping Use: Never used   Substance and Sexual Activity    Alcohol use: No    Drug use: Not Currently     Types: Marijuana     Comment: CBD  occ    Sexual activity: Not Currently     Partners: Male     Comment:    Other Topics Concern    Not on file   Social History Narrative    Not on file     Social Determinants of Health     Financial Resource Strain: Low Risk  (3/21/2022)    Overall Financial Resource Strain (CARDIA)     Difficulty of Paying Living Expenses: Not very hard   Food Insecurity: No Food Insecurity (3/21/2022)    Hunger Vital Sign     Worried About Running Out of Food in the Last Year: Never true     Ran Out of Food in the Last Year: Never true   Transportation Needs: No Transportation Needs (3/21/2022)    PRAPARE - Transportation     Lack of Transportation (Medical): No     Lack of Transportation (Non-Medical): No   Physical Activity: Unknown (3/21/2022)    Exercise Vital Sign     Days of Exercise per Week: 0 days     Minutes of Exercise per Session: Not on file   Stress: Stress Concern Present (3/21/2022)    [employer] - Occupational Stress Questionnaire     Feeling of Stress : Very much   Social Connections: Moderately Isolated (3/21/2022)    Social Connection and Isolation Panel [NHANES]     Frequency of Communication with Friends and Family: More than three times a week     Frequency of Social Gatherings with Friends and Family: Once a week     Attends Baptism Services: Never     Active Member of Clubs or Organizations: No     Attends Club or Organization Meetings: Never     Marital Status:    Intimate Partner Violence: Not on file   Housing Stability: Low Risk  (3/21/2022)    Housing Stability Vital  Sign     Unable to Pay for Housing in the Last Year: No     Number of Places Lived in the Last Year: 1     Unstable Housing in the Last Year: No         MDM (Data Review):     Records reviewed and summarized in current documentation    Lab Data Review:  Recent Results (from the past 24 hour(s))   CBC WITH DIFFERENTIAL    Collection Time: 02/10/24 11:11 PM   Result Value Ref Range    WBC 24.1 (H) 4.8 - 10.8 K/uL    RBC 4.85 4.20 - 5.40 M/uL    Hemoglobin 13.4 12.0 - 16.0 g/dL    Hematocrit 40.8 37.0 - 47.0 %    MCV 84.1 81.4 - 97.8 fL    MCH 27.6 27.0 - 33.0 pg    MCHC 32.8 32.2 - 35.5 g/dL    RDW 43.8 35.9 - 50.0 fL    Platelet Count 371 164 - 446 K/uL    MPV 9.8 9.0 - 12.9 fL    Neutrophils-Polys 82.50 (H) 44.00 - 72.00 %    Lymphocytes 9.90 (L) 22.00 - 41.00 %    Monocytes 6.20 0.00 - 13.40 %    Eosinophils 0.50 0.00 - 6.90 %    Basophils 0.30 0.00 - 1.80 %    Immature Granulocytes 0.60 0.00 - 0.90 %    Nucleated RBC 0.00 0.00 - 0.20 /100 WBC    Neutrophils (Absolute) 19.93 (H) 1.82 - 7.42 K/uL    Lymphs (Absolute) 2.39 1.00 - 4.80 K/uL    Monos (Absolute) 1.49 (H) 0.00 - 0.85 K/uL    Eos (Absolute) 0.11 0.00 - 0.51 K/uL    Baso (Absolute) 0.08 0.00 - 0.12 K/uL    Immature Granulocytes (abs) 0.14 (H) 0.00 - 0.11 K/uL    NRBC (Absolute) 0.00 K/uL   CMP    Collection Time: 02/10/24 11:11 PM   Result Value Ref Range    Sodium 136 135 - 145 mmol/L    Potassium 3.6 3.6 - 5.5 mmol/L    Chloride 96 96 - 112 mmol/L    Co2 23 20 - 33 mmol/L    Anion Gap 17.0 (H) 7.0 - 16.0    Glucose 218 (H) 65 - 99 mg/dL    Bun 19 8 - 22 mg/dL    Creatinine 0.61 0.50 - 1.40 mg/dL    Calcium 9.8 8.4 - 10.2 mg/dL    Correct Calcium 9.6 8.5 - 10.5 mg/dL    AST(SGOT) 14 12 - 45 U/L    ALT(SGPT) 15 2 - 50 U/L    Alkaline Phosphatase 94 30 - 99 U/L    Total Bilirubin 0.6 0.1 - 1.5 mg/dL    Albumin 4.3 3.2 - 4.9 g/dL    Total Protein 7.6 6.0 - 8.2 g/dL    Globulin 3.3 1.9 - 3.5 g/dL    A-G Ratio 1.3 g/dL   LIPASE    Collection Time: 02/10/24  11:11 PM   Result Value Ref Range    Lipase 48 11 - 82 U/L   ESTIMATED GFR    Collection Time: 02/10/24 11:11 PM   Result Value Ref Range    GFR (CKD-EPI) 94 >60 mL/min/1.73 m 2   LACTIC ACID    Collection Time: 02/10/24 11:11 PM   Result Value Ref Range    Lactic Acid 2.2 (H) 0.5 - 2.0 mmol/L   Prothrombin time (INR)    Collection Time: 02/10/24 11:11 PM   Result Value Ref Range    PT 12.5 12.0 - 14.6 sec    INR 0.90 0.87 - 1.13   Blood Culture - Draw one from central line and one from peripheral site    Collection Time: 02/10/24 11:30 PM    Specimen: Line; Blood   Result Value Ref Range    Significant Indicator NEG     Source BLD     Site LINE     Culture Result       No Growth  Note: Blood cultures are incubated for 5 days and  are monitored continuously.Positive blood cultures  are called to the RN and reported as soon as  they are identified.  Blood culture testing and Gram stain, if indicated, are  performed at [facility], [address] [place] [address].  Positive blood cultures are  sent to [facility], [address] [place] [address], for organism identification and  susceptibility testing.     Lactic Acid    Collection Time: 02/10/24 11:40 PM   Result Value Ref Range    Lactic Acid 1.8 0.5 - 2.0 mmol/L   Blood Culture - Draw one from central line and one from peripheral site    Collection Time: 02/10/24 11:40 PM    Specimen: Peripheral; Blood   Result Value Ref Range    Significant Indicator NEG     Source BLD     Site PERIPHERAL     Culture Result       No Growth  Note: Blood cultures are incubated for 5 days and  are monitored continuously.Positive blood cultures  are called to the RN and reported as soon as  they are identified.  Blood culture testing and Gram stain, if indicated, are  performed at [facility], [address] [place] [address].[place], [place].  Positive blood cultures are  sent to [address]  [place], [place], for organism identification and  susceptibility testing.     Urinalysis    Collection Time: 02/11/24  1:31 AM    Specimen: Urine   Result Value Ref Range    Color Yellow     Character Hazy (A)     Specific Gravity 1.010 <1.035    Ph 6.0 5.0 - 8.0    Glucose 250 (A) Negative mg/dL    Ketones Trace (A) Negative mg/dL    Protein Negative Negative mg/dL    Bilirubin Negative Negative    Nitrite Negative Negative    Leukocyte Esterase Moderate (A) Negative    Occult Blood Trace (A) Negative    Micro Urine Req Microscopic    URINE MICROSCOPIC (W/UA)    Collection Time: 02/11/24  1:31 AM   Result Value Ref Range    WBC 20-50 (A) /hpf    RBC 0-2 /hpf    Bacteria Moderate (A) None /hpf    Epithelial Cells Moderate (A) Few /hpf    Mucous Threads Few /hpf   POCT glucose device results    Collection Time: 02/11/24  3:23 AM   Result Value Ref Range    POC Glucose, Blood 240 (H) 65 - 99 mg/dL   POCT glucose device results    Collection Time: 02/11/24  5:44 AM   Result Value Ref Range    POC Glucose, Blood 226 (H) 65 - 99 mg/dL   LACTIC ACID    Collection Time: 02/11/24  5:58 AM   Result Value Ref Range    Lactic Acid 1.7 0.5 - 2.0 mmol/L       Imaging/Procedures Review:      CT-ABDOMEN-PELVIS WITH   Final Result      1.  Features concerning for acute cholangitis and/or mild pancreatitis.   2.  Possible reactive duodenitis.   3.  Small amount of biliary gas presumably related to recent procedure.   4.  Mildly dilated common bile duct likely related to prior cholecystectomy.   5.  Distal colonic diverticulosis.   6.  Hepatomegaly.           MDM (Assessment and Plan):     Patient Active Problem List    Diagnosis Date Noted    Acute cholangitis 02/11/2024    Hypertensive urgency 02/11/2024    SIRS (systemic inflammatory response syndrome) (HCC) 02/11/2024    Medication refill 02/08/2024    Dilated cbd, acquired 11/27/2023    Hospital discharge follow-up 05/30/2023    Mucous retention cyst of maxillary sinus  05/30/2023    Left leg pain 01/18/2023    Pain of left heel 01/18/2023    Ingrown toenail of left foot 01/18/2023    Major depressive disorder 01/18/2023    Viral upper respiratory tract infection 11/09/2022    URI (upper respiratory infection) 11/09/2022    Need for vaccination 09/29/2022    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    Recurrent major depressive disorder (HCC) 03/22/2022    Healthcare maintenance 03/22/2022    Venous stasis dermatitis of left lower extremity 12/17/2020    Neuropathic pain 08/24/2020    Vaginal yeast infection 01/07/2020    Obesity (BMI 30-39.9) 09/17/2019    S/P carotid endarterectomy 02/05/2019    Radicular pain in left arm 02/05/2019    Neck pain on left side 02/05/2019    Vitamin D deficiency 11/06/2018    Essential hypertension 09/25/2018    Anxiety 03/25/2016    CAD (coronary artery disease) 03/25/2016    COPD (chronic obstructive pulmonary disease) ([facility]) 03/25/2016    Type 2 diabetes mellitus with complication, without long-term current use of insulin ([facility]) 03/25/2016    Goiter 03/25/2016    Hyperlipidemia 03/25/2016    Stented coronary artery 03/25/2016    Tobacco dependence 03/25/2016    Carotid artery stenosis without cerebral infarction 09/25/2014     This is a 72 yo female admitted to the hospital with post op epigastric abdominal pain concerning for pancreatitis vs cholangitis. Underwent an EGD/EUS/ERCP with stone extraction of the biliary duct, sphincterotomy, biopsies of the gastric antrum and ampulla. Discharged home but abdominal pain was getting worse. Labs: elevated WBC but LFT's and lipase are normal. CT scan concerning for cholangitis vs mild pancreatitis. Started on IV Zosyn.    ASSESSMENT:  Abdominal pain likely due to ERCP induced pancreatitis  Leukocytosis  Choledocholithiasis s/p stone extraction with sphincterotomy 2/9/24  HTN    PLAN:  Recommend supportive care with IV fluids resuscitation for pancreatitis, pain and antiemetics  IV antibiotics  (Zosyn)  Daily CMP  Clear liquids, no reds then KENTON BRAUN and Dr. Shields will start covering for GI Consultants 2/12/24    Thank your for the opportunity to assist in the care of your patient.  Please call for any questions or concerns.    RAHUL Tapia.

## 2024-02-11 NOTE — H&P
Hospital Medicine History & Physical Note    Date of Service  2/11/2024    Primary Care Physician  Cheryl M. Demucha, A.P.R.N.    Consultants  GI    Specialist Names: ERP discussed the case with Dr Adams    Code Status  Full Code    Chief Complaint  Chief Complaint   Patient presents with    Abdominal Pain     PT BIB EMS for abdominal pain x 2 days. Pt had a upper endoscopy with needle aspiration on 2/9. Pt was dx home with no pain but once home started to have pain.        History of Presenting Illness  Jigna Allen is a 73 y.o. female, with h/o HTN, non-insulin dependent Type II DM, CAD, COPD who underwent ERCP/ EUS yesterday (Dr. Mars) scheduled after hospitalization late November for common bile duct dilation and ongoing abdominal pain.  She also has moderate stenosis of SMA and following outpatient with vascular surgery.  Patient states that after she went home after procedure, developed worsening abdominal pain reason why she presented to the ED on 2/10/2024 for further evaluation.    CT scan evaluation showing concerning's for acute cholangitis and/or mild pancreatitis with reactive duodenitis and had significantly elevated blood pressure of 217/100 in the ED requiring IV medication for blood pressure control.    I discussed the plan of care with patient and ERP Dr. Cruz .    Review of Systems  Review of Systems   Constitutional:  Positive for malaise/fatigue. Negative for fever.   HENT:  Negative for congestion and sore throat.    Eyes:  Negative for blurred vision and double vision.   Respiratory:  Negative for cough and shortness of breath.    Cardiovascular:  Negative for chest pain and palpitations.   Gastrointestinal:  Positive for abdominal pain. Negative for nausea and vomiting.   Genitourinary:  Negative for dysuria and urgency.   Musculoskeletal:  Negative for myalgias and neck pain.   Skin:  Negative for itching and rash.   Neurological:  Negative for dizziness, weakness and headaches.    Endo/Heme/Allergies:  Does not bruise/bleed easily.   Psychiatric/Behavioral:  Negative for depression. The patient does not have insomnia.        Past Medical History   has a past medical history of Abnormal screening cardiac CT (02/14/2020), Anxiety, Asthma, Bowel habit changes, Breath shortness, Bronchitis (2018), Carotid arterial disease (HCC), Cataract, COPD (chronic obstructive pulmonary disease) (HCC), Diabetes (HCC), Emphysema of lung (HCC) (2023), Goiter (03/25/2016), Hiatus hernia syndrome (2023), HTN (hypertension), Hyperlipidemia, Hyperlipidemia (03/25/2016), Indigestion, Myocardial infarct (HCC) (2010), Pain (2023), Pneumonia (2022), PONV (postoperative nausea and vomiting) (1989), Tobacco use, and Urinary incontinence (2023).    Surgical History   has a past surgical history that includes other cardiac surgery (2010); other orthopedic surgery; other abdominal surgery; pr ercp,diagnostic (N/A, 2/9/2024); pr upper gi endoscopy,diagnosis (N/A, 2/9/2024); and egd w/endoscopic ultrasound (N/A, 2/9/2024).     Family History  family history includes Alcohol abuse in her father, maternal grandmother, paternal uncle, and paternal uncle; Breast Cancer in her mother; Cancer in her brother and father; Cancer (age of onset: 47) in her mother; Hypertension in her father; Stroke in her maternal grandmother and paternal grandmother.   Family history reviewed with patient. There is no family history that is pertinent to the chief complaint.     Social History   reports that she has been smoking cigarettes. She started smoking about 59 years ago. She has a 29.7 pack-year smoking history. She has never used smokeless tobacco. She reports that she does not currently use drugs after having used the following drugs: Marijuana. She reports that she does not drink alcohol.    Allergies  Allergies   Allergen Reactions    Sulfa Drugs Hives    Ezetimibe Nausea     Diarrhea     Hmg-Coa-R Inhibitors Unspecified     Muscle cramps        Medications  Prior to Admission Medications   Prescriptions Last Dose Informant Patient Reported? Taking?   Cholecalciferol (VITAMIN D) 2000 UNIT Tab  Patient Yes No   Sig: Take 2,000 Units by mouth every day.   Multiple Vitamins-Minerals (PRESERVISION AREDS PO)   Yes No   Sig: Take 1 Tablet by mouth every day.   Nebulizers Misc  Patient No No   Sig: Use nebulizer as directed up to 4x day prn sob   acetaminophen (TYLENOL) 325 MG Tab   Yes No   Sig: Take 650 mg by mouth every four hours as needed.   albuterol (PROAIR HFA) 108 (90 Base) MCG/ACT Aero Soln inhalation aerosol  Patient No No   Sig: USE 2 INHALATIONS EVERY 6 HOURS AS NEEDED FOR SHORTNESS OF BREATH   amoxicillin-clavulanate (AUGMENTIN) 875-125 MG Tab   Yes No   Sig: Take 1 Tablet by mouth 2 times a day.   aspirin EC (ECOTRIN) 81 MG Tablet Delayed Response  Patient Yes No   Sig: Take 1 Tablet by mouth every day.   azithromycin (ZITHROMAX) 250 MG Tab   Yes No   Sig: Take 250 mg by mouth every day.   benzonatate (TESSALON) 100 MG Cap   Yes No   Sig: Take 100 mg by mouth 3 times a day as needed for Cough.   buPROPion (WELLBUTRIN) 100 MG Tab   No No   Sig: TAKE 1 TABLET TWICE A DAY (DOSE INCREASED)   clobetasol (TEMOVATE) 0.05 % Ointment  Patient Yes No   Sig: Apply 1 Application topically 2 times a day. APPLY TO LEGS   fluticasone-umeclidin-vilant (TRELEGY ELLIPTA) 100-62.5-25 MCG/ACT AEROSOL POWDER, BREATH ACTIVATED inhalation  Patient No No   Sig: Inhale 1 Inhalation every day. X 365 days   glucose blood (FREESTYLE LITE) strip  Patient No No   Sig: Use as directed.patient tests fasting blood sugar every am   ipratropium-albuterol (DUONEB) 0.5-2.5 (3) MG/3ML nebulizer solution   No No   Sig: USE 1 VIAL (3 ML) VIA NEBULIZER FOUR TIMES A DAY   lactobacillus rhamnosus (CULTURELLE) Cap capsule   No No   Sig: Take 1 Capsule by mouth every morning with breakfast.   losartan-hydrochlorothiazide (HYZAAR) 100-12.5 MG per tablet   No No   Sig: Take 1 Tablet by  mouth every day.   metformin (GLUCOPHAGE) 1000 MG tablet   No No   Sig: Take 1 Tablet by mouth 2 times a day with meals.   nitroglycerin (NITROSTAT) 0.4 MG SL Tab  Patient No No   Sig: Place 1 Tablet under the tongue as needed for Chest Pain (Once every 5 minutes up to 3 doses).   pantoprazole (PROTONIX) 20 MG tablet  Patient Yes No   Sig: Take 20 mg by mouth every day.      Facility-Administered Medications: None       Physical Exam  Temp:  [37.3 °C (99.1 °F)] 37.3 °C (99.1 °F)  Pulse:  [] 85  Resp:  [16-20] 20  BP: (190-220)/() 190/91  SpO2:  [94 %-96 %] 95 %  Blood Pressure : (!) 190/91   Temperature: 37.3 °C (99.1 °F)   Pulse: 85   Respiration: 20   Pulse Oximetry: 95 %       Physical Exam  Constitutional:       Appearance: Normal appearance.   HENT:      Head: Normocephalic and atraumatic.      Mouth/Throat:      Mouth: Mucous membranes are moist.      Pharynx: Oropharynx is clear.   Eyes:      Extraocular Movements: Extraocular movements intact.      Pupils: Pupils are equal, round, and reactive to light.   Cardiovascular:      Rate and Rhythm: Normal rate and regular rhythm.      Heart sounds: Normal heart sounds.   Pulmonary:      Effort: Pulmonary effort is normal.      Breath sounds: Normal breath sounds.   Abdominal:      General: Abdomen is flat. Bowel sounds are normal.      Palpations: Abdomen is soft.      Tenderness: There is abdominal tenderness (Mild periumbilical pain to palpation).   Musculoskeletal:      Cervical back: Normal range of motion and neck supple.   Skin:     General: Skin is warm and dry.   Neurological:      General: No focal deficit present.      Mental Status: She is alert and oriented to person, place, and time.   Psychiatric:         Mood and Affect: Mood normal.         Behavior: Behavior normal.         Laboratory:  Recent Labs     02/10/24  2311   WBC 24.1*   RBC 4.85   HEMOGLOBIN 13.4   HEMATOCRIT 40.8   MCV 84.1   MCH 27.6   MCHC 32.8   RDW 43.8   PLATELETCT 371    MPV 9.8     Recent Labs     02/10/24  2311   SODIUM 136   POTASSIUM 3.6   CHLORIDE 96   CO2 23   GLUCOSE 218*   BUN 19   CREATININE 0.61   CALCIUM 9.8     Recent Labs     02/10/24  2311   ALTSGPT 15   ASTSGOT 14   ALKPHOSPHAT 94   TBILIRUBIN 0.6   LIPASE 48   GLUCOSE 218*         Imaging:  CT-ABDOMEN-PELVIS WITH   Final Result      1.  Features concerning for acute cholangitis and/or mild pancreatitis.   2.  Possible reactive duodenitis.   3.  Small amount of biliary gas presumably related to recent procedure.   4.  Mildly dilated common bile duct likely related to prior cholecystectomy.   5.  Distal colonic diverticulosis.   6.  Hepatomegaly.          X-Ray:  I have personally reviewed the images and compared with prior images. and My impression is: CT of the abdomen and pelvis showing concerning for acute cholangitis and/or mild pancreatitis with possible reactive duodenitis.    Assessment/Plan:  Justification for Admission Status  I anticipate this patient will require at least two midnights for appropriate medical management, necessitating inpatient admission because 73-year-old female who underwent ERCP and EUS yesterday, returning with worsening abdominal pain and CT findings concerning for acute cholangitis and/or mild pancreatitis with possible reactive duodenitis. WBC 24 . She also came with significantly high blood pressure      * Acute cholangitis- (present on admission)  Assessment & Plan  -Inpatient status on medical floor.  -Patient underwent ERCP/EUS today by Dr. Mars.  She has common bile duct dilation and chronic abdominal pain since November.  -She reports significantly worse abdominal pain after procedure.  -Will keep n.p.o.  -She has elevated WBC at 24.1 and tachycardic therefore meeting SIRS criteria but no organ dysfunction.  She is not septic on admission per sepsis 3 criteria.  -CT of the abdomen and pelvis done in the emergency department is showing concern for acute cholangitis and  or mild pancreatitis with possible reactive duodenitis.  Her lipase is within normal limits.  -I appreciate GI consult recommendations.  ERP discussed this case with Dr. Stratton.  -Pain control as needed.  -Patient was started on Zosyn in the emergency department which I will continue.    SIRS (systemic inflammatory response syndrome) ([facility])  Assessment & Plan  SIRS criteria identified on my evaluation include:  Tachycardia, with heart rate greater than 90 BPM and Leukocytosis, with WBC greater than 12,000  SIRS with CT findings of acute cholangitis and or mild pancreatitis with possible reactive duodenitis.  -Initial white count is 24.1.  Patient was started on Zosyn while in the emergency department in which I am continuing.  -Sepsis protocol was added, there is no endorgan dysfunction therefore not sepsis on admission per sepsis 3 criteria but will need to closely monitor this.  I appreciate GI consult and recommendations.    Hypertensive urgency  Assessment & Plan  -Initial blood pressure was 217/100.  Patient was seen severe pain on arrival.  -She received 50 mcg of fentanyl in the emergency department for pain and 20 mg of IV labetalol.  -At the time of my evaluation, blood pressure was a still elevated at 217/91 and I requested ERP to give additional 10 mg of IV hydralazine, as I was trying to decide if she will be appropriate to go to the floor versus requiring ICU for blood pressure control.  Patient blood pressure trending down was 167/92.  -Elevated blood pressure likely worse secondary to abdominal pain but will have to closely monitor this.  She will be n.p.o. and I am unable to reorder her oral medications losartan hydrochlorothiazide.    Type 2 diabetes mellitus with complication, without long-term current use of insulin ([facility])- (present on admission)  Assessment & Plan  -She is NPO.  Added regular insulin sliding scale.    COPD (chronic obstructive pulmonary disease) ([facility])- (present on  admission)  Assessment & Plan  -Without exacerbation.  Continue Trelegy Ellipta.    CAD (coronary artery disease)- (present on admission)  Assessment & Plan  -History of MI and stent.  She denies having chest pain.        VTE prophylaxis: SCDs/TEDs

## 2024-02-12 LAB
ANION GAP SERPL CALC-SCNC: 10 MMOL/L (ref 7–16)
BUN SERPL-MCNC: 12 MG/DL (ref 8–22)
CALCIUM SERPL-MCNC: 9 MG/DL (ref 8.4–10.2)
CHLORIDE SERPL-SCNC: 100 MMOL/L (ref 96–112)
CO2 SERPL-SCNC: 28 MMOL/L (ref 20–33)
CREAT SERPL-MCNC: 0.54 MG/DL (ref 0.5–1.4)
ERYTHROCYTE [DISTWIDTH] IN BLOOD BY AUTOMATED COUNT: 45.9 FL (ref 35.9–50)
GFR SERPLBLD CREATININE-BSD FMLA CKD-EPI: 97 ML/MIN/1.73 M 2
GLUCOSE BLD STRIP.AUTO-MCNC: 156 MG/DL (ref 65–99)
GLUCOSE BLD STRIP.AUTO-MCNC: 163 MG/DL (ref 65–99)
GLUCOSE BLD STRIP.AUTO-MCNC: 174 MG/DL (ref 65–99)
GLUCOSE BLD STRIP.AUTO-MCNC: 183 MG/DL (ref 65–99)
GLUCOSE BLD STRIP.AUTO-MCNC: 199 MG/DL (ref 65–99)
GLUCOSE SERPL-MCNC: 196 MG/DL (ref 65–99)
HCT VFR BLD AUTO: 35.9 % (ref 37–47)
HGB BLD-MCNC: 11.5 G/DL (ref 12–16)
MCH RBC QN AUTO: 27.8 PG (ref 27–33)
MCHC RBC AUTO-ENTMCNC: 32 G/DL (ref 32.2–35.5)
MCV RBC AUTO: 86.7 FL (ref 81.4–97.8)
PLATELET # BLD AUTO: 309 K/UL (ref 164–446)
PMV BLD AUTO: 9.8 FL (ref 9–12.9)
POTASSIUM SERPL-SCNC: 3.6 MMOL/L (ref 3.6–5.5)
RBC # BLD AUTO: 4.14 M/UL (ref 4.2–5.4)
SODIUM SERPL-SCNC: 138 MMOL/L (ref 135–145)
WBC # BLD AUTO: 16.4 K/UL (ref 4.8–10.8)

## 2024-02-12 PROCEDURE — A9270 NON-COVERED ITEM OR SERVICE: HCPCS | Performed by: HOSPITALIST

## 2024-02-12 PROCEDURE — 770020 HCHG ROOM/CARE - TELE (206)

## 2024-02-12 PROCEDURE — 99233 SBSQ HOSP IP/OBS HIGH 50: CPT | Performed by: STUDENT IN AN ORGANIZED HEALTH CARE EDUCATION/TRAINING PROGRAM

## 2024-02-12 PROCEDURE — 700111 HCHG RX REV CODE 636 W/ 250 OVERRIDE (IP): Mod: JZ | Performed by: HOSPITALIST

## 2024-02-12 PROCEDURE — 700105 HCHG RX REV CODE 258: Performed by: HOSPITALIST

## 2024-02-12 PROCEDURE — 82962 GLUCOSE BLOOD TEST: CPT | Mod: 91

## 2024-02-12 PROCEDURE — 85027 COMPLETE CBC AUTOMATED: CPT

## 2024-02-12 PROCEDURE — 94760 N-INVAS EAR/PLS OXIMETRY 1: CPT

## 2024-02-12 PROCEDURE — 80048 BASIC METABOLIC PNL TOTAL CA: CPT

## 2024-02-12 PROCEDURE — 700102 HCHG RX REV CODE 250 W/ 637 OVERRIDE(OP): Performed by: HOSPITALIST

## 2024-02-12 PROCEDURE — 36415 COLL VENOUS BLD VENIPUNCTURE: CPT

## 2024-02-12 PROCEDURE — 700105 HCHG RX REV CODE 258: Performed by: STUDENT IN AN ORGANIZED HEALTH CARE EDUCATION/TRAINING PROGRAM

## 2024-02-12 PROCEDURE — 700111 HCHG RX REV CODE 636 W/ 250 OVERRIDE (IP): Mod: JZ | Performed by: STUDENT IN AN ORGANIZED HEALTH CARE EDUCATION/TRAINING PROGRAM

## 2024-02-12 RX ORDER — DEXTROSE MONOHYDRATE 25 G/50ML
25 INJECTION, SOLUTION INTRAVENOUS
Status: DISCONTINUED | OUTPATIENT
Start: 2024-02-12 | End: 2024-02-16 | Stop reason: HOSPADM

## 2024-02-12 RX ADMIN — INSULIN HUMAN 1 UNITS: 100 INJECTION, SOLUTION PARENTERAL at 12:16

## 2024-02-12 RX ADMIN — HYDROMORPHONE HYDROCHLORIDE 1 MG: 1 INJECTION, SOLUTION INTRAMUSCULAR; INTRAVENOUS; SUBCUTANEOUS at 05:28

## 2024-02-12 RX ADMIN — ACETAMINOPHEN 650 MG: 325 TABLET ORAL at 10:27

## 2024-02-12 RX ADMIN — HYDROMORPHONE HYDROCHLORIDE 1 MG: 1 INJECTION, SOLUTION INTRAMUSCULAR; INTRAVENOUS; SUBCUTANEOUS at 15:07

## 2024-02-12 RX ADMIN — CEFTRIAXONE SODIUM 2000 MG: 2 INJECTION, POWDER, FOR SOLUTION INTRAMUSCULAR; INTRAVENOUS at 12:14

## 2024-02-12 RX ADMIN — DOCUSATE SODIUM 50MG AND SENNOSIDES 8.6MG 2 TABLET: 8.6; 5 TABLET, FILM COATED ORAL at 17:06

## 2024-02-12 RX ADMIN — FLUTICASONE FUROATE, UMECLIDINIUM BROMIDE AND VILANTEROL TRIFENATATE 1 PUFF: 100; 62.5; 25 POWDER RESPIRATORY (INHALATION) at 05:23

## 2024-02-12 RX ADMIN — INSULIN HUMAN 1 UNITS: 100 INJECTION, SOLUTION PARENTERAL at 17:08

## 2024-02-12 RX ADMIN — ACETAMINOPHEN 650 MG: 325 TABLET ORAL at 03:22

## 2024-02-12 RX ADMIN — DOCUSATE SODIUM 50MG AND SENNOSIDES 8.6MG 2 TABLET: 8.6; 5 TABLET, FILM COATED ORAL at 05:23

## 2024-02-12 RX ADMIN — HYDROMORPHONE HYDROCHLORIDE 1 MG: 1 INJECTION, SOLUTION INTRAMUSCULAR; INTRAVENOUS; SUBCUTANEOUS at 23:29

## 2024-02-12 RX ADMIN — PIPERACILLIN SODIUM AND TAZOBACTAM SODIUM 3.38 G: 3; .375 INJECTION, POWDER, LYOPHILIZED, FOR SOLUTION INTRAVENOUS at 05:23

## 2024-02-12 RX ADMIN — ONDANSETRON 4 MG: 2 INJECTION INTRAMUSCULAR; INTRAVENOUS at 15:07

## 2024-02-12 RX ADMIN — INSULIN HUMAN 1 UNITS: 100 INJECTION, SOLUTION PARENTERAL at 00:01

## 2024-02-12 RX ADMIN — ACETAMINOPHEN 650 MG: 325 TABLET ORAL at 19:03

## 2024-02-12 ASSESSMENT — PAIN DESCRIPTION - PAIN TYPE
TYPE: ACUTE PAIN
TYPE: ACUTE PAIN

## 2024-02-12 ASSESSMENT — PATIENT HEALTH QUESTIONNAIRE - PHQ9
9. THOUGHTS THAT YOU WOULD BE BETTER OFF DEAD, OR OF HURTING YOURSELF: NOT AT ALL
3. TROUBLE FALLING OR STAYING ASLEEP OR SLEEPING TOO MUCH: NOT AT ALL
6. FEELING BAD ABOUT YOURSELF - OR THAT YOU ARE A FAILURE OR HAVE LET YOURSELF OR YOUR FAMILY DOWN: NOT AL ALL
SUM OF ALL RESPONSES TO PHQ9 QUESTIONS 1 AND 2: 0
4. FEELING TIRED OR HAVING LITTLE ENERGY: NOT AT ALL
5. POOR APPETITE OR OVEREATING: NOT AT ALL
8. MOVING OR SPEAKING SO SLOWLY THAT OTHER PEOPLE COULD HAVE NOTICED. OR THE OPPOSITE, BEING SO FIGETY OR RESTLESS THAT YOU HAVE BEEN MOVING AROUND A LOT MORE THAN USUAL: NOT AT ALL
7. TROUBLE CONCENTRATING ON THINGS, SUCH AS READING THE NEWSPAPER OR WATCHING TELEVISION: NOT AT ALL
1. LITTLE INTEREST OR PLEASURE IN DOING THINGS: NOT AT ALL
2. FEELING DOWN, DEPRESSED, IRRITABLE, OR HOPELESS: NOT AT ALL
SUM OF ALL RESPONSES TO PHQ QUESTIONS 1-9: 0

## 2024-02-12 ASSESSMENT — ENCOUNTER SYMPTOMS
MUSCULOSKELETAL NEGATIVE: 1
NEUROLOGICAL NEGATIVE: 1
RESPIRATORY NEGATIVE: 1
PSYCHIATRIC NEGATIVE: 1
ABDOMINAL PAIN: 1
CONSTITUTIONAL NEGATIVE: 1
CARDIOVASCULAR NEGATIVE: 1

## 2024-02-12 ASSESSMENT — FIBROSIS 4 INDEX: FIB4 SCORE: 0.85

## 2024-02-12 NOTE — PROGRESS NOTES
Telemetry Shift Summary     Rhythm SR  HR Range 85-98  Ectopy r-oPVC,rPAC,rCoup  Measurements  .14/.10/.32  Per strip printed 0400     Normal Values  Rhythm SR  HR Range    Measurements 0.12-0.20 / 0.06-0.10  / 0.30-0.52

## 2024-02-12 NOTE — CARE PLAN
Problem: Knowledge Deficit - Standard  Goal: Patient and family/care givers will demonstrate understanding of plan of care, disease process/condition, diagnostic tests and medications  Outcome: Progressing     Problem: Pain - Standard  Goal: Alleviation of pain or a reduction in pain to the patient’s comfort goal  Outcome: Progressing   The patient is Stable - Low risk of patient condition declining or worsening    Shift Goals  Clinical Goals: antibiotics, pain control  Patient Goals: pain control, rest    Progress made toward(s) clinical / shift goals:  updated pt on plan of care, continue IV abx and pain control. Pt reporting 4/10 pain at this time that is tolerable.     Patient is not progressing towards the following goals:

## 2024-02-12 NOTE — PROGRESS NOTES
Gastroenterology Progress Note:   APARNA Paige  Date & Time note created:    2/12/2024   11:38 AM     Referring MD:  Dr. Frank    Patient ID:  Name:             Jigna Allen   YOB: 1950  Age:                 73 y.o.  female   MRN:               [id number]                                                             Reason for Consult:      Abdominal pain   S/p EGD/EUS/ERCP 2/9/24    History of Present Illness:    This is a 72 yo female PMH HTN, Type 2 DM, CAD, COPD, moderate stenosis of SMA (followed by vascular surgery) who was seen in consultation for severe epigastric abdominal pain after having an ERCP/EUS/EGD on 2/9/24. Hypertensive 217/100. Notable Labs: WBC: 24.1. Glucose: 218. LFT's and lipase was normal. Started on IV Zosyn.     CT scan:   Features concerning for acute cholangitis and/or mild pancreatitis.  2.  Possible reactive duodenitis.  3.  Small amount of biliary gas presumably related to recent procedure.  4.  Mildly dilated common bile duct likely related to prior cholecystectomy.  5.  Distal colonic diverticulosis.  6.  Hepatomegaly.    EGD/EUS/ERCP 2/9/24 performed by Dr. Mars due to dilated biliary duct and pain.   EGD: esophagus unremarkable. HH 37-39 cm. Gastric antrum erythema with biopsy for H. Pylori. Duodenum: unremarkable.   EUS: celiac axis/pancreas body/tail/pancreatic duct/ampulla/head of pancreas: unremarkable. CBD: dilated and tortuous up to 13.6 mm at mid duct. Distal filling defect concerning for stone/sludge.  ERCP: Ampulla: slightly bulbous, slowly rhythmically invaginating then protruding.   CBD: dilated and tortuous up to 13.6 mm mid duct subtle distal filling defect, several small stones and debris. Sphincterotomy. Biopsy of ampulla    Interval History  2/12/2024: Patient is stable. No fevers, chills, nausea, vomiting. She is still dealing with some persistent upper abdominal pain, gradually improving per patient. This is not worse with her clear  liquid diet.             Review of Systems:      Review of Systems   Constitutional: Negative.    Respiratory: Negative.     Cardiovascular: Negative.    Gastrointestinal:  Positive for abdominal pain.   Genitourinary: Negative.    Musculoskeletal: Negative.    Neurological: Negative.    Endo/Heme/Allergies: Negative.    Psychiatric/Behavioral: Negative.               Physical Exam:  Vitals/ General Appearance:   Weight/BMI: Body mass index is 29.99 kg/m².    Vitals:    02/12/24 0026 02/12/24 0321 02/12/24 0528 02/12/24 0704   BP: 118/54 (!) 140/89  113/48   Pulse: 85 88 87 76   Resp: 18 20 18 16   Temp: 36.8 °C (98.3 °F) 36.7 °C (98.1 °F)  36.5 °C (97.7 °F)   TempSrc: Axillary Oral  Oral   SpO2: 93% 94% 94% 95%   Weight:  76.8 kg (169 lb 5 oz)     Height:         Oxygen Therapy:  Pulse Oximetry: 95 %, O2 (LPM): 4, O2 Delivery Device: Silicone Nasal Cannula    Physical Exam  Vitals reviewed.   Constitutional:       Appearance: She is obese.   HENT:      Head: Normocephalic and atraumatic.      Mouth/Throat:      Mouth: Mucous membranes are moist.   Eyes:      Extraocular Movements: Extraocular movements intact.      Pupils: Pupils are equal, round, and reactive to light.   Cardiovascular:      Rate and Rhythm: Normal rate and regular rhythm.      Pulses: Normal pulses.      Heart sounds: Normal heart sounds.   Pulmonary:      Effort: Pulmonary effort is normal.      Breath sounds: Normal breath sounds.   Abdominal:      General: Bowel sounds are normal.      Palpations: Abdomen is soft.      Tenderness: There is abdominal tenderness (Epigastrium and LUQ).   Musculoskeletal:         General: No swelling or tenderness.      Cervical back: Normal range of motion and neck supple.   Skin:     General: Skin is warm and dry.   Neurological:      General: No focal deficit present.      Mental Status: She is alert and oriented to person, place, and time.   Psychiatric:         Mood and Affect: Mood normal.         Behavior:  Behavior normal.         Thought Content: Thought content normal.         Judgment: Judgment normal.         Past Medical History:   Past Medical History:   Diagnosis Date    Abnormal screening cardiac CT 02/14/2020    Anxiety     Asthma     Bowel habit changes     Diarrhea    Breath shortness     Occasionally    Bronchitis 2018    Carotid arterial disease (HCC)     Cataract     Corrected with surgery    COPD (chronic obstructive pulmonary disease) (HCC)     Diabetes (HCC)     Emphysema of lung (HCC) 2023    Goiter 03/25/2016    Hiatus hernia syndrome 2023    HTN (hypertension)     Hyperlipidemia     Hyperlipidemia 03/25/2016    Indigestion     Myocardial infarct (HCC) 2010    Pain 2023    Pneumonia 2022    PONV (postoperative nausea and vomiting) 1989    Wasn’t a factor with last 2 surgeries    Tobacco use     Urinary incontinence 2023       Past Surgical History:  Past Surgical History:   Procedure Laterality Date    OK ERCP,DIAGNOSTIC N/A 2/9/2024    Procedure: UPPER ENDOSCOPIC ULTRASOUND WITH FINE NEEDLE ASPIRATION WITH  ENDOSCOPIC RETROGRADE CHOLANGIOPANCREATOGRAPHY AND ESOPHAGOGASTRODUODENOSCOPY;  Surgeon: Adrian Mars M.D.;  Location: [facility];  Service: EUS    OK UPPER GI ENDOSCOPY,DIAGNOSIS N/A 2/9/2024    Procedure: GASTROSCOPY;  Surgeon: Adrian Mars M.D.;  Location: [facility];  Service: EUS    EGD W/ENDOSCOPIC ULTRASOUND N/A 2/9/2024    Procedure: EGD, WITH ENDOSCOPIC US;  Surgeon: Adrian Mars M.D.;  Location: [facility];  Service: EUS    OTHER ABDOMINAL SURGERY      Appendix and gall bladder    OTHER CARDIAC SURGERY  2010    Just 1 stent    OTHER ORTHOPEDIC SURGERY      [facility] Medications:    Current Facility-Administered Medications:     cefTRIAXone (Rocephin) 2,000 mg in  mL IVPB, 2,000 mg, Intravenous, Q24HRS, Charly Frank M.D.    fluticasone-umeclidinium-vilanterol (Trelegy Ellipta) 100-62.5-25 mcg/act  inhaler 1 Puff, 1 Puff, Inhalation, DAILY, Lilli Smith M.D., 1 Puff at 02/12/24 0523    acetaminophen (Tylenol) tablet 650 mg, 650 mg, Oral, Q6HRS PRN, Lilli Smith M.D., 650 mg at 02/12/24 1027    senna-docusate (Pericolace Or Senokot S) 8.6-50 MG per tablet 2 Tablet, 2 Tablet, Oral, BID, 2 Tablet at 02/12/24 0523 **AND** polyethylene glycol/lytes (Miralax) Packet 1 Packet, 1 Packet, Oral, QDAY PRN, Lilli Smith M.D.    ondansetron (Zofran) syringe/vial injection 4 mg, 4 mg, Intravenous, Q4HRS PRN, Lilli Smith M.D., 4 mg at 02/11/24 1622    ondansetron (Zofran ODT) dispertab 4 mg, 4 mg, Oral, Q4HRS PRN, Lilli Smith M.D.    HYDROmorphone (Dilaudid) injection 0.5 mg, 0.5 mg, Intravenous, Q3HRS PRN, Lilli Smith M.D.    HYDROmorphone (Dilaudid) injection 1 mg, 1 mg, Intravenous, Q3HRS PRN, Lilli Smith M.D., 1 mg at 02/12/24 0528    insulin regular (HumuLIN R,NovoLIN R) injection, 1-6 Units, Subcutaneous, Q6HRS, 1 Units at 02/12/24 0001 **AND** POC blood glucose manual result, , , Q6H **AND** NOTIFY MD and PharmD, , , Once **AND** Administer 20 grams of glucose (approximately 8 ounces of fruit juice) every 15 minutes PRN FSBG less than 70 mg/dL, , , PRN **AND** dextrose 50% (D50W) injection 25 g, 25 g, Intravenous, Q15 MIN PRN, Lilli Smith M.D.    LR (Bolus) infusion 500 mL, 500 mL, Intravenous, Once PRN, Lilli Smith M.D.    Current Outpatient Medications:  Current Facility-Administered Medications   Medication Dose Route Frequency Provider Last Rate Last Admin    cefTRIAXone (Rocephin) 2,000 mg in  mL IVPB  2,000 mg Intravenous Q24HRS Charly Frank M.D.        fluticasone-umeclidinium-vilanterol (Trelegy Ellipta) 100-62.5-25 mcg/act inhaler 1 Puff  1 Puff Inhalation DAILY Lilli Smith M.D.   1 Puff at 02/12/24 0523    acetaminophen (Tylenol) tablet 650 mg  650 mg Oral Q6HRS PRN Lilli  CARLOS Smith   650 mg at 02/12/24 1027    senna-docusate (Pericolace Or Senokot S) 8.6-50 MG per tablet 2 Tablet  2 Tablet Oral BID Lilli Smith M.D.   2 Tablet at 02/12/24 0523    And    polyethylene glycol/lytes (Miralax) Packet 1 Packet  1 Packet Oral QDAY PRN Lilli Smith M.D.        ondansetron (Zofran) syringe/vial injection 4 mg  4 mg Intravenous Q4HRS PRN Lilli Smith M.D.   4 mg at 02/11/24 1622    ondansetron (Zofran ODT) dispertab 4 mg  4 mg Oral Q4HRS PRN Lilli Smith M.D.        HYDROmorphone (Dilaudid) injection 0.5 mg  0.5 mg Intravenous Q3HRS PRN Lilli Smith M.D.        HYDROmorphone (Dilaudid) injection 1 mg  1 mg Intravenous Q3HRS PRN Lilli Smith M.D.   1 mg at 02/12/24 0528    insulin regular (HumuLIN R,NovoLIN R) injection  1-6 Units Subcutaneous Q6HRS Lilli Smith M.D.   1 Units at 02/12/24 0001    And    dextrose 50% (D50W) injection 25 g  25 g Intravenous Q15 MIN PRN Lilli Smith M.D.        LR (Bolus) infusion 500 mL  500 mL Intravenous Once PRN Lilli Smith M.D.           Medication Allergy:  Allergies   Allergen Reactions    Sulfa Drugs Hives    Ezetimibe Nausea     Diarrhea     Hmg-Coa-R Inhibitors Unspecified     Muscle cramps       Family History:  Family History   Problem Relation Age of Onset    Cancer Mother 47        breast    Breast Cancer Mother         Mets to bone and liver    Cancer Father         Stomach    Hypertension Father     Alcohol abuse Father     Stroke Maternal Grandmother     Alcohol abuse Maternal Grandmother     Stroke Paternal Grandmother     Cancer Brother         Glioblastoma    Alcohol abuse Paternal Uncle     Alcohol abuse Paternal Uncle        Social History:  Social History     Socioeconomic History    Marital status:      Spouse name: Not on file    Number of children: Not on file    Years of education: Not on file    Highest education level:  Associate degree: occupational, technical, or vocational program   Occupational History    Not on file   Tobacco Use    Smoking status: Every Day     Current packs/day: 0.50     Average packs/day: 0.5 packs/day for 59.4 years (29.7 ttl pk-yrs)     Types: Cigarettes     Start date: 9/1/1964    Smokeless tobacco: Never    Tobacco comments:     I enjoy smoking   Vaping Use    Vaping Use: Never used   Substance and Sexual Activity    Alcohol use: No    Drug use: Not Currently     Types: Marijuana     Comment: CBD  occ    Sexual activity: Not Currently     Partners: Male     Comment:    Other Topics Concern    Not on file   Social History Narrative    Not on file     Social Determinants of Health     Financial Resource Strain: Low Risk  (3/21/2022)    Overall Financial Resource Strain (CARDIA)     Difficulty of Paying Living Expenses: Not very hard   Food Insecurity: No Food Insecurity (3/21/2022)    Hunger Vital Sign     Worried About Running Out of Food in the Last Year: Never true     Ran Out of Food in the Last Year: Never true   Transportation Needs: No Transportation Needs (3/21/2022)    PRAPARE - Transportation     Lack of Transportation (Medical): No     Lack of Transportation (Non-Medical): No   Physical Activity: Unknown (3/21/2022)    Exercise Vital Sign     Days of Exercise per Week: 0 days     Minutes of Exercise per Session: Not on file   Stress: Stress Concern Present (3/21/2022)    [employer] [place] [employer] - Occupational Stress Questionnaire     Feeling of Stress : Very much   Social Connections: Moderately Isolated (3/21/2022)    Social Connection and Isolation Panel [NHANES]     Frequency of Communication with Friends and Family: More than three times a week     Frequency of Social Gatherings with Friends and Family: Once a week     Attends Oriental orthodox Services: Never     Active Member of Clubs or Organizations: No     Attends Club or Organization Meetings: Never     Marital Status:     Intimate Partner Violence: Not on file   Housing Stability: Low Risk  (3/21/2022)    Housing Stability Vital Sign     Unable to Pay for Housing in the Last Year: No     Number of Places Lived in the Last Year: 1     Unstable Housing in the Last Year: No         MDM (Data Review):     Records reviewed and summarized in current documentation    Lab Data Review:  Recent Results (from the past 24 hour(s))   POCT glucose device results    Collection Time: 02/11/24  1:42 PM   Result Value Ref Range    POC Glucose, Blood 129 (H) 65 - 99 mg/dL   POCT glucose device results    Collection Time: 02/11/24  5:58 PM   Result Value Ref Range    POC Glucose, Blood 182 (H) 65 - 99 mg/dL   POCT glucose device results    Collection Time: 02/12/24 12:00 AM   Result Value Ref Range    POC Glucose, Blood 183 (H) 65 - 99 mg/dL   CBC without Differential    Collection Time: 02/12/24  1:01 AM   Result Value Ref Range    WBC 16.4 (H) 4.8 - 10.8 K/uL    RBC 4.14 (L) 4.20 - 5.40 M/uL    Hemoglobin 11.5 (L) 12.0 - 16.0 g/dL    Hematocrit 35.9 (L) 37.0 - 47.0 %    MCV 86.7 81.4 - 97.8 fL    MCH 27.8 27.0 - 33.0 pg    MCHC 32.0 (L) 32.2 - 35.5 g/dL    RDW 45.9 35.9 - 50.0 fL    Platelet Count 309 164 - 446 K/uL    MPV 9.8 9.0 - 12.9 fL   Basic Metabolic Panel (BMP)    Collection Time: 02/12/24  1:01 AM   Result Value Ref Range    Sodium 138 135 - 145 mmol/L    Potassium 3.6 3.6 - 5.5 mmol/L    Chloride 100 96 - 112 mmol/L    Co2 28 20 - 33 mmol/L    Glucose 196 (H) 65 - 99 mg/dL    Bun 12 8 - 22 mg/dL    Creatinine 0.54 0.50 - 1.40 mg/dL    Calcium 9.0 8.4 - 10.2 mg/dL    Anion Gap 10.0 7.0 - 16.0   ESTIMATED GFR    Collection Time: 02/12/24  1:01 AM   Result Value Ref Range    GFR (CKD-EPI) 97 >60 mL/min/1.73 m 2   POCT glucose device results    Collection Time: 02/12/24  5:32 AM   Result Value Ref Range    POC Glucose, Blood 156 (H) 65 - 99 mg/dL       Imaging/Procedures Review:      CT-ABDOMEN-PELVIS WITH   Final Result      1.   Features concerning for acute cholangitis and/or mild pancreatitis.   2.  Possible reactive duodenitis.   3.  Small amount of biliary gas presumably related to recent procedure.   4.  Mildly dilated common bile duct likely related to prior cholecystectomy.   5.  Distal colonic diverticulosis.   6.  Hepatomegaly.           MDM (Assessment and Plan):     Patient Active Problem List    Diagnosis Date Noted    Acute cholangitis 02/11/2024    Hypertensive urgency 02/11/2024    SIRS (systemic inflammatory response syndrome) (HCC) 02/11/2024    Medication refill 02/08/2024    Dilated cbd, acquired 11/27/2023    Hospital discharge follow-up 05/30/2023    Mucous retention cyst of maxillary sinus 05/30/2023    Left leg pain 01/18/2023    Pain of left heel 01/18/2023    Ingrown toenail of left foot 01/18/2023    Major depressive disorder 01/18/2023    Viral upper respiratory tract infection 11/09/2022    URI (upper respiratory infection) 11/09/2022    Need for vaccination 09/29/2022    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    Recurrent major depressive disorder (HCC) 03/22/2022    Healthcare maintenance 03/22/2022    Venous stasis dermatitis of left lower extremity 12/17/2020    Neuropathic pain 08/24/2020    Vaginal yeast infection 01/07/2020    Obesity (BMI 30-39.9) 09/17/2019    S/P carotid endarterectomy 02/05/2019    Radicular pain in left arm 02/05/2019    Neck pain on left side 02/05/2019    Vitamin D deficiency 11/06/2018    Essential hypertension 09/25/2018    Anxiety 03/25/2016    CAD (coronary artery disease) 03/25/2016    COPD (chronic obstructive pulmonary disease) (HCC) 03/25/2016    Type 2 diabetes mellitus with complication, without long-term current use of insulin (HCC) 03/25/2016    Goiter 03/25/2016    Hyperlipidemia 03/25/2016    Stented coronary artery 03/25/2016    Tobacco dependence 03/25/2016    Carotid artery stenosis without cerebral infarction 09/25/2014     This is a 72 yo female admitted to  the hospital with post op epigastric abdominal pain concerning for pancreatitis vs cholangitis. Underwent an EGD/EUS/ERCP with stone extraction of the biliary duct, sphincterotomy, biopsies of the gastric antrum and ampulla. Discharged home but abdominal pain was getting worse. Labs: elevated WBC but LFT's and lipase are normal. CT scan concerning for cholangitis vs mild pancreatitis. Started on IV Zosyn.    ASSESSMENT:  Abdominal pain likely due to ERCP induced pancreatitis  Leukocytosis  Choledocholithiasis s/p stone extraction with sphincterotomy 2/9/24  HTN    PLAN:  Recommend supportive care with IV fluids resuscitation for pancreatitis, pain and antiemetics  2. Continue Ceftriaxone  3. Repeat Hepatic panel  4. Advance to low fat diet.    Thank your for the opportunity to assist in the care of your patient.  Please call for any questions or concerns.    RAHUL Paige.

## 2024-02-12 NOTE — FLOWSHEET NOTE
Telemetry Shift Summary     Rhythm: SR  HR Range:   Ectopy: rPVC, rPAC  Measurements: 0.14/0.08/0.36              Normal Values  Rhythm SR  HR Range    Measurements 0.12-0.20 / 0.06-0.10  / 0.30-0.52

## 2024-02-12 NOTE — CARE PLAN
Problem: Fall Risk  Goal: Patient will remain free from falls  Outcome: Progressing  Note: Fall precautions in place.Encouraged use of call light.     Problem: Pain - Standard  Goal: Alleviation of pain or a reduction in pain to the patient’s comfort goal  Outcome: Progressing   The patient is Stable - Low risk of patient condition declining or worsening    Shift Goals  Clinical Goals: Abx,pain control,blood sugar checks,advance diet  Patient Goals: pain control,rest    Progress made toward(s) clinical / shift goals:  Educated on fall prevention measures,encouraged use of call light,updated on plan of care.    Patient is not progressing towards the following goals:

## 2024-02-13 ENCOUNTER — APPOINTMENT (OUTPATIENT)
Dept: SURGICAL ONCOLOGY | Facility: MEDICAL CENTER | Age: 74
End: 2024-02-13
Payer: MEDICARE

## 2024-02-13 ENCOUNTER — APPOINTMENT (OUTPATIENT)
Dept: RADIOLOGY | Facility: MEDICAL CENTER | Age: 74
DRG: 444 | End: 2024-02-13
Attending: INTERNAL MEDICINE
Payer: MEDICARE

## 2024-02-13 PROBLEM — E83.42 HYPOMAGNESEMIA: Status: ACTIVE | Noted: 2024-02-13

## 2024-02-13 PROBLEM — Z71.89 ADVANCE CARE PLANNING: Status: ACTIVE | Noted: 2024-02-13

## 2024-02-13 PROBLEM — E87.6 HYPOKALEMIA: Status: ACTIVE | Noted: 2024-02-13

## 2024-02-13 PROBLEM — R09.02 HYPOXIA: Status: ACTIVE | Noted: 2024-02-13

## 2024-02-13 LAB
ALBUMIN SERPL BCP-MCNC: 3.5 G/DL (ref 3.2–4.9)
ALBUMIN/GLOB SERPL: 1.1 G/DL
ALP SERPL-CCNC: 88 U/L (ref 30–99)
ALT SERPL-CCNC: 14 U/L (ref 2–50)
ANION GAP SERPL CALC-SCNC: 8 MMOL/L (ref 7–16)
AST SERPL-CCNC: 10 U/L (ref 12–45)
BACTERIA UR CULT: NORMAL
BASOPHILS # BLD AUTO: 0.2 % (ref 0–1.8)
BASOPHILS # BLD: 0.03 K/UL (ref 0–0.12)
BILIRUB SERPL-MCNC: 0.2 MG/DL (ref 0.1–1.5)
BUN SERPL-MCNC: 9 MG/DL (ref 8–22)
CALCIUM ALBUM COR SERPL-MCNC: 9.8 MG/DL (ref 8.5–10.5)
CALCIUM SERPL-MCNC: 9.4 MG/DL (ref 8.4–10.2)
CHLORIDE SERPL-SCNC: 98 MMOL/L (ref 96–112)
CO2 SERPL-SCNC: 30 MMOL/L (ref 20–33)
CREAT SERPL-MCNC: 0.49 MG/DL (ref 0.5–1.4)
D DIMER PPP IA.FEU-MCNC: 3.3 UG/ML (FEU) (ref 0–0.5)
EOSINOPHIL # BLD AUTO: 0.1 K/UL (ref 0–0.51)
EOSINOPHIL NFR BLD: 0.8 % (ref 0–6.9)
ERYTHROCYTE [DISTWIDTH] IN BLOOD BY AUTOMATED COUNT: 43.1 FL (ref 35.9–50)
FLUAV RNA SPEC QL NAA+PROBE: NEGATIVE
FLUBV RNA SPEC QL NAA+PROBE: NEGATIVE
GFR SERPLBLD CREATININE-BSD FMLA CKD-EPI: 99 ML/MIN/1.73 M 2
GLOBULIN SER CALC-MCNC: 3.1 G/DL (ref 1.9–3.5)
GLUCOSE BLD STRIP.AUTO-MCNC: 149 MG/DL (ref 65–99)
GLUCOSE BLD STRIP.AUTO-MCNC: 150 MG/DL (ref 65–99)
GLUCOSE BLD STRIP.AUTO-MCNC: 175 MG/DL (ref 65–99)
GLUCOSE BLD STRIP.AUTO-MCNC: 179 MG/DL (ref 65–99)
GLUCOSE SERPL-MCNC: 161 MG/DL (ref 65–99)
HCT VFR BLD AUTO: 38.1 % (ref 37–47)
HGB BLD-MCNC: 12 G/DL (ref 12–16)
IMM GRANULOCYTES # BLD AUTO: 0.04 K/UL (ref 0–0.11)
IMM GRANULOCYTES NFR BLD AUTO: 0.3 % (ref 0–0.9)
LYMPHOCYTES # BLD AUTO: 2.3 K/UL (ref 1–4.8)
LYMPHOCYTES NFR BLD: 17.9 % (ref 22–41)
MAGNESIUM SERPL-MCNC: 1.9 MG/DL (ref 1.5–2.5)
MCH RBC QN AUTO: 27.4 PG (ref 27–33)
MCHC RBC AUTO-ENTMCNC: 31.5 G/DL (ref 32.2–35.5)
MCV RBC AUTO: 87 FL (ref 81.4–97.8)
MONOCYTES # BLD AUTO: 0.97 K/UL (ref 0–0.85)
MONOCYTES NFR BLD AUTO: 7.6 % (ref 0–13.4)
NEUTROPHILS # BLD AUTO: 9.4 K/UL (ref 1.82–7.42)
NEUTROPHILS NFR BLD: 73.2 % (ref 44–72)
NRBC # BLD AUTO: 0 K/UL
NRBC BLD-RTO: 0 /100 WBC (ref 0–0.2)
PHOSPHATE SERPL-MCNC: 3.2 MG/DL (ref 2.5–4.5)
PLATELET # BLD AUTO: 329 K/UL (ref 164–446)
PMV BLD AUTO: 10 FL (ref 9–12.9)
POTASSIUM SERPL-SCNC: 3.5 MMOL/L (ref 3.6–5.5)
PROCALCITONIN SERPL-MCNC: 0.16 NG/ML
PROT SERPL-MCNC: 6.6 G/DL (ref 6–8.2)
RBC # BLD AUTO: 4.38 M/UL (ref 4.2–5.4)
RSV RNA SPEC QL NAA+PROBE: NEGATIVE
SARS-COV-2 RNA RESP QL NAA+PROBE: NOTDETECTED
SIGNIFICANT IND 70042: NORMAL
SITE SITE: NORMAL
SODIUM SERPL-SCNC: 136 MMOL/L (ref 135–145)
SOURCE SOURCE: NORMAL
SPECIMEN SOURCE: NORMAL
WBC # BLD AUTO: 12.8 K/UL (ref 4.8–10.8)

## 2024-02-13 PROCEDURE — 700102 HCHG RX REV CODE 250 W/ 637 OVERRIDE(OP): Performed by: INTERNAL MEDICINE

## 2024-02-13 PROCEDURE — 700111 HCHG RX REV CODE 636 W/ 250 OVERRIDE (IP): Performed by: INTERNAL MEDICINE

## 2024-02-13 PROCEDURE — 94760 N-INVAS EAR/PLS OXIMETRY 1: CPT

## 2024-02-13 PROCEDURE — 99497 ADVNCD CARE PLAN 30 MIN: CPT | Performed by: INTERNAL MEDICINE

## 2024-02-13 PROCEDURE — 83735 ASSAY OF MAGNESIUM: CPT

## 2024-02-13 PROCEDURE — 0241U HCHG SARS-COV-2 COVID-19 NFCT DS RESP RNA 4 TRGT MIC: CPT

## 2024-02-13 PROCEDURE — 80053 COMPREHEN METABOLIC PANEL: CPT

## 2024-02-13 PROCEDURE — 85025 COMPLETE CBC W/AUTO DIFF WBC: CPT

## 2024-02-13 PROCEDURE — A9270 NON-COVERED ITEM OR SERVICE: HCPCS | Performed by: INTERNAL MEDICINE

## 2024-02-13 PROCEDURE — 82962 GLUCOSE BLOOD TEST: CPT | Mod: 91

## 2024-02-13 PROCEDURE — 99233 SBSQ HOSP IP/OBS HIGH 50: CPT | Performed by: INTERNAL MEDICINE

## 2024-02-13 PROCEDURE — 700102 HCHG RX REV CODE 250 W/ 637 OVERRIDE(OP): Performed by: HOSPITALIST

## 2024-02-13 PROCEDURE — 700105 HCHG RX REV CODE 258: Performed by: STUDENT IN AN ORGANIZED HEALTH CARE EDUCATION/TRAINING PROGRAM

## 2024-02-13 PROCEDURE — 770020 HCHG ROOM/CARE - TELE (206)

## 2024-02-13 PROCEDURE — 700111 HCHG RX REV CODE 636 W/ 250 OVERRIDE (IP): Mod: JZ | Performed by: HOSPITALIST

## 2024-02-13 PROCEDURE — A9270 NON-COVERED ITEM OR SERVICE: HCPCS | Performed by: HOSPITALIST

## 2024-02-13 PROCEDURE — 71045 X-RAY EXAM CHEST 1 VIEW: CPT

## 2024-02-13 PROCEDURE — 700111 HCHG RX REV CODE 636 W/ 250 OVERRIDE (IP): Mod: JZ | Performed by: STUDENT IN AN ORGANIZED HEALTH CARE EDUCATION/TRAINING PROGRAM

## 2024-02-13 PROCEDURE — 84145 PROCALCITONIN (PCT): CPT

## 2024-02-13 PROCEDURE — 36415 COLL VENOUS BLD VENIPUNCTURE: CPT

## 2024-02-13 PROCEDURE — 84100 ASSAY OF PHOSPHORUS: CPT

## 2024-02-13 PROCEDURE — 85379 FIBRIN DEGRADATION QUANT: CPT

## 2024-02-13 RX ORDER — HYDROCHLOROTHIAZIDE 12.5 MG/1
12.5 TABLET ORAL
Status: DISCONTINUED | OUTPATIENT
Start: 2024-02-13 | End: 2024-02-16 | Stop reason: HOSPADM

## 2024-02-13 RX ORDER — LOSARTAN POTASSIUM AND HYDROCHLOROTHIAZIDE 12.5; 1 MG/1; MG/1
1 TABLET ORAL DAILY
Status: DISCONTINUED | OUTPATIENT
Start: 2024-02-13 | End: 2024-02-13

## 2024-02-13 RX ORDER — LABETALOL HYDROCHLORIDE 5 MG/ML
10 INJECTION, SOLUTION INTRAVENOUS EVERY 4 HOURS PRN
Status: DISCONTINUED | OUTPATIENT
Start: 2024-02-13 | End: 2024-02-16 | Stop reason: HOSPADM

## 2024-02-13 RX ORDER — POTASSIUM CHLORIDE 20 MEQ/1
40 TABLET, EXTENDED RELEASE ORAL ONCE
Status: COMPLETED | OUTPATIENT
Start: 2024-02-13 | End: 2024-02-13

## 2024-02-13 RX ORDER — AMLODIPINE BESYLATE 5 MG/1
5 TABLET ORAL
Status: DISCONTINUED | OUTPATIENT
Start: 2024-02-13 | End: 2024-02-13

## 2024-02-13 RX ORDER — MAGNESIUM SULFATE 1 G/100ML
1 INJECTION INTRAVENOUS ONCE
Status: COMPLETED | OUTPATIENT
Start: 2024-02-13 | End: 2024-02-13

## 2024-02-13 RX ORDER — ENOXAPARIN SODIUM 100 MG/ML
40 INJECTION SUBCUTANEOUS DAILY
Status: DISCONTINUED | OUTPATIENT
Start: 2024-02-13 | End: 2024-02-16 | Stop reason: HOSPADM

## 2024-02-13 RX ORDER — LOSARTAN POTASSIUM 50 MG/1
100 TABLET ORAL
Status: DISCONTINUED | OUTPATIENT
Start: 2024-02-13 | End: 2024-02-16 | Stop reason: HOSPADM

## 2024-02-13 RX ADMIN — CEFTRIAXONE SODIUM 2000 MG: 2 INJECTION, POWDER, FOR SOLUTION INTRAMUSCULAR; INTRAVENOUS at 13:54

## 2024-02-13 RX ADMIN — HYDROCHLOROTHIAZIDE 12.5 MG: 12.5 TABLET ORAL at 05:53

## 2024-02-13 RX ADMIN — DOCUSATE SODIUM 50MG AND SENNOSIDES 8.6MG 2 TABLET: 8.6; 5 TABLET, FILM COATED ORAL at 16:49

## 2024-02-13 RX ADMIN — POTASSIUM CHLORIDE 40 MEQ: 1500 TABLET, EXTENDED RELEASE ORAL at 12:41

## 2024-02-13 RX ADMIN — HYDROMORPHONE HYDROCHLORIDE 0.5 MG: 1 INJECTION, SOLUTION INTRAMUSCULAR; INTRAVENOUS; SUBCUTANEOUS at 04:16

## 2024-02-13 RX ADMIN — LOSARTAN POTASSIUM 100 MG: 50 TABLET, FILM COATED ORAL at 05:53

## 2024-02-13 RX ADMIN — MAGNESIUM SULFATE IN DEXTROSE 1 G: 10 INJECTION, SOLUTION INTRAVENOUS at 12:42

## 2024-02-13 RX ADMIN — HYDROMORPHONE HYDROCHLORIDE 1 MG: 1 INJECTION, SOLUTION INTRAMUSCULAR; INTRAVENOUS; SUBCUTANEOUS at 20:41

## 2024-02-13 RX ADMIN — HYDROMORPHONE HYDROCHLORIDE 1 MG: 1 INJECTION, SOLUTION INTRAMUSCULAR; INTRAVENOUS; SUBCUTANEOUS at 10:42

## 2024-02-13 RX ADMIN — ONDANSETRON 4 MG: 2 INJECTION INTRAMUSCULAR; INTRAVENOUS at 06:12

## 2024-02-13 ASSESSMENT — ENCOUNTER SYMPTOMS
FALLS: 0
COUGH: 0
HEARTBURN: 0
HEADACHES: 0
PSYCHIATRIC NEGATIVE: 1
BACK PAIN: 0
BLURRED VISION: 0
MUSCULOSKELETAL NEGATIVE: 1
CHILLS: 0
DIZZINESS: 0
NERVOUS/ANXIOUS: 0
NEUROLOGICAL NEGATIVE: 1
FEVER: 0
DOUBLE VISION: 0
CARDIOVASCULAR NEGATIVE: 1
NAUSEA: 1
RESPIRATORY NEGATIVE: 1
ABDOMINAL PAIN: 1
SHORTNESS OF BREATH: 0
PALPITATIONS: 0
CONSTITUTIONAL NEGATIVE: 1

## 2024-02-13 ASSESSMENT — PAIN DESCRIPTION - PAIN TYPE
TYPE: ACUTE PAIN
TYPE: ACUTE PAIN

## 2024-02-13 ASSESSMENT — LIFESTYLE VARIABLES: SUBSTANCE_ABUSE: 0

## 2024-02-13 ASSESSMENT — FIBROSIS 4 INDEX: FIB4 SCORE: 0.59

## 2024-02-13 NOTE — PROGRESS NOTES
"Hospital Medicine Daily Progress Note    Date of Service  2/12/2024    Chief Complaint  Jigna Allen is a 73 y.o. female admitted 2/10/2024 with abdominal pain.    Hospital Course  As per chart review:  \"Jigna Allen is a 73 y.o. female, with h/o HTN, non-insulin dependent Type II DM, CAD, COPD who underwent ERCP/ EUS yesterday (Dr. Mars) scheduled after hospitalization late November for common bile duct dilation and ongoing abdominal pain.  She also has moderate stenosis of SMA and following outpatient with vascular surgery.  Patient states that after she went home after procedure, developed worsening abdominal pain reason why she presented to the ED on 2/10/2024 for further evaluation.     CT scan evaluation showing concerning's for acute cholangitis and/or mild pancreatitis with reactive duodenitis and had significantly elevated blood pressure of 217/100 in the ED requiring IV medication for blood pressure control.\"    Interval Problem Update  2/12  Evaluated in room patient room, afebrile, pulse in the 70s to 90s respiratory rate 16 to 20 systolic blood pressure on 113-180 pulse ox 93 to 98% on 2 to 4 L nasal cannula.  Leukocytosis improving, anemia likely dilutional, normal platelet count, no evidence of bleeding, hyperglycemic normal electrolytes and renal function.  Due to abnormal LFTs and T. bili GI signed off.  Recommending continue IV antibiotics for suspected ascending cholangitis versus pancreatitis post ERCP.  Tolerating p.o. intake however requested to go to liquid diet today because regular diet caused her stomach discomfort.  Pain controlled.  Continue IV antibiotics.  Suspect discharge in 1 to 2 days pending clinical course, p.o. intake, pain control.    PATIENT SEEN BY PREVIOUS HOSPITALIST UNTIL 2/12 2/13: Patient seen at bedside this morning.  The patient states that she somewhat feels better than yesterday, still having abdominal pain and nausea.  We appreciate further " recommendations by GI.  For now we will continue with IV fluids and antibiotics.  We have ordered chest x-ray and COVID testing due to the patient being hypoxic.      I have discussed this patient's plan of care and discharge plan at IDT rounds today with Case Management, Nursing, Nursing leadership, and other members of the IDT team.    Consultants/Specialty  GI    Code Status  Full Code    Disposition  The patient is not medically cleared for discharge to home or a post-acute facility.      I have placed the appropriate orders for post-discharge needs.    Review of Systems  Review of Systems   Constitutional:  Positive for malaise/fatigue. Negative for chills and fever.   HENT:  Negative for hearing loss and nosebleeds.    Eyes:  Negative for blurred vision and double vision.   Respiratory:  Negative for cough and shortness of breath.    Cardiovascular:  Negative for chest pain and palpitations.   Gastrointestinal:  Positive for abdominal pain and nausea. Negative for heartburn.   Genitourinary:  Negative for dysuria and urgency.   Musculoskeletal:  Negative for back pain and falls.   Skin:  Negative for itching and rash.   Neurological:  Negative for dizziness and headaches.   Psychiatric/Behavioral:  Negative for substance abuse. The patient is not nervous/anxious.    All other systems reviewed and are negative.     Review of Systems   Constitutional:  Positive for malaise/fatigue. Negative for fever.   HENT:  Negative for congestion and sore throat.    Eyes:  Negative for blurred vision and double vision.   Respiratory:  Negative for cough and shortness of breath.    Cardiovascular:  Negative for chest pain and palpitations.   Gastrointestinal:  Positive for abdominal pain. Negative for nausea and vomiting.   Genitourinary:  Negative for dysuria and urgency.   Musculoskeletal:  Negative for myalgias and neck pain.   Skin:  Negative for itching and rash.   Neurological:  Negative for dizziness, weakness and  headaches.   Endo/Heme/Allergies:  Does not bruise/bleed easily.   Psychiatric/Behavioral:  Negative for depression. The patient does not have insomnia.      Physical Exam  Temp:  [36.1 °C (97 °F)-36.9 °C (98.4 °F)] 36.7 °C (98.1 °F)  Pulse:  [16-92] 16  Resp:  [18-22] 18  BP: (122-187)/(48-83) 164/58  SpO2:  [93 %-98 %] 97 %    Physical Exam  Vitals and nursing note reviewed.   Constitutional:       General: She is not in acute distress.     Appearance: She is obese. She is ill-appearing. She is not toxic-appearing.   HENT:      Head: Normocephalic and atraumatic.      Right Ear: External ear normal.      Left Ear: External ear normal.      Nose: Nose normal. No rhinorrhea.      Mouth/Throat:      Mouth: Mucous membranes are moist.      Pharynx: Oropharynx is clear. No oropharyngeal exudate or posterior oropharyngeal erythema.   Eyes:      General: No scleral icterus.        Right eye: No discharge.         Left eye: No discharge.      Extraocular Movements: Extraocular movements intact.      Conjunctiva/sclera: Conjunctivae normal.   Cardiovascular:      Rate and Rhythm: Normal rate and regular rhythm.      Pulses: Normal pulses.      Heart sounds: No murmur heard.     No gallop.   Pulmonary:      Effort: Pulmonary effort is normal. No respiratory distress.      Comments: On supplemental oxygen  Abdominal:      General: Bowel sounds are normal. There is no distension.      Palpations: Abdomen is soft.      Tenderness: There is abdominal tenderness. There is no guarding or rebound.   Musculoskeletal:         General: No swelling or tenderness. Normal range of motion.      Cervical back: Normal range of motion and neck supple. No rigidity or tenderness.   Skin:     General: Skin is warm and dry.      Capillary Refill: Capillary refill takes less than 2 seconds.   Neurological:      General: No focal deficit present.      Mental Status: She is alert and oriented to person, place, and time. Mental status is at  baseline.      Cranial Nerves: No cranial nerve deficit.      Sensory: No sensory deficit.      Motor: No weakness.      Coordination: Coordination normal.   Psychiatric:         Mood and Affect: Mood normal.         Behavior: Behavior normal.         Thought Content: Thought content normal.         Judgment: Judgment normal.         Fluids    Intake/Output Summary (Last 24 hours) at 2/13/2024 1123  Last data filed at 2/12/2024 1400  Gross per 24 hour   Intake 240 ml   Output --   Net 240 ml         Laboratory  Recent Labs     02/10/24  2311 02/12/24  0101 02/13/24  0347   WBC 24.1* 16.4* 12.8*   RBC 4.85 4.14* 4.38   HEMOGLOBIN 13.4 11.5* 12.0   HEMATOCRIT 40.8 35.9* 38.1   MCV 84.1 86.7 87.0   MCH 27.6 27.8 27.4   MCHC 32.8 32.0* 31.5*   RDW 43.8 45.9 43.1   PLATELETCT 371 309 329   MPV 9.8 9.8 10.0       Recent Labs     02/10/24  2311 02/12/24  0101 02/13/24  0347   SODIUM 136 138 136   POTASSIUM 3.6 3.6 3.5*   CHLORIDE 96 100 98   CO2 23 28 30   GLUCOSE 218* 196* 161*   BUN 19 12 9   CREATININE 0.61 0.54 0.49*   CALCIUM 9.8 9.0 9.4       Recent Labs     02/10/24  2311   INR 0.90                 Imaging  CT-ABDOMEN-PELVIS WITH   Final Result      1.  Features concerning for acute cholangitis and/or mild pancreatitis.   2.  Possible reactive duodenitis.   3.  Small amount of biliary gas presumably related to recent procedure.   4.  Mildly dilated common bile duct likely related to prior cholecystectomy.   5.  Distal colonic diverticulosis.   6.  Hepatomegaly.      DX-CHEST-LIMITED (1 VIEW)    (Results Pending)        Assessment/Plan  * Acute cholangitis- (present on admission)  Assessment & Plan  -Inpatient status on medical floor.  -Patient underwent ERCP/EUS today by Dr. Mars.  She has common bile duct dilation and chronic abdominal pain since November.  -She reports significantly worse abdominal pain after procedure.  -Will keep n.p.o.  -She has elevated WBC at 24.1 and tachycardic therefore meeting SIRS  criteria but no organ dysfunction.  She is not septic on admission per sepsis 3 criteria.  -CT of the abdomen and pelvis done in the emergency department is showing concern for acute cholangitis and or mild pancreatitis with possible reactive duodenitis.  Her lipase is within normal limits.  -I appreciate GI consult recommendations.  ERP discussed this case with Dr. Stratton.  -Pain control as needed.  -Patient was started on Zosyn in the emergency department which I will continue.    2/13: Concern for acute cholangitis vs pancreatitis. For now we will continue antibiotics and IVF and pain management. We appreciate further recommendations by GI.    Hypoxia  Assessment & Plan  She does not seem to be in respiratory distress.  We will order chest x-ray.  Will also order COVID testing.    Hypokalemia  Assessment & Plan  Replace as needed  monitor    Hypomagnesemia  Assessment & Plan  Replace as needed  monitor    Advance care planning  Assessment & Plan  I discussed for at least 15 minutes further goals of care which included code status. This included DNR/DNI vs Full code. The patient would like to be full code. We also discussed further goals of care which included discussion of invasive versus noninvasive procedures.  The patient at this time would like to continue full treatment options.  She would like to continue with invasive and noninvasive procedures as needed.      SIRS (systemic inflammatory response syndrome) (HCC)  Assessment & Plan  2/13: Patient not meeting SIRS criteria today. We will continue with antibiotics for now.    Hypertensive urgency  Assessment & Plan  -Initial blood pressure was 217/100.  Patient was seen severe pain on arrival.    2/13: Patient still with significant elevated BP. Resume losartan and HTZD.          Type 2 diabetes mellitus with complication, without long-term current use of insulin (HCC)- (present on admission)  Assessment & Plan  Last known A1c is 7.7    Continue ISS,  monitor and make changes accordingly.    COPD (chronic obstructive pulmonary disease) (HCC)- (present on admission)  Assessment & Plan  -Without exacerbation.  Continue Trelegy Ellipta.    CAD (coronary artery disease)- (present on admission)  Assessment & Plan  -History of MI and stent.  She denies having chest pain.         VTE prophylaxis: Lovenox    I have performed a physical exam and reviewed and updated ROS and Plan today (2/12/2024). In review of yesterday's note (2/11/2024), there are no changes except as documented above.    I spend at least 51 minutes providing care for this patient.  This includes face-to-face interview, physical examination.  Review of lab work including CBC, CMP, phosphorus, magnesium.  Discussing with GI.  Discussing with multidisciplinary team including case management, nursing staff and pharmacy.  Creating plan of care, reviewing orders.    Moreover, I discussed for at least 15 minutes further goals of care which included code status. This included DNR/DNI vs Full code. The patient would like to be full code. We also discussed further goals of care which included discussion of invasive versus noninvasive procedures.  The patient at this time would like to continue full treatment options.  She would like to continue with invasive and noninvasive procedures as needed.

## 2024-02-13 NOTE — CARE PLAN
The patient is Stable - Low risk of patient condition declining or worsening    Shift Goals  Clinical Goals: Pain Control, BP monitoring  Patient Goals: pain control  Family Goals: ALEXUS    Progress made toward(s) clinical / shift goals:        Problem: Knowledge Deficit - Standard  Goal: Patient and family/care givers will demonstrate understanding of plan of care, disease process/condition, diagnostic tests and medications  Description: Target End Date:  1-3 days or as soon as patient condition allows    Document in Patient Education    1.  Patient and family/caregiver oriented to unit, equipment, visitation policy and means for communicating concern  2.  Complete/review Learning Assessment  3.  Assess knowledge level of disease process/condition, treatment plan, diagnostic tests and medications  4.  Explain disease process/condition, treatment plan, diagnostic tests and medications  Outcome: Progressing     Problem: Fluid Volume  Goal: Fluid volume balance will be maintained  Description: Target End Date:  Prior to discharge or change in level of care    Document on I/O flowsheet    1.  Monitor intake and output as ordered  2.  Promote oral intake as appropriate  3.  Report inadequate intake or output to physician  4.  Administer IV therapy as ordered  5.  Weights per provider order  6.  Assess for signs and symptoms of bleeding  7.  Monitor for signs of fluid overload (respiratory changes, edema, weight gain, increased abdominal girth)  8.  Monitor of signs for inadequate fluid volume (poor skin turgor, dry mucous membranes)  9.  Instruct patient on adherence to fluid restrictions  Outcome: Progressing     Problem: Fall Risk  Goal: Patient will remain free from falls  Description: Target End Date:  Prior to discharge or change in level of care    Document interventions on the Shannen Rodríguez Fall Risk Assessment    1.  Assess for fall risk factors  2.  Implement fall precautions  Outcome: Progressing     Problem:  Pain - Standard  Goal: Alleviation of pain or a reduction in pain to the patient’s comfort goal  Description: Target End Date:  Prior to discharge or change in level of care    Document on Vitals flowsheet    1.  Document pain using the appropriate pain scale per order or unit policy  2.  Educate and implement non-pharmacologic comfort measures (i.e. relaxation, distraction, massage, cold/heat therapy, etc.)  3.  Pain management medications as ordered  4.  Reassess pain after pain med administration per policy  5.  If opiods administered assess patient's response to pain medication is appropriate per POSS sedation scale  6.  Follow pain management plan developed in collaboration with patient and interdisciplinary team (including palliative care or pain specialists if applicable)  Outcome: Progressing

## 2024-02-13 NOTE — PROGRESS NOTES
Hospital Medicine Daily Progress Note    Date of Service  2/12/2024    Chief Complaint  Jigna Allen is a 73 y.o. female admitted 2/10/2024 with abdominal pain.    Hospital Course  Jigna Allen is a 73 y.o. female, with h/o HTN, non-insulin dependent Type II DM, CAD, COPD who underwent ERCP/ EUS yesterday (Dr. Mars) scheduled after hospitalization late November for common bile duct dilation and ongoing abdominal pain.  She also has moderate stenosis of SMA and following outpatient with vascular surgery.  Patient states that after she went home after procedure, developed worsening abdominal pain reason why she presented to the ED on 2/10/2024 for further evaluation.     CT scan evaluation showing concerning's for acute cholangitis and/or mild pancreatitis with reactive duodenitis and had significantly elevated blood pressure of 217/100 in the ED requiring IV medication for blood pressure control.    Interval Problem Update  2/12  Evaluated in room patient room, afebrile, pulse in the 70s to 90s respiratory rate 16 to 20 systolic blood pressure on 113-180 pulse ox 93 to 98% on 2 to 4 L nasal cannula.  Leukocytosis improving, anemia likely dilutional, normal platelet count, no evidence of bleeding, hyperglycemic normal electrolytes and renal function.  Due to abnormal LFTs and T. bili GI signed off.  Recommending continue IV antibiotics for suspected ascending cholangitis versus pancreatitis post ERCP.  Tolerating p.o. intake however requested to go to liquid diet today because regular diet caused her stomach discomfort.  Pain controlled.  Continue IV antibiotics.  Suspect discharge in 1 to 2 days pending clinical course, p.o. intake, pain control.      I have discussed this patient's plan of care and discharge plan at IDT rounds today with Case Management, Nursing, Nursing leadership, and other members of the IDT team.    Consultants/Specialty  GI    Code Status  Full Code    Disposition  Medically Cleared  I  have placed the appropriate orders for post-discharge needs.    Review of Systems  ROS   Review of Systems   Constitutional:  Positive for malaise/fatigue. Negative for fever.   HENT:  Negative for congestion and sore throat.    Eyes:  Negative for blurred vision and double vision.   Respiratory:  Negative for cough and shortness of breath.    Cardiovascular:  Negative for chest pain and palpitations.   Gastrointestinal:  Positive for abdominal pain. Negative for nausea and vomiting.   Genitourinary:  Negative for dysuria and urgency.   Musculoskeletal:  Negative for myalgias and neck pain.   Skin:  Negative for itching and rash.   Neurological:  Negative for dizziness, weakness and headaches.   Endo/Heme/Allergies:  Does not bruise/bleed easily.   Psychiatric/Behavioral:  Negative for depression. The patient does not have insomnia.      Physical Exam  Temp:  [36.1 °C (97 °F)-36.9 °C (98.4 °F)] 36.1 °C (97 °F)  Pulse:  [76-92] 91  Resp:  [16-22] 18  BP: (113-187)/(48-83) 146/65  SpO2:  [93 %-98 %] 97 %    Physical Exam  Vitals and nursing note reviewed.   Constitutional:       General: She is not in acute distress.     Appearance: She is obese. She is not toxic-appearing.   HENT:      Head: Normocephalic and atraumatic.      Nose: Nose normal. No rhinorrhea.      Mouth/Throat:      Mouth: Mucous membranes are moist.      Pharynx: Oropharynx is clear.   Eyes:      General: No scleral icterus.     Extraocular Movements: Extraocular movements intact.      Conjunctiva/sclera: Conjunctivae normal.   Cardiovascular:      Rate and Rhythm: Normal rate and regular rhythm.      Pulses: Normal pulses.   Pulmonary:      Effort: Pulmonary effort is normal. No respiratory distress.      Breath sounds: Rales (bibasilar) present. No wheezing or rhonchi.   Abdominal:      Palpations: Abdomen is soft.      Tenderness: There is abdominal tenderness (mild generalized). There is no guarding or rebound.   Musculoskeletal:          General: No tenderness. Normal range of motion.      Cervical back: Normal range of motion and neck supple. No rigidity.   Skin:     General: Skin is warm and dry.      Capillary Refill: Capillary refill takes less than 2 seconds.   Neurological:      General: No focal deficit present.      Mental Status: She is alert and oriented to person, place, and time. Mental status is at baseline.      Cranial Nerves: No cranial nerve deficit.      Sensory: No sensory deficit.      Motor: No weakness.      Coordination: Coordination normal.   Psychiatric:         Mood and Affect: Mood normal.         Behavior: Behavior normal.         Thought Content: Thought content normal.         Judgment: Judgment normal.         Fluids    Intake/Output Summary (Last 24 hours) at 2/13/2024 0534  Last data filed at 2/12/2024 1400  Gross per 24 hour   Intake 360 ml   Output --   Net 360 ml       Laboratory  Recent Labs     02/10/24  2311 02/12/24  0101 02/13/24  0347   WBC 24.1* 16.4* 12.8*   RBC 4.85 4.14* 4.38   HEMOGLOBIN 13.4 11.5* 12.0   HEMATOCRIT 40.8 35.9* 38.1   MCV 84.1 86.7 87.0   MCH 27.6 27.8 27.4   MCHC 32.8 32.0* 31.5*   RDW 43.8 45.9 43.1   PLATELETCT 371 309 329   MPV 9.8 9.8 10.0     Recent Labs     02/10/24  2311 02/12/24 0101 02/13/24  0347   SODIUM 136 138 136   POTASSIUM 3.6 3.6 3.5*   CHLORIDE 96 100 98   CO2 23 28 30   GLUCOSE 218* 196* 161*   BUN 19 12 9   CREATININE 0.61 0.54 0.49*   CALCIUM 9.8 9.0 9.4     Recent Labs     02/10/24  2311   INR 0.90               Imaging  CT-ABDOMEN-PELVIS WITH   Final Result      1.  Features concerning for acute cholangitis and/or mild pancreatitis.   2.  Possible reactive duodenitis.   3.  Small amount of biliary gas presumably related to recent procedure.   4.  Mildly dilated common bile duct likely related to prior cholecystectomy.   5.  Distal colonic diverticulosis.   6.  Hepatomegaly.           Assessment/Plan  * Acute cholangitis- (present on admission)  Assessment &  Plan  -Inpatient status on medical floor.  -Patient underwent ERCP/EUS today by Dr. Mars.  She has common bile duct dilation and chronic abdominal pain since November.  -She reports significantly worse abdominal pain after procedure.  -Will keep n.p.o.  -She has elevated WBC at 24.1 and tachycardic therefore meeting SIRS criteria but no organ dysfunction.  She is not septic on admission per sepsis 3 criteria.  -CT of the abdomen and pelvis done in the emergency department is showing concern for acute cholangitis and or mild pancreatitis with possible reactive duodenitis.  Her lipase is within normal limits.  -I appreciate GI consult recommendations.  ERP discussed this case with Dr. Stratton.  -Pain control as needed.  -Patient was started on Zosyn in the emergency department which I will continue.    SIRS (systemic inflammatory response syndrome) (HCC)  Assessment & Plan  SIRS criteria identified on my evaluation include:  Tachycardia, with heart rate greater than 90 BPM and Leukocytosis, with WBC greater than 12,000  SIRS with CT findings of acute cholangitis and or mild pancreatitis with possible reactive duodenitis.  -Initial white count is 24.1.  Patient was started on Zosyn while in the emergency department in which I am continuing.  -Sepsis protocol was added, there is no endorgan dysfunction therefore not sepsis on admission per sepsis 3 criteria but will need to closely monitor this.  I appreciate GI consult and recommendations.    Hypertensive urgency  Assessment & Plan  -Initial blood pressure was 217/100.  Patient was seen severe pain on arrival.  -She received 50 mcg of fentanyl in the emergency department for pain and 20 mg of IV labetalol.  -At the time of my evaluation, blood pressure was a still elevated at 217/91 and I requested ERP to give additional 10 mg of IV hydralazine, as I was trying to decide if she will be appropriate to go to the floor versus requiring ICU for blood pressure  control.  Patient blood pressure trending down was 167/92.  -Elevated blood pressure likely worse secondary to abdominal pain but will have to closely monitor this.  She will be n.p.o. and I am unable to reorder her oral medications losartan hydrochlorothiazide.    Type 2 diabetes mellitus with complication, without long-term current use of insulin ([facility])- (present on admission)  Assessment & Plan  -She is NPO.  Added regular insulin sliding scale.    COPD (chronic obstructive pulmonary disease) ([facility])- (present on admission)  Assessment & Plan  -Without exacerbation.  Continue Trelegy Ellipta.    CAD (coronary artery disease)- (present on admission)  Assessment & Plan  -History of MI and stent.  She denies having chest pain.    Advance care planning  Assessment & Plan  I discussed advance care planning with the patient for at least 11 minutes, including diagnosis, prognosis, plan of care, risks and benefits of any therapies that could be offered, as well as alternatives including palliation and hospice, as appropriate.           VTE prophylaxis:   SCDs/TEDs   enoxaparin ppx    I have performed a physical exam and reviewed and updated ROS and Plan today (2/12/2024). In review of yesterday's note (2/11/2024), there are no changes except as documented above.  Total time spent 52 minutes. I spent greater than 50% of the time for patient care, counseling, and coordination on this date, including unit/floor time, and face-to-face time with the patient as per interval events, my own review of patient's imaging and lab analysis and developing my assessment and plan above.

## 2024-02-13 NOTE — HOSPITAL COURSE
"As per chart review:  \"73 y.o. female, with h/o HTN, non-insulin dependent Type II DM, CAD, COPD who underwent ERCP/ EUS on 2/10/24 (Dr. Mars) scheduled after hospitalization late November for common bile duct dilation and ongoing abdominal pain.  She also has moderate stenosis of SMA and following outpatient with vascular surgery.  Patient states that after she went home after procedure, developed worsening abdominal pain reason why she presented to the ED on 2/10/2024 for further evaluation.     CT scan evaluation showing concerning's for acute cholangitis and/or mild pancreatitis with reactive duodenitis and had significantly elevated blood pressure of 217/100 in the ED requiring IV medication for blood pressure control.\"    The patient was admitted for further management and care.  GI was consulted.  The patient was to continue with antibiotics, GI evaluated patient and they believe the patient's abdominal pain was most likely due to ERCP induced pancreatitis, however they do recommend 7-day course of antibiotics which we have transition to Augmentin.  The patient's pain has improved significantly while hospitalized and now tolerating p.o.    Of note while hospitalized the patient requiring oxygen, the patient did not seem to be in respiratory distress.  The patient does have a history of COPD.  We did not suspect this was COPD exacerbation as the patient was not having worsening shortness of breath, increased cough or sputum production.    We ordered CTA of the chest to rule out pulmonary embolism.  Did not report pulmonary embolism, it reported atelectasis and emphysema.  Patient had home O2 evaluation done and the patient will require oxygen upon discharge.  We have placed referral to pulmonary as the patient will require close follow-up with pulmonary as an outpatient.  The patient was also found to have a pulmonary nodule so we have placed referral to pulmonary nodule clinic as an outpatient as " well.    The patient had hypertensive urgency while hospitalized.  Patient had a SBP as high as 210 yesterday. The patient received multiple doses of as needed blood pressure medication, however the patient yesterday was complaining of some abdominal pain and some nausea and the patient was being very anxious. I suspect this could have played a role in her elevated blood pressure from yesterday.  We initially started the patient on amlodipine yesterday on top of her regular losartan and hydrochlorothiazide.  However this morning the patient's blood pressure was within normal limits and actually on the lower side.  The patient will be discharged home on her regular dose of losartan and hydrochlorothiazide.  The patient understands that she will monitor her blood pressure at home and she will follow-up closely with her PCP as an outpatient to see if she warrants further blood pressure medications.  At this time we will not discharge patient on amlodipine, however she will require close monitoring of her blood pressure to see if she warrants further blood pressure medications.    The patient also developed diarrhea while hospitalized, there was concern for C. difficile, however C. difficile was negative.  Her suspect this was due to antibiotic use.  Today the patient mentions the diarrhea is much better.    Of note, the patient was found to have an adrenal nodule on the CT scan performed while hospitalized.  We have placed referral to urology as an outpatient.    The patient seen at bedside this morning.  The patient feels much better and would like to go home.  The patient will be discharged home.  The patient will require close follow-up with GI and pulmonary as an outpatient as well as PCP. Referral to urology has also been placed to follow up on adrenal nodule.

## 2024-02-13 NOTE — PROGRESS NOTES
23:42:  MD informed on high blood pressure readings.Patient  complaining frontal of headache and was given pain medication.No nausea no vomiting.    23:45 BP went down to 158/61    No new orders made this time.

## 2024-02-13 NOTE — PROGRESS NOTES
Telemetry Shift Summary     Rhythm SR ST  HR Range   Ectopy r-oPVC,rPAC,rCoup  Measurements  .14/.08/.30  Per strip printed 0400     Normal Values  Rhythm SR  HR Range    Measurements 0.12-0.20 / 0.06-0.10  / 0.30-0.52

## 2024-02-13 NOTE — PROGRESS NOTES
Gastroenterology Progress Note:   APARNA Paige  Date & Time note created:    2/13/2024   8:51 AM     Referring MD:  Dr. Frank    Patient ID:  Name:             Jigna Allen   YOB: 1950  Age:                 73 y.o.  female   MRN:               [id number]                                                             Reason for Consult:      Abdominal pain   S/p EGD/EUS/ERCP 2/9/24    History of Present Illness:    This is a 72 yo female PMH HTN, Type 2 DM, CAD, COPD, moderate stenosis of SMA (followed by vascular surgery) who was seen in consultation for severe epigastric abdominal pain after having an ERCP/EUS/EGD on 2/9/24. Hypertensive 217/100. Notable Labs: WBC: 24.1. Glucose: 218. LFT's and lipase was normal. Started on IV Zosyn.     CT scan:   Features concerning for acute cholangitis and/or mild pancreatitis.  2.  Possible reactive duodenitis.  3.  Small amount of biliary gas presumably related to recent procedure.  4.  Mildly dilated common bile duct likely related to prior cholecystectomy.  5.  Distal colonic diverticulosis.  6.  Hepatomegaly.    EGD/EUS/ERCP 2/9/24 performed by Dr. Mars due to dilated biliary duct and pain.   EGD: esophagus unremarkable. HH 37-39 cm. Gastric antrum erythema with biopsy for H. Pylori. Duodenum: unremarkable.   EUS: celiac axis/pancreas body/tail/pancreatic duct/ampulla/head of pancreas: unremarkable. CBD: dilated and tortuous up to 13.6 mm at mid duct. Distal filling defect concerning for stone/sludge.  ERCP: Ampulla: slightly bulbous, slowly rhythmically invaginating then protruding.   CBD: dilated and tortuous up to 13.6 mm mid duct subtle distal filling defect, several small stones and debris. Sphincterotomy. Biopsy of ampulla    Interval History  2/12/2024: Patient is stable. No fevers, chills, nausea, vomiting. She is still dealing with some persistent upper abdominal pain, gradually improving per patient. This is not worse with her clear  liquid diet.    2/23/2024: Improved pain. No worse with eating. No nausea, vomiting.            Review of Systems:      Review of Systems   Constitutional: Negative.    Respiratory: Negative.     Cardiovascular: Negative.    Gastrointestinal:  Positive for abdominal pain.   Genitourinary: Negative.    Musculoskeletal: Negative.    Neurological: Negative.    Endo/Heme/Allergies: Negative.    Psychiatric/Behavioral: Negative.               Physical Exam:  Vitals/ General Appearance:   Weight/BMI: Body mass index is 31.05 kg/m².    Vitals:    02/13/24 0522 02/13/24 0553 02/13/24 0800 02/13/24 0830   BP: (!) 146/65 (!) 172/76 (!) 183/80 (!) 164/58   Pulse: 91  88 (!) 16   Resp:   18    Temp: 36.1 °C (97 °F)  36.7 °C (98.1 °F)    TempSrc: Oral  Temporal    SpO2: 97%  97% 97%   Weight:       Height:         Oxygen Therapy:  Pulse Oximetry: 97 %, O2 (LPM): 3, O2 Delivery Device: Silicone Nasal Cannula    Physical Exam  Vitals reviewed.   Constitutional:       Appearance: She is obese.   HENT:      Head: Normocephalic and atraumatic.      Mouth/Throat:      Mouth: Mucous membranes are moist.   Eyes:      Extraocular Movements: Extraocular movements intact.      Pupils: Pupils are equal, round, and reactive to light.   Cardiovascular:      Rate and Rhythm: Normal rate and regular rhythm.      Pulses: Normal pulses.      Heart sounds: Normal heart sounds.   Pulmonary:      Effort: Pulmonary effort is normal.      Breath sounds: Normal breath sounds.   Abdominal:      General: Bowel sounds are normal.      Palpations: Abdomen is soft.      Tenderness: There is abdominal tenderness (Epigastrium and LUQ).   Musculoskeletal:         General: No swelling or tenderness.      Cervical back: Normal range of motion and neck supple.   Skin:     General: Skin is warm and dry.   Neurological:      General: No focal deficit present.      Mental Status: She is alert and oriented to person, place, and time.   Psychiatric:         Mood and  Affect: Mood normal.         Behavior: Behavior normal.         Thought Content: Thought content normal.         Judgment: Judgment normal.         Past Medical History:   Past Medical History:   Diagnosis Date    Abnormal screening cardiac CT 02/14/2020    Anxiety     Asthma     Bowel habit changes     Diarrhea    Breath shortness     Occasionally    Bronchitis 2018    Carotid arterial disease (HCC)     Cataract     Corrected with surgery    COPD (chronic obstructive pulmonary disease) (HCC)     Diabetes (HCC)     Emphysema of lung (HCC) 2023    Goiter 03/25/2016    Hiatus hernia syndrome 2023    HTN (hypertension)     Hyperlipidemia     Hyperlipidemia 03/25/2016    Indigestion     Myocardial infarct (HCC) 2010    Pain 2023    Pneumonia 2022    PONV (postoperative nausea and vomiting) 1989    Wasn’t a factor with last 2 surgeries    Tobacco use     Urinary incontinence 2023       Past Surgical History:  Past Surgical History:   Procedure Laterality Date    RI ERCP,DIAGNOSTIC N/A 2/9/2024    Procedure: UPPER ENDOSCOPIC ULTRASOUND WITH FINE NEEDLE ASPIRATION WITH  ENDOSCOPIC RETROGRADE CHOLANGIOPANCREATOGRAPHY AND ESOPHAGOGASTRODUODENOSCOPY;  Surgeon: Adrian Mars M.D.;  Location: [facility];  Service: EUS    RI UPPER GI ENDOSCOPY,DIAGNOSIS N/A 2/9/2024    Procedure: GASTROSCOPY;  Surgeon: Adrian Mars M.D.;  Location: [facility];  Service: EUS    EGD W/ENDOSCOPIC ULTRASOUND N/A 2/9/2024    Procedure: EGD, WITH ENDOSCOPIC US;  Surgeon: Adrian Mars M.D.;  Location: [facility];  Service: EUS    OTHER ABDOMINAL SURGERY      Appendix and gall bladder    OTHER CARDIAC SURGERY  2010    Just 1 stent    OTHER ORTHOPEDIC SURGERY      [facility] Medications:    Current Facility-Administered Medications:     labetalol (Normodyne/Trandate) injection 10 mg, 10 mg, Intravenous, Q4HRS PRN, Jaskaran Mccallum M.D.    losartan (Cozaar) tablet 100 mg,  100 mg, Oral, Q DAY, 100 mg at 02/13/24 0553 **AND** hydroCHLOROthiazide tablet 12.5 mg, 12.5 mg, Oral, Q DAY, Jaskaran Mccallum M.D., 12.5 mg at 02/13/24 0553    enoxaparin (Lovenox) inj 40 mg, 40 mg, Subcutaneous, DAILY AT 1800, Charly Frank M.D.    cefTRIAXone (Rocephin) 2,000 mg in  mL IVPB, 2,000 mg, Intravenous, Q24HRS, Charly Frank M.D., Stopped at 02/12/24 1244    insulin regular (HumuLIN R,NovoLIN R) injection, 1-6 Units, Subcutaneous, 4X/DAY ACHS, 1 Units at 02/12/24 2049 **AND** POC blood glucose manual result, , , Q6H **AND** NOTIFY MD and PharmD, , , Once **AND** Administer 20 grams of glucose (approximately 8 ounces of fruit juice) every 15 minutes PRN FSBG less than 70 mg/dL, , , PRN **AND** dextrose 50% (D50W) injection 25 g, 25 g, Intravenous, Q15 MIN PRN, Charly Frank M.D.    fluticasone-umeclidinium-vilanterol (Trelegy Ellipta) 100-62.5-25 mcg/act inhaler 1 Puff, 1 Puff, Inhalation, DAILY, Lilli Smith M.D., 1 Puff at 02/12/24 0523    acetaminophen (Tylenol) tablet 650 mg, 650 mg, Oral, Q6HRS PRN, Lilli Smith M.D., 650 mg at 02/12/24 1903    senna-docusate (Pericolace Or Senokot S) 8.6-50 MG per tablet 2 Tablet, 2 Tablet, Oral, BID, 2 Tablet at 02/12/24 1706 **AND** polyethylene glycol/lytes (Miralax) Packet 1 Packet, 1 Packet, Oral, QDAY PRN, Lilli Smith M.D.    ondansetron (Zofran) syringe/vial injection 4 mg, 4 mg, Intravenous, Q4HRS PRN, Lilli Smith M.D., 4 mg at 02/13/24 0612    ondansetron (Zofran ODT) dispertab 4 mg, 4 mg, Oral, Q4HRS PRN, Lilli Smith M.D.    HYDROmorphone (Dilaudid) injection 0.5 mg, 0.5 mg, Intravenous, Q3HRS PRN, Lilli Smith M.D., 0.5 mg at 02/13/24 0416    HYDROmorphone (Dilaudid) injection 1 mg, 1 mg, Intravenous, Q3HRS PRN, Lilli Smith M.D., 1 mg at 02/12/24 5742    LR (Bolus) infusion 500 mL, 500 mL, Intravenous, Once PRN, Lilli Smith  M.D.    Current Outpatient Medications:  Current Facility-Administered Medications   Medication Dose Route Frequency Provider Last Rate Last Admin    labetalol (Normodyne/Trandate) injection 10 mg  10 mg Intravenous Q4HRS PRN Jaskaran Mccallum M.D.        losartan (Cozaar) tablet 100 mg  100 mg Oral Q DAY Jaskaran Mccallum M.D.   100 mg at 02/13/24 0553    And    hydroCHLOROthiazide tablet 12.5 mg  12.5 mg Oral Q DAY Jaskaran Mccallum M.D.   12.5 mg at 02/13/24 0553    enoxaparin (Lovenox) inj 40 mg  40 mg Subcutaneous DAILY AT 1800 Charly Frank M.D.        cefTRIAXone (Rocephin) 2,000 mg in  mL IVPB  2,000 mg Intravenous Q24HRS Charly Frank M.D.   Stopped at 02/12/24 1244    insulin regular (HumuLIN R,NovoLIN R) injection  1-6 Units Subcutaneous 4X/DAY ACHS Charly Frank M.D.   1 Units at 02/12/24 2049    And    dextrose 50% (D50W) injection 25 g  25 g Intravenous Q15 MIN PRN Charly Frank M.D.        fluticasone-umeclidinium-vilanterol (Trelegy Ellipta) 100-62.5-25 mcg/act inhaler 1 Puff  1 Puff Inhalation DAILY Lilli Smith M.D.   1 Puff at 02/12/24 0523    acetaminophen (Tylenol) tablet 650 mg  650 mg Oral Q6HRS PRN Lilli Smith M.D.   650 mg at 02/12/24 1903    senna-docusate (Pericolace Or Senokot S) 8.6-50 MG per tablet 2 Tablet  2 Tablet Oral BID Lilli Smith M.D.   2 Tablet at 02/12/24 1706    And    polyethylene glycol/lytes (Miralax) Packet 1 Packet  1 Packet Oral QDAY PRN Lilli Smith M.D.        ondansetron (Zofran) syringe/vial injection 4 mg  4 mg Intravenous Q4HRS PRN JORDAN Gutierrez.D.   4 mg at 02/13/24 0612    ondansetron (Zofran ODT) dispertab 4 mg  4 mg Oral Q4HRS PRN Lilli Smith M.D.        HYDROmorphone (Dilaudid) injection 0.5 mg  0.5 mg Intravenous Q3HRS PRN Lilli Smith M.D.   0.5 mg at 02/13/24 0416    HYDROmorphone (Dilaudid) injection 1 mg  1 mg Intravenous  Q3HRS PRN Lilli Smith M.D.   1 mg at 02/12/24 2329    LR (Bolus) infusion 500 mL  500 mL Intravenous Once PRN Lilli Smith M.D.           Medication Allergy:  Allergies   Allergen Reactions    Sulfa Drugs Hives    Ezetimibe Nausea     Diarrhea     Hmg-Coa-R Inhibitors Unspecified     Muscle cramps       Family History:  Family History   Problem Relation Age of Onset    Cancer Mother 47        breast    Breast Cancer Mother         Mets to bone and liver    Cancer Father         Stomach    Hypertension Father     Alcohol abuse Father     Stroke Maternal Grandmother     Alcohol abuse Maternal Grandmother     Stroke Paternal Grandmother     Cancer Brother         Glioblastoma    Alcohol abuse Paternal Uncle     Alcohol abuse Paternal Uncle        Social History:  Social History     Socioeconomic History    Marital status:      Spouse name: Not on file    Number of children: Not on file    Years of education: Not on file    Highest education level: Associate degree: occupational, technical, or vocational program   Occupational History    Not on file   Tobacco Use    Smoking status: Every Day     Current packs/day: 0.50     Average packs/day: 0.5 packs/day for 59.4 years (29.7 ttl pk-yrs)     Types: Cigarettes     Start date: 9/1/1964    Smokeless tobacco: Never    Tobacco comments:     I enjoy smoking   Vaping Use    Vaping Use: Never used   Substance and Sexual Activity    Alcohol use: No    Drug use: Not Currently     Types: Marijuana     Comment: CBD  occ    Sexual activity: Not Currently     Partners: Male     Comment:    Other Topics Concern    Not on file   Social History Narrative    Not on file     Social Determinants of Health     Financial Resource Strain: Low Risk  (3/21/2022)    Overall Financial Resource Strain (CARDIA)     Difficulty of Paying Living Expenses: Not very hard   Food Insecurity: No Food Insecurity (3/21/2022)    Hunger Vital Sign     Worried About Running  Out of Food in the Last Year: Never true     Ran Out of Food in the Last Year: Never true   Transportation Needs: No Transportation Needs (3/21/2022)    PRAPARE - Transportation     Lack of Transportation (Medical): No     Lack of Transportation (Non-Medical): No   Physical Activity: Unknown (3/21/2022)    Exercise Vital Sign     Days of Exercise per Week: 0 days     Minutes of Exercise per Session: Not on file   Stress: Stress Concern Present (3/21/2022)    [employer] - Occupational Stress Questionnaire     Feeling of Stress : Very much   Social Connections: Moderately Isolated (3/21/2022)    Social Connection and Isolation Panel [NHANES]     Frequency of Communication with Friends and Family: More than three times a week     Frequency of Social Gatherings with Friends and Family: Once a week     Attends Tenri[employer] Services: Never     Active Member of Clubs or Organizations: No     Attends Club or Organization Meetings: Never     Marital Status:    Intimate Partner Violence: Not on file   Housing Stability: Low Risk  (3/21/2022)    Housing Stability Vital Sign     Unable to Pay for Housing in the Last Year: No     Number of Places Lived in the Last Year: 1     Unstable Housing in the Last Year: No         MDM (Data Review):     Records reviewed and summarized in current documentation    Lab Data Review:  Recent Results (from the past 24 hour(s))   POCT glucose device results    Collection Time: 02/12/24 12:15 PM   Result Value Ref Range    POC Glucose, Blood 199 (H) 65 - 99 mg/dL   POCT glucose device results    Collection Time: 02/12/24  5:07 PM   Result Value Ref Range    POC Glucose, Blood 163 (H) 65 - 99 mg/dL   POCT glucose device results    Collection Time: 02/12/24  8:48 PM   Result Value Ref Range    POC Glucose, Blood 174 (H) 65 - 99 mg/dL   Comp Metabolic Panel    Collection Time: 02/13/24  3:47 AM   Result Value Ref Range    Sodium 136 135 - 145 mmol/L    Potassium 3.5  (L) 3.6 - 5.5 mmol/L    Chloride 98 96 - 112 mmol/L    Co2 30 20 - 33 mmol/L    Anion Gap 8.0 7.0 - 16.0    Glucose 161 (H) 65 - 99 mg/dL    Bun 9 8 - 22 mg/dL    Creatinine 0.49 (L) 0.50 - 1.40 mg/dL    Calcium 9.4 8.4 - 10.2 mg/dL    Correct Calcium 9.8 8.5 - 10.5 mg/dL    AST(SGOT) 10 (L) 12 - 45 U/L    ALT(SGPT) 14 2 - 50 U/L    Alkaline Phosphatase 88 30 - 99 U/L    Total Bilirubin 0.2 0.1 - 1.5 mg/dL    Albumin 3.5 3.2 - 4.9 g/dL    Total Protein 6.6 6.0 - 8.2 g/dL    Globulin 3.1 1.9 - 3.5 g/dL    A-G Ratio 1.1 g/dL   CBC WITH DIFFERENTIAL    Collection Time: 02/13/24  3:47 AM   Result Value Ref Range    WBC 12.8 (H) 4.8 - 10.8 K/uL    RBC 4.38 4.20 - 5.40 M/uL    Hemoglobin 12.0 12.0 - 16.0 g/dL    Hematocrit 38.1 37.0 - 47.0 %    MCV 87.0 81.4 - 97.8 fL    MCH 27.4 27.0 - 33.0 pg    MCHC 31.5 (L) 32.2 - 35.5 g/dL    RDW 43.1 35.9 - 50.0 fL    Platelet Count 329 164 - 446 K/uL    MPV 10.0 9.0 - 12.9 fL    Neutrophils-Polys 73.20 (H) 44.00 - 72.00 %    Lymphocytes 17.90 (L) 22.00 - 41.00 %    Monocytes 7.60 0.00 - 13.40 %    Eosinophils 0.80 0.00 - 6.90 %    Basophils 0.20 0.00 - 1.80 %    Immature Granulocytes 0.30 0.00 - 0.90 %    Nucleated RBC 0.00 0.00 - 0.20 /100 WBC    Neutrophils (Absolute) 9.40 (H) 1.82 - 7.42 K/uL    Lymphs (Absolute) 2.30 1.00 - 4.80 K/uL    Monos (Absolute) 0.97 (H) 0.00 - 0.85 K/uL    Eos (Absolute) 0.10 0.00 - 0.51 K/uL    Baso (Absolute) 0.03 0.00 - 0.12 K/uL    Immature Granulocytes (abs) 0.04 0.00 - 0.11 K/uL    NRBC (Absolute) 0.00 K/uL   MAGNESIUM    Collection Time: 02/13/24  3:47 AM   Result Value Ref Range    Magnesium 1.9 1.5 - 2.5 mg/dL   PHOSPHORUS    Collection Time: 02/13/24  3:47 AM   Result Value Ref Range    Phosphorus 3.2 2.5 - 4.5 mg/dL   ESTIMATED GFR    Collection Time: 02/13/24  3:47 AM   Result Value Ref Range    GFR (CKD-EPI) 99 >60 mL/min/1.73 m 2   POCT glucose device results    Collection Time: 02/13/24  5:52 AM   Result Value Ref Range    POC Glucose,  Blood 149 (H) 65 - 99 mg/dL       Imaging/Procedures Review:      CT-ABDOMEN-PELVIS WITH   Final Result      1.  Features concerning for acute cholangitis and/or mild pancreatitis.   2.  Possible reactive duodenitis.   3.  Small amount of biliary gas presumably related to recent procedure.   4.  Mildly dilated common bile duct likely related to prior cholecystectomy.   5.  Distal colonic diverticulosis.   6.  Hepatomegaly.           MDM (Assessment and Plan):     Patient Active Problem List    Diagnosis Date Noted    Advance care planning 02/13/2024    Acute cholangitis 02/11/2024    Hypertensive urgency 02/11/2024    SIRS (systemic inflammatory response syndrome) (HCC) 02/11/2024    Medication refill 02/08/2024    Dilated cbd, acquired 11/27/2023    Hospital discharge follow-up 05/30/2023    Mucous retention cyst of maxillary sinus 05/30/2023    Left leg pain 01/18/2023    Pain of left heel 01/18/2023    Ingrown toenail of left foot 01/18/2023    Major depressive disorder 01/18/2023    Viral upper respiratory tract infection 11/09/2022    URI (upper respiratory infection) 11/09/2022    Need for vaccination 09/29/2022    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    Recurrent major depressive disorder (HCC) 03/22/2022    Healthcare maintenance 03/22/2022    Venous stasis dermatitis of left lower extremity 12/17/2020    Neuropathic pain 08/24/2020    Vaginal yeast infection 01/07/2020    Obesity (BMI 30-39.9) 09/17/2019    S/P carotid endarterectomy 02/05/2019    Radicular pain in left arm 02/05/2019    Neck pain on left side 02/05/2019    Vitamin D deficiency 11/06/2018    Essential hypertension 09/25/2018    Anxiety 03/25/2016    CAD (coronary artery disease) 03/25/2016    COPD (chronic obstructive pulmonary disease) (HCC) 03/25/2016    Type 2 diabetes mellitus with complication, without long-term current use of insulin (HCC) 03/25/2016    Goiter 03/25/2016    Hyperlipidemia 03/25/2016    Stented coronary artery  03/25/2016    Tobacco dependence 03/25/2016    Carotid artery stenosis without cerebral infarction 09/25/2014     This is a 74 yo female admitted to the hospital with post op epigastric abdominal pain concerning for pancreatitis vs cholangitis. Underwent an EGD/EUS/ERCP with stone extraction of the biliary duct, sphincterotomy, biopsies of the gastric antrum and ampulla. Discharged home but abdominal pain was getting worse. Labs: elevated WBC but LFT's and lipase are normal. CT scan concerning for cholangitis vs mild pancreatitis. Started on IV Zosyn.    ASSESSMENT:  Abdominal pain likely due to ERCP induced pancreatitis. Lack of fever and lack of elevated liver enzymes favor against post-ERCP cholangitis.   Leukocytosis  Choledocholithiasis s/p stone extraction with sphincterotomy 2/9/24  HTN    PLAN:  Continue supportive care. Advance diet as tolerated  2. Continue antibiotic x 5-7 days    GI will sign off. Patient to keep her appointment in clinic in May.     Thank your for the opportunity to assist in the care of your patient.  Please call for any questions or concerns.    RAHUL Paige.

## 2024-02-13 NOTE — ASSESSMENT & PLAN NOTE
2/13: I discussed for at least 15 minutes further goals of care which included code status. This included DNR/DNI vs Full code. The patient would like to be full code. We also discussed further goals of care which included discussion of invasive versus noninvasive procedures.  The patient at this time would like to continue full treatment options.  She would like to continue with invasive and noninvasive procedures as needed.

## 2024-02-13 NOTE — CARE PLAN
Problem: Respiratory  Goal: Patient will achieve/maintain optimum respiratory ventilation and gas exchange  Outcome: Progressing     Problem: Pain - Standard  Goal: Alleviation of pain or a reduction in pain to the patient’s comfort goal  Outcome: Progressing   The patient is Stable - Low risk of patient condition declining or worsening    Shift Goals  Clinical Goals: Antibiotics,pain control,safety  Patient Goals: pain control    Progress made toward(s) clinical / shift goals:  Educated on fall prevention measures,encouraged use of call light,updated on plan of care    Patient is not progressing towards the following goals:

## 2024-02-13 NOTE — PROGRESS NOTES
As per monitor tech had a triplet.VS taken and recorded.Patient denies any pain.MD informed with no new order made this time.

## 2024-02-14 ENCOUNTER — APPOINTMENT (OUTPATIENT)
Dept: RADIOLOGY | Facility: MEDICAL CENTER | Age: 74
DRG: 444 | End: 2024-02-14
Attending: INTERNAL MEDICINE
Payer: MEDICARE

## 2024-02-14 PROBLEM — R91.1 PULMONARY NODULE: Status: ACTIVE | Noted: 2024-02-14

## 2024-02-14 LAB
ALBUMIN SERPL BCP-MCNC: 3.3 G/DL (ref 3.2–4.9)
ALBUMIN/GLOB SERPL: 1.2 G/DL
ALP SERPL-CCNC: 88 U/L (ref 30–99)
ALT SERPL-CCNC: 14 U/L (ref 2–50)
ANION GAP SERPL CALC-SCNC: 11 MMOL/L (ref 7–16)
AST SERPL-CCNC: 16 U/L (ref 12–45)
BILIRUB SERPL-MCNC: 0.2 MG/DL (ref 0.1–1.5)
BUN SERPL-MCNC: 14 MG/DL (ref 8–22)
CALCIUM ALBUM COR SERPL-MCNC: 9.7 MG/DL (ref 8.5–10.5)
CALCIUM SERPL-MCNC: 9.1 MG/DL (ref 8.4–10.2)
CHLORIDE SERPL-SCNC: 97 MMOL/L (ref 96–112)
CO2 SERPL-SCNC: 28 MMOL/L (ref 20–33)
CREAT SERPL-MCNC: 0.44 MG/DL (ref 0.5–1.4)
ERYTHROCYTE [DISTWIDTH] IN BLOOD BY AUTOMATED COUNT: 42.5 FL (ref 35.9–50)
GFR SERPLBLD CREATININE-BSD FMLA CKD-EPI: 102 ML/MIN/1.73 M 2
GLOBULIN SER CALC-MCNC: 2.8 G/DL (ref 1.9–3.5)
GLUCOSE BLD STRIP.AUTO-MCNC: 154 MG/DL (ref 65–99)
GLUCOSE BLD STRIP.AUTO-MCNC: 165 MG/DL (ref 65–99)
GLUCOSE BLD STRIP.AUTO-MCNC: 169 MG/DL (ref 65–99)
GLUCOSE BLD STRIP.AUTO-MCNC: 199 MG/DL (ref 65–99)
GLUCOSE SERPL-MCNC: 207 MG/DL (ref 65–99)
HCT VFR BLD AUTO: 34.6 % (ref 37–47)
HGB BLD-MCNC: 11 G/DL (ref 12–16)
MAGNESIUM SERPL-MCNC: 1.9 MG/DL (ref 1.5–2.5)
MCH RBC QN AUTO: 27.4 PG (ref 27–33)
MCHC RBC AUTO-ENTMCNC: 31.8 G/DL (ref 32.2–35.5)
MCV RBC AUTO: 86.3 FL (ref 81.4–97.8)
PLATELET # BLD AUTO: 330 K/UL (ref 164–446)
PMV BLD AUTO: 10.4 FL (ref 9–12.9)
POTASSIUM SERPL-SCNC: 4.1 MMOL/L (ref 3.6–5.5)
PROT SERPL-MCNC: 6.1 G/DL (ref 6–8.2)
RBC # BLD AUTO: 4.01 M/UL (ref 4.2–5.4)
SODIUM SERPL-SCNC: 136 MMOL/L (ref 135–145)
WBC # BLD AUTO: 12.7 K/UL (ref 4.8–10.8)

## 2024-02-14 PROCEDURE — 99233 SBSQ HOSP IP/OBS HIGH 50: CPT | Performed by: INTERNAL MEDICINE

## 2024-02-14 PROCEDURE — 80053 COMPREHEN METABOLIC PANEL: CPT

## 2024-02-14 PROCEDURE — 71275 CT ANGIOGRAPHY CHEST: CPT

## 2024-02-14 PROCEDURE — 85027 COMPLETE CBC AUTOMATED: CPT

## 2024-02-14 PROCEDURE — 770020 HCHG ROOM/CARE - TELE (206)

## 2024-02-14 PROCEDURE — 700117 HCHG RX CONTRAST REV CODE 255: Performed by: INTERNAL MEDICINE

## 2024-02-14 PROCEDURE — 700111 HCHG RX REV CODE 636 W/ 250 OVERRIDE (IP): Performed by: INTERNAL MEDICINE

## 2024-02-14 PROCEDURE — A9270 NON-COVERED ITEM OR SERVICE: HCPCS | Performed by: INTERNAL MEDICINE

## 2024-02-14 PROCEDURE — 94640 AIRWAY INHALATION TREATMENT: CPT

## 2024-02-14 PROCEDURE — 83735 ASSAY OF MAGNESIUM: CPT

## 2024-02-14 PROCEDURE — 36415 COLL VENOUS BLD VENIPUNCTURE: CPT

## 2024-02-14 PROCEDURE — 97165 OT EVAL LOW COMPLEX 30 MIN: CPT

## 2024-02-14 PROCEDURE — 97535 SELF CARE MNGMENT TRAINING: CPT

## 2024-02-14 PROCEDURE — 82962 GLUCOSE BLOOD TEST: CPT

## 2024-02-14 PROCEDURE — 700102 HCHG RX REV CODE 250 W/ 637 OVERRIDE(OP): Performed by: INTERNAL MEDICINE

## 2024-02-14 PROCEDURE — 94760 N-INVAS EAR/PLS OXIMETRY 1: CPT

## 2024-02-14 RX ORDER — OXYCODONE HYDROCHLORIDE 5 MG/1
2.5 TABLET ORAL
Status: DISCONTINUED | OUTPATIENT
Start: 2024-02-14 | End: 2024-02-16 | Stop reason: HOSPADM

## 2024-02-14 RX ORDER — MAGNESIUM SULFATE 1 G/100ML
1 INJECTION INTRAVENOUS ONCE
Status: COMPLETED | OUTPATIENT
Start: 2024-02-14 | End: 2024-02-14

## 2024-02-14 RX ORDER — OXYCODONE HYDROCHLORIDE 5 MG/1
5 TABLET ORAL
Status: DISCONTINUED | OUTPATIENT
Start: 2024-02-14 | End: 2024-02-16 | Stop reason: HOSPADM

## 2024-02-14 RX ORDER — HYDROMORPHONE HYDROCHLORIDE 1 MG/ML
0.25 INJECTION, SOLUTION INTRAMUSCULAR; INTRAVENOUS; SUBCUTANEOUS
Status: DISCONTINUED | OUTPATIENT
Start: 2024-02-14 | End: 2024-02-16 | Stop reason: HOSPADM

## 2024-02-14 RX ORDER — AMOXICILLIN AND CLAVULANATE POTASSIUM 875; 125 MG/1; MG/1
1 TABLET, FILM COATED ORAL EVERY 12 HOURS
Status: DISCONTINUED | OUTPATIENT
Start: 2024-02-14 | End: 2024-02-16 | Stop reason: HOSPADM

## 2024-02-14 RX ORDER — OMEPRAZOLE 20 MG/1
20 CAPSULE, DELAYED RELEASE ORAL DAILY
Status: DISCONTINUED | OUTPATIENT
Start: 2024-02-14 | End: 2024-02-16 | Stop reason: HOSPADM

## 2024-02-14 RX ADMIN — MAGNESIUM SULFATE IN DEXTROSE 1 G: 10 INJECTION, SOLUTION INTRAVENOUS at 13:41

## 2024-02-14 RX ADMIN — IOHEXOL 52 ML: 350 INJECTION, SOLUTION INTRAVENOUS at 06:42

## 2024-02-14 RX ADMIN — AMOXICILLIN AND CLAVULANATE POTASSIUM 1 TABLET: 875; 125 TABLET, FILM COATED ORAL at 12:31

## 2024-02-14 RX ADMIN — HYDROCHLOROTHIAZIDE 12.5 MG: 12.5 TABLET ORAL at 05:49

## 2024-02-14 RX ADMIN — LOSARTAN POTASSIUM 100 MG: 50 TABLET, FILM COATED ORAL at 05:49

## 2024-02-14 RX ADMIN — AMOXICILLIN AND CLAVULANATE POTASSIUM 1 TABLET: 875; 125 TABLET, FILM COATED ORAL at 21:06

## 2024-02-14 RX ADMIN — OMEPRAZOLE 20 MG: 20 CAPSULE, DELAYED RELEASE ORAL at 12:31

## 2024-02-14 RX ADMIN — FLUTICASONE FUROATE, UMECLIDINIUM BROMIDE AND VILANTEROL TRIFENATATE 1 PUFF: 100; 62.5; 25 POWDER RESPIRATORY (INHALATION) at 05:48

## 2024-02-14 ASSESSMENT — ACTIVITIES OF DAILY LIVING (ADL): TOILETING: INDEPENDENT

## 2024-02-14 ASSESSMENT — ENCOUNTER SYMPTOMS
BLURRED VISION: 0
ABDOMINAL PAIN: 1
NERVOUS/ANXIOUS: 0
HEADACHES: 0
FALLS: 0
PALPITATIONS: 0
BACK PAIN: 0
COUGH: 0
HEARTBURN: 0
DOUBLE VISION: 0
CHILLS: 0
SHORTNESS OF BREATH: 0
FEVER: 0
NAUSEA: 1
DIZZINESS: 0

## 2024-02-14 ASSESSMENT — LIFESTYLE VARIABLES: SUBSTANCE_ABUSE: 0

## 2024-02-14 ASSESSMENT — COGNITIVE AND FUNCTIONAL STATUS - GENERAL
SUGGESTED CMS G CODE MODIFIER DAILY ACTIVITY: CH
DAILY ACTIVITIY SCORE: 24

## 2024-02-14 ASSESSMENT — PAIN DESCRIPTION - PAIN TYPE
TYPE: ACUTE PAIN
TYPE: ACUTE PAIN

## 2024-02-14 ASSESSMENT — FIBROSIS 4 INDEX: FIB4 SCORE: 0.95

## 2024-02-14 ASSESSMENT — GAIT ASSESSMENTS: DISTANCE (FEET): 50

## 2024-02-14 NOTE — DISCHARGE PLANNING
Care Transition Team Assessment    CM RN met with patient at bedside and completed assessment. Patient lives with her spouse in a single level home in [place], [place]. Patient was independent with ADL's and IADL's, drives.     Portable oxygen was delivered by [employer]. Oxygen concentrator will be delivered to her home in [place], [place] tomorrow. Patient's spouse has a several portable tanks in case there is any delay in patient getting her concentrator from [employer]. [employer] is patient's  oxygen vendor too.     No other needs identified at this time. CM will continue to follow for any needs that may arise prior to discharge.     Information Source  Orientation Level: Oriented X4  Information Given By: Patient  Informant's Name: Jigna Allen  Who is responsible for making decisions for patient? : Patient    Readmission Evaluation  Is this a readmission?: No    Elopement Risk  Legal Hold: No  Ambulatory or Self Mobile in Wheelchair: Yes  Disoriented: No  Psychiatric Symptoms: None  History of Wandering: No  Elopement this Admit: No  Vocalizing Wanting to Leave: No  Displays Behaviors, Body Language Wanting to Leave: No-Not at Risk for Elopement  Elopement Risk: Not at Risk for Elopement    Interdisciplinary Discharge Planning  Lives with - Patient's Self Care Capacity: Significant Other  Patient or legal guardian wants to designate a caregiver: No  Support Systems: Family Member(s)  Housing / Facility: [address]  Prior Services: Home-Independent  Durable Medical Equipment: Not Applicable    Discharge Preparedness  What is your plan after discharge?: Home with help  What are your discharge supports?: Spouse  Prior Functional Level: Ambulatory, Drives Self, Independent with Activities of Daily Living, Independent with Medication Management  Difficulity with ADLs: None  Difficulity with IADLs: None    Functional Assesment  Prior Functional Level: Ambulatory, Drives Self, Independent with Activities of Daily  Living, Independent with Medication Management    Finances  Financial Barriers to Discharge: No  Prescription Coverage: Yes    Vision / Hearing Impairment  Vision Impairment : Yes  Right Eye Vision: Impaired, Wears Glasses  Left Eye Vision: Impaired, Wears Glasses  Hearing Impairment : No         Advance Directive  Advance Directive?: None  Advance Directive offered?: AD Booklet given    Domestic Abuse  Have you ever been the victim of abuse or violence?: No  Physical Abuse or Sexual Abuse: No  Verbal Abuse or Emotional Abuse: No  Possible Abuse/Neglect Reported to:: Not Applicable    Psychological Assessment  History of Substance Abuse: None  History of Psychiatric Problems: No  Non-compliant with Treatment: No  Newly Diagnosed Illness: Yes    Discharge Risks or Barriers  Discharge risks or barriers?: No    Anticipated Discharge Information  Discharge Disposition: Discharged to home/self care (01)  Discharge Address: [address] [place] [id number]  Discharge Contact Phone Number: [phone]

## 2024-02-14 NOTE — THERAPY
Occupational Therapy   Initial Evaluation     Patient Name: Jigna Allen  Age:  73 y.o., Sex:  female  Medical Record #: [id number]  Today's Date: 2/14/2024          Assessment  Patient is 73 y.o. female presented 2/10/24 w/ abdominal pain post procedure.  Admitted with a diagnosis of acute cholangitis, underwent ERCP/EUS.  PMH HTN, DM II, CAD and COPD.  Additional factors influencing patient status / progress: Supplemental O2 needs.  Pt and SO reside in a [facility] in [place], [place].  SO is limited in his physical ability to assist.  PLOF Indep for ADL's,, transfers and ambulation w/out a device.  Therapist reviewed environmental/safety precautions, fall precautions, ADL's and transfers.      Occupational Therapy Initial Treatment Plan   Duration: (P) Evaluation only       Discharge Recommendations: (P) Anticipate that the patient will have no further occupational therapy needs after discharge from the hospital.    Subjective    Pt was alert and cooperative w/ tx.     Objective       02/14/24 0800    Services   Is patient using  services for this encounter? No   Initial Contact Note    Initial Contact Note Order Received and Verified, Evaluation Only - Patient Does Not Require Further Acute Occupational Therapy at this Time.  However, May Benefit from Post Acute Therapy for Higher Level Functional Deficits.   Prior Living Situation   Prior Services Home-Independent   Housing / Facility 1 Story House   Steps Into Home 3   Steps In Home 0   Bathroom Set up Walk In Shower;Shower Chair;Grab Bars   Equipment Owned Grab Bar(s) By Toilet;Tub / Shower Seat   Lives with - Patient's Self Care Capacity Significant Other   Comments Pt and SO reside in a [facility] in [place], [place].  SO is limited in his physical ability to assist.  PLOF Indep for ADL's,, transfers and ambulation w/out a device.   Prior Level of ADL Function   Self Feeding Independent   Grooming / Hygiene Independent   Bathing Independent   Dressing  Independent   Toileting Independent   Prior Level of IADL Function   Medication Management Independent   Laundry Independent   Kitchen Mobility Independent   Finances Independent   Home Management Independent   Shopping Independent   Prior Level Of Mobility Independent Without Device in Community;Independent With Steps in Community;Independent Without Device in Home;Independent With Steps in Home   Driving / Transportation Driving Independent   Occupation (Pre-Hospital Vocational) Retired Due To Age   History of Falls   History of Falls No   Vitals   Pulse 87   Patient BP Position Sitting   Respiration 16   Pulse Oximetry 97 %   O2 (LPM) 2.5   O2 Delivery Device Silicone Nasal Cannula   Vitals Comments Above vitals in sitting prior to OOB activity.   3.0L w/ ambulation 50' O2 @ 93% + 101 BPM, ambulation 50' on RA O2 @ 84% + 104 BPM   Pain   Intervention Declines   Non Verbal Descriptors   Non Verbal Scale  Calm;Unlabored Breathing   Cognition    Cognition / Consciousness WDL   Level of Consciousness Alert   Passive ROM Upper Body   Passive ROM Upper Body WDL   Active ROM Upper Body   Active ROM Upper Body  WDL   Dominant Hand Right   Strength Upper Body   Upper Body Strength  WDL   Upper Body Muscle Tone   Upper Body Muscle Tone  WDL   Coordination Upper Body   Coordination WDL   Balance Assessment   Sitting Balance (Static) Good   Sitting Balance (Dynamic) Good   Standing Balance (Static) Good   Standing Balance (Dynamic) Fair +   Weight Shift Sitting Good   Weight Shift Standing Good   Bed Mobility    Supine to Sit Independent   Sit to Supine Independent   ADL Assessment   Upper Body Dressing Independent   Lower Body Dressing Independent   Toileting Independent   How much help from another person does the patient currently need...   Putting on and taking off regular lower body clothing? 4   Bathing (including washing, rinsing, and drying)? 4   Toileting, which includes using a toilet, bedpan, or urinal? 4    Putting on and taking off regular upper body clothing? 4   Taking care of personal grooming such as brushing teeth? 4   Eating meals? 4   6 Clicks Daily Activity Score 24   Functional Mobility   Sit to Stand Supervised   Bed, Chair, Wheelchair Transfer Supervised   Toilet Transfers Supervised   Transfer Method Stand Step   Mobility SBA w/out AE   Distance (Feet) 50   # of Times Distance was Traveled 2   Comments RN requested O2/walk assessment - see vitals above   Edema / Skin Assessment   Edema / Skin  Not Assessed   Education Group   Education Provided Transfers;Role of Occupational Therapist;Activities of Daily Living   Role of Occupational Therapist Patient Response Patient;Acceptance;Explanation;Verbal Demonstration   Transfers Patient Response Patient;Acceptance;Explanation;Demonstration;Teach Back;Verbal Demonstration;Action Demonstration   ADL Patient Response Patient;Acceptance;Explanation;Demonstration;Teach Back;Verbal Demonstration;Action Demonstration   Occupational Therapy Initial Treatment Plan    Duration Evaluation only   Problem List   Problem List Other (Comments)  (Supplmental O2 needs)   Anticipated Discharge Equipment and Recommendations   Discharge Recommendations Anticipate that the patient will have no further occupational therapy needs after discharge from the hospital

## 2024-02-14 NOTE — FACE TO FACE
"Face to Face Note  -  Durable Medical Equipment    Jaskaran Mccallum M.D. - NPI: [id number]  I certify that this patient is under my care and that they had a durable medical equipment(DME)face to face encounter by myself that meets the physician DME face-to-face encounter requirements with this patient on:    Date of encounter:   Patient:                    MRN:                       YOB: 2024  Jigna Allen  [id number]  1950     The encounter with the patient was in whole, or in part, for the following medical condition, which is the primary reason for durable medical equipment:  COPD    I certify that, based on my findings, the following durable medical equipment is medically necessary:    Oxygen   HOME O2 Saturation Measurements:(Values must be present for Home Oxygen orders)  Room air sat at rest: 85  Room air sat with amb: 83  With liters of O2: 1, O2 sat at rest with O2: 93  With Liters of O2: 3, O2 sat with amb with O2 : 94  Is the patient mobile?: Yes  If patient feels more short of breath, they can go up to 6 liters per minute and contact healthcare provider.    Supporting Symptoms: The patient requires supplemental oxygen, as the following interventions have been tried with limited or no improvement: \"Ambulation with oximetry.    My Clinical findings support the need for the above equipment due to:  Hypoxia  "

## 2024-02-14 NOTE — PROGRESS NOTES
Telemetry summary    Rhythm: SR  Rate: 82-97  Ectopy: r-oPVC, rCouplet, r-o-fPAC  Measurements: 0.12/0.08/0.32    Normal Values  Rhythm: SR  HR Range:   Measurement: 0.12-0.2/0.06-0.10/0.30-0.52

## 2024-02-14 NOTE — PROGRESS NOTES
Monitor summary:     Rhythm : SR  Rate : 78-96  Ectopy : F/O pvc, R couplet, R bigeminy and trigeminy    DE=.12  QRS=.08  QT=.32        Per telemetry strip summary from monitor room.

## 2024-02-14 NOTE — CARE PLAN
The patient is Stable - Low risk of patient condition declining or worsening    Shift Goals  Clinical Goals: Pain control, O2 monitoring  Patient Goals: rest and get back home  Family Goals: ALEXUS    Progress made toward(s) clinical / shift goals:        Problem: Knowledge Deficit - Standard  Goal: Patient and family/care givers will demonstrate understanding of plan of care, disease process/condition, diagnostic tests and medications  Description: Target End Date:  1-3 days or as soon as patient condition allows    Document in Patient Education    1.  Patient and family/caregiver oriented to unit, equipment, visitation policy and means for communicating concern  2.  Complete/review Learning Assessment  3.  Assess knowledge level of disease process/condition, treatment plan, diagnostic tests and medications  4.  Explain disease process/condition, treatment plan, diagnostic tests and medications  Outcome: Progressing     Problem: Fluid Volume  Goal: Fluid volume balance will be maintained  Description: Target End Date:  Prior to discharge or change in level of care    Document on I/O flowsheet    1.  Monitor intake and output as ordered  2.  Promote oral intake as appropriate  3.  Report inadequate intake or output to physician  4.  Administer IV therapy as ordered  5.  Weights per provider order  6.  Assess for signs and symptoms of bleeding  7.  Monitor for signs of fluid overload (respiratory changes, edema, weight gain, increased abdominal girth)  8.  Monitor of signs for inadequate fluid volume (poor skin turgor, dry mucous membranes)  9.  Instruct patient on adherence to fluid restrictions  Outcome: Progressing     Problem: Respiratory  Goal: Patient will achieve/maintain optimum respiratory ventilation and gas exchange  Description: Target End Date:  Prior to discharge or change in level of care    Document on Assessment flowsheet    1.  Assess and monitor rate, rhythm, depth and effort of respiration  2.   Breath sounds assessed qshift and/or as needed  3.  Assess O2 saturation, administer/titrate oxygen as ordered  4.  Position patient for maximum ventilatory efficiency  5.  Turn, cough, and deep breath with splinting to improve effectiveness  6.  Collaborate with RT to administer medication/treatments per order  7.  Encourage use of incentive spirometer and encourage patient to cough after use and utilize splinting techniques if applicable  8.  Airway suctioning  9.  Monitor sputum production for changes in color, consistency and frequency  10. Perform frequent oral hygiene  11. Alternate physical activity with rest periods  Outcome: Progressing

## 2024-02-14 NOTE — DISCHARGE PLANNING
1210  Received Choice Form at: 1159 am  Agency/Facility Name: [facility]  Sent Referral per Choice Form at: 1210 pm    DPA received faxed choice form(s). DPA placed choice in [facility] file.

## 2024-02-14 NOTE — PROGRESS NOTES
"Hospital Medicine Daily Progress Note    Date of Service  2/12/2024    Chief Complaint  Jigna Allen is a 73 y.o. female admitted 2/10/2024 with abdominal pain.    Hospital Course  As per chart review:  \"Jigna Allen is a 73 y.o. female, with h/o HTN, non-insulin dependent Type II DM, CAD, COPD who underwent ERCP/ EUS yesterday (Dr. Mars) scheduled after hospitalization late November for common bile duct dilation and ongoing abdominal pain.  She also has moderate stenosis of SMA and following outpatient with vascular surgery.  Patient states that after she went home after procedure, developed worsening abdominal pain reason why she presented to the ED on 2/10/2024 for further evaluation.     CT scan evaluation showing concerning's for acute cholangitis and/or mild pancreatitis with reactive duodenitis and had significantly elevated blood pressure of 217/100 in the ED requiring IV medication for blood pressure control.\"    Interval Problem Update  2/12  Evaluated in room patient room, afebrile, pulse in the 70s to 90s respiratory rate 16 to 20 systolic blood pressure on 113-180 pulse ox 93 to 98% on 2 to 4 L nasal cannula.  Leukocytosis improving, anemia likely dilutional, normal platelet count, no evidence of bleeding, hyperglycemic normal electrolytes and renal function.  Due to abnormal LFTs and T. bili GI signed off.  Recommending continue IV antibiotics for suspected ascending cholangitis versus pancreatitis post ERCP.  Tolerating p.o. intake however requested to go to liquid diet today because regular diet caused her stomach discomfort.  Pain controlled.  Continue IV antibiotics.  Suspect discharge in 1 to 2 days pending clinical course, p.o. intake, pain control.    PATIENT SEEN BY PREVIOUS HOSPITALIST UNTIL 2/12 2/13: Patient seen at bedside this morning.  The patient states that she somewhat feels better than yesterday, still having abdominal pain and nausea.  We appreciate further " recommendations by GI.  For now we will continue with IV fluids and antibiotics.  We have ordered chest x-ray and COVID testing due to the patient being hypoxic.    2/14: Patient seen at bedside this morning.  Patient still requiring oxygen.  We ordered CTA of the chest to rule out pulmonary embolism.  They found patient with emphysema, she already has COPD.  We have ordered oxygen as she will most likely require oxygen upon discharge.  Will continue antibiotics for now.  Continue to monitor closely.      I have discussed this patient's plan of care and discharge plan at IDT rounds today with Case Management, Nursing, Nursing leadership, and other members of the IDT team.    Consultants/Specialty  GI    Code Status  Full Code    Disposition  The patient is not medically cleared for discharge to home or a post-acute facility.      I have placed the appropriate orders for post-discharge needs.    Review of Systems  Review of Systems   Constitutional:  Positive for malaise/fatigue. Negative for chills and fever.   HENT:  Negative for hearing loss and nosebleeds.    Eyes:  Negative for blurred vision and double vision.   Respiratory:  Negative for cough and shortness of breath.    Cardiovascular:  Negative for chest pain and palpitations.   Gastrointestinal:  Positive for abdominal pain and nausea. Negative for heartburn.   Genitourinary:  Negative for dysuria and urgency.   Musculoskeletal:  Negative for back pain and falls.   Skin:  Negative for itching and rash.   Neurological:  Negative for dizziness and headaches.   Psychiatric/Behavioral:  Negative for substance abuse. The patient is not nervous/anxious.    All other systems reviewed and are negative.     Review of Systems   Constitutional:  Positive for malaise/fatigue. Negative for fever.   HENT:  Negative for congestion and sore throat.    Eyes:  Negative for blurred vision and double vision.   Respiratory:  Negative for cough and shortness of breath.     Cardiovascular:  Negative for chest pain and palpitations.   Gastrointestinal:  Positive for abdominal pain. Negative for nausea and vomiting.   Genitourinary:  Negative for dysuria and urgency.   Musculoskeletal:  Negative for myalgias and neck pain.   Skin:  Negative for itching and rash.   Neurological:  Negative for dizziness, weakness and headaches.   Endo/Heme/Allergies:  Does not bruise/bleed easily.   Psychiatric/Behavioral:  Negative for depression. The patient does not have insomnia.      Physical Exam  Temp:  [36.2 °C (97.1 °F)-37.1 °C (98.8 °F)] 36.2 °C (97.1 °F)  Pulse:  [77-92] 80  Resp:  [16-19] 16  BP: (112-185)/(45-83) 122/58  SpO2:  [93 %-97 %] 95 %    Physical Exam  Vitals and nursing note reviewed.   Constitutional:       General: She is not in acute distress.     Appearance: She is obese. She is ill-appearing. She is not toxic-appearing.   HENT:      Head: Normocephalic and atraumatic.      Right Ear: External ear normal.      Left Ear: External ear normal.      Nose: Nose normal. No rhinorrhea.      Mouth/Throat:      Mouth: Mucous membranes are moist.      Pharynx: Oropharynx is clear. No oropharyngeal exudate or posterior oropharyngeal erythema.   Eyes:      General: No scleral icterus.        Right eye: No discharge.         Left eye: No discharge.      Extraocular Movements: Extraocular movements intact.      Conjunctiva/sclera: Conjunctivae normal.   Cardiovascular:      Rate and Rhythm: Normal rate and regular rhythm.      Pulses: Normal pulses.      Heart sounds: No murmur heard.     No gallop.   Pulmonary:      Effort: Pulmonary effort is normal. No respiratory distress.      Comments: On supplemental oxygen  Abdominal:      General: Bowel sounds are normal. There is no distension.      Palpations: Abdomen is soft.      Tenderness: There is abdominal tenderness. There is no guarding or rebound.   Musculoskeletal:         General: No swelling or tenderness. Normal range of motion.       Cervical back: Normal range of motion and neck supple. No rigidity or tenderness.   Skin:     General: Skin is warm and dry.      Capillary Refill: Capillary refill takes less than 2 seconds.   Neurological:      General: No focal deficit present.      Mental Status: She is alert and oriented to person, place, and time. Mental status is at baseline.      Cranial Nerves: No cranial nerve deficit.      Sensory: No sensory deficit.      Motor: No weakness.      Coordination: Coordination normal.   Psychiatric:         Mood and Affect: Mood normal.         Behavior: Behavior normal.         Thought Content: Thought content normal.         Judgment: Judgment normal.         Fluids    Intake/Output Summary (Last 24 hours) at 2/14/2024 1315  Last data filed at 2/14/2024 0828  Gross per 24 hour   Intake 240 ml   Output --   Net 240 ml       Laboratory  Recent Labs     02/12/24 0101 02/13/24 0347 02/14/24  0036   WBC 16.4* 12.8* 12.7*   RBC 4.14* 4.38 4.01*   HEMOGLOBIN 11.5* 12.0 11.0*   HEMATOCRIT 35.9* 38.1 34.6*   MCV 86.7 87.0 86.3   MCH 27.8 27.4 27.4   MCHC 32.0* 31.5* 31.8*   RDW 45.9 43.1 42.5   PLATELETCT 309 329 330   MPV 9.8 10.0 10.4     Recent Labs     02/12/24  0101 02/13/24 0347 02/14/24 0036   SODIUM 138 136 136   POTASSIUM 3.6 3.5* 4.1   CHLORIDE 100 98 97   CO2 28 30 28   GLUCOSE 196* 161* 207*   BUN 12 9 14   CREATININE 0.54 0.49* 0.44*   CALCIUM 9.0 9.4 9.1                   Imaging  CT-CTA CHEST PULMONARY ARTERY W/ RECONS   Final Result         1.  No pulmonary embolus appreciated.   2.  Small layering bilateral pleural effusions   3.  Linear densities the bilateral lung bases favor atelectasis   4.  Emphysema   5.  Small hiatal hernia   6.  Indeterminate left adrenal nodule, follow-up adrenal protocol CT for further characterization as clinically appropriate.   7.  Atherosclerosis and atherosclerotic coronary artery disease.   8.  Pulmonary nodule, see nodule follow-up recommendations below.       Fleischner Society pulmonary nodule recommendations:   Low Risk: CT at 6-12 months, then consider CT at 18-24 months      High Risk: CT at 6-12 months, then CT at 18-24 months      Low Risk - Minimal or absent history of smoking and of other known risk factors.      High Risk - History of smoking or of other known risk factors.      Note: These recommendations do not apply to lung cancer screening, patients with immunosuppression, or patients with known primary cancer.      Fleischner Society 2017 Guidelines for Management of Incidentally Detected Pulmonary Nodules in Adults         DX-CHEST-LIMITED (1 VIEW)   Final Result      1.  Atelectasis/consolidation involving the left lung base.      2.  Small left pleural effusion.      3.  Mild cardiomegaly.      CT-ABDOMEN-PELVIS WITH   Final Result      1.  Features concerning for acute cholangitis and/or mild pancreatitis.   2.  Possible reactive duodenitis.   3.  Small amount of biliary gas presumably related to recent procedure.   4.  Mildly dilated common bile duct likely related to prior cholecystectomy.   5.  Distal colonic diverticulosis.   6.  Hepatomegaly.           Assessment/Plan  * Acute cholangitis- (present on admission)  Assessment & Plan  -Inpatient status on medical floor.  -Patient underwent ERCP/EUS today by Dr. Mars.  She has common bile duct dilation and chronic abdominal pain since November.  -She reports significantly worse abdominal pain after procedure.  -Will keep n.p.o.  -She has elevated WBC at 24.1 and tachycardic therefore meeting SIRS criteria but no organ dysfunction.  She is not septic on admission per sepsis 3 criteria.  -CT of the abdomen and pelvis done in the emergency department is showing concern for acute cholangitis and or mild pancreatitis with possible reactive duodenitis.  Her lipase is within normal limits.  -I appreciate GI consult recommendations.  ERP discussed this case with Dr. Stratton.  -Pain control as  needed.  -Patient was started on Zosyn in the emergency department which I will continue.    2/13: Concern for acute cholangitis vs pancreatitis. For now we will continue antibiotics and IVF and pain management. We appreciate further recommendations by GI.    2/14: Continue antibiotics.    Pulmonary nodule  Assessment & Plan  Seen on CT scan  We have placed referral to pulmonary nodule clinic.    Hypoxia  Assessment & Plan  She does not seem to be in respiratory distress.  We will order chest x-ray.  Will also order COVID testing.    2/14: Patient with significant elevated D-dimer, we have ordered CTA of the chest.  It was negative for pulmonary embolism.  It did report emphysema. We have ordered oxygen as I suspect patient will require oxygen upon discharge.    Hypokalemia  Assessment & Plan  Replace as needed  monitor    Hypomagnesemia  Assessment & Plan  Replace as needed  monitor    Advance care planning  Assessment & Plan  2/13: I discussed for at least 15 minutes further goals of care which included code status. This included DNR/DNI vs Full code. The patient would like to be full code. We also discussed further goals of care which included discussion of invasive versus noninvasive procedures.  The patient at this time would like to continue full treatment options.  She would like to continue with invasive and noninvasive procedures as needed.      SIRS (systemic inflammatory response syndrome) (HCC)  Assessment & Plan  2/13: Patient not meeting SIRS criteria today. We will continue with antibiotics for now.    Hypertensive urgency  Assessment & Plan  -Initial blood pressure was 217/100.  Patient was seen severe pain on arrival.    2/13: Patient still with significant elevated BP. Resume losartan and HTZD.    2/14: Continue losartan and HTZD          Type 2 diabetes mellitus with complication, without long-term current use of insulin (HCC)- (present on admission)  Assessment & Plan  Last known A1c is  7.7    Continue ISS, monitor and make changes accordingly.    COPD (chronic obstructive pulmonary disease) (HCC)- (present on admission)  Assessment & Plan  -Without exacerbation.  Continue Trelegy Ellipta.    CAD (coronary artery disease)- (present on admission)  Assessment & Plan  -History of MI and stent.  She denies having chest pain.         VTE prophylaxis: Lovenox    I have performed a physical exam and reviewed and updated ROS and Plan today (2/12/2024). In review of yesterday's note (2/11/2024), there are no changes except as documented above.    I spend at least 52 minutes providing care for this patient.  This includes face-to-face interview, physical examination.  Review of lab work including CBC, CMP, magnesium. Discussing with multidisciplinary team including case management, nursing staff and pharmacy.  Creating plan of care, reviewing orders.

## 2024-02-14 NOTE — CARE PLAN
The patient is Stable - Low risk of patient condition declining or worsening    Shift Goals  Clinical Goals: monitor BP, pain management  Patient Goals: rest and comfort  Family Goals: ALEXUS    Progress made toward(s) clinical / shift goals:    Problem: Knowledge Deficit - Standard  Goal: Patient and family/care givers will demonstrate understanding of plan of care, disease process/condition, diagnostic tests and medication  Outcome: Progressing  Note: Pt updated on POC, agreeable. All questions and concerns addressed at this time.      Problem: Respiratory  Goal: Patient will achieve/maintain optimum respiratory ventilation and gas exchange  Outcome: Progressing  Note: Pt is currently on 3 L NC, satting in the 90s.      Problem: Pain - Standard  Goal: Alleviation of pain or a reduction in pain to the patient’s comfort goal  Outcome: Progressing  Note: Pt reports 8/10 pain. Medicated per MAR. Pt educated on use of call light for any pain management needs.        Patient is not progressing towards the following goals:

## 2024-02-14 NOTE — DISCHARGE PLANNING
Case Management Discharge Planning    Admission Date: 2/10/2024  GMLOS: 3.1  ALOS: 3    6-Clicks ADL Score: 24  6-Clicks Mobility Score: 18      Anticipated Discharge Dispo: Discharge Disposition: Discharged to home/self care (01)    DME Needed: Yes    DME Ordered: Yes    Action(s) Taken: RNCM attended IDT rounds and reviewed chart.  Per bedside RN, pt would like her home O2 from [employer]. Choice form completed and faxed to [facility].    1432: Per [employer] rep, home O2 is on route.    Escalations Completed: None    Medically Clear: No    Next Steps: f/u with [employer] regarding O2 delivery ETA    Barriers to Discharge: Medical clearance and Oxygen Delivery

## 2024-02-15 ENCOUNTER — APPOINTMENT (OUTPATIENT)
Dept: RADIOLOGY | Facility: MEDICAL CENTER | Age: 74
DRG: 444 | End: 2024-02-15
Attending: INTERNAL MEDICINE
Payer: MEDICARE

## 2024-02-15 ENCOUNTER — TELEPHONE (OUTPATIENT)
Dept: SURGICAL ONCOLOGY | Facility: MEDICAL CENTER | Age: 74
End: 2024-02-15
Payer: MEDICARE

## 2024-02-15 PROBLEM — D64.9 ANEMIA: Status: ACTIVE | Noted: 2024-02-15

## 2024-02-15 LAB
ALBUMIN SERPL BCP-MCNC: 3.4 G/DL (ref 3.2–4.9)
ALBUMIN/GLOB SERPL: 1.1 G/DL
ALP SERPL-CCNC: 92 U/L (ref 30–99)
ALT SERPL-CCNC: 19 U/L (ref 2–50)
ANION GAP SERPL CALC-SCNC: 10 MMOL/L (ref 7–16)
AST SERPL-CCNC: 16 U/L (ref 12–45)
BILIRUB SERPL-MCNC: <0.2 MG/DL (ref 0.1–1.5)
BUN SERPL-MCNC: 18 MG/DL (ref 8–22)
C DIFF DNA SPEC QL NAA+PROBE: NEGATIVE
C DIFF TOX GENS STL QL NAA+PROBE: NEGATIVE
CALCIUM ALBUM COR SERPL-MCNC: 9.5 MG/DL (ref 8.5–10.5)
CALCIUM SERPL-MCNC: 9 MG/DL (ref 8.4–10.2)
CHLORIDE SERPL-SCNC: 99 MMOL/L (ref 96–112)
CO2 SERPL-SCNC: 27 MMOL/L (ref 20–33)
CREAT SERPL-MCNC: 0.53 MG/DL (ref 0.5–1.4)
EKG IMPRESSION: NORMAL
ERYTHROCYTE [DISTWIDTH] IN BLOOD BY AUTOMATED COUNT: 42.6 FL (ref 35.9–50)
GFR SERPLBLD CREATININE-BSD FMLA CKD-EPI: 97 ML/MIN/1.73 M 2
GLOBULIN SER CALC-MCNC: 3 G/DL (ref 1.9–3.5)
GLUCOSE BLD STRIP.AUTO-MCNC: 207 MG/DL (ref 65–99)
GLUCOSE BLD STRIP.AUTO-MCNC: 215 MG/DL (ref 65–99)
GLUCOSE BLD STRIP.AUTO-MCNC: 221 MG/DL (ref 65–99)
GLUCOSE BLD STRIP.AUTO-MCNC: 254 MG/DL (ref 65–99)
GLUCOSE SERPL-MCNC: 272 MG/DL (ref 65–99)
HCT VFR BLD AUTO: 35.9 % (ref 37–47)
HGB BLD-MCNC: 11.8 G/DL (ref 12–16)
MAGNESIUM SERPL-MCNC: 1.9 MG/DL (ref 1.5–2.5)
MCH RBC QN AUTO: 28.2 PG (ref 27–33)
MCHC RBC AUTO-ENTMCNC: 32.9 G/DL (ref 32.2–35.5)
MCV RBC AUTO: 85.7 FL (ref 81.4–97.8)
PLATELET # BLD AUTO: 361 K/UL (ref 164–446)
PMV BLD AUTO: 10.3 FL (ref 9–12.9)
POTASSIUM SERPL-SCNC: 3.6 MMOL/L (ref 3.6–5.5)
PROT SERPL-MCNC: 6.4 G/DL (ref 6–8.2)
RBC # BLD AUTO: 4.19 M/UL (ref 4.2–5.4)
SODIUM SERPL-SCNC: 136 MMOL/L (ref 135–145)
WBC # BLD AUTO: 9.7 K/UL (ref 4.8–10.8)

## 2024-02-15 PROCEDURE — 97162 PT EVAL MOD COMPLEX 30 MIN: CPT

## 2024-02-15 PROCEDURE — 99233 SBSQ HOSP IP/OBS HIGH 50: CPT | Performed by: INTERNAL MEDICINE

## 2024-02-15 PROCEDURE — 85027 COMPLETE CBC AUTOMATED: CPT

## 2024-02-15 PROCEDURE — 80053 COMPREHEN METABOLIC PANEL: CPT

## 2024-02-15 PROCEDURE — 93005 ELECTROCARDIOGRAM TRACING: CPT | Performed by: INTERNAL MEDICINE

## 2024-02-15 PROCEDURE — 770020 HCHG ROOM/CARE - TELE (206)

## 2024-02-15 PROCEDURE — 93010 ELECTROCARDIOGRAM REPORT: CPT | Performed by: INTERNAL MEDICINE

## 2024-02-15 PROCEDURE — 87493 C DIFF AMPLIFIED PROBE: CPT

## 2024-02-15 PROCEDURE — 700111 HCHG RX REV CODE 636 W/ 250 OVERRIDE (IP): Mod: JZ | Performed by: STUDENT IN AN ORGANIZED HEALTH CARE EDUCATION/TRAINING PROGRAM

## 2024-02-15 PROCEDURE — 700102 HCHG RX REV CODE 250 W/ 637 OVERRIDE(OP): Performed by: INTERNAL MEDICINE

## 2024-02-15 PROCEDURE — 700111 HCHG RX REV CODE 636 W/ 250 OVERRIDE (IP): Performed by: INTERNAL MEDICINE

## 2024-02-15 PROCEDURE — 83735 ASSAY OF MAGNESIUM: CPT

## 2024-02-15 PROCEDURE — 94760 N-INVAS EAR/PLS OXIMETRY 1: CPT

## 2024-02-15 PROCEDURE — A9270 NON-COVERED ITEM OR SERVICE: HCPCS | Performed by: INTERNAL MEDICINE

## 2024-02-15 PROCEDURE — 36415 COLL VENOUS BLD VENIPUNCTURE: CPT

## 2024-02-15 PROCEDURE — 82962 GLUCOSE BLOOD TEST: CPT | Mod: 91

## 2024-02-15 PROCEDURE — 700111 HCHG RX REV CODE 636 W/ 250 OVERRIDE (IP): Mod: JZ | Performed by: HOSPITALIST

## 2024-02-15 PROCEDURE — 71045 X-RAY EXAM CHEST 1 VIEW: CPT

## 2024-02-15 PROCEDURE — 94640 AIRWAY INHALATION TREATMENT: CPT

## 2024-02-15 RX ORDER — PROCHLORPERAZINE EDISYLATE 5 MG/ML
5 INJECTION INTRAMUSCULAR; INTRAVENOUS EVERY 6 HOURS PRN
Status: DISCONTINUED | OUTPATIENT
Start: 2024-02-15 | End: 2024-02-16 | Stop reason: HOSPADM

## 2024-02-15 RX ORDER — HYDRALAZINE HYDROCHLORIDE 20 MG/ML
10 INJECTION INTRAMUSCULAR; INTRAVENOUS EVERY 6 HOURS PRN
Status: DISCONTINUED | OUTPATIENT
Start: 2024-02-15 | End: 2024-02-15

## 2024-02-15 RX ORDER — HYDRALAZINE HYDROCHLORIDE 20 MG/ML
20 INJECTION INTRAMUSCULAR; INTRAVENOUS EVERY 6 HOURS PRN
Status: DISCONTINUED | OUTPATIENT
Start: 2024-02-15 | End: 2024-02-15

## 2024-02-15 RX ORDER — HYDRALAZINE HYDROCHLORIDE 20 MG/ML
20 INJECTION INTRAMUSCULAR; INTRAVENOUS EVERY 6 HOURS PRN
Status: DISCONTINUED | OUTPATIENT
Start: 2024-02-15 | End: 2024-02-16 | Stop reason: HOSPADM

## 2024-02-15 RX ORDER — MAGNESIUM SULFATE HEPTAHYDRATE 40 MG/ML
2 INJECTION, SOLUTION INTRAVENOUS ONCE
Status: COMPLETED | OUTPATIENT
Start: 2024-02-15 | End: 2024-02-15

## 2024-02-15 RX ORDER — AMLODIPINE BESYLATE 5 MG/1
5 TABLET ORAL
Status: DISCONTINUED | OUTPATIENT
Start: 2024-02-15 | End: 2024-02-16 | Stop reason: HOSPADM

## 2024-02-15 RX ADMIN — FLUTICASONE FUROATE, UMECLIDINIUM BROMIDE AND VILANTEROL TRIFENATATE 1 PUFF: 100; 62.5; 25 POWDER RESPIRATORY (INHALATION) at 05:53

## 2024-02-15 RX ADMIN — AMOXICILLIN AND CLAVULANATE POTASSIUM 1 TABLET: 875; 125 TABLET, FILM COATED ORAL at 16:44

## 2024-02-15 RX ADMIN — HYDRALAZINE HYDROCHLORIDE 10 MG: 20 INJECTION, SOLUTION INTRAMUSCULAR; INTRAVENOUS at 09:10

## 2024-02-15 RX ADMIN — MAGNESIUM SULFATE HEPTAHYDRATE 2 G: 2 INJECTION, SOLUTION INTRAVENOUS at 05:50

## 2024-02-15 RX ADMIN — ENOXAPARIN SODIUM 40 MG: 100 INJECTION SUBCUTANEOUS at 16:43

## 2024-02-15 RX ADMIN — OMEPRAZOLE 20 MG: 20 CAPSULE, DELAYED RELEASE ORAL at 05:51

## 2024-02-15 RX ADMIN — OXYCODONE HYDROCHLORIDE 5 MG: 5 TABLET ORAL at 12:07

## 2024-02-15 RX ADMIN — AMLODIPINE BESYLATE 5 MG: 5 TABLET ORAL at 09:11

## 2024-02-15 RX ADMIN — AMOXICILLIN AND CLAVULANATE POTASSIUM 1 TABLET: 875; 125 TABLET, FILM COATED ORAL at 05:51

## 2024-02-15 RX ADMIN — ONDANSETRON 4 MG: 2 INJECTION INTRAMUSCULAR; INTRAVENOUS at 08:42

## 2024-02-15 RX ADMIN — LABETALOL HYDROCHLORIDE 10 MG: 5 INJECTION, SOLUTION INTRAVENOUS at 07:37

## 2024-02-15 RX ADMIN — LOSARTAN POTASSIUM 100 MG: 50 TABLET, FILM COATED ORAL at 05:51

## 2024-02-15 RX ADMIN — PROCHLORPERAZINE EDISYLATE 5 MG: 5 INJECTION INTRAMUSCULAR; INTRAVENOUS at 14:49

## 2024-02-15 RX ADMIN — LABETALOL HYDROCHLORIDE 10 MG: 5 INJECTION, SOLUTION INTRAVENOUS at 12:04

## 2024-02-15 RX ADMIN — HYDROCHLOROTHIAZIDE 12.5 MG: 12.5 TABLET ORAL at 05:51

## 2024-02-15 ASSESSMENT — COGNITIVE AND FUNCTIONAL STATUS - GENERAL
SUGGESTED CMS G CODE MODIFIER MOBILITY: CH
MOBILITY SCORE: 24

## 2024-02-15 ASSESSMENT — ENCOUNTER SYMPTOMS
FEVER: 0
HEADACHES: 0
COUGH: 0
DIARRHEA: 1
ABDOMINAL PAIN: 1
NERVOUS/ANXIOUS: 0
BACK PAIN: 0
BLURRED VISION: 0
CHILLS: 0
FALLS: 0
PALPITATIONS: 0
NAUSEA: 1
DOUBLE VISION: 0
HEARTBURN: 0
DIZZINESS: 0
SHORTNESS OF BREATH: 0

## 2024-02-15 ASSESSMENT — PAIN DESCRIPTION - PAIN TYPE
TYPE: ACUTE PAIN

## 2024-02-15 ASSESSMENT — GAIT ASSESSMENTS
DISTANCE (FEET): 25
GAIT LEVEL OF ASSIST: SUPERVISED
DEVIATION: STEP TO

## 2024-02-15 ASSESSMENT — LIFESTYLE VARIABLES: SUBSTANCE_ABUSE: 0

## 2024-02-15 NOTE — PROGRESS NOTES
Telemetry summary    Rhythm: SR  Rate:   Ectopy: r-oPVC, rPAC  Measurements: 0.14/0.08/0.36    Normal Values  Rhythm: SR  HR Range:   Measurement: 0.12-0.2/0.06-0.10/0.30-0.52

## 2024-02-15 NOTE — TELEPHONE ENCOUNTER
I spoke with daughter after reviewed path with Dr. Jones and patient scheduled for an apptmt next Wed.

## 2024-02-15 NOTE — THERAPY
Physical Therapy   Initial Evaluation     Patient Name: Jigna Allen  Age:  73 y.o., Sex:  female  Medical Record #: [id number]  Today's Date: 2/15/2024          Assessment  Patient is 73 y.o. female with a diagnosis of Acute cholangitis.Pt lives at home with  and is active.Pt is safe with bed mob,transfers and ambulation.She has no equipment needs     Plan    DC Equipment Recommendations: (P) None  Discharge Recommendations: (P) Anticipate that the patient will have no further physical therapy needs after discharge from the hospital        Objective       02/15/24 1400   Charge Group   PT Evaluation PT Evaluation Mod   Total Time Spent   PT Evaluation Time Spent (Mins) 30   Initial Contact Note    Initial Contact Note Order Received and Verified, Physical Therapy Evaluation in Progress with Full Report to Follow.   Prior Living Situation   Prior Services Home-Independent   Housing / Facility 1 Story House   Steps Into Home 3   Steps In Home 0   Equipment Owned Front-Wheel Walker;Single Point Cane   Lives with - Patient's Self Care Capacity Spouse   Prior Level of Functional Mobility   Bed Mobility Independent   Transfer Status Independent   Ambulation Independent   Ambulation Distance community   Assistive Devices Used None   History of Falls   History of Falls No   Passive ROM Lower Body   Passive ROM Lower Body WDL   Active ROM Lower Body    Active ROM Lower Body  WDL   Strength Lower Body   Lower Body Strength  WDL   Coordination Lower Body    Coordination Lower Body  WDL   Balance Assessment   Sitting Balance (Static) Good   Sitting Balance (Dynamic) Good   Standing Balance (Static) Good   Standing Balance (Dynamic) Fair +   Weight Shift Sitting Good   Weight Shift Standing Good   Bed Mobility    Supine to Sit Independent   Sit to Supine Independent   Scooting Modified Independent   Gait Analysis   Gait Level Of Assist Supervised   Assistive Device None   Distance (Feet) 25   # of Times Distance was  Traveled 2   Deviation Step To   Weight Bearing Status full   Functional Mobility   Sit to Stand Supervised   Bed, Chair, Wheelchair Transfer Supervised   Toilet Transfers Supervised   How much difficulty does the patient currently have...   Turning over in bed (including adjusting bedclothes, sheets and blankets)? 4   Sitting down on and standing up from a chair with arms (e.g., wheelchair, bedside commode, etc.) 4   Moving from lying on back to sitting on the side of the bed? 4   How much help from another person does the patient currently need...   Moving to and from a bed to a chair (including a wheelchair)? 4   Need to walk in a hospital room? 4   Climbing 3-5 steps with a railing? 4   6 clicks Mobility Score 24   Activity Tolerance   Sitting Edge of Bed 10   Standing 5   Patient / Family Goals    Patient / Family Goal #1 Home   Anticipated Discharge Equipment and Recommendations   DC Equipment Recommendations None   Discharge Recommendations Anticipate that the patient will have no further physical therapy needs after discharge from the hospital   Interdisciplinary Plan of Care Collaboration   IDT Collaboration with  Nursing   Session Information   Date / Session Number  2/15   Priority 0

## 2024-02-15 NOTE — ASSESSMENT & PLAN NOTE
Patient developed diarrhea.  This could be due to antibiotics.  We will rule out C. difficile first.  Continue to monitor closely.

## 2024-02-15 NOTE — PROGRESS NOTES
This RN reached out to Pharmacist Harvey TALBERT Regarding patient complaint of diarrhea induced by oral antibiotic therapy. Patient had stated this was a historic problem and has begun this visit since she had her first dose of oral antibiotic. Pharmacist recommended fiber intake to alleviate symptoms of this. This RN informed Dr. Rodriguez of discussion with pharmacy. Dr. Rodriguez approved dietary increase of fiber. Dietary order updated.

## 2024-02-15 NOTE — PROGRESS NOTES
Monitor summary:     Rhythm : SR  Rate : 79-93  Ectopy : R/O pvc and pac, R trig    NC=.14  QRS=.08  QT=.38        Per telemetry strip summary from monitor room.

## 2024-02-15 NOTE — CARE PLAN
The patient is Stable - Low risk of patient condition declining or worsening    Shift Goals  Clinical Goals: monitor O2 sats, respiratory status  Patient Goals: rest and comfort  Family Goals: ALEXUS    Progress made toward(s) clinical / shift goals:    Problem: Knowledge Deficit - Standard  Goal: Patient and family/care givers will demonstrate understanding of plan of care, disease process/condition, diagnostic tests and medications  Outcome: Progressing     Problem: Respiratory  Goal: Patient will achieve/maintain optimum respiratory ventilation and gas exchange  Outcome: Progressing  Note: Pt currently on 1 L NC.      Problem: Fall Risk  Goal: Patient will remain free from falls  Outcome: Progressing     Problem: Pain - Standard  Goal: Alleviation of pain or a reduction in pain to the patient’s comfort goal  Outcome: Progressing  Note: Pt denies pain, reports 0/10.

## 2024-02-15 NOTE — PROGRESS NOTES
"Hospital Medicine Daily Progress Note    Date of Service  2/12/2024    Chief Complaint  Jigna Allen is a 73 y.o. female admitted 2/10/2024 with abdominal pain.    Hospital Course  As per chart review:  \"As per chart review:  \"73 y.o. female, with h/o HTN, non-insulin dependent Type II DM, CAD, COPD who underwent ERCP/ EUS on 2/10/24 (Dr. Mars) scheduled after hospitalization late November for common bile duct dilation and ongoing abdominal pain.  She also has moderate stenosis of SMA and following outpatient with vascular surgery.  Patient states that after she went home after procedure, developed worsening abdominal pain reason why she presented to the ED on 2/10/2024 for further evaluation.     CT scan evaluation showing concerning's for acute cholangitis and/or mild pancreatitis with reactive duodenitis and had significantly elevated blood pressure of 217/100 in the ED requiring IV medication for blood pressure control.\"    The patient was admitted for further management and care.  GI was consulted.  The patient was to continue with antibiotics, GI evaluated patient and they believe the patient's abdominal pain was most likely due to ERCP induced pancreatitis, however they do recommend 7-day course of antibiotics which we have transition to Augmentin.  The patient's pain has improved significantly while hospitalized and now tolerating p.o.    Of note while hospitalized the patient requiring oxygen, the patient did not seem to be in respiratory distress.  The patient does have a history of COPD.  We did not suspect this was COPD exacerbation as the patient was not having worsening shortness of breath, increased cough or sputum production.    We ordered CTA of the chest to rule out pulmonary embolism.  Did not report pulmonary embolism, it reported atelectasis and emphysema.  Patient had home O2 evaluation done and the patient will require oxygen upon discharge.  We have placed referral to pulmonary as the " Is This A New Presentation, Or A Follow-Up?: Acne "patient will require close follow-up with pulmonary as an outpatient.  The patient was also found to have a pulmonary nodule so we have placed referral to pulmonary nodule clinic as an outpatient as well.    The patient seen at bedside this morning.  The patient feels much better and would like to go home.  The patient will be discharged home.  The patient will require close follow-up with GI and pulmonary as an outpatient as well as PCP.    \"    Interval Problem Update  2/12  Evaluated in room patient room, afebrile, pulse in the 70s to 90s respiratory rate 16 to 20 systolic blood pressure on 113-180 pulse ox 93 to 98% on 2 to 4 L nasal cannula.  Leukocytosis improving, anemia likely dilutional, normal platelet count, no evidence of bleeding, hyperglycemic normal electrolytes and renal function.  Due to abnormal LFTs and T. bili GI signed off.  Recommending continue IV antibiotics for suspected ascending cholangitis versus pancreatitis post ERCP.  Tolerating p.o. intake however requested to go to liquid diet today because regular diet caused her stomach discomfort.  Pain controlled.  Continue IV antibiotics.  Suspect discharge in 1 to 2 days pending clinical course, p.o. intake, pain control.    PATIENT SEEN BY PREVIOUS HOSPITALIST UNTIL 2/12 2/13: Patient seen at bedside this morning.  The patient states that she somewhat feels better than yesterday, still having abdominal pain and nausea.  We appreciate further recommendations by GI.  For now we will continue with IV fluids and antibiotics.  We have ordered chest x-ray and COVID testing due to the patient being hypoxic.    2/14: Patient seen at bedside this morning.  Patient still requiring oxygen.  We ordered CTA of the chest to rule out pulmonary embolism.  They found patient with emphysema, she already has COPD.  We have ordered oxygen as she will most likely require oxygen upon discharge.  Will continue antibiotics for now.  Continue to monitor " closely.    2/15: Patient states that her abdominal pain is basically nonexistent at this time.  The patient still with significant elevated blood pressure.  We have added amlodipine and also added as needed hydralazine.  Continue with antibiotics.  Patient also developed diarrhea, we are pending C. difficile rule out.      I have discussed this patient's plan of care and discharge plan at IDT rounds today with Case Management, Nursing, Nursing leadership, and other members of the IDT team.    Consultants/Specialty  GI    Code Status  Full Code    Disposition  The patient is not medically cleared for discharge to home or a post-acute facility.      I have placed the appropriate orders for post-discharge needs.    Review of Systems  Review of Systems   Constitutional:  Positive for malaise/fatigue. Negative for chills and fever.   HENT:  Negative for hearing loss and nosebleeds.    Eyes:  Negative for blurred vision and double vision.   Respiratory:  Negative for cough and shortness of breath.    Cardiovascular:  Negative for chest pain and palpitations.   Gastrointestinal:  Positive for abdominal pain, diarrhea and nausea. Negative for heartburn.   Genitourinary:  Negative for dysuria and urgency.   Musculoskeletal:  Negative for back pain and falls.   Skin:  Negative for itching and rash.   Neurological:  Negative for dizziness and headaches.   Psychiatric/Behavioral:  Negative for substance abuse. The patient is not nervous/anxious.    All other systems reviewed and are negative.       Physical Exam  Temp:  [36.2 °C (97.1 °F)-37.1 °C (98.8 °F)] 36.4 °C (97.6 °F)  Pulse:  [] 95  Resp:  [16-18] 16  BP: (122-210)/() 191/76  SpO2:  [90 %-96 %] 93 %    Physical Exam  Vitals and nursing note reviewed.   Constitutional:       General: She is not in acute distress.     Appearance: She is obese. She is ill-appearing. She is not toxic-appearing.   HENT:      Head: Normocephalic and atraumatic.      Right Ear:  External ear normal.      Left Ear: External ear normal.      Nose: Nose normal. No rhinorrhea.      Mouth/Throat:      Mouth: Mucous membranes are moist.      Pharynx: Oropharynx is clear. No oropharyngeal exudate or posterior oropharyngeal erythema.   Eyes:      General: No scleral icterus.        Right eye: No discharge.         Left eye: No discharge.      Extraocular Movements: Extraocular movements intact.      Conjunctiva/sclera: Conjunctivae normal.   Cardiovascular:      Rate and Rhythm: Normal rate and regular rhythm.      Pulses: Normal pulses.      Heart sounds: No murmur heard.     No gallop.   Pulmonary:      Effort: Pulmonary effort is normal. No respiratory distress.      Comments: On supplemental oxygen  Abdominal:      General: Bowel sounds are normal. There is no distension.      Palpations: Abdomen is soft.      Tenderness: There is abdominal tenderness. There is no guarding or rebound.   Musculoskeletal:         General: No swelling or tenderness. Normal range of motion.      Cervical back: Normal range of motion and neck supple. No rigidity or tenderness.   Skin:     General: Skin is warm and dry.      Capillary Refill: Capillary refill takes less than 2 seconds.   Neurological:      General: No focal deficit present.      Mental Status: She is alert and oriented to person, place, and time. Mental status is at baseline.      Cranial Nerves: No cranial nerve deficit.      Sensory: No sensory deficit.      Motor: No weakness.      Coordination: Coordination normal.   Psychiatric:         Mood and Affect: Mood normal.         Behavior: Behavior normal.         Fluids    Intake/Output Summary (Last 24 hours) at 2/15/2024 1154  Last data filed at 2/15/2024 1109  Gross per 24 hour   Intake 240 ml   Output --   Net 240 ml       Laboratory  Recent Labs     02/13/24  0347 02/14/24  0036 02/15/24  0219   WBC 12.8* 12.7* 9.7   RBC 4.38 4.01* 4.19*   HEMOGLOBIN 12.0 11.0* 11.8*   HEMATOCRIT 38.1 34.6*  35.9*   MCV 87.0 86.3 85.7   MCH 27.4 27.4 28.2   MCHC 31.5* 31.8* 32.9   RDW 43.1 42.5 42.6   PLATELETCT 329 330 361   MPV 10.0 10.4 10.3     Recent Labs     02/13/24  0347 02/14/24  0036 02/15/24  0219   SODIUM 136 136 136   POTASSIUM 3.5* 4.1 3.6   CHLORIDE 98 97 99   CO2 30 28 27   GLUCOSE 161* 207* 272*   BUN 9 14 18   CREATININE 0.49* 0.44* 0.53   CALCIUM 9.4 9.1 9.0                   Imaging  CT-CTA CHEST PULMONARY ARTERY W/ RECONS   Final Result         1.  No pulmonary embolus appreciated.   2.  Small layering bilateral pleural effusions   3.  Linear densities the bilateral lung bases favor atelectasis   4.  Emphysema   5.  Small hiatal hernia   6.  Indeterminate left adrenal nodule, follow-up adrenal protocol CT for further characterization as clinically appropriate.   7.  Atherosclerosis and atherosclerotic coronary artery disease.   8.  Pulmonary nodule, see nodule follow-up recommendations below.      Fleischner Society pulmonary nodule recommendations:   Low Risk: CT at 6-12 months, then consider CT at 18-24 months      High Risk: CT at 6-12 months, then CT at 18-24 months      Low Risk - Minimal or absent history of smoking and of other known risk factors.      High Risk - History of smoking or of other known risk factors.      Note: These recommendations do not apply to lung cancer screening, patients with immunosuppression, or patients with known primary cancer.      Fleischner Society 2017 Guidelines for Management of Incidentally Detected Pulmonary Nodules in Adults         DX-CHEST-LIMITED (1 VIEW)   Final Result      1.  Atelectasis/consolidation involving the left lung base.      2.  Small left pleural effusion.      3.  Mild cardiomegaly.      CT-ABDOMEN-PELVIS WITH   Final Result      1.  Features concerning for acute cholangitis and/or mild pancreatitis.   2.  Possible reactive duodenitis.   3.  Small amount of biliary gas presumably related to recent procedure.   4.  Mildly dilated common  bile duct likely related to prior cholecystectomy.   5.  Distal colonic diverticulosis.   6.  Hepatomegaly.           Assessment/Plan  * Acute cholangitis- (present on admission)  Assessment & Plan  -Inpatient status on medical floor.  -Patient underwent ERCP/EUS today by Dr. Mars.  She has common bile duct dilation and chronic abdominal pain since November.  -She reports significantly worse abdominal pain after procedure.  -Will keep n.p.o.  -She has elevated WBC at 24.1 and tachycardic therefore meeting SIRS criteria but no organ dysfunction.  She is not septic on admission per sepsis 3 criteria.  -CT of the abdomen and pelvis done in the emergency department is showing concern for acute cholangitis and or mild pancreatitis with possible reactive duodenitis.  Her lipase is within normal limits.  -I appreciate GI consult recommendations.  ERP discussed this case with Dr. Stratton.  -Pain control as needed.  -Patient was started on Zosyn in the emergency department which I will continue.    2/13: Concern for acute cholangitis vs pancreatitis. For now we will continue antibiotics and IVF and pain management. We appreciate further recommendations by GI.    2/15: Continue antibiotics.    Diarrhea- (present on admission)  Assessment & Plan  Patient developed diarrhea.  This could be due to antibiotics.  We will rule out C. difficile first.  Continue to monitor closely.    Anemia  Assessment & Plan  No signs of overt bleeding.  Monitor.    Pulmonary nodule  Assessment & Plan  Seen on CT scan  We have placed referral to pulmonary nodule clinic.    Hypoxia  Assessment & Plan  She does not seem to be in respiratory distress.  We will order chest x-ray.  Will also order COVID testing.    2/14: Patient with significant elevated D-dimer, we have ordered CTA of the chest.  It was negative for pulmonary embolism.  It did report emphysema. We have ordered oxygen as I suspect patient will require oxygen upon  discharge.    Hypokalemia  Assessment & Plan  Replace as needed  monitor    Hypomagnesemia  Assessment & Plan  Replace as needed  monitor    Advance care planning  Assessment & Plan  2/13: I discussed for at least 15 minutes further goals of care which included code status. This included DNR/DNI vs Full code. The patient would like to be full code. We also discussed further goals of care which included discussion of invasive versus noninvasive procedures.  The patient at this time would like to continue full treatment options.  She would like to continue with invasive and noninvasive procedures as needed.      SIRS (systemic inflammatory response syndrome) ([facility])  Assessment & Plan  2/13: Patient not meeting SIRS criteria today. We will continue with antibiotics for now.    Hypertensive urgency  Assessment & Plan  -Initial blood pressure was 217/100.  Patient was seen severe pain on arrival.    2/13: Patient still with significant elevated BP. Resume losartan and HTZD.    2/14: Continue losartan and HTZD    2/15: Patient still with increased elevated blood pressure.  We have started the patient on as needed hydralazine as well as amlodipine.  Continue to monitor closely.          Type 2 diabetes mellitus with complication, without long-term current use of insulin ([facility])- (present on admission)  Assessment & Plan  Last known A1c is 7.7    Continue ISS, monitor and make changes accordingly.    COPD (chronic obstructive pulmonary disease) ([facility])- (present on admission)  Assessment & Plan  -Without exacerbation.  Continue Trelegy Ellipta.    CAD (coronary artery disease)- (present on admission)  Assessment & Plan  -History of MI and stent.  She denies having chest pain.         VTE prophylaxis: Lovenox    I have performed a physical exam and reviewed and updated ROS and Plan today (2/12/2024). In review of yesterday's note (2/11/2024), there are no changes except as documented above.    I spend at least 51 minutes  providing care for this patient.  This includes face-to-face interview, physical examination.  Review of lab work including CBC, CMP, magnesium. Discussing with multidisciplinary team including case management, nursing staff and pharmacy.  Creating plan of care, reviewing orders.

## 2024-02-16 VITALS
DIASTOLIC BLOOD PRESSURE: 54 MMHG | OXYGEN SATURATION: 91 % | SYSTOLIC BLOOD PRESSURE: 102 MMHG | RESPIRATION RATE: 16 BRPM | BODY MASS INDEX: 30.23 KG/M2 | TEMPERATURE: 97.5 F | HEIGHT: 63 IN | WEIGHT: 170.64 LBS | HEART RATE: 97 BPM

## 2024-02-16 PROBLEM — E27.9 ADRENAL NODULE (HCC): Status: ACTIVE | Noted: 2024-02-16

## 2024-02-16 PROBLEM — E27.8 ADRENAL NODULE (HCC): Status: ACTIVE | Noted: 2024-02-16

## 2024-02-16 LAB
ANION GAP SERPL CALC-SCNC: 14 MMOL/L (ref 7–16)
BACTERIA BLD CULT: NORMAL
BACTERIA BLD CULT: NORMAL
BUN SERPL-MCNC: 19 MG/DL (ref 8–22)
CALCIUM SERPL-MCNC: 9.8 MG/DL (ref 8.4–10.2)
CHLORIDE SERPL-SCNC: 94 MMOL/L (ref 96–112)
CO2 SERPL-SCNC: 29 MMOL/L (ref 20–33)
CREAT SERPL-MCNC: 0.82 MG/DL (ref 0.5–1.4)
ERYTHROCYTE [DISTWIDTH] IN BLOOD BY AUTOMATED COUNT: 43.1 FL (ref 35.9–50)
GFR SERPLBLD CREATININE-BSD FMLA CKD-EPI: 75 ML/MIN/1.73 M 2
GLUCOSE BLD STRIP.AUTO-MCNC: 219 MG/DL (ref 65–99)
GLUCOSE SERPL-MCNC: 180 MG/DL (ref 65–99)
HCT VFR BLD AUTO: 42.2 % (ref 37–47)
HGB BLD-MCNC: 13.9 G/DL (ref 12–16)
MAGNESIUM SERPL-MCNC: 2.3 MG/DL (ref 1.5–2.5)
MCH RBC QN AUTO: 28.1 PG (ref 27–33)
MCHC RBC AUTO-ENTMCNC: 32.9 G/DL (ref 32.2–35.5)
MCV RBC AUTO: 85.4 FL (ref 81.4–97.8)
PLATELET # BLD AUTO: 437 K/UL (ref 164–446)
PMV BLD AUTO: 9.9 FL (ref 9–12.9)
POTASSIUM SERPL-SCNC: 3.3 MMOL/L (ref 3.6–5.5)
RBC # BLD AUTO: 4.94 M/UL (ref 4.2–5.4)
SIGNIFICANT IND 70042: NORMAL
SIGNIFICANT IND 70042: NORMAL
SITE SITE: NORMAL
SITE SITE: NORMAL
SODIUM SERPL-SCNC: 137 MMOL/L (ref 135–145)
SOURCE SOURCE: NORMAL
SOURCE SOURCE: NORMAL
WBC # BLD AUTO: 12.7 K/UL (ref 4.8–10.8)

## 2024-02-16 PROCEDURE — 83735 ASSAY OF MAGNESIUM: CPT

## 2024-02-16 PROCEDURE — 94760 N-INVAS EAR/PLS OXIMETRY 1: CPT

## 2024-02-16 PROCEDURE — 82962 GLUCOSE BLOOD TEST: CPT

## 2024-02-16 PROCEDURE — 80048 BASIC METABOLIC PNL TOTAL CA: CPT

## 2024-02-16 PROCEDURE — 85027 COMPLETE CBC AUTOMATED: CPT

## 2024-02-16 PROCEDURE — A9270 NON-COVERED ITEM OR SERVICE: HCPCS | Performed by: INTERNAL MEDICINE

## 2024-02-16 PROCEDURE — 36415 COLL VENOUS BLD VENIPUNCTURE: CPT

## 2024-02-16 PROCEDURE — 700102 HCHG RX REV CODE 250 W/ 637 OVERRIDE(OP): Performed by: INTERNAL MEDICINE

## 2024-02-16 RX ORDER — AMOXICILLIN AND CLAVULANATE POTASSIUM 875; 125 MG/1; MG/1
1 TABLET, FILM COATED ORAL EVERY 12 HOURS
Qty: 4 TABLET | Refills: 0 | Status: ACTIVE | OUTPATIENT
Start: 2024-02-16 | End: 2024-02-18

## 2024-02-16 RX ORDER — POTASSIUM CHLORIDE 20 MEQ/1
40 TABLET, EXTENDED RELEASE ORAL ONCE
Status: COMPLETED | OUTPATIENT
Start: 2024-02-16 | End: 2024-02-16

## 2024-02-16 RX ADMIN — AMOXICILLIN AND CLAVULANATE POTASSIUM 1 TABLET: 875; 125 TABLET, FILM COATED ORAL at 05:51

## 2024-02-16 RX ADMIN — OMEPRAZOLE 20 MG: 20 CAPSULE, DELAYED RELEASE ORAL at 05:51

## 2024-02-16 RX ADMIN — POTASSIUM CHLORIDE 40 MEQ: 1500 TABLET, EXTENDED RELEASE ORAL at 08:28

## 2024-02-16 RX ADMIN — FLUTICASONE FUROATE, UMECLIDINIUM BROMIDE AND VILANTEROL TRIFENATATE 1 PUFF: 100; 62.5; 25 POWDER RESPIRATORY (INHALATION) at 05:51

## 2024-02-16 ASSESSMENT — PAIN DESCRIPTION - PAIN TYPE: TYPE: ACUTE PAIN

## 2024-02-16 ASSESSMENT — FIBROSIS 4 INDEX: FIB4 SCORE: 0.74

## 2024-02-16 NOTE — PROGRESS NOTES
Subjective:   2/21/2024 12:52 PM  Primary care physician: Cheryl M. Demucha, A.P.R.N.  Referring Provider: Hospital consult     Chief Complaint: No chief complaint on file.    Diagnosis:   1. Dilated cbd, acquired        2. Acute cholangitis        3. Type 2 diabetes mellitus with complication, without long-term current use of insulin (HCC)        4. Obesity (BMI 30-39.9)          History of presenting illness:    Jigna Allen is a pleasant 73 y.o. female with history including COPD, CAD with history of MI with stent, HTN, and DM2 who presented to the ER for abdominal pain that started on 11/26/23. She was noted to be hypoxic and concern of sepsis. CT chest showed no PE or pneumonia. CT ABDOMEN/PELVIS on 11/26/23 noted intrahepatic and extra hepatic biliary dilation with common bile duct measuring up to 2.2 cm. MRCP on 11/26/23 and dilated CBD and intrahepatic bile ducts, no stone. LFTs are normal. She has not had BM or flatus for multiple days.     She has been seen previously for this by GI and has not had a diagnosis for this issue. The symptoms have worsened to the point where she is having severe pain.     CT PANCREAS on 11/29/23 noted common bile and intrahepatic hepatic duct dilatation. Hepatomegaly. Small hiatal hernia.      Update 12/19/23  EGD/EUS/ BX/ERCP by Dr Mars on 2/9/24 noted  EGD: esophagus unremarkable. HH 37-39 cm. Gastric antrum erythema with biopsy for H. Pylori. Duodenum: unremarkable.   EUS: celiac axis/pancreas body/tail/pancreatic duct/ampulla/head of pancreas: unremarkable. CBD: dilated and tortuous up to 13.6 mm at mid duct. Distal filling defect concerning for stone/sludge.  ERCP: Ampulla: slightly bulbous, slowly rhythmically invaginating then protruding.   CBD: dilated and tortuous up to 13.6 mm mid duct subtle distal filling defect, several small stones and debris. Sphincterotomy. Biopsy of ampulla     Pathology negative for  malignancy    Update 2/21/24  She was recently admitted per the ED for abdominal pain following upper endoscope with needle aspiration on 2/9/24.  CT scan noted concern for acute cholangitis and/or mild pancreatitis with reactive duodenitis and had significantly elevated blood pressure of 217/100 in the ED requiring IV medication for blood pressure control. The patient was admitted for further management and care. Abdominal pain was most likely due to ERCP induced pancreatitis. The patient had hypertensive urgency while hospitalized.     Pathology from 2/14/24 noted no malignancy.    She is here today for further eval ration.  Patient's pathology was completely benign.  She did have some inflammation at her ampulla and sludge in her common bile duct.  she states that she is otherwise feeling okay.    Past Medical History:   Diagnosis Date    Abnormal screening cardiac CT 02/14/2020    Anxiety     Asthma     Bowel habit changes     Diarrhea    Breath shortness     Occasionally    Bronchitis 2018    Carotid arterial disease (HCC)     Cataract     Corrected with surgery    COPD (chronic obstructive pulmonary disease) (HCC)     Diabetes (HCC)     Emphysema of lung (HCC) 2023    Goiter 03/25/2016    Hiatus hernia syndrome 2023    HTN (hypertension)     Hyperlipidemia     Hyperlipidemia 03/25/2016    Indigestion     Myocardial infarct (HCC) 2010    Pain 2023    Pneumonia 2022    PONV (postoperative nausea and vomiting) 1989    Wasn’t a factor with last 2 surgeries    Tobacco use     Urinary incontinence 2023     Past Surgical History:   Procedure Laterality Date    TN ERCP,DIAGNOSTIC N/A 2/9/2024    Procedure: UPPER ENDOSCOPIC ULTRASOUND WITH FINE NEEDLE ASPIRATION WITH  ENDOSCOPIC RETROGRADE CHOLANGIOPANCREATOGRAPHY AND ESOPHAGOGASTRODUODENOSCOPY;  Surgeon: Adrian Mars M.D.;  Location: SURGERY [facility];  Service: EUS    TN UPPER GI ENDOSCOPY,DIAGNOSIS N/A 2/9/2024    Procedure: GASTROSCOPY;  Surgeon:  Adrian Mars M.D.;  Location: SURGERY [facility];  Service: EUS    EGD W/ENDOSCOPIC ULTRASOUND N/A 2024    Procedure: EGD, WITH ENDOSCOPIC US;  Surgeon: Adrian Mars M.D.;  Location: SURGERY [facility];  Service: EUS    OTHER ABDOMINAL SURGERY      Appendix and gall bladder    OTHER CARDIAC SURGERY      Just 1 stent    OTHER ORTHOPEDIC SURGERY      Shoulder     Allergies   Allergen Reactions    Sulfa Drugs Hives    Ezetimibe Nausea     Diarrhea     Hmg-Coa-R Inhibitors Unspecified     Muscle cramps     Outpatient Encounter Medications as of 2024   Medication Sig Dispense Refill    [] amoxicillin-clavulanate (AUGMENTIN) 875-125 MG Tab Take 1 Tablet by mouth every 12 hours for 2 days. 4 Tablet 0    acetaminophen (TYLENOL 8 HOUR ARTHRITIS PAIN) 650 MG CR tablet Take 1,300 mg by mouth every 6 hours as needed. Indications: Pain      famotidine (PEPCID) 20 MG Tab Take 20 mg by mouth every evening.      benzonatate (TESSALON) 100 MG Cap Take 100 mg by mouth 3 times a day as needed for Cough.      ipratropium-albuterol (DUONEB) 0.5-2.5 (3) MG/3ML nebulizer solution USE 1 VIAL (3 ML) VIA NEBULIZER FOUR TIMES A DAY (Patient taking differently: Take 3 mL by nebulization every 6 hours as needed for Shortness of Breath. USE 1 VIAL (3 ML) VIA NEBULIZER FOUR TIMES A DAY) 360 Each 3    losartan-hydrochlorothiazide (HYZAAR) 100-12.5 MG per tablet Take 1 Tablet by mouth every day. 90 Tablet 3    metformin (GLUCOPHAGE) 1000 MG tablet Take 1 Tablet by mouth 2 times a day with meals. 180 Tablet 3    buPROPion (WELLBUTRIN) 100 MG Tab TAKE 1 TABLET TWICE A DAY (DOSE INCREASED) (Patient taking differently: Take 100 mg by mouth 2 times a day.) 180 Tablet 3    Multiple Vitamins-Minerals (PRESERVISION AREDS PO) Take 1 Capsule by mouth 2 times a day.      lactobacillus rhamnosus (CULTURELLE) Cap capsule Take 1 Capsule by mouth every morning with breakfast. 30 Capsule 0    clobetasol (TEMOVATE) 0.05 %  Ointment Apply 1 Application topically 2 times a day as needed (Apply's on both legs). APPLY TO LEGS      fluticasone-umeclidin-vilant (TRELEGY ELLIPTA) 100-62.5-25 MCG/ACT AEROSOL POWDER, BREATH ACTIVATED inhalation Inhale 1 Inhalation every day. X 365 days 90 Each 3    nitroglycerin (NITROSTAT) 0.4 MG SL Tab Place 1 Tablet under the tongue as needed for Chest Pain (Once every 5 minutes up to 3 doses). 100 Tablet 3    albuterol (PROAIR HFA) 108 (90 Base) MCG/ACT Aero Soln inhalation aerosol USE 2 INHALATIONS EVERY 6 HOURS AS NEEDED FOR SHORTNESS OF BREATH (Patient taking differently: Inhale 2 Puffs every 6 hours as needed for Shortness of Breath. USE 2 INHALATIONS EVERY 6 HOURS AS NEEDED FOR SHORTNESS OF BREATH) 1 Each 11    pantoprazole (PROTONIX) 20 MG tablet Take 20 mg by mouth every day.      Nebulizers Misc Use nebulizer as directed up to 4x day prn sob 1 Each 1    glucose blood (FREESTYLE LITE) strip Use as directed.patient tests fasting blood sugar every am 100 Strip 3    Cholecalciferol (VITAMIN D) 2000 UNIT Tab Take 4,000 Units by mouth every day.      aspirin EC (ECOTRIN) 81 MG Tablet Delayed Response Take 1 Tablet by mouth every day.      [DISCONTINUED] amLODIPine (Norvasc) tablet 5 mg       [DISCONTINUED] prochlorperazine (Compazine) injection 5 mg       [DISCONTINUED] hydrALAZINE (Apresoline) injection 20 mg       [DISCONTINUED] amoxicillin-clavulanate (Augmentin) 875-125 MG per tablet 1 Tablet       [DISCONTINUED] omeprazole (PriLOSEC) capsule 20 mg       [DISCONTINUED] Pharmacy Consult Request ...Pain Management Review 1 Each       [DISCONTINUED] oxyCODONE immediate-release (Roxicodone) tablet 2.5 mg       [DISCONTINUED] oxyCODONE immediate-release (Roxicodone) tablet 5 mg       [DISCONTINUED] HYDROmorphone (Dilaudid) injection 0.25 mg       [DISCONTINUED] labetalol (Normodyne/Trandate) injection 10 mg       [DISCONTINUED] losartan (Cozaar) tablet 100 mg       [DISCONTINUED] hydroCHLOROthiazide  tablet 12.5 mg       [DISCONTINUED] enoxaparin (Lovenox) inj 40 mg       [DISCONTINUED] insulin regular (HumuLIN R,NovoLIN R) injection       [DISCONTINUED] dextrose 50% (D50W) injection 25 g       [DISCONTINUED] fluticasone-umeclidinium-vilanterol (Trelegy Ellipta) 100-62.5-25 mcg/act inhaler 1 Puff       [DISCONTINUED] acetaminophen (Tylenol) tablet 650 mg       [DISCONTINUED] ondansetron (Zofran) syringe/vial injection 4 mg       [DISCONTINUED] ondansetron (Zofran ODT) dispertab 4 mg       [DISCONTINUED] LR (Bolus) infusion 500 mL        No facility-administered encounter medications on file as of 2/21/2024.     Social History     Socioeconomic History    Marital status:      Spouse name: Not on file    Number of children: Not on file    Years of education: Not on file    Highest education level: Associate degree: occupational, technical, or vocational program   Occupational History    Not on file   Tobacco Use    Smoking status: Every Day     Current packs/day: 0.50     Average packs/day: 0.5 packs/day for 59.5 years (29.7 ttl pk-yrs)     Types: Cigarettes     Start date: 9/1/1964    Smokeless tobacco: Never    Tobacco comments:     I enjoy smoking   Vaping Use    Vaping Use: Never used   Substance and Sexual Activity    Alcohol use: No    Drug use: Not Currently     Types: Marijuana     Comment: CBD  occ    Sexual activity: Not Currently     Partners: Male     Comment:    Other Topics Concern    Not on file   Social History Narrative    Not on file     Social Determinants of Health     Financial Resource Strain: Low Risk  (3/21/2022)    Overall Financial Resource Strain (CARDIA)     Difficulty of Paying Living Expenses: Not very hard   Food Insecurity: No Food Insecurity (3/21/2022)    Hunger Vital Sign     Worried About Running Out of Food in the Last Year: Never true     Ran Out of Food in the Last Year: Never true   Transportation Needs: No Transportation Needs (3/21/2022)    PRAPARE -  Transportation     Lack of Transportation (Medical): No     Lack of Transportation (Non-Medical): No   Physical Activity: Unknown (3/21/2022)    Exercise Vital Sign     Days of Exercise per Week: 0 days     Minutes of Exercise per Session: Not on file   Stress: Stress Concern Present (3/21/2022)    [employer] [place] [employer] - Occupational Stress Questionnaire     Feeling of Stress : Very much   Social Connections: Moderately Isolated (3/21/2022)    Social Connection and Isolation Panel [NHANES]     Frequency of Communication with Friends and Family: More than three times a week     Frequency of Social Gatherings with Friends and Family: Once a week     Attends Mandaen Services: Never     Active Member of Clubs or Organizations: No     Attends Club or Organization Meetings: Never     Marital Status:    Intimate Partner Violence: Not on file   Housing Stability: Low Risk  (3/21/2022)    Housing Stability Vital Sign     Unable to Pay for Housing in the Last Year: No     Number of Places Lived in the Last Year: 1     Unstable Housing in the Last Year: No      Social History     Tobacco Use   Smoking Status Every Day    Current packs/day: 0.50    Average packs/day: 0.5 packs/day for 59.5 years (29.7 ttl pk-yrs)    Types: Cigarettes    Start date: 9/1/1964   Smokeless Tobacco Never   Tobacco Comments    I enjoy smoking     Social History     Substance and Sexual Activity   Alcohol Use No     Social History     Substance and Sexual Activity   Drug Use Not Currently    Types: Marijuana    Comment: CBD  occ      Family History   Problem Relation Age of Onset    Cancer Mother 47        breast    Breast Cancer Mother         Mets to bone and liver    Cancer Father         Stomach    Hypertension Father     Alcohol abuse Father     Stroke Maternal Grandmother     Alcohol abuse Maternal Grandmother     Stroke Paternal Grandmother     Cancer Brother         Glioblastoma    Alcohol abuse Paternal Uncle      Alcohol abuse Paternal Uncle             Objective:   There were no vitals taken for this visit.    No physical exam was performed    Labs   Latest Reference Range & Units 02/16/24 05:31   WBC 4.8 - 10.8 K/uL 12.7 (H)   RBC 4.20 - 5.40 M/uL 4.94   Hemoglobin 12.0 - 16.0 g/dL 13.9   Hematocrit 37.0 - 47.0 % 42.2   MCV 81.4 - 97.8 fL 85.4   MCH 27.0 - 33.0 pg 28.1   MCHC 32.2 - 35.5 g/dL 32.9   RDW 35.9 - 50.0 fL 43.1   Platelet Count 164 - 446 K/uL 437   MPV 9.0 - 12.9 fL 9.9   (H): Data is abnormally high     Latest Reference Range & Units 02/15/24 02:19 02/16/24 05:31   Sodium 135 - 145 mmol/L 136 137   Potassium 3.6 - 5.5 mmol/L 3.6 3.3 (L)   Chloride 96 - 112 mmol/L 99 94 (L)   Co2 20 - 33 mmol/L 27 29   Anion Gap 7.0 - 16.0  10.0 14.0   Glucose 65 - 99 mg/dL 272 (H) 180 (H)   Bun 8 - 22 mg/dL 18 19   Creatinine 0.50 - 1.40 mg/dL 0.53 0.82   GFR (CKD-EPI) >60 mL/min/1.73 m 2 97 75   Calcium 8.4 - 10.2 mg/dL 9.0 9.8   Correct Calcium 8.5 - 10.5 mg/dL 9.5    AST(SGOT) 12 - 45 U/L 16    ALT(SGPT) 2 - 50 U/L 19    Alkaline Phosphatase 30 - 99 U/L 92    Total Bilirubin 0.1 - 1.5 mg/dL <0.2    Albumin 3.2 - 4.9 g/dL 3.4    Total Protein 6.0 - 8.2 g/dL 6.4    Globulin 1.9 - 3.5 g/dL 3.0    A-G Ratio g/dL 1.1    Magnesium 1.5 - 2.5 mg/dL 1.9 2.3   (L): Data is abnormally low  (H): Data is abnormally high     Imaging  CT ABDOMEN (2/10/24)  IMPRESSION:  1.  Features concerning for acute cholangitis and/or mild pancreatitis.  2.  Possible reactive duodenitis.  3.  Small amount of biliary gas presumably related to recent procedure.  4.  Mildly dilated common bile duct likely related to prior cholecystectomy.  5.  Distal colonic diverticulosis.  6.  Hepatomegaly.    Pathology  N/A    Procedures  ERCP/ EUS on 2/10/24 (Dr. Mars)     Diagnosis:     1. Dilated cbd, acquired        2. Acute cholangitis        3. Type 2 diabetes mellitus with complication, without long-term current use of insulin (HCC)        4. Obesity (BMI  30-39.9)            Medical Decision Making:  Today's Assessment / Status / Plan:     Patient has a dilated bile duct without any evidence of ampullary or bile duct malignancy.  She did have sludge in her common bile duct.  She will follow-up with GI.  T  No further  surgical oncology follow-up.    I, Dr Jones, have entered, reviewed and confirmed the above diagnoses related to this patient on this date of service, as per the time and date noted at top of this note.

## 2024-02-16 NOTE — PROGRESS NOTES
Telemetry summary    Rhythm: SR  Rate: 83-97  Ectopy: r-o-fPVC, o-fPAC, rCouplet, rTrigem  Measurements: 0.14/0.08/0.38    Normal Values  Rhythm: SR  HR Range:   Measurement: 0.12-0.2/0.06-0.10/0.30-0.52

## 2024-02-16 NOTE — PROGRESS NOTES
0785- Notified Dr Rodriguez of /113    0804- Notified Dr Rodriguez of /85 after PRN    0815- Paged Dr Rodriguez to ask for additional orders     1120- Notified Dr Rodriguez of /76     1331- Notified Dr Rodriguez of /77, new cough, and pt vomiting/unable to keep anything down

## 2024-02-16 NOTE — DISCHARGE SUMMARY
"Discharge Summary    CHIEF COMPLAINT ON ADMISSION  Chief Complaint   Patient presents with    Abdominal Pain     PT BIB EMS for abdominal pain x 2 days. Pt had a upper endoscopy with needle aspiration on 2/9. Pt was dx home with no pain but once home started to have pain.        Reason for Admission  EMS     Admission Date  2/10/2024    CODE STATUS  Full Code    HPI & HOSPITAL COURSE    As per chart review:  \"73 y.o. female, with h/o HTN, non-insulin dependent Type II DM, CAD, COPD who underwent ERCP/ EUS on 2/10/24 (Dr. Mars) scheduled after hospitalization late November for common bile duct dilation and ongoing abdominal pain.  She also has moderate stenosis of SMA and following outpatient with vascular surgery.  Patient states that after she went home after procedure, developed worsening abdominal pain reason why she presented to the ED on 2/10/2024 for further evaluation.     CT scan evaluation showing concerning's for acute cholangitis and/or mild pancreatitis with reactive duodenitis and had significantly elevated blood pressure of 217/100 in the ED requiring IV medication for blood pressure control.\"    The patient was admitted for further management and care.  GI was consulted.  The patient was to continue with antibiotics, GI evaluated patient and they believe the patient's abdominal pain was most likely due to ERCP induced pancreatitis, however they do recommend 7-day course of antibiotics which we have transition to Augmentin.  The patient's pain has improved significantly while hospitalized and now tolerating p.o.    Of note while hospitalized the patient requiring oxygen, the patient did not seem to be in respiratory distress.  The patient does have a history of COPD.  We did not suspect this was COPD exacerbation as the patient was not having worsening shortness of breath, increased cough or sputum production.    We ordered CTA of the chest to rule out pulmonary embolism.  Did not report pulmonary " embolism, it reported atelectasis and emphysema.  Patient had home O2 evaluation done and the patient will require oxygen upon discharge.  We have placed referral to pulmonary as the patient will require close follow-up with pulmonary as an outpatient.  The patient was also found to have a pulmonary nodule so we have placed referral to pulmonary nodule clinic as an outpatient as well.    The patient had hypertensive urgency while hospitalized.  Patient had a SBP as high as 210 yesterday. The patient received multiple doses of as needed blood pressure medication, however the patient yesterday was complaining of some abdominal pain and some nausea and the patient was being very anxious. I suspect this could have played a role in her elevated blood pressure from yesterday.  We initially started the patient on amlodipine yesterday on top of her regular losartan and hydrochlorothiazide.  However this morning the patient's blood pressure was within normal limits and actually on the lower side.  The patient will be discharged home on her regular dose of losartan and hydrochlorothiazide.  The patient understands that she will monitor her blood pressure at home and she will follow-up closely with her PCP as an outpatient to see if she warrants further blood pressure medications.  At this time we will not discharge patient on amlodipine, however she will require close monitoring of her blood pressure to see if she warrants further blood pressure medications.    The patient also developed diarrhea while hospitalized, there was concern for C. difficile, however C. difficile was negative.  Her suspect this was due to antibiotic use.  Today the patient mentions the diarrhea is much better.    Of note, the patient was found to have an adrenal nodule on the CT scan performed while hospitalized.  We have placed referral to urology as an outpatient.    The patient seen at bedside this morning.  The patient feels much better and would  like to go home.  The patient will be discharged home.  The patient will require close follow-up with GI and pulmonary as an outpatient as well as PCP. Referral to urology has also been placed to follow up on adrenal nodule.        Therefore, she is discharged in fair and stable condition to home with close outpatient follow-up.    The patient met 2-midnight criteria for an inpatient stay at the time of discharge.    Discharge Date  02/16/2024    FOLLOW UP ITEMS POST DISCHARGE  The patient will follow complete antibiotic as an outpatient.  The patient require close follow-up with PCP, GI and pulmonary as an outpatient. Referral to urology to follow up on adrenal nodule has been placed.    DISCHARGE DIAGNOSES  Principal Problem:    Acute cholangitis (POA: Yes)  Active Problems:    Diarrhea (POA: Yes)    CAD (coronary artery disease) (POA: Yes)    COPD (chronic obstructive pulmonary disease) (HCC) (POA: Yes)    Type 2 diabetes mellitus with complication, without long-term current use of insulin (HCC) (POA: Yes)    Hypertensive urgency (POA: Unknown)    SIRS (systemic inflammatory response syndrome) (HCC) (POA: Unknown)    Advance care planning (POA: Unknown)    Hypomagnesemia (POA: Unknown)    Hypokalemia (POA: Unknown)    Hypoxia (POA: Unknown)    Pulmonary nodule (POA: Unknown)    Anemia (POA: Unknown)    Adrenal nodule (HCC) (POA: Unknown)  Resolved Problems:    * No resolved hospital problems. *      FOLLOW UP  Future Appointments   Date Time Provider Department Center   2/21/2024  2:00 PM Patrick Jones M.D. [facility] None   5/21/2024  7:20 AM Cheryl M. Demucha, A.P.R.N. [facility] [place] Cheryl M. Demucha, A.P.R.NRick  [address] [place] [id number] [phone]    Schedule an appointment as soon as possible for a visit      Adrian Mars M.D.  [address]  [place] [address]  [phone]    Schedule an appointment as soon as possible for a visit      Pulmonary    Schedule  an appointment as soon as possible for a visit      Pulmonary Nodule clinic    Schedule an appointment as soon as possible for a visit      Urology    Schedule an appointment as soon as possible for a visit        MEDICATIONS ON DISCHARGE     Medication List        CHANGE how you take these medications        Instructions   albuterol 108 (90 Base) MCG/ACT Aers inhalation aerosol  What changed:   how much to take  how to take this  when to take this  reasons to take this  Commonly known as: ProAir HFA   USE 2 INHALATIONS EVERY 6 HOURS AS NEEDED FOR SHORTNESS OF BREATH     amoxicillin-clavulanate 875-125 MG Tabs  What changed:   when to take this  additional instructions  Commonly known as: Augmentin   Take 1 Tablet by mouth every 12 hours for 2 days.  Dose: 1 Tablet     buPROPion 100 MG Tabs  What changed: See the new instructions.  Commonly known as: Wellbutrin   TAKE 1 TABLET TWICE A DAY (DOSE INCREASED)     ipratropium-albuterol 0.5-2.5 (3) MG/3ML nebulizer solution  What changed:   how much to take  how to take this  when to take this  reasons to take this  Commonly known as: Savanna   Doctor's comments: Sent the wrong rx last time; should've been for 90 days  USE 1 VIAL (3 ML) VIA NEBULIZER FOUR TIMES A DAY            CONTINUE taking these medications        Instructions   aspirin EC 81 MG Tbec  Commonly known as: Ecotrin   Take 1 Tablet by mouth every day.  Dose: 81 mg     benzonatate 100 MG Caps  Commonly known as: Tessalon   Take 100 mg by mouth 3 times a day as needed for Cough.  Dose: 100 mg     clobetasol 0.05 % Oint  Commonly known as: Temovate   Apply 1 Application topically 2 times a day as needed (Apply's on both legs). APPLY TO LEGS  Dose: 1 Application     famotidine 20 MG Tabs  Commonly known as: Pepcid   Take 20 mg by mouth every evening.  Dose: 20 mg     FREESTYLE LITE strip  Generic drug: glucose blood   Use as directed.patient tests fasting blood sugar every am     lactobacillus rhamnosus Caps  capsule   Take 1 Capsule by mouth every morning with breakfast.  Dose: 1 Capsule     losartan-hydrochlorothiazide 100-12.5 MG per tablet  Commonly known as: Hyzaar   Take 1 Tablet by mouth every day.  Dose: 1 Tablet     metformin 1000 MG tablet  Commonly known as: Glucophage   Take 1 Tablet by mouth 2 times a day with meals.  Dose: 1,000 mg     Nebulizers [facility]   Doctor's comments: Pt needs tubing and cup for her nebulizer as hers is cracked  Use nebulizer as directed up to 4x day prn sob     nitroglycerin 0.4 MG Subl  Commonly known as: Nitrostat   Place 1 Tablet under the tongue as needed for Chest Pain (Once every 5 minutes up to 3 doses).  Dose: 0.4 mg     pantoprazole 20 MG tablet  Commonly known as: Protonix   Take 20 mg by mouth every day.  Dose: 20 mg     PRESERVISION AREDS PO   Take 1 Capsule by mouth 2 times a day.  Dose: 1 Capsule     Trelegy Ellipta 100-62.5-25 MCG/ACT inhaler  Generic drug: fluticasone-umeclidinium-vilanterol   Doctor's comments: D/c spiriva and wexela d/t ins issue  Inhale 1 Inhalation every day. X 365 days  Dose: 1 Inhalation     Tylenol 8 Hour Arthritis Pain 650 MG CR tablet  Generic drug: acetaminophen   Take 1,300 mg by mouth every 6 hours as needed. Indications: Pain  Dose: 1,300 mg     vitamin D 2000 UNIT Tabs   Take 4,000 Units by mouth every day.  Dose: 4,000 Units            STOP taking these medications      azithromycin 250 MG Tabs  Commonly known as: Zithromax     methylPREDNISolone 4 MG Tbpk  Commonly known as: Medrol Dosepak              Allergies  Allergies   Allergen Reactions    Sulfa Drugs Hives    Ezetimibe Nausea     Diarrhea     Hmg-Coa-R Inhibitors Unspecified     Muscle cramps       DIET  Orders Placed This Encounter   Procedures    Diet Order Diet: Consistent CHO (Diabetic); Nutrient modifications: (optional): Low Fat, High Fiber     Standing Status:   Standing     Number of Occurrences:   1     Order Specific Question:   Diet:     Answer:   Consistent CHO  (Diabetic) [4]     Order Specific Question:   Nutrient modifications: (optional)     Answer:   Low Fat [5]     Order Specific Question:   Nutrient modifications: (optional)     Answer:   High Fiber [8]       ACTIVITY  As tolerated.  Weight bearing as tolerated    CONSULTATIONS  None    PROCEDURES      DX-CHEST-LIMITED (1 VIEW)   Final Result         No acute cardiopulmonary abnormalities are identified.      CT-CTA CHEST PULMONARY ARTERY W/ RECONS   Final Result         1.  No pulmonary embolus appreciated.   2.  Small layering bilateral pleural effusions   3.  Linear densities the bilateral lung bases favor atelectasis   4.  Emphysema   5.  Small hiatal hernia   6.  Indeterminate left adrenal nodule, follow-up adrenal protocol CT for further characterization as clinically appropriate.   7.  Atherosclerosis and atherosclerotic coronary artery disease.   8.  Pulmonary nodule, see nodule follow-up recommendations below.      Fleischner Society pulmonary nodule recommendations:   Low Risk: CT at 6-12 months, then consider CT at 18-24 months      High Risk: CT at 6-12 months, then CT at 18-24 months      Low Risk - Minimal or absent history of smoking and of other known risk factors.      High Risk - History of smoking or of other known risk factors.      Note: These recommendations do not apply to lung cancer screening, patients with immunosuppression, or patients with known primary cancer.      Fleischner Society 2017 Guidelines for Management of Incidentally Detected Pulmonary Nodules in Adults         DX-CHEST-LIMITED (1 VIEW)   Final Result      1.  Atelectasis/consolidation involving the left lung base.      2.  Small left pleural effusion.      3.  Mild cardiomegaly.      CT-ABDOMEN-PELVIS WITH   Final Result      1.  Features concerning for acute cholangitis and/or mild pancreatitis.   2.  Possible reactive duodenitis.   3.  Small amount of biliary gas presumably related to recent procedure.   4.  Mildly dilated  common bile duct likely related to prior cholecystectomy.   5.  Distal colonic diverticulosis.   6.  Hepatomegaly.           LABORATORY  Lab Results   Component Value Date    SODIUM 137 02/16/2024    POTASSIUM 3.3 (L) 02/16/2024    CHLORIDE 94 (L) 02/16/2024    CO2 29 02/16/2024    GLUCOSE 180 (H) 02/16/2024    BUN 19 02/16/2024    CREATININE 0.82 02/16/2024        Lab Results   Component Value Date    WBC 12.7 (H) 02/16/2024    HEMOGLOBIN 13.9 02/16/2024    HEMATOCRIT 42.2 02/16/2024    PLATELETCT 437 02/16/2024        Total time of the discharge process exceeds 31 minutes.

## 2024-02-16 NOTE — PROGRESS NOTES
Telemetry Shift Summary     Rhythm: SR/ST  HR Range:89-96  Ectopy: o-fPVC fPAC r-oBIG rTRIG r-oCOUP  Measurements: 0.14/0.08/0.36    Normal Values  Measurements: 0.12- 0.20 / 0.08-0.10 / 0.30-0.52

## 2024-02-16 NOTE — CARE PLAN
The patient is Watcher - Medium risk of patient condition declining or worsening    Shift Goals  Clinical Goals: monitor BP, control nausea  Patient Goals: rest and comfort  Family Goals: ALEXUS    Progress made toward(s) clinical / shift goals:    Problem: Knowledge Deficit - Standard  Goal: Patient and family/care givers will demonstrate understanding of plan of care, disease process/condition, diagnostic tests and medications  Outcome: Progressing  Note: Pt updated on POC, agreeable. Monitoring BP. Pt denies feeling nauseous this evening.      Problem: Pain - Standard  Goal: Alleviation of pain or a reduction in pain to the patient’s comfort goal  Outcome: Progressing  Note: Pt denies pain, reports 0/10. Pt educated on use of call light for any pain management needs.

## 2024-02-20 ENCOUNTER — PATIENT OUTREACH (OUTPATIENT)
Dept: MEDICAL GROUP | Facility: PHYSICIAN GROUP | Age: 74
End: 2024-02-20
Payer: MEDICARE

## 2024-02-20 NOTE — PROGRESS NOTES
Transitional Care Management  TCM Outreach Date and Time: Filed (2/20/2024 10:18 AM)    Discharge Questions  Actual Discharge Date: 02/16/24  Now that you are home, how are you feeling?: Good (yeast infection)  Did you receive any new prescriptions?: No  Were you able to get them filled?: No  Meds to Bed or Pharmacy filled?: Pharmacy (No New meds)  Reason prescriptions not filled?: Other (please specify) (no new meds)  Do you have any questions about your current medications or new medications (Review Med Rec)?: No  Did you have any durable medical equipment ordered?: Yes (home O2 w/accellance)  Did you receive it?: Yes  Do you have a follow up appointment scheduled with your PCP?: Yes (Cheryl Demucha)  Was an appointment scheduled for the patient?: Yes  Appointment Date: 04/04/24  Appointment Time: 1700  Any issues or paperwork you wish to discuss with your PCP?: Yes (Please specify) (sent home with O2, pulse ox at home 93%)  Are you (patient) able to get to the appointment?: Yes  Reason not scheduled?: Patient to Schedule (no reason not to schedule)  If Home Health was ordered, have they contacted you (Patient): Not Applicable  Did you have enough support after your last discharge?: Yes  Does this patient qualify for the CCM program?: Yes (DCE risk score 10 w/HTN, DM, CAD, COPD)    Transitional Care  Number of attempts made to contact patient: 1  Current or previous attempts completed within two business days of discharge? : Yes  Provided education regarding treatment plan, medications, self-management, ADLs?: Yes  Has patient completed an Advanced Directive?: No  Has the Care Manager's phone number provided?: No  Is there anything else I can help you with?: Yes (Please specify) (patient finished antibiotics from hospitalizaqtion and hasd yeast infection resistance to OTC medication)    Discharge Summary  Chief Complaint: abd pain  Admitting Diagnosis: S/P endosccopy, R/O cholangitis  Discharge Diagnosis: acute  cholangitis

## 2024-02-21 ENCOUNTER — TELEMEDICINE (OUTPATIENT)
Dept: SURGICAL ONCOLOGY | Facility: MEDICAL CENTER | Age: 74
End: 2024-02-21
Payer: MEDICARE

## 2024-02-21 DIAGNOSIS — K83.09 ACUTE CHOLANGITIS: ICD-10-CM

## 2024-02-21 DIAGNOSIS — E11.8 TYPE 2 DIABETES MELLITUS WITH COMPLICATION, WITHOUT LONG-TERM CURRENT USE OF INSULIN (HCC): ICD-10-CM

## 2024-02-21 DIAGNOSIS — E66.9 OBESITY (BMI 30-39.9): ICD-10-CM

## 2024-02-21 DIAGNOSIS — K83.8 DILATED CBD, ACQUIRED: ICD-10-CM

## 2024-02-21 DIAGNOSIS — B37.9 YEAST INFECTION: ICD-10-CM

## 2024-02-21 PROCEDURE — 99213 OFFICE O/P EST LOW 20 MIN: CPT | Mod: 95 | Performed by: SURGERY

## 2024-02-21 RX ORDER — FLUCONAZOLE 150 MG/1
TABLET ORAL
Qty: 3 TABLET | Refills: 1 | Status: SHIPPED | OUTPATIENT
Start: 2024-02-21 | End: 2024-03-19

## 2024-02-22 ENCOUNTER — HOSPITAL ENCOUNTER (EMERGENCY)
Facility: MEDICAL CENTER | Age: 74
End: 2024-02-22
Attending: EMERGENCY MEDICINE
Payer: MEDICARE

## 2024-02-22 ENCOUNTER — OFFICE VISIT (OUTPATIENT)
Dept: URGENT CARE | Facility: PHYSICIAN GROUP | Age: 74
End: 2024-02-22
Payer: MEDICARE

## 2024-02-22 ENCOUNTER — APPOINTMENT (OUTPATIENT)
Dept: RADIOLOGY | Facility: MEDICAL CENTER | Age: 74
End: 2024-02-22
Attending: EMERGENCY MEDICINE
Payer: MEDICARE

## 2024-02-22 VITALS
RESPIRATION RATE: 16 BRPM | TEMPERATURE: 97.3 F | WEIGHT: 167 LBS | DIASTOLIC BLOOD PRESSURE: 72 MMHG | SYSTOLIC BLOOD PRESSURE: 192 MMHG | OXYGEN SATURATION: 95 % | BODY MASS INDEX: 29.59 KG/M2 | HEIGHT: 63 IN | HEART RATE: 97 BPM

## 2024-02-22 VITALS
SYSTOLIC BLOOD PRESSURE: 150 MMHG | OXYGEN SATURATION: 94 % | WEIGHT: 174 LBS | RESPIRATION RATE: 18 BRPM | HEART RATE: 84 BPM | HEIGHT: 63 IN | BODY MASS INDEX: 30.83 KG/M2 | DIASTOLIC BLOOD PRESSURE: 74 MMHG | TEMPERATURE: 98.8 F

## 2024-02-22 DIAGNOSIS — I16.9 HYPERTENSIVE CRISIS: ICD-10-CM

## 2024-02-22 DIAGNOSIS — E11.8 TYPE 2 DIABETES MELLITUS WITH COMPLICATION, WITHOUT LONG-TERM CURRENT USE OF INSULIN (HCC): ICD-10-CM

## 2024-02-22 DIAGNOSIS — I10 ESSENTIAL HYPERTENSION: ICD-10-CM

## 2024-02-22 DIAGNOSIS — I25.10 CORONARY ARTERY DISEASE INVOLVING NATIVE HEART, UNSPECIFIED VESSEL OR LESION TYPE, UNSPECIFIED WHETHER ANGINA PRESENT: ICD-10-CM

## 2024-02-22 DIAGNOSIS — I10 UNCONTROLLED HYPERTENSION: ICD-10-CM

## 2024-02-22 PROBLEM — R47.01 EXPRESSIVE APHASIA: Status: ACTIVE | Noted: 2023-05-16

## 2024-02-22 PROBLEM — G93.40 ACUTE ENCEPHALOPATHY: Status: ACTIVE | Noted: 2023-05-10

## 2024-02-22 LAB
ALBUMIN SERPL BCP-MCNC: 4.2 G/DL (ref 3.2–4.9)
ALBUMIN/GLOB SERPL: 1.3 G/DL
ALP SERPL-CCNC: 88 U/L (ref 30–99)
ALT SERPL-CCNC: 21 U/L (ref 2–50)
ANION GAP SERPL CALC-SCNC: 16 MMOL/L (ref 7–16)
AST SERPL-CCNC: 17 U/L (ref 12–45)
BASOPHILS # BLD AUTO: 0.5 % (ref 0–1.8)
BASOPHILS # BLD: 0.06 K/UL (ref 0–0.12)
BILIRUB SERPL-MCNC: <0.2 MG/DL (ref 0.1–1.5)
BUN SERPL-MCNC: 18 MG/DL (ref 8–22)
CALCIUM ALBUM COR SERPL-MCNC: 9.5 MG/DL (ref 8.5–10.5)
CALCIUM SERPL-MCNC: 9.7 MG/DL (ref 8.4–10.2)
CHLORIDE SERPL-SCNC: 101 MMOL/L (ref 96–112)
CO2 SERPL-SCNC: 23 MMOL/L (ref 20–33)
CREAT SERPL-MCNC: 0.6 MG/DL (ref 0.5–1.4)
EOSINOPHIL # BLD AUTO: 0.38 K/UL (ref 0–0.51)
EOSINOPHIL NFR BLD: 2.9 % (ref 0–6.9)
ERYTHROCYTE [DISTWIDTH] IN BLOOD BY AUTOMATED COUNT: 44.1 FL (ref 35.9–50)
GFR SERPLBLD CREATININE-BSD FMLA CKD-EPI: 95 ML/MIN/1.73 M 2
GLOBULIN SER CALC-MCNC: 3.3 G/DL (ref 1.9–3.5)
GLUCOSE SERPL-MCNC: 126 MG/DL (ref 65–99)
HCT VFR BLD AUTO: 39 % (ref 37–47)
HGB BLD-MCNC: 12.8 G/DL (ref 12–16)
IMM GRANULOCYTES # BLD AUTO: 0.05 K/UL (ref 0–0.11)
IMM GRANULOCYTES NFR BLD AUTO: 0.4 % (ref 0–0.9)
LIPASE SERPL-CCNC: 25 U/L (ref 11–82)
LYMPHOCYTES # BLD AUTO: 3.45 K/UL (ref 1–4.8)
LYMPHOCYTES NFR BLD: 26.7 % (ref 22–41)
MCH RBC QN AUTO: 28.1 PG (ref 27–33)
MCHC RBC AUTO-ENTMCNC: 32.8 G/DL (ref 32.2–35.5)
MCV RBC AUTO: 85.5 FL (ref 81.4–97.8)
MONOCYTES # BLD AUTO: 0.89 K/UL (ref 0–0.85)
MONOCYTES NFR BLD AUTO: 6.9 % (ref 0–13.4)
NEUTROPHILS # BLD AUTO: 8.1 K/UL (ref 1.82–7.42)
NEUTROPHILS NFR BLD: 62.6 % (ref 44–72)
NRBC # BLD AUTO: 0 K/UL
NRBC BLD-RTO: 0 /100 WBC (ref 0–0.2)
NT-PROBNP SERPL IA-MCNC: 491 PG/ML (ref 0–125)
PLATELET # BLD AUTO: 463 K/UL (ref 164–446)
PMV BLD AUTO: 9.4 FL (ref 9–12.9)
POTASSIUM SERPL-SCNC: 4 MMOL/L (ref 3.6–5.5)
PROT SERPL-MCNC: 7.5 G/DL (ref 6–8.2)
RBC # BLD AUTO: 4.56 M/UL (ref 4.2–5.4)
SODIUM SERPL-SCNC: 140 MMOL/L (ref 135–145)
TROPONIN T SERPL-MCNC: 18 NG/L (ref 6–19)
WBC # BLD AUTO: 12.9 K/UL (ref 4.8–10.8)

## 2024-02-22 PROCEDURE — 80053 COMPREHEN METABOLIC PANEL: CPT

## 2024-02-22 PROCEDURE — 700102 HCHG RX REV CODE 250 W/ 637 OVERRIDE(OP): Performed by: EMERGENCY MEDICINE

## 2024-02-22 PROCEDURE — 3077F SYST BP >= 140 MM HG: CPT | Performed by: NURSE PRACTITIONER

## 2024-02-22 PROCEDURE — 36415 COLL VENOUS BLD VENIPUNCTURE: CPT

## 2024-02-22 PROCEDURE — 83690 ASSAY OF LIPASE: CPT

## 2024-02-22 PROCEDURE — 3078F DIAST BP <80 MM HG: CPT | Performed by: NURSE PRACTITIONER

## 2024-02-22 PROCEDURE — 85025 COMPLETE CBC W/AUTO DIFF WBC: CPT

## 2024-02-22 PROCEDURE — 71045 X-RAY EXAM CHEST 1 VIEW: CPT

## 2024-02-22 PROCEDURE — 70450 CT HEAD/BRAIN W/O DYE: CPT

## 2024-02-22 PROCEDURE — 84484 ASSAY OF TROPONIN QUANT: CPT

## 2024-02-22 PROCEDURE — A9270 NON-COVERED ITEM OR SERVICE: HCPCS | Performed by: EMERGENCY MEDICINE

## 2024-02-22 PROCEDURE — 83880 ASSAY OF NATRIURETIC PEPTIDE: CPT

## 2024-02-22 PROCEDURE — 99214 OFFICE O/P EST MOD 30 MIN: CPT | Performed by: NURSE PRACTITIONER

## 2024-02-22 PROCEDURE — 99284 EMERGENCY DEPT VISIT MOD MDM: CPT

## 2024-02-22 RX ORDER — ACETAMINOPHEN 500 MG
1000 TABLET ORAL ONCE
Status: COMPLETED | OUTPATIENT
Start: 2024-02-22 | End: 2024-02-22

## 2024-02-22 RX ORDER — HYDRALAZINE HYDROCHLORIDE 20 MG/ML
10 INJECTION INTRAMUSCULAR; INTRAVENOUS ONCE
Status: DISCONTINUED | OUTPATIENT
Start: 2024-02-22 | End: 2024-02-22 | Stop reason: HOSPADM

## 2024-02-22 RX ADMIN — ACETAMINOPHEN 1000 MG: 500 TABLET ORAL at 20:33

## 2024-02-22 ASSESSMENT — FIBROSIS 4 INDEX
FIB4 SCORE: 0.61
FIB4 SCORE: 0.61

## 2024-02-22 ASSESSMENT — ENCOUNTER SYMPTOMS: DIZZINESS: 1

## 2024-02-23 ASSESSMENT — ENCOUNTER SYMPTOMS
FEVER: 0
ABDOMINAL PAIN: 0
COUGH: 0
VOMITING: 0
CHILLS: 0
SHORTNESS OF BREATH: 0
NAUSEA: 0
HEADACHES: 1

## 2024-02-23 NOTE — ED PROVIDER NOTES
"ED Provider Note    CHIEF COMPLAINT  Chief Complaint   Patient presents with    Dizziness     \" All day \"    Hypertension     Awoke this am and felt unwell  T-max 195/95 Hx HTN  Has LT sided  H/A Denies CP or SOB  No N/V No blood thinners   Recent admission for cholangitis       EXTERNAL RECORDS REVIEWED  Inpatient Notes patient was recently hospitalized for uncontrolled hypertension in setting of acute cholangitis.  At discharge they also recommend that she go home on home oxygen.  But her blood pressure had resolved after her infection had improved and they did start her back on her home medications.  The patient was seen at outpatient urgent care today and transferred here for further evaluation.    HPI/ROS  LIMITATION TO HISTORY   Select: : None  OUTSIDE HISTORIAN(S):  na    Jigna Allen is a 73 y.o. female who presents to the emerged department chief complaint of 1 day of uncontrolled hypertension and lightheadedness and headache.  She states his left-sided kind of achy and sharp in nature with a little bit of lightheadedness no room spinning no weakness numbness or tingling.  She denies any chest pain or shortness of breath.  She states has been compliant with all of her medications including meds for her blood pressure.  She states he was actually doing well until this morning when she woke up and just felt a little unwell checked her blood pressure and her systolic was over 190.  She took her home medications and has not been resolved on the day she was seen.  She endorses little bit of leg swelling but states that is not significantly worsened at baseline.  She is not using the oxygen at home stating that her pulse ox has been in the mid 90s during the day and so she has not been requiring it.    PAST MEDICAL HISTORY   has a past medical history of Abnormal screening cardiac CT (02/14/2020), Anxiety, Asthma, Bowel habit changes, Breath shortness, Bronchitis (2018), Carotid arterial disease (HCC), " Cataract, COPD (chronic obstructive pulmonary disease) (HCC), Diabetes (HCC), Emphysema of lung (HCC) (2023), Goiter (03/25/2016), Hiatus hernia syndrome (2023), HTN (hypertension), Hyperlipidemia, Hyperlipidemia (03/25/2016), Indigestion, Myocardial infarct (HCC) (2010), Pain (2023), Pneumonia (2022), PONV (postoperative nausea and vomiting) (1989), Tobacco use, and Urinary incontinence (2023).    SURGICAL HISTORY   has a past surgical history that includes other cardiac surgery (2010); other orthopedic surgery; other abdominal surgery; ercp,diagnostic (N/A, 2/9/2024); upper gi endoscopy,diagnosis (N/A, 2/9/2024); and egd w/endoscopic ultrasound (N/A, 2/9/2024).    FAMILY HISTORY  Family History   Problem Relation Age of Onset    Cancer Mother 47        breast    Breast Cancer Mother         Mets to bone and liver    Cancer Father         Stomach    Hypertension Father     Alcohol abuse Father     Stroke Maternal Grandmother     Alcohol abuse Maternal Grandmother     Stroke Paternal Grandmother     Cancer Brother         Glioblastoma    Alcohol abuse Paternal Uncle     Alcohol abuse Paternal Uncle        SOCIAL HISTORY  Social History     Tobacco Use    Smoking status: Every Day     Current packs/day: 0.50     Average packs/day: 0.5 packs/day for 59.5 years (29.7 ttl pk-yrs)     Types: Cigarettes     Start date: 9/1/1964    Smokeless tobacco: Never    Tobacco comments:     I enjoy smoking   Vaping Use    Vaping Use: Never used   Substance and Sexual Activity    Alcohol use: No    Drug use: Not Currently     Types: Marijuana     Comment: CBD  occ    Sexual activity: Not Currently     Partners: Male     Comment:        CURRENT MEDICATIONS  Home Medications    **Home medications have not yet been reviewed for this encounter**         ALLERGIES  Allergies   Allergen Reactions    Sulfa Drugs Hives    Ezetimibe Nausea     Diarrhea     Hmg-Coa-R Inhibitors Unspecified     Muscle cramps       PHYSICAL EXAM  VITAL  "SIGNS: BP (!) 195/95   Pulse 98   Temp 37.1 °C (98.8 °F) (Temporal)   Resp 16   Ht 1.6 m (5' 3\")   Wt 78.9 kg (174 lb)   SpO2 95%   BMI 30.82 kg/m²    Pulse OX: Pulse Oxygen level is normal  Constitutional: Alert in no apparent distress.  HENT: Normocephalic, Atraumatic, MMM  Eyes: PERound. Conjunctiva normal, non-icteric.   Heart: Regular rate and rhythm, intact distal pulses   Lungs: Symmetrical movement, no resp distress faint scattered wheezes exp without resp distress  Abdomen: Non-tender, non-distended, normal bowel sounds  EXT/Back 1+ pe b/l   Skin: Warm, Dry, No erythema, No rash.   Neurologic: Alert and oriented, Symmetric smile, eyes shut tight bilaterally, forehead wrinkles bilaterally, sensation intact to light touch bilateral face, tongue midline, head turn and shoulder shrug with full strength. Hearing intact grossly bilaterally. 5/5 strength shoulder, elbow  b/l. 5/5 strength hip, knee, ankle b/l   SILT biceps, forearms, hands, SILT thighs, shins mid foot         DIAGNOSTIC STUDIES / PROCEDURES    LABS  Labs Reviewed   CBC WITH DIFFERENTIAL - Abnormal; Notable for the following components:       Result Value    WBC 12.9 (*)     Platelet Count 463 (*)     Neutrophils (Absolute) 8.10 (*)     Monos (Absolute) 0.89 (*)     All other components within normal limits    Narrative:     Biotin intake of greater than 5 mg per day may interfere with  troponin levels, causing false low values.   COMP METABOLIC PANEL - Abnormal; Notable for the following components:    Glucose 126 (*)     All other components within normal limits    Narrative:     Biotin intake of greater than 5 mg per day may interfere with  troponin levels, causing false low values.   PROBRAIN NATRIURETIC PEPTIDE, NT - Abnormal; Notable for the following components:    NT-proBNP 491 (*)     All other components within normal limits    Narrative:     Biotin intake of greater than 5 mg per day may interfere with  troponin levels, " causing false low values.   TROPONIN    Narrative:     Biotin intake of greater than 5 mg per day may interfere with  troponin levels, causing false low values.   LIPASE    Narrative:     Biotin intake of greater than 5 mg per day may interfere with  troponin levels, causing false low values.   ESTIMATED GFR    Narrative:     Biotin intake of greater than 5 mg per day may interfere with  troponin levels, causing false low values.         RADIOLOGY  I have independently interpreted the diagnostic imaging associated with this visit and am waiting the final reading from the radiologist.   My preliminary interpretation is as follows:     Chest x-ray no acute process    CT head no bleed    Radiologist interpretation:     CT-HEAD W/O   Final Result      No evidence of acute intracranial process.         DX-CHEST-PORTABLE (1 VIEW)   Final Result      No evidence of acute cardiopulmonary process.            COURSE & MEDICAL DECISION MAKING    ED Observation Status? No; Patient does not meet criteria for ED Observation.     INITIAL ASSESSMENT, COURSE AND PLAN  Care Narrative:     Patient is a 73-year-old female presents the ED with symptomatic hypertension.  Complaining of a headache and dizziness more lightheadedness.  Neurologic exam is unremarkable.  She is not complaining of chest pain or shortness of breath and EKG is unchanged.  Will evaluate for any endorgan injury patient be treated with hydralazine as needed as well as some oral Tylenol head CT and then reassess.  Fortunately her NIH is 0.    DISPOSITION AND DISCUSSIONS  8:30 PM  Rechecked sbp 150 without intervention still with a mild headache she has yet to receive the Tylenol.      9:02 PM  I reassessed the patient.  Her systolic blood pressure is now in the 140s.  Her headache is completely resolved.  There is no evidence of endorgan injury or CATHY no evidence of ACS or cardiac injury secondary hypertension.  Her blood pressure has come down significantly on its  own.  We did discuss that she has elevated blood pressure at home that she can take a second dose of her medications if needed but at this time there is no indication for hospitalization as she is significantly feeling improved.  She feels quite grateful she feels comfortable going home she will watch her salt intake and diet changes for hypertensive diet and return to the ED for any new or worsening issues.  I also recommended that she take her oxygen at night as she may be getting hypoxic during the evening and having a stress response during the day.  She is amenable to this plan      I have discussed management of the patient with the following physicians and TERESA's:  none    Discussion of management with other QHP or appropriate source(s): None     Escalation of care considered, and ultimately not performed:acute inpatient care management, however at this time, the patient is most appropriate for outpatient management    Barriers to care at this time, including but not limited to:  na .     Decision tools and prescription drugs considered including, but not limited to: NIH Stroke Scale 0 . No chest pain, heart score not performed     The patient will return for new or worsening symptoms and is stable at the time of discharge.    The patient is referred to a primary physician for blood pressure management, diabetic screening, and for all other preventative health concerns.    DISPOSITION:  Patient will be discharged home in stable condition.    FOLLOW UP:  Cheryl M. Demucha, A.P.R.N.  [address] [place] [address] [phone]    Schedule an appointment as soon as possible for a visit       [facility], Emergency Dept  [address]  [facility] [id number] [phone]    If symptoms worsen      OUTPATIENT MEDICATIONS:  Discharge Medication List as of 2/22/2024  9:21 PM            FINAL DIAGNOSIS  1. Uncontrolled hypertension           Electronically signed by: Ashlee  JORDAN Woodard M.D., 2/22/2024 7:48 PM

## 2024-02-23 NOTE — PROGRESS NOTES
Subjective:     Jigna Allen is a 73 y.o. female who presents for Dizziness (HA, dizzy, Shakes, hypertesion started this morning stiff neck, no chest pain.)      Dizziness  Associated symptoms include headaches. Pertinent negatives include no abdominal pain, chills, coughing, fever, nausea or vomiting.     Pt presents for evaluation of a new problem. Jigna is a very pleasant 73-year-old female presents to urgent care today with complaints of high blood pressure, dizziness, headache and feeling as though she cannot walk straight that started this morning.  She does suffer from essential hypertension and uses losartan/hydrochlorothiazide 100-12.5 milligrams daily.  She endorses a history of MI with cardiac catheterization and diabetes type 2.  She states that she was in the hospital earlier this month on 2/10/2024 and notes that her blood pressure at this time was also very elevated.  Per ER notes blood pressure did resolve following resolution of her sepsis and she was told to closely monitor this at home.  She denies any current pain including chest pain or shortness of breath.  Additionally, there are no paresthesias, numbness/tingling or changes in vision.  Review of Systems   Constitutional:  Negative for chills and fever.   Respiratory:  Negative for cough and shortness of breath.    Gastrointestinal:  Negative for abdominal pain, nausea and vomiting.   Neurological:  Positive for dizziness and headaches.       PMH:   Past Medical History:   Diagnosis Date    Abnormal screening cardiac CT 02/14/2020    Anxiety     Asthma     Bowel habit changes     Diarrhea    Breath shortness     Occasionally    Bronchitis 2018    Carotid arterial disease (HCC)     Cataract     Corrected with surgery    COPD (chronic obstructive pulmonary disease) (HCC)     Diabetes (HCC)     Emphysema of lung (HCC) 2023    Goiter 03/25/2016    Hiatus hernia syndrome 2023    HTN (hypertension)     Hyperlipidemia     Hyperlipidemia 03/25/2016     Indigestion     Myocardial infarct (HCC) 2010    Pain 2023    Pneumonia 2022    PONV (postoperative nausea and vomiting) 1989    Wasn’t a factor with last 2 surgeries    Tobacco use     Urinary incontinence 2023     ALLERGIES:   Allergies   Allergen Reactions    Sulfa Drugs Hives    Ezetimibe Nausea     Diarrhea     Hmg-Coa-R Inhibitors Unspecified     Muscle cramps     SURGHX:   Past Surgical History:   Procedure Laterality Date    NM ERCP,DIAGNOSTIC N/A 2/9/2024    Procedure: UPPER ENDOSCOPIC ULTRASOUND WITH FINE NEEDLE ASPIRATION WITH  ENDOSCOPIC RETROGRADE CHOLANGIOPANCREATOGRAPHY AND ESOPHAGOGASTRODUODENOSCOPY;  Surgeon: Adrian Mars M.D.;  Location: SURGERY [facility];  Service: EUS    NM UPPER GI ENDOSCOPY,DIAGNOSIS N/A 2/9/2024    Procedure: GASTROSCOPY;  Surgeon: Adrian Mars M.D.;  Location: [facility];  Service: EUS    EGD W/ENDOSCOPIC ULTRASOUND N/A 2/9/2024    Procedure: EGD, WITH ENDOSCOPIC US;  Surgeon: Adrian Mars M.D.;  Location: SURGERY [facility];  Service: EUS    OTHER ABDOMINAL SURGERY      Appendix and gall bladder    OTHER CARDIAC SURGERY  2010    Just 1 stent    OTHER ORTHOPEDIC SURGERY      Shoulder     SOCHX:   Social History     Socioeconomic History    Marital status:     Highest education level: Associate degree: occupational, technical, or vocational program   Tobacco Use    Smoking status: Every Day     Current packs/day: 0.50     Average packs/day: 0.5 packs/day for 59.5 years (29.7 ttl pk-yrs)     Types: Cigarettes     Start date: 9/1/1964    Smokeless tobacco: Never    Tobacco comments:     I enjoy smoking   Vaping Use    Vaping Use: Never used   Substance and Sexual Activity    Alcohol use: No    Drug use: Not Currently     Types: Marijuana     Comment: CBD  occ    Sexual activity: Not Currently     Partners: Male     Comment:      Social Determinants of Health     Financial Resource Strain: Low Risk  (3/21/2022)     "Overall Financial Resource Strain (CARDIA)     Difficulty of Paying Living Expenses: Not very hard   Food Insecurity: No Food Insecurity (3/21/2022)    Hunger Vital Sign     Worried About Running Out of Food in the Last Year: Never true     Ran Out of Food in the Last Year: Never true   Transportation Needs: No Transportation Needs (3/21/2022)    PRAPARE - Transportation     Lack of Transportation (Medical): No     Lack of Transportation (Non-Medical): No   Physical Activity: Unknown (3/21/2022)    Exercise Vital Sign     Days of Exercise per Week: 0 days   Stress: Stress Concern Present (3/21/2022)    Citizen of [place] of Occupational Health - Occupational Stress Questionnaire     Feeling of Stress : Very much   Social Connections: Moderately Isolated (3/21/2022)    Social Connection and Isolation Panel [NHANES]     Frequency of Communication with Friends and Family: More than three times a week     Frequency of Social Gatherings with Friends and Family: Once a week     Attends Anabaptism Services: Never     Active Member of Clubs or Organizations: No     Attends Club or Organization Meetings: Never     Marital Status:    Housing Stability: Low Risk  (3/21/2022)    Housing Stability Vital Sign     Unable to Pay for Housing in the Last Year: No     Number of Places Lived in the Last Year: 1     Unstable Housing in the Last Year: No     FH:   Family History   Problem Relation Age of Onset    Cancer Mother 47        breast    Breast Cancer Mother         Mets to bone and liver    Cancer Father         Stomach    Hypertension Father     Alcohol abuse Father     Stroke Maternal Grandmother     Alcohol abuse Maternal Grandmother     Stroke Paternal Grandmother     Cancer Brother         Glioblastoma    Alcohol abuse Paternal Uncle     Alcohol abuse Paternal Uncle          Objective:   BP (!) 192/72   Pulse 97   Temp 36.3 °C (97.3 °F) (Temporal)   Resp 16   Ht 1.6 m (5' 3\")   Wt 75.8 kg (167 lb)   SpO2 95%   " BMI 29.58 kg/m²     Physical Exam  Vitals and nursing note reviewed.   Constitutional:       General: She is not in acute distress.     Appearance: Normal appearance. She is normal weight. She is not ill-appearing or toxic-appearing.   HENT:      Head: Normocephalic.      Right Ear: External ear normal.      Left Ear: External ear normal.      Nose: No congestion or rhinorrhea.      Mouth/Throat:      Pharynx: No oropharyngeal exudate or posterior oropharyngeal erythema.   Eyes:      General:         Right eye: No discharge.         Left eye: No discharge.      Pupils: Pupils are equal, round, and reactive to light.   Cardiovascular:      Rate and Rhythm: Normal rate and regular rhythm.      Pulses: Normal pulses.      Heart sounds: Normal heart sounds.   Pulmonary:      Effort: Pulmonary effort is normal. No respiratory distress.      Breath sounds: No stridor. No wheezing, rhonchi or rales.   Chest:      Chest wall: No tenderness.   Abdominal:      General: Abdomen is flat.   Musculoskeletal:         General: Normal range of motion.      Cervical back: Normal range of motion and neck supple.   Skin:     General: Skin is dry.   Neurological:      General: No focal deficit present.      Mental Status: She is alert and oriented to person, place, and time. Mental status is at baseline.      Cranial Nerves: No cranial nerve deficit.      Motor: No weakness.   Psychiatric:         Mood and Affect: Mood normal.         Behavior: Behavior normal.         Thought Content: Thought content normal.         Judgment: Judgment normal.         Assessment/Plan:   Assessment    1. Hypertensive crisis        2. Essential hypertension        3. Type 2 diabetes mellitus with complication, without long-term current use of insulin (HCC)        4. Coronary artery disease involving native heart, unspecified vessel or lesion type, unspecified whether angina present          Differential diagnoses discussed with patient.  Due to her  symptomatic  hypertensive crisis, dizziness and headache she was referred to follow-up immediately in the emergency room for further evaluation and higher level of care.  Patient would like to be just discharged home with additional antihypertensive however, we did discuss possibility for cardiac event leading to her severe hypertension today and she is agreeable to seek further care in emergency room.  Patient would like to transport private vehicle with .  Transfer center notified of patient's impending arrival.

## 2024-02-27 ENCOUNTER — OFFICE VISIT (OUTPATIENT)
Dept: MEDICAL GROUP | Facility: PHYSICIAN GROUP | Age: 74
End: 2024-02-27
Payer: MEDICARE

## 2024-02-27 VITALS
BODY MASS INDEX: 29.27 KG/M2 | WEIGHT: 165.2 LBS | HEIGHT: 63 IN | SYSTOLIC BLOOD PRESSURE: 132 MMHG | DIASTOLIC BLOOD PRESSURE: 66 MMHG | RESPIRATION RATE: 18 BRPM | TEMPERATURE: 98.2 F | OXYGEN SATURATION: 95 % | HEART RATE: 90 BPM

## 2024-02-27 DIAGNOSIS — R09.02 HYPOXIA: ICD-10-CM

## 2024-02-27 DIAGNOSIS — Z09 HOSPITAL DISCHARGE FOLLOW-UP: ICD-10-CM

## 2024-02-27 DIAGNOSIS — E27.8 ADRENAL NODULE (HCC): ICD-10-CM

## 2024-02-27 DIAGNOSIS — I10 ESSENTIAL HYPERTENSION: ICD-10-CM

## 2024-02-27 PROCEDURE — 3075F SYST BP GE 130 - 139MM HG: CPT | Performed by: NURSE PRACTITIONER

## 2024-02-27 PROCEDURE — 99214 OFFICE O/P EST MOD 30 MIN: CPT | Performed by: NURSE PRACTITIONER

## 2024-02-27 PROCEDURE — 3078F DIAST BP <80 MM HG: CPT | Performed by: NURSE PRACTITIONER

## 2024-02-27 RX ORDER — LOSARTAN POTASSIUM 100 MG/1
100 TABLET ORAL DAILY
Qty: 90 TABLET | Refills: 3 | Status: SHIPPED | OUTPATIENT
Start: 2024-02-27

## 2024-02-27 RX ORDER — HYDROCHLOROTHIAZIDE 25 MG/1
TABLET ORAL
Qty: 90 TABLET | Refills: 3 | Status: SHIPPED | OUTPATIENT
Start: 2024-02-27

## 2024-02-27 ASSESSMENT — FIBROSIS 4 INDEX: FIB4 SCORE: 0.58

## 2024-02-27 NOTE — LETTER
February 27, 2024    Jigna Allen is currently a patient under my care. Please discontinue her oxygen order at this time as her oxygen saturations have been essentially normal.     Thank you!    Cheryl M. Demucha, A.P.R.N.

## 2024-02-27 NOTE — ASSESSMENT & PLAN NOTE
Pt was seen in our office 2/8; 2/9 she underwent ERCP  She was seen in the hospital the following day for abd pain, dizziness: see notes below:   The patient was admitted for further management and care.  GI was consulted.  The patient was to continue with antibiotics, GI evaluated patient and they believe the patient's abdominal pain was most likely due to ERCP induced pancreatitis, however they do recommend 7-day course of antibiotics which we have transition to Augmentin.  The patient's pain has improved significantly while hospitalized and now tolerating p.o.    Pt was seen in the Urgent Care on 2/22 w/co feeling unwell and bp was 195 systolic; sent to ER again; notes indicate they felt it was due to acute cholangitis; CT head and chest xray wnl

## 2024-02-27 NOTE — ASSESSMENT & PLAN NOTE
Got a new bp cuff  Reports recently has been as high as 187 systolic and lowest 154 sytolic   Diastolic seems to run wnl around mid to high 80s    Gets a half gallon water a day  Does like salty foods; recommend no more than 2500 mg a day  Caffeine intake: 1 c coffee per day  Pt reports the hctz I added causes her to be in the bathroom more often but d/t her elevated levels, will increase to 25 mg as she's been having edema; she can take 1/2 tab if no edema and bp is 130/90 or less, but full tab if not; consider propranolol if still elevated as amlodipine can cause  pedal edema

## 2024-02-27 NOTE — ASSESSMENT & PLAN NOTE
Pt reports oxygen is not necessary; she is satting normally at home, lowest has been around 93%  Will send D/c order to [employer] ([employer]?)

## 2024-02-27 NOTE — ASSESSMENT & PLAN NOTE
ER notes from most recent hospital visit indicate adrenal nodule, CTA of abd and pelvis from 2023 showed the followin mm LEFT adrenal nodule has an internal density of 37 Hounsfield units. RIGHT adrenal gland unremarkable.  Simple RIGHT renal cyst, no follow-up required per ACR guidelines. Kidneys otherwise unremarkable.

## 2024-02-27 NOTE — PROGRESS NOTES
Subjective:     CC:   Chief Complaint   Patient presents with    Hypertension     ER visit        HPI:   Jigna presents today with    Hospital discharge follow-up  Pt was seen in our office ;  she underwent ERCP  She was seen in the hospital the following day for abd pain, dizziness: see notes below:   The patient was admitted for further management and care.  GI was consulted.  The patient was to continue with antibiotics, GI evaluated patient and they believe the patient's abdominal pain was most likely due to ERCP induced pancreatitis, however they do recommend 7-day course of antibiotics which we have transition to Augmentin.  The patient's pain has improved significantly while hospitalized and now tolerating p.o.    Pt was seen in the Urgent Care on  w/co feeling unwell and bp was 195 systolic; sent to ER again; notes indicate they felt it was due to acute cholangitis; CT head and chest xray wnl       Essential hypertension  Got a new bp cuff  Reports recently has been as high as 187 systolic and lowest 154 sytolic   Diastolic seems to run wnl around mid to high 80s    Gets a half gallon water a day  Does like salty foods; recommend no more than 2500 mg a day  Caffeine intake: 1 c coffee per day  Pt reports the hctz I added causes her to be in the bathroom more often but d/t her elevated levels, will increase to 25 mg as she's been having edema; she can take 1/2 tab if no edema and bp is 130/90 or less, but full tab if not; consider propranolol if still elevated as amlodipine can cause  pedal edema      Adrenal nodule (HCC)  ER notes from most recent hospital visit indicate adrenal nodule, CTA of abd and pelvis from 2023 showed the followin mm LEFT adrenal nodule has an internal density of 37 Hounsfield units. RIGHT adrenal gland unremarkable.  Simple RIGHT renal cyst, no follow-up required per ACR guidelines. Kidneys otherwise unremarkable.       Hypoxia  Pt reports oxygen is not  "necessary; she is satting normally at home, lowest has been around 93%  Will send D/c order to DME company ([employer]?)       [facility] Form    Diagnosis to address: K55.1 - Mesenteric artery stenosis (HCC)  Assessment and plan: Chronic, stable. Continue with current defined treatment plan: . Follow-up at least annually.  Diagnosis: E27.8 - Adrenal nodule (HCC)  Assessment and plan: New dx as of 11/2023; will be referred to specialist  Follow-up with specialist as directed, but at least annually.  Diagnosis: F33.1 - Moderate episode of recurrent major depressive disorder (HCC)  Assessment and plan:  Continue with current defined treatment plan: .re Follow-up at least annually.  Last edited 02/27/24 13:44 PST by Cheryl M. Demucha, A.P.R.N. ROS per HPI    Objective:     Exam:  /66 (BP Location: Right arm, Patient Position: Sitting, BP Cuff Size: Adult long)   Pulse 90   Temp 36.8 °C (98.2 °F) (Temporal)   Resp 18   Ht 1.6 m (5' 3\")   Wt 74.9 kg (165 lb 3.2 oz)   SpO2 95%   BMI 29.26 kg/m²  Body mass index is 29.26 kg/m².    Physical Exam:  Constitutional: Well-developed and well-nourished female. Not diaphoretic. No distress.   Skin: warm, dry, intact, no evidence of rash or concerning lesions  Head: Atraumatic without lesions.  Eyes: Conjunctivae are normal. Pupils are equal, round. No scleral icterus.   Ears:  External ears unremarkable.   Neck: Supple, trachea midline. No thyromegaly present. No cervical or supraclavicular lymphadenopathy.  Cardiovascular: Regular rate and rhythm without murmur.   Pulmonary: Clear to ausculation. Normal effort. No rales, ronchi, or wheezing.  Extremities: No cyanosis, clubbing, erythema, nor edema.   Neurological: Alert and oriented x 3.   Psychiatric:  Behavior, mood, and affect are appropriate.        Assessment & Plan:     73 y.o. female with the following -     1. Hospital discharge follow-up  Reviewed notes; ordered referral to nephrology    2. Essential " hypertension  - losartan (COZAAR) 100 MG Tab; Take 1 Tablet by mouth every day.  Dispense: 90 Tablet; Refill: 3  - hydroCHLOROthiazide 25 MG Tab; Take 1/2 tab every am; may increase to full tab if needed to manage leg edema  Dispense: 90 Tablet; Refill: 3  Return for bp check in 3 weeks   Avoid >2500 mg salt  Cont to drink plenty of water     3. Adrenal nodule (HCC)  - Referral to Nephrology    4. Hypoxia   Resolved       Return in about 3 weeks (around 3/19/2024) for bp check.    Please note that this dictation was created using voice recognition software. I have made every reasonable attempt to correct obvious errors, but I expect that there are errors of grammar and possibly content that I did not discover before finalizing the note.

## 2024-03-02 ENCOUNTER — OFFICE VISIT (OUTPATIENT)
Dept: URGENT CARE | Facility: PHYSICIAN GROUP | Age: 74
End: 2024-03-02
Payer: MEDICARE

## 2024-03-02 ENCOUNTER — HOSPITAL ENCOUNTER (OUTPATIENT)
Facility: MEDICAL CENTER | Age: 74
End: 2024-03-02
Attending: NURSE PRACTITIONER
Payer: MEDICARE

## 2024-03-02 VITALS
DIASTOLIC BLOOD PRESSURE: 74 MMHG | RESPIRATION RATE: 16 BRPM | WEIGHT: 166 LBS | HEIGHT: 63 IN | OXYGEN SATURATION: 97 % | HEART RATE: 95 BPM | TEMPERATURE: 97.1 F | BODY MASS INDEX: 29.41 KG/M2 | SYSTOLIC BLOOD PRESSURE: 132 MMHG

## 2024-03-02 DIAGNOSIS — E83.42 HYPOMAGNESEMIA: Primary | ICD-10-CM

## 2024-03-02 DIAGNOSIS — R53.83 OTHER FATIGUE: ICD-10-CM

## 2024-03-02 DIAGNOSIS — R39.9 UTI SYMPTOMS: ICD-10-CM

## 2024-03-02 DIAGNOSIS — R30.0 DYSURIA: ICD-10-CM

## 2024-03-02 DIAGNOSIS — Z87.440 HISTORY OF UTI: ICD-10-CM

## 2024-03-02 DIAGNOSIS — E11.8 TYPE 2 DIABETES MELLITUS WITH COMPLICATION, WITHOUT LONG-TERM CURRENT USE OF INSULIN (HCC): ICD-10-CM

## 2024-03-02 DIAGNOSIS — E87.6 HYPOKALEMIA: ICD-10-CM

## 2024-03-02 DIAGNOSIS — E27.8 ADRENAL NODULE (HCC): ICD-10-CM

## 2024-03-02 LAB
APPEARANCE UR: NORMAL
BILIRUB UR STRIP-MCNC: NEGATIVE MG/DL
COLOR UR AUTO: YELLOW
GLUCOSE UR STRIP.AUTO-MCNC: NEGATIVE MG/DL
KETONES UR STRIP.AUTO-MCNC: NEGATIVE MG/DL
LEUKOCYTE ESTERASE UR QL STRIP.AUTO: NEGATIVE
NITRITE UR QL STRIP.AUTO: NEGATIVE
PH UR STRIP.AUTO: 5.5 [PH] (ref 5–8)
PROT UR QL STRIP: NEGATIVE MG/DL
RBC UR QL AUTO: NEGATIVE
SP GR UR STRIP.AUTO: 1.02
UROBILINOGEN UR STRIP-MCNC: 0.2 MG/DL

## 2024-03-02 PROCEDURE — 87086 URINE CULTURE/COLONY COUNT: CPT

## 2024-03-02 PROCEDURE — 81002 URINALYSIS NONAUTO W/O SCOPE: CPT | Performed by: NURSE PRACTITIONER

## 2024-03-02 PROCEDURE — 3078F DIAST BP <80 MM HG: CPT | Performed by: NURSE PRACTITIONER

## 2024-03-02 PROCEDURE — 99213 OFFICE O/P EST LOW 20 MIN: CPT | Performed by: NURSE PRACTITIONER

## 2024-03-02 PROCEDURE — 3075F SYST BP GE 130 - 139MM HG: CPT | Performed by: NURSE PRACTITIONER

## 2024-03-02 RX ORDER — NITROFURANTOIN 25; 75 MG/1; MG/1
100 CAPSULE ORAL 2 TIMES DAILY
Qty: 10 CAPSULE | Refills: 0 | Status: SHIPPED | OUTPATIENT
Start: 2024-03-02 | End: 2024-03-07

## 2024-03-02 ASSESSMENT — ENCOUNTER SYMPTOMS
FLANK PAIN: 0
CHILLS: 0
NAUSEA: 0
FEVER: 0
DIARRHEA: 1
BACK PAIN: 0
VOMITING: 0
ABDOMINAL PAIN: 0
CONSTITUTIONAL NEGATIVE: 1

## 2024-03-02 ASSESSMENT — FIBROSIS 4 INDEX: FIB4 SCORE: 0.58

## 2024-03-02 ASSESSMENT — VISUAL ACUITY: OU: 1

## 2024-03-02 NOTE — PROGRESS NOTES
Subjective:     Jigna Allen is a 73 y.o. female who presents for UTI (X 2 days with burning with urination, pelvic pain, urgency and frequency. )       UTI  This is a new problem. Episode onset: 2 days. The problem has been gradually worsening. Associated symptoms include urinary symptoms. Pertinent negatives include no abdominal pain, chills, fever, nausea or vomiting.     Hx of UTI with similar sx.    Had some vaginal itching which resolved with Diflucan prescribed by PCP.    Review of Systems   Constitutional: Negative.  Negative for chills, fever and malaise/fatigue.   Gastrointestinal:  Positive for diarrhea (Chronic). Negative for abdominal pain, nausea and vomiting.   Genitourinary:  Positive for dysuria, frequency and urgency. Negative for flank pain.        Pelvic pain   Musculoskeletal:  Negative for back pain.   All other systems reviewed and are negative.    Refer to HPI for additional details.    During this visit, appropriate PPE was worn, and hand hygiene was performed.    PMH:  has a past medical history of Abnormal screening cardiac CT (02/14/2020), Anxiety, Asthma, Bowel habit changes, Breath shortness, Bronchitis (2018), Carotid arterial disease ([facility]), Cataract, COPD (chronic obstructive pulmonary disease) ([facility]), Diabetes ([facility]), Emphysema of lung ([facility]) (2023), Goiter (03/25/2016), Hiatus hernia syndrome (2023), HTN (hypertension), Hyperlipidemia, Hyperlipidemia (03/25/2016), Indigestion, Myocardial infarct ([facility]) (2010), Pain (2023), Pneumonia (2022), PONV (postoperative nausea and vomiting) (1989), Tobacco use, and Urinary incontinence (2023).    She has no past medical history of Acute nasopharyngitis, Anesthesia, Anginal syndrome ([facility]), Arrhythmia, Arthritis, Blood clotting disorder ([facility]), Cancer ([facility]), Carcinoma in situ of respiratory system, Congestive heart failure ([facility]), Continuous ambulatory peritoneal dialysis status ([facility]), Coughing blood, Dental disorder, Dialysis patient ([facility]),  Glaucoma, Gynecological disorder, Heart burn, Heart murmur, Heart valve disease, Hemorrhagic disorder (HCC), Hepatitis A, Hepatitis B, Hepatitis C, Infectious disease, Jaundice, Pacemaker, Pregnant, Psychiatric problem, Renal disorder, Rheumatic fever, Seizure (HCC), Sleep apnea, Snoring, Stroke (HCC), Tuberculosis, or Urinary bladder disorder.    MEDS:   Current Outpatient Medications:     nitrofurantoin (MACROBID) 100 MG Cap, Take 1 Capsule by mouth 2 times a day for 5 days., Disp: 10 Capsule, Rfl: 0    losartan (COZAAR) 100 MG Tab, Take 1 Tablet by mouth every day., Disp: 90 Tablet, Rfl: 3    hydroCHLOROthiazide 25 MG Tab, Take 1/2 tab every am; may increase to full tab if needed to manage leg edema, Disp: 90 Tablet, Rfl: 3    acetaminophen (TYLENOL 8 HOUR ARTHRITIS PAIN) 650 MG CR tablet, Take 1,300 mg by mouth every 6 hours as needed. Indications: Pain, Disp: , Rfl:     famotidine (PEPCID) 20 MG Tab, Take 20 mg by mouth every evening., Disp: , Rfl:     ipratropium-albuterol (DUONEB) 0.5-2.5 (3) MG/3ML nebulizer solution, USE 1 VIAL (3 ML) VIA NEBULIZER FOUR TIMES A DAY (Patient taking differently: Take 3 mL by nebulization every 6 hours as needed for Shortness of Breath. USE 1 VIAL (3 ML) VIA NEBULIZER FOUR TIMES A DAY), Disp: 360 Each, Rfl: 3    metformin (GLUCOPHAGE) 1000 MG tablet, Take 1 Tablet by mouth 2 times a day with meals., Disp: 180 Tablet, Rfl: 3    buPROPion (WELLBUTRIN) 100 MG Tab, TAKE 1 TABLET TWICE A DAY (DOSE INCREASED) (Patient taking differently: Take 100 mg by mouth 2 times a day.), Disp: 180 Tablet, Rfl: 3    Multiple Vitamins-Minerals (PRESERVISION AREDS PO), Take 1 Capsule by mouth 2 times a day., Disp: , Rfl:     lactobacillus rhamnosus (CULTURELLE) Cap capsule, Take 1 Capsule by mouth every morning with breakfast., Disp: 30 Capsule, Rfl: 0    fluticasone-umeclidin-vilant (TRELEGY ELLIPTA) 100-62.5-25 MCG/ACT AEROSOL POWDER, BREATH ACTIVATED inhalation, Inhale 1 Inhalation every day. X  365 days, Disp: 90 Each, Rfl: 3    nitroglycerin (NITROSTAT) 0.4 MG SL Tab, Place 1 Tablet under the tongue as needed for Chest Pain (Once every 5 minutes up to 3 doses)., Disp: 100 Tablet, Rfl: 3    albuterol (PROAIR HFA) 108 (90 Base) MCG/ACT Aero Soln inhalation aerosol, USE 2 INHALATIONS EVERY 6 HOURS AS NEEDED FOR SHORTNESS OF BREATH (Patient taking differently: Inhale 2 Puffs every 6 hours as needed for Shortness of Breath. USE 2 INHALATIONS EVERY 6 HOURS AS NEEDED FOR SHORTNESS OF BREATH), Disp: 1 Each, Rfl: 11    pantoprazole (PROTONIX) 20 MG tablet, Take 20 mg by mouth every day., Disp: , Rfl:     Cholecalciferol (VITAMIN D) 2000 UNIT Tab, Take 4,000 Units by mouth every day., Disp: , Rfl:     aspirin EC (ECOTRIN) 81 MG Tablet Delayed Response, Take 1 Tablet by mouth every day., Disp: , Rfl:     fluconazole (DIFLUCAN) 150 MG tablet, 1 tab PO once; may repeat every 72 hours twice more if needed to resolve symptoms; max dose 3 tabs in 9 days (Patient not taking: Reported on 3/2/2024), Disp: 3 Tablet, Rfl: 1    losartan-hydrochlorothiazide (HYZAAR) 100-12.5 MG per tablet, Take 1 Tablet by mouth every day. (Patient not taking: Reported on 3/2/2024), Disp: 90 Tablet, Rfl: 3    clobetasol (TEMOVATE) 0.05 % Ointment, Apply 1 Application topically 2 times a day as needed (Apply's on both legs). APPLY TO LEGS (Patient not taking: Reported on 3/2/2024), Disp: , Rfl:     Nebulizers Misc, Use nebulizer as directed up to 4x day prn sob, Disp: 1 Each, Rfl: 1    glucose blood (FREESTYLE LITE) strip, Use as directed.patient tests fasting blood sugar every am, Disp: 100 Strip, Rfl: 3    ALLERGIES:   Allergies   Allergen Reactions    Sulfa Drugs Hives    Ezetimibe Nausea     Diarrhea     Hmg-Coa-R Inhibitors Unspecified     Muscle cramps     SURGHX:   Past Surgical History:   Procedure Laterality Date    ME ERCP,DIAGNOSTIC N/A 2/9/2024    Procedure: UPPER ENDOSCOPIC ULTRASOUND WITH FINE NEEDLE ASPIRATION WITH  ENDOSCOPIC  "RETROGRADE CHOLANGIOPANCREATOGRAPHY AND ESOPHAGOGASTRODUODENOSCOPY;  Surgeon: Adrian Mars M.D.;  Location: [facility];  Service: EUS    MO UPPER GI ENDOSCOPY,DIAGNOSIS N/A 2/9/2024    Procedure: GASTROSCOPY;  Surgeon: Adrian Mars M.D.;  Location: [facility];  Service: EUS    EGD W/ENDOSCOPIC ULTRASOUND N/A 2/9/2024    Procedure: EGD, WITH ENDOSCOPIC US;  Surgeon: Adrian Mars M.D.;  Location: SURGERY [facility];  Service: EUS    OTHER ABDOMINAL SURGERY      Appendix and gall bladder    OTHER CARDIAC SURGERY  2010    Just 1 stent    OTHER ORTHOPEDIC SURGERY      Shoulder     SOCHX:  reports that she has been smoking cigarettes. She started smoking about 59 years ago. She has a 29.7 pack-year smoking history. She has never used smokeless tobacco. She reports that she does not currently use drugs after having used the following drugs: Marijuana. She reports that she does not drink alcohol.    FH: Per HPI as applicable/pertinent.      Objective:     /74   Pulse 95   Temp 36.2 °C (97.1 °F) (Temporal)   Resp 16   Ht 1.6 m (5' 3\")   Wt 75.3 kg (166 lb)   SpO2 97%   BMI 29.41 kg/m²     Physical Exam  Nursing note reviewed.   Constitutional:       General: She is not in acute distress.     Appearance: She is well-developed. She is not ill-appearing or toxic-appearing.   Eyes:      General: Vision grossly intact.   Cardiovascular:      Rate and Rhythm: Normal rate.   Pulmonary:      Effort: Pulmonary effort is normal. No respiratory distress.   Abdominal:      Tenderness: There is abdominal tenderness in the suprapubic area. There is no right CVA tenderness or left CVA tenderness.   Musculoskeletal:         General: No deformity. Normal range of motion.   Skin:     General: Skin is warm and dry.      Coloration: Skin is not pale.   Neurological:      Mental Status: She is alert and oriented to person, place, and time.      Motor: No weakness.   Psychiatric:    "      Behavior: Behavior normal. Behavior is cooperative.     UA: slightly cloudy, otherwise unremarkable      Assessment/Plan:     1. UTI symptoms  - POCT Urinalysis  - URINE CULTURE(NEW); Future  - nitrofurantoin (MACROBID) 100 MG Cap; Take 1 Capsule by mouth 2 times a day for 5 days.  Dispense: 10 Capsule; Refill: 0    2. History of UTI    Hx of UTI with similar UTI sx. Rx as above sent electronically. Culture pending. Alternative, less common causes of symptoms such as OAB and IS discussed. Monitor. Follow up in 5 days if symptoms do not improve or sooner if symptoms change or worsen.      Differential diagnosis, natural history, supportive care, over-the-counter symptom management per 's instructions, close monitoring, and indications for immediate follow-up discussed.     All questions answered. Patient agrees with the plan of care.    Discharge summary provided via [employer].

## 2024-03-04 DIAGNOSIS — R39.9 UTI SYMPTOMS: ICD-10-CM

## 2024-03-06 ENCOUNTER — TELEPHONE (OUTPATIENT)
Dept: NEPHROLOGY | Facility: MEDICAL CENTER | Age: 74
End: 2024-03-06

## 2024-03-06 ENCOUNTER — HOSPITAL ENCOUNTER (OUTPATIENT)
Dept: LAB | Facility: MEDICAL CENTER | Age: 74
End: 2024-03-06
Attending: NURSE PRACTITIONER
Payer: MEDICARE

## 2024-03-06 DIAGNOSIS — E78.2 MIXED HYPERLIPIDEMIA: ICD-10-CM

## 2024-03-06 DIAGNOSIS — E27.8 ADRENAL NODULE (HCC): ICD-10-CM

## 2024-03-06 DIAGNOSIS — E11.8 TYPE 2 DIABETES MELLITUS WITH COMPLICATION, WITHOUT LONG-TERM CURRENT USE OF INSULIN (HCC): ICD-10-CM

## 2024-03-06 DIAGNOSIS — R53.83 OTHER FATIGUE: ICD-10-CM

## 2024-03-06 DIAGNOSIS — R30.0 DYSURIA: ICD-10-CM

## 2024-03-06 DIAGNOSIS — E83.42 HYPOMAGNESEMIA: ICD-10-CM

## 2024-03-06 DIAGNOSIS — E87.6 HYPOKALEMIA: ICD-10-CM

## 2024-03-06 LAB
BACTERIA UR CULT: NORMAL
SIGNIFICANT IND 70042: NORMAL
SITE SITE: NORMAL
SOURCE SOURCE: NORMAL

## 2024-03-07 ENCOUNTER — TELEPHONE (OUTPATIENT)
Dept: MEDICAL GROUP | Facility: PHYSICIAN GROUP | Age: 74
End: 2024-03-07
Payer: MEDICARE

## 2024-03-07 DIAGNOSIS — R94.4 ABNORMAL KIDNEY FUNCTION STUDY: ICD-10-CM

## 2024-03-07 DIAGNOSIS — E11.8 TYPE 2 DIABETES MELLITUS WITH COMPLICATION, WITHOUT LONG-TERM CURRENT USE OF INSULIN (HCC): ICD-10-CM

## 2024-03-07 DIAGNOSIS — R53.83 OTHER FATIGUE: ICD-10-CM

## 2024-03-12 ENCOUNTER — HOSPITAL ENCOUNTER (OUTPATIENT)
Dept: LAB | Facility: MEDICAL CENTER | Age: 74
End: 2024-03-12
Attending: NURSE PRACTITIONER
Payer: MEDICARE

## 2024-03-12 ENCOUNTER — APPOINTMENT (OUTPATIENT)
Dept: NEPHROLOGY | Facility: MEDICAL CENTER | Age: 74
End: 2024-03-12
Payer: MEDICARE

## 2024-03-12 DIAGNOSIS — R94.4 ABNORMAL KIDNEY FUNCTION STUDY: ICD-10-CM

## 2024-03-12 DIAGNOSIS — E11.8 TYPE 2 DIABETES MELLITUS WITH COMPLICATION, WITHOUT LONG-TERM CURRENT USE OF INSULIN (HCC): ICD-10-CM

## 2024-03-12 DIAGNOSIS — R53.83 OTHER FATIGUE: ICD-10-CM

## 2024-03-12 LAB
ALBUMIN SERPL BCP-MCNC: 4.4 G/DL (ref 3.2–4.9)
ALBUMIN/GLOB SERPL: 1.6 G/DL
ALP SERPL-CCNC: 81 U/L (ref 30–99)
ALT SERPL-CCNC: 15 U/L (ref 2–50)
ANION GAP SERPL CALC-SCNC: 15 MMOL/L (ref 7–16)
APPEARANCE UR: ABNORMAL
AST SERPL-CCNC: 16 U/L (ref 12–45)
BACTERIA #/AREA URNS HPF: ABNORMAL /HPF
BASOPHILS # BLD AUTO: 0.8 % (ref 0–1.8)
BASOPHILS # BLD: 0.09 K/UL (ref 0–0.12)
BILIRUB SERPL-MCNC: 0.3 MG/DL (ref 0.1–1.5)
BILIRUB UR QL STRIP.AUTO: ABNORMAL
BUN SERPL-MCNC: 20 MG/DL (ref 8–22)
CALCIUM ALBUM COR SERPL-MCNC: 9.7 MG/DL (ref 8.5–10.5)
CALCIUM SERPL-MCNC: 10 MG/DL (ref 8.5–10.5)
CHLORIDE SERPL-SCNC: 99 MMOL/L (ref 96–112)
CO2 SERPL-SCNC: 24 MMOL/L (ref 20–33)
COLOR UR: ABNORMAL
CREAT SERPL-MCNC: 0.63 MG/DL (ref 0.5–1.4)
CREAT UR-MCNC: 215.05 MG/DL
EOSINOPHIL # BLD AUTO: 1.15 K/UL (ref 0–0.51)
EOSINOPHIL NFR BLD: 10.8 % (ref 0–6.9)
EPI CELLS #/AREA URNS HPF: ABNORMAL /HPF
ERYTHROCYTE [DISTWIDTH] IN BLOOD BY AUTOMATED COUNT: 44.2 FL (ref 35.9–50)
GFR SERPLBLD CREATININE-BSD FMLA CKD-EPI: 93 ML/MIN/1.73 M 2
GLOBULIN SER CALC-MCNC: 2.8 G/DL (ref 1.9–3.5)
GLUCOSE SERPL-MCNC: 158 MG/DL (ref 65–99)
GLUCOSE UR STRIP.AUTO-MCNC: NEGATIVE MG/DL
HCT VFR BLD AUTO: 41.5 % (ref 37–47)
HGB BLD-MCNC: 13.4 G/DL (ref 12–16)
HYALINE CASTS #/AREA URNS LPF: ABNORMAL /LPF
IMM GRANULOCYTES # BLD AUTO: 0.03 K/UL (ref 0–0.11)
IMM GRANULOCYTES NFR BLD AUTO: 0.3 % (ref 0–0.9)
KETONES UR STRIP.AUTO-MCNC: ABNORMAL MG/DL
LEUKOCYTE ESTERASE UR QL STRIP.AUTO: ABNORMAL
LYMPHOCYTES # BLD AUTO: 3.1 K/UL (ref 1–4.8)
LYMPHOCYTES NFR BLD: 29.1 % (ref 22–41)
MCH RBC QN AUTO: 27.3 PG (ref 27–33)
MCHC RBC AUTO-ENTMCNC: 32.3 G/DL (ref 32.2–35.5)
MCV RBC AUTO: 84.5 FL (ref 81.4–97.8)
MICRO URNS: ABNORMAL
MICROALBUMIN UR-MCNC: 5.4 MG/DL
MICROALBUMIN/CREAT UR: 25 MG/G (ref 0–30)
MONOCYTES # BLD AUTO: 0.76 K/UL (ref 0–0.85)
MONOCYTES NFR BLD AUTO: 7.1 % (ref 0–13.4)
NEUTROPHILS # BLD AUTO: 5.53 K/UL (ref 1.82–7.42)
NEUTROPHILS NFR BLD: 51.9 % (ref 44–72)
NITRITE UR QL STRIP.AUTO: NEGATIVE
NRBC # BLD AUTO: 0 K/UL
NRBC BLD-RTO: 0 /100 WBC (ref 0–0.2)
PH UR STRIP.AUTO: 5.5 [PH] (ref 5–8)
PLATELET # BLD AUTO: 351 K/UL (ref 164–446)
PMV BLD AUTO: 10.3 FL (ref 9–12.9)
POTASSIUM SERPL-SCNC: 4.1 MMOL/L (ref 3.6–5.5)
PROT SERPL-MCNC: 7.2 G/DL (ref 6–8.2)
PROT UR QL STRIP: 30 MG/DL
RBC # BLD AUTO: 4.91 M/UL (ref 4.2–5.4)
RBC # URNS HPF: ABNORMAL /HPF
RBC UR QL AUTO: NEGATIVE
SODIUM SERPL-SCNC: 138 MMOL/L (ref 135–145)
SP GR UR STRIP.AUTO: 1.02
UROBILINOGEN UR STRIP.AUTO-MCNC: 0.2 MG/DL
WBC # BLD AUTO: 10.7 K/UL (ref 4.8–10.8)
WBC #/AREA URNS HPF: ABNORMAL /HPF

## 2024-03-12 PROCEDURE — 85025 COMPLETE CBC W/AUTO DIFF WBC: CPT

## 2024-03-12 PROCEDURE — 36415 COLL VENOUS BLD VENIPUNCTURE: CPT

## 2024-03-12 PROCEDURE — 80053 COMPREHEN METABOLIC PANEL: CPT

## 2024-03-12 PROCEDURE — 82570 ASSAY OF URINE CREATININE: CPT

## 2024-03-12 PROCEDURE — 82043 UR ALBUMIN QUANTITATIVE: CPT

## 2024-03-12 PROCEDURE — 81001 URINALYSIS AUTO W/SCOPE: CPT

## 2024-03-17 ENCOUNTER — TELEPHONE (OUTPATIENT)
Dept: MEDICAL GROUP | Facility: PHYSICIAN GROUP | Age: 74
End: 2024-03-17
Payer: MEDICARE

## 2024-03-18 NOTE — TELEPHONE ENCOUNTER
Will you check w/the lab to see if they cultured her urine? She had evidence of a uti, so I'd like to see what grew out. Thanks!

## 2024-03-18 NOTE — TELEPHONE ENCOUNTER
I spoke with the lab and they said that they did not run the culture. It was Urinalysis culture if indicated and it did not reflex that it needed to be cultured

## 2024-03-19 ENCOUNTER — OFFICE VISIT (OUTPATIENT)
Dept: MEDICAL GROUP | Facility: PHYSICIAN GROUP | Age: 74
End: 2024-03-19
Payer: MEDICARE

## 2024-03-19 VITALS
TEMPERATURE: 97.1 F | WEIGHT: 164.4 LBS | HEART RATE: 103 BPM | HEIGHT: 63 IN | DIASTOLIC BLOOD PRESSURE: 78 MMHG | RESPIRATION RATE: 16 BRPM | BODY MASS INDEX: 29.13 KG/M2 | OXYGEN SATURATION: 96 % | SYSTOLIC BLOOD PRESSURE: 142 MMHG

## 2024-03-19 DIAGNOSIS — N39.0 RECURRENT UTI: ICD-10-CM

## 2024-03-19 DIAGNOSIS — I10 ESSENTIAL HYPERTENSION: ICD-10-CM

## 2024-03-19 PROBLEM — J06.9 VIRAL UPPER RESPIRATORY TRACT INFECTION: Status: RESOLVED | Noted: 2022-11-09 | Resolved: 2024-03-19

## 2024-03-19 PROBLEM — R82.71 BACTERIA IN URINE: Status: ACTIVE | Noted: 2024-03-19

## 2024-03-19 PROBLEM — B37.31 VAGINAL YEAST INFECTION: Status: RESOLVED | Noted: 2020-01-07 | Resolved: 2024-03-19

## 2024-03-19 PROCEDURE — 99214 OFFICE O/P EST MOD 30 MIN: CPT | Performed by: NURSE PRACTITIONER

## 2024-03-19 PROCEDURE — 3077F SYST BP >= 140 MM HG: CPT | Performed by: NURSE PRACTITIONER

## 2024-03-19 PROCEDURE — 3078F DIAST BP <80 MM HG: CPT | Performed by: NURSE PRACTITIONER

## 2024-03-19 RX ORDER — PROPRANOLOL HYDROCHLORIDE 20 MG/1
TABLET ORAL
Qty: 60 TABLET | Refills: 6 | Status: SHIPPED | OUTPATIENT
Start: 2024-03-19

## 2024-03-19 ASSESSMENT — FIBROSIS 4 INDEX: FIB4 SCORE: 0.86

## 2024-03-19 NOTE — ASSESSMENT & PLAN NOTE
Pt reports bp is still elevated, often in the 150s  We'd discussed taking 1/2 tab daily but increasing to 1 tab if edema was present   Doesn't feel hctz helpful for edema but doesn't want to take lasix

## 2024-03-19 NOTE — PATIENT INSTRUCTIONS
Recommending compression socks w/mild to mod coverage, don't need full/heavy pressure  Avoid >2500 mg salt  Drinking at least 60-80 oz water

## 2024-03-19 NOTE — PROGRESS NOTES
"Subjective:     CC:   Chief Complaint   Patient presents with    Hypertension     BP        HPI:   Jigna presents today with    Recurrent UTI  Was seen in Urgent Care on 3/2/24; treated w/macrobid bid x 5 days   She felt the symptoms were still present, so on 3/12, I ordered a urine w/culture prn; the lab showed bacteria but the lab didn't culture it   Pt reports the s/s have resolved w/macrobid  If they return consider cipro 500 mg bid x 7-14 days      Type 2 diabetes mellitus with complication, without long-term current use of insulin (HCC)  F/b endocrine; most recent A1c was 7.7 and increase from mid 6s the previous lab      Essential hypertension  Pt reports bp is still elevated, often in the 150s  We'd discussed taking 1/2 tab daily but increasing to 1 tab if edema was present   Doesn't feel hctz helpful for edema but doesn't want to take lasix              ROS per HPI    Objective:     Exam:  BP (!) 142/78 (BP Location: Left arm, Patient Position: Sitting, BP Cuff Size: Adult long)   Pulse (!) 103   Temp 36.2 °C (97.1 °F) (Temporal)   Resp 16   Ht 1.6 m (5' 3\")   Wt 74.6 kg (164 lb 6.4 oz)   SpO2 96%   BMI 29.12 kg/m²  Body mass index is 29.12 kg/m².    Physical Exam:  Constitutional: Well-developed and well-nourished female. Not diaphoretic. No distress.   Skin: warm, dry, intact, no evidence of rash or concerning lesions  Head: Atraumatic without lesions.  Eyes: Conjunctivae are normal. Pupils are equal, round. No scleral icterus.   Ears:  External ears unremarkable.   Neck: Supple, trachea midline. No thyromegaly present. No cervical or supraclavicular lymphadenopathy.  Cardiovascular: Regular rate and rhythm without murmur.   Pulmonary: Clear to ausculation. Normal effort. No rales, ronchi, or wheezing.  Extremities: No cyanosis, clubbing, erythema, nor edema.   Neurological: Alert and oriented x 3.   Psychiatric:  Behavior, mood, and affect are appropriate.        Assessment & Plan:     73 y.o. " female with the following -     1. Essential hypertension  Patient will measure her blood pressure and pulse twice daily   we are eliminating hydrochlorothiazide  patient was advised to keep her salt under 2500 mg and continue to stay well-hydrated elevate her feet as needed and wear compression socks when she first gets out of the bed in the morning     we will try propranolol because patient does typically have somewhat higher pulse rate and does feel anxiety at times     she will keep close eye on her blood pressure and heart rate and update me in 2 weeks; goal is <130/90    2. Recurrent UTI  Resolved            Return in about 2 weeks (around 4/2/2024) for bp check.  Can cancel if bp normalizes    Please note that this dictation was created using voice recognition software. I have made every reasonable attempt to correct obvious errors, but I expect that there are errors of grammar and possibly content that I did not discover before finalizing the note.

## 2024-03-19 NOTE — ASSESSMENT & PLAN NOTE
Was seen in Urgent Care on 3/2/24; treated w/macrobid bid x 5 days   She felt the symptoms were still present, so on 3/12, I ordered a urine w/culture prn; the lab showed bacteria but the lab didn't culture it   Pt reports the s/s have resolved w/macrobid  If they return consider cipro 500 mg bid x 7-14 days

## 2024-04-04 ENCOUNTER — OFFICE VISIT (OUTPATIENT)
Dept: MEDICAL GROUP | Facility: PHYSICIAN GROUP | Age: 74
End: 2024-04-04
Payer: MEDICARE

## 2024-04-04 VITALS
TEMPERATURE: 97.6 F | RESPIRATION RATE: 18 BRPM | SYSTOLIC BLOOD PRESSURE: 146 MMHG | BODY MASS INDEX: 29.66 KG/M2 | WEIGHT: 167.4 LBS | OXYGEN SATURATION: 99 % | HEART RATE: 58 BPM | HEIGHT: 63 IN | DIASTOLIC BLOOD PRESSURE: 68 MMHG

## 2024-04-04 DIAGNOSIS — I10 ESSENTIAL HYPERTENSION: ICD-10-CM

## 2024-04-04 PROCEDURE — 3078F DIAST BP <80 MM HG: CPT | Performed by: NURSE PRACTITIONER

## 2024-04-04 PROCEDURE — 99214 OFFICE O/P EST MOD 30 MIN: CPT | Performed by: NURSE PRACTITIONER

## 2024-04-04 PROCEDURE — 3077F SYST BP >= 140 MM HG: CPT | Performed by: NURSE PRACTITIONER

## 2024-04-04 ASSESSMENT — FIBROSIS 4 INDEX: FIB4 SCORE: 0.86

## 2024-04-04 NOTE — LETTER
[facility]  Cheryl M. Demucha, A.P.R.N.  [address] [place] [address]  Fax: [phone]   Authorization for Release/Disclosure of   Protected Health Information   Name: MORRIS CRAIN : 1950 SSN: [id number]   Address: [address] [place] [address]  [place] [address] Phone:    [phone] (work)   I authorize the entity listed below to release/disclose the PHI below to:   [facility]/Cheryl M. Demucha, A.P.R.N. and Cheryl M. Demucha, A.P.R.N.   Provider or Entity Name:     Address   City, State, Zip   Phone:      Fax:     Reason for request: continuity of care   Information to be released:    [  ] LAST COLONOSCOPY,  including any PATH REPORT and follow-up  [  ] LAST FIT/COLOGUARD RESULT [  ] LAST DEXA  [  ] LAST MAMMOGRAM  [  ] LAST PAP  [  ] LAST LABS [  ] RETINA EXAM REPORT  [  ] IMMUNIZATION RECORDS  [  ] Release all info      [  ] Check here and initial the line next to each item to release ALL health information INCLUDING  _____ Care and treatment for drug and / or alcohol abuse  _____ HIV testing, infection status, or AIDS  _____ Genetic Testing    DATES OF SERVICE OR TIME PERIOD TO BE DISCLOSED: _____________  I understand and acknowledge that:  * This Authorization may be revoked at any time by you in writing, except if your health information has already been used or disclosed.  * Your health information that will be used or disclosed as a result of you signing this authorization could be re-disclosed by the recipient. If this occurs, your re-disclosed health information may no longer be protected by State or Federal laws.  * You may refuse to sign this Authorization. Your refusal will not affect your ability to obtain treatment.  * This Authorization becomes effective upon signing and will  on (date) __________.      If no date is indicated, this Authorization will  one (1) year from the signature date.    Name: Morris Crain  Signature: Date:   2024     PLEASE FAX REQUESTED  RECORDS BACK TO: [phone]

## 2024-04-05 NOTE — ASSESSMENT & PLAN NOTE
Was seen in the office on 3/19 for elevated blood pressure  Patient is still taking hctz 25 mg in the am, 100 mg losartan. I added propranolol  d/t her anxiety; she has a moderately higher heart rate and anxiety, so I felt she could benefit from propranolol     Pt measured her bp bid; her values are still elevated in the 140s-150s systolic typically. She does report feeling less anxious on the propranolol.

## 2024-04-05 NOTE — PROGRESS NOTES
"Subjective:     CC:   Chief Complaint   Patient presents with    Follow-Up     Blood pressure       HPI:   Jigna presents today with    Essential hypertension  Was seen in the office on 3/19 for elevated blood pressure  Patient is still taking hctz 25 mg in the am, 100 mg losartan. I added propranolol  d/t her anxiety; she has a moderately higher heart rate and anxiety, so I felt she could benefit from propranolol     Pt measured her bp bid; her values are still elevated in the 140s-150s systolic typically. She does report feeling less anxious on the propranolol.                        ROS per HPI    Objective:     Exam:  BP (!) 146/68 (BP Location: Left arm, Patient Position: Sitting, BP Cuff Size: Large adult)   Pulse (!) 58   Temp 36.4 °C (97.6 °F) (Temporal)   Resp 18   Ht 1.6 m (5' 3\")   Wt 75.9 kg (167 lb 6.4 oz)   SpO2 99%   BMI 29.65 kg/m²  Body mass index is 29.65 kg/m².    Physical Exam:  Constitutional: Well-developed and well-nourished female Not diaphoretic. No distress.   Skin: warm, dry, intact, no evidence of rash or concerning lesions  Head: Atraumatic without lesions.  Eyes: Conjunctivae are normal. Pupils are equal, round. No scleral icterus.   Ears:  External ears unremarkable.   Neck: Supple, trachea midline. No thyromegaly present. No cervical or supraclavicular lymphadenopathy.  Cardiovascular: Regular rate and rhythm without murmur.   Pulmonary: Clear to ausculation. Normal effort. No rales, ronchi, or wheezing.  Extremities: No cyanosis, clubbing, erythema, nor edema.   Neurological: Alert and oriented x 3.   Psychiatric:  Behavior, mood, and affect are appropriate.        Assessment & Plan:     73 y.o. female with the following -     1. Essential hypertension   Pt will cont to monitor twice daily; if after one week the bp is still above goal of 130/90 she will increase the hctz from 25 mg to 50 mg  She will message me in 10 days to let me know how the values are  She will monitor for " leg cramps, may need to start potassium if she increases dose         Return in about 6 weeks (around 5/16/2024) for gen check in, bp check.    Please note that this dictation was created using voice recognition software. I have made every reasonable attempt to correct obvious errors, but I expect that there are errors of grammar and possibly content that I did not discover before finalizing the note.

## 2024-04-24 DIAGNOSIS — I10 ESSENTIAL HYPERTENSION: ICD-10-CM

## 2024-04-24 RX ORDER — PROPRANOLOL HYDROCHLORIDE 20 MG/1
TABLET ORAL
Qty: 60 TABLET | Refills: 6 | Status: SHIPPED | OUTPATIENT
Start: 2024-04-24

## 2024-04-24 RX ORDER — LOSARTAN POTASSIUM 100 MG/1
100 TABLET ORAL DAILY
Qty: 90 TABLET | Refills: 3 | Status: SHIPPED | OUTPATIENT
Start: 2024-04-24

## 2024-04-24 NOTE — TELEPHONE ENCOUNTER
Requested Prescriptions     Pending Prescriptions Disp Refills    losartan (COZAAR) 100 MG Tab 90 Tablet 3     Sig: Take 1 Tablet by mouth every day.    propranolol (INDERAL) 20 MG Tab 60 Tablet 6     Si tab PO every am x 1 week then increase to bid if needed to maintain bp <130/90      Last office visit: 24  Last lab: 3/12/24      Pharmacy change

## 2024-04-30 ENCOUNTER — OFFICE VISIT (OUTPATIENT)
Dept: NEPHROLOGY | Facility: MEDICAL CENTER | Age: 74
End: 2024-04-30
Attending: NURSE PRACTITIONER
Payer: MEDICARE

## 2024-04-30 VITALS
HEIGHT: 63 IN | HEART RATE: 77 BPM | OXYGEN SATURATION: 96 % | DIASTOLIC BLOOD PRESSURE: 60 MMHG | SYSTOLIC BLOOD PRESSURE: 116 MMHG | BODY MASS INDEX: 29.95 KG/M2 | TEMPERATURE: 97.3 F | WEIGHT: 169 LBS

## 2024-04-30 DIAGNOSIS — I10 ESSENTIAL HYPERTENSION: ICD-10-CM

## 2024-04-30 DIAGNOSIS — N28.1 RENAL CYST: ICD-10-CM

## 2024-04-30 DIAGNOSIS — E27.8 ADRENAL NODULE (HCC): ICD-10-CM

## 2024-04-30 DIAGNOSIS — F17.200 TOBACCO DEPENDENCE: ICD-10-CM

## 2024-04-30 DIAGNOSIS — E11.8 TYPE 2 DIABETES MELLITUS WITH COMPLICATION, WITHOUT LONG-TERM CURRENT USE OF INSULIN (HCC): ICD-10-CM

## 2024-04-30 DIAGNOSIS — Z71.6 TOBACCO ABUSE COUNSELING: ICD-10-CM

## 2024-04-30 ASSESSMENT — ENCOUNTER SYMPTOMS
COUGH: 0
HYPERTENSION: 1
CHILLS: 0
VOMITING: 0
FEVER: 0
SHORTNESS OF BREATH: 0
NAUSEA: 0

## 2024-04-30 ASSESSMENT — FIBROSIS 4 INDEX: FIB4 SCORE: 0.86

## 2024-04-30 NOTE — PROGRESS NOTES
"Subjective     Jigna Allen is a 73 y.o. female who presents with Hypertension and Chronic Kidney Disease            Patient is a pleasant 73-year-old lady with a past medical history significant for longstanding diabetes, peripheral vascular disease, found on abdominal CT scan to have left adrenal nodule measured 21 mm, also found on another CT scan to have right renal simple cysts.  Patient has no hematuria or dysuria, no symptoms for Cushing's or pheochromocytoma.  Patient has no recent use of NSAIDs.    Hypertension  This is a chronic problem. The current episode started more than 1 year ago. The problem is unchanged. The problem is controlled. Pertinent negatives include no chest pain, malaise/fatigue, peripheral edema or shortness of breath. Risk factors for coronary artery disease include post-menopausal state and diabetes mellitus. Past treatments include angiotensin blockers. The current treatment provides significant improvement. There are no compliance problems.    Chronic Kidney Disease  This is a new problem. The current episode started more than 1 month ago. The problem occurs constantly. The problem has been unchanged. Pertinent negatives include no chest pain, chills, coughing, fever, nausea, urinary symptoms or vomiting.       Review of Systems   Constitutional:  Negative for chills, fever and malaise/fatigue.   Respiratory:  Negative for cough and shortness of breath.    Cardiovascular:  Negative for chest pain and leg swelling.   Gastrointestinal:  Negative for nausea and vomiting.   Genitourinary:  Negative for dysuria, frequency and urgency.              Objective     /60 (BP Location: Left arm, Patient Position: Sitting, BP Cuff Size: Adult)   Pulse 77   Temp 36.3 °C (97.3 °F) (Temporal)   Ht 1.6 m (5' 3\")   Wt 76.7 kg (169 lb)   SpO2 96%   BMI 29.94 kg/m²      Physical Exam  Vitals and nursing note reviewed.   Constitutional:       General: She is awake. She is not in acute " distress.     Appearance: She is well-developed. She is not ill-appearing or diaphoretic.   HENT:      Head: Normocephalic and atraumatic.      Right Ear: External ear normal.      Left Ear: External ear normal.      Nose: Nose normal. No rhinorrhea.      Mouth/Throat:      Pharynx: No oropharyngeal exudate or posterior oropharyngeal erythema.   Eyes:      General: No scleral icterus.        Right eye: No discharge.         Left eye: No discharge.      Conjunctiva/sclera: Conjunctivae normal.   Neck:      Vascular: No carotid bruit.   Cardiovascular:      Rate and Rhythm: Normal rate and regular rhythm.      Heart sounds: No murmur heard.  Pulmonary:      Effort: Pulmonary effort is normal. No respiratory distress.      Breath sounds: Normal breath sounds.   Abdominal:      General: Abdomen is flat. There is no distension.      Palpations: Abdomen is soft. There is no mass.   Musculoskeletal:         General: No tenderness.      Cervical back: No rigidity. No muscular tenderness.      Right lower leg: No edema.      Left lower leg: No edema.   Skin:     General: Skin is warm and dry.      Coloration: Skin is not jaundiced.   Neurological:      General: No focal deficit present.      Mental Status: She is alert and oriented to person, place, and time. Mental status is at baseline.   Psychiatric:         Mood and Affect: Mood normal.         Behavior: Behavior normal.         Thought Content: Thought content normal.       Past Medical History:   Diagnosis Date    Abnormal screening cardiac CT 02/14/2020    Anxiety     Asthma     Bowel habit changes     Diarrhea    Breath shortness     Occasionally    Bronchitis 2018    Carotid arterial disease (HCC)     Cataract     Corrected with surgery    COPD (chronic obstructive pulmonary disease) (HCC)     Diabetes (HCC)     Emphysema of lung (HCC) 2023    Goiter 03/25/2016    Hiatus hernia syndrome 2023    HTN (hypertension)     Hyperlipidemia     Hyperlipidemia 03/25/2016     Indigestion     Myocardial infarct (HCC) 2010    Pain 2023    Pneumonia 2022    PONV (postoperative nausea and vomiting) 1989    Wasn’t a factor with last 2 surgeries    Tobacco use     Urinary incontinence 2023     Social History     Socioeconomic History    Marital status:      Spouse name: Not on file    Number of children: Not on file    Years of education: Not on file    Highest education level: Associate degree: occupational, technical, or vocational program   Occupational History    Not on file   Tobacco Use    Smoking status: Every Day     Current packs/day: 0.50     Average packs/day: 0.5 packs/day for 59.7 years (29.8 ttl pk-yrs)     Types: Cigarettes     Start date: 9/1/1964    Smokeless tobacco: Never    Tobacco comments:     I enjoy smoking   Vaping Use    Vaping Use: Never used   Substance and Sexual Activity    Alcohol use: No    Drug use: Not Currently     Types: Marijuana     Comment: CBD  occ    Sexual activity: Not Currently     Partners: Male     Comment:    Other Topics Concern    Not on file   Social History Narrative    Not on file     Social Determinants of Health     Financial Resource Strain: Low Risk  (3/21/2022)    Overall Financial Resource Strain (CARDIA)     Difficulty of Paying Living Expenses: Not very hard   Food Insecurity: No Food Insecurity (3/21/2022)    Hunger Vital Sign     Worried About Running Out of Food in the Last Year: Never true     Ran Out of Food in the Last Year: Never true   Transportation Needs: No Transportation Needs (3/21/2022)    PRAPARE - Transportation     Lack of Transportation (Medical): No     Lack of Transportation (Non-Medical): No   Physical Activity: Unknown (3/21/2022)    Exercise Vital Sign     Days of Exercise per Week: 0 days     Minutes of Exercise per Session: Not on file   Stress: Stress Concern Present (3/21/2022)    [employer] - Occupational Stress Questionnaire     Feeling of Stress : Very much    Social Connections: Moderately Isolated (3/21/2022)    Social Connection and Isolation Panel [NHANES]     Frequency of Communication with Friends and Family: More than three times a week     Frequency of Social Gatherings with Friends and Family: Once a week     Attends [employer] Services: Never     Active Member of Clubs or Organizations: No     Attends Club or Organization Meetings: Never     Marital Status:    Intimate Partner Violence: Not on file   Housing Stability: Low Risk  (3/21/2022)    Housing Stability Vital Sign     Unable to Pay for Housing in the Last Year: No     Number of Places Lived in the Last Year: 1     Unstable Housing in the Last Year: No     Family History   Problem Relation Age of Onset    Cancer Mother 47        breast    Breast Cancer Mother         Mets to bone and liver    Cancer Father         Stomach    Hypertension Father     Alcohol abuse Father     Stroke Maternal Grandmother     Alcohol abuse Maternal Grandmother     Stroke Paternal Grandmother     Cancer Brother         Glioblastoma    Alcohol abuse Paternal Uncle     Alcohol abuse Paternal Uncle      Recent Labs     05/17/23  0631 08/10/23  0940 11/28/23  0653 11/29/23  0559 02/13/24  0347 02/14/24  0036 02/15/24  0219 02/16/24  0531 02/22/24  1949 03/12/24  0843   ALBUMIN  --    < > 3.7   < > 3.5   < > 3.4  --  4.2 4.4   HDL 50  --   --   --   --   --   --   --   --   --    TRIGLYCERIDE 98  --   --   --   --   --   --   --   --   --    SODIUM  --    < > 139   < > 136   < > 136 137 140 138   POTASSIUM  --    < > 3.1*   < > 3.5*   < > 3.6 3.3* 4.0 4.1   CHLORIDE  --    < > 98   < > 98   < > 99 94* 101 99   CO2  --    < > 32   < > 30   < > 27 29 23 24   BUN  --    < > 26*   < > 9   < > 18 19 18 20   CREATININE  --    < > 0.60   < > 0.49*   < > 0.53 0.82 0.60 0.63   PHOSPHORUS  --   --  2.0*  --  3.2  --   --   --   --   --     < > = values in this interval not displayed.     I reviewed the recent abdominal CT scan images  (done in February 2024 )with the patient which showed right renal simple cyst                        Assessment & Plan        1. Essential hypertension  Controlled  Continue same medication regimen  Continue low-sodium diet      2. Renal cyst  It is a simple cyst and no need for follow-up according to radiology report    3. Adrenal nodule (HCC)  I believe this is a benign incidentaloma.  Recommend to repeat abdominal CT scan within a year to see if there is any change in size or morphology    4. Type 2 diabetes mellitus with complication, without long-term current use of insulin (HCC)  Optimize diabetes control for hemoglobin A1c below 7%    5. Tobacco dependence    6. Tobacco abuse counseling  I spent 3 minutes discussing the need for smoking/tobacco cessation. We discussed measures for quitting including replacements such as nicotine gum/nicotine patch

## 2024-05-07 DIAGNOSIS — I10 ESSENTIAL HYPERTENSION: ICD-10-CM

## 2024-05-12 DIAGNOSIS — I10 ESSENTIAL HYPERTENSION: ICD-10-CM

## 2024-05-12 RX ORDER — PROPRANOLOL HYDROCHLORIDE 20 MG/1
TABLET ORAL
Qty: 180 TABLET | Refills: 3 | Status: SHIPPED | OUTPATIENT
Start: 2024-05-12 | End: 2024-05-21

## 2024-05-12 RX ORDER — HYDROCHLOROTHIAZIDE 25 MG/1
TABLET ORAL
Qty: 180 TABLET | Refills: 3 | Status: SHIPPED | OUTPATIENT
Start: 2024-05-12

## 2024-05-21 ENCOUNTER — OFFICE VISIT (OUTPATIENT)
Dept: MEDICAL GROUP | Facility: PHYSICIAN GROUP | Age: 74
End: 2024-05-21
Payer: MEDICARE

## 2024-05-21 VITALS
HEIGHT: 63 IN | BODY MASS INDEX: 30.12 KG/M2 | RESPIRATION RATE: 16 BRPM | WEIGHT: 170 LBS | HEART RATE: 78 BPM | TEMPERATURE: 97.7 F | OXYGEN SATURATION: 98 % | DIASTOLIC BLOOD PRESSURE: 82 MMHG | SYSTOLIC BLOOD PRESSURE: 130 MMHG

## 2024-05-21 DIAGNOSIS — E11.8 TYPE 2 DIABETES MELLITUS WITH COMPLICATION, WITHOUT LONG-TERM CURRENT USE OF INSULIN (HCC): ICD-10-CM

## 2024-05-21 DIAGNOSIS — F17.200 TOBACCO DEPENDENCE: ICD-10-CM

## 2024-05-21 DIAGNOSIS — I10 ESSENTIAL HYPERTENSION: ICD-10-CM

## 2024-05-21 DIAGNOSIS — Z12.31 ENCOUNTER FOR SCREENING MAMMOGRAM FOR BREAST CANCER: ICD-10-CM

## 2024-05-21 LAB
HBA1C MFR BLD: 7.5 % (ref ?–5.8)
POCT INT CON NEG: NEGATIVE
POCT INT CON POS: POSITIVE

## 2024-05-21 PROCEDURE — 83036 HEMOGLOBIN GLYCOSYLATED A1C: CPT | Performed by: NURSE PRACTITIONER

## 2024-05-21 PROCEDURE — 3079F DIAST BP 80-89 MM HG: CPT | Performed by: NURSE PRACTITIONER

## 2024-05-21 PROCEDURE — 99214 OFFICE O/P EST MOD 30 MIN: CPT | Performed by: NURSE PRACTITIONER

## 2024-05-21 PROCEDURE — 3075F SYST BP GE 130 - 139MM HG: CPT | Performed by: NURSE PRACTITIONER

## 2024-05-21 RX ORDER — HYDRALAZINE HYDROCHLORIDE 10 MG/1
TABLET, FILM COATED ORAL
Qty: 180 TABLET | Refills: 3 | Status: SHIPPED | OUTPATIENT
Start: 2024-05-21

## 2024-05-21 ASSESSMENT — FIBROSIS 4 INDEX: FIB4 SCORE: 0.86

## 2024-05-21 NOTE — ASSESSMENT & PLAN NOTE
Is down to 1/2 pack from 1 pack  Still work in progress  Didn't feel wellbutrin  was helpful and chantix isn't covered by insurance if over 64 yo

## 2024-05-21 NOTE — ASSESSMENT & PLAN NOTE
Still taking all meds:  Propranolol am/pm  Losartan 100 mg qam  Hctz  50 mg qam     Reporting propranolol makes her dizzy; is still taking 20 mg bid; will cut to 10 mg bid x 7 days then once a day x 7 days then every other day x 7 days to d/c as it's not helpful for anxiety

## 2024-05-21 NOTE — PROGRESS NOTES
"Subjective:     CC:   Chief Complaint   Patient presents with    Lab Results    Hypertension Follow-up    Other     Discuss medication        HPI:   Jigna presents today with    Essential hypertension  Still taking all meds:  Propranolol am/pm  Losartan 100 mg qam  Hctz  50 mg qam     Reporting propranolol makes her dizzy; is still taking 20 mg bid; will cut to 10 mg bid x 7 days then once a day x 7 days then every other day x 7 days to d/c as it's not helpful for anxiety        Type 2 diabetes mellitus with complication, without long-term current use of insulin ([facility])  Most recent A1c was 7.7 end Jan 2024  In office today 7.5    Tobacco dependence  Is down to 1/2 pack from 1 pack  Still work in progress  Didn't feel wellbutrin  was helpful and chantix isn't covered by insurance if over 66 yo              ROS per HPI    Objective:     Exam:  /82   Pulse 78   Temp 36.5 °C (97.7 °F) (Temporal)   Resp 16   Ht 1.6 m (5' 3\")   Wt 77.1 kg (170 lb)   SpO2 98%   BMI 30.11 kg/m²  Body mass index is 30.11 kg/m².    Physical Exam:  Constitutional: Well-developed and well-nourished female. Not diaphoretic. No distress.   Skin: warm, dry, intact, no evidence of rash or concerning lesions  Head: Atraumatic without lesions.  Eyes: Conjunctivae are normal. Pupils are equal, round. No scleral icterus.   Ears:  External ears unremarkable.   Neck: Supple, trachea midline. No thyromegaly present. No cervical or supraclavicular lymphadenopathy.  Cardiovascular: Regular rate and rhythm without murmur.   Pulmonary: Clear to ausculation. Normal effort. No rales, ronchi, or wheezing.  Extremities: No cyanosis, clubbing, erythema, nor edema.   Neurological: Alert and oriented x 3.   Psychiatric:  Behavior, mood, and affect are appropriate.        Assessment & Plan:     73 y.o. female with the following -     1. Essential hypertension  - hydrALAZINE (APRESOLINE) 10 MG Tab; 1 tab PO bid prn systolic bp over 160  Dispense: 180 " Tablet; Refill: 3    2. Type 2 diabetes mellitus with complication, without long-term current use of insulin (HCC)  - POCT  A1C    3. Encounter for screening mammogram for breast cancer  - MA-SCREENING MAMMO BILAT W/TOMOSYNTHESIS W/CAD; Future    4. Tobacco dependence         Return in about 3 months (around 8/21/2024) for est care w/new pcp as my last day is 5/30.    Please note that this dictation was created using voice recognition software. I have made every reasonable attempt to correct obvious errors, but I expect that there are errors of grammar and possibly content that I did not discover before finalizing the note.

## 2024-06-05 ENCOUNTER — DOCUMENTATION (OUTPATIENT)
Dept: HEALTH INFORMATION MANAGEMENT | Facility: OTHER | Age: 74
End: 2024-06-05
Payer: MEDICARE

## 2024-06-11 DIAGNOSIS — J44.9 CHRONIC OBSTRUCTIVE PULMONARY DISEASE, UNSPECIFIED COPD TYPE (HCC): ICD-10-CM

## 2024-06-11 NOTE — TELEPHONE ENCOUNTER
Requested Prescriptions     Pending Prescriptions Disp Refills    fluticasone-umeclidinium-vilanterol (TRELEGY ELLIPTA) 100-62.5-25 mcg/act inhaler 90 Each 3     Sig: Inhale 1 Puff every day. X 365 days      Last office visit: 5/21/24  Last lab: 3/12/24

## 2024-06-12 RX ORDER — FLUTICASONE FUROATE, UMECLIDINIUM BROMIDE AND VILANTEROL TRIFENATATE 100; 62.5; 25 UG/1; UG/1; UG/1
1 POWDER RESPIRATORY (INHALATION) DAILY
Qty: 90 EACH | Refills: 3 | Status: SHIPPED | OUTPATIENT
Start: 2024-06-12

## 2024-06-17 ENCOUNTER — HOSPITAL ENCOUNTER (OUTPATIENT)
Dept: RADIOLOGY | Facility: MEDICAL CENTER | Age: 74
End: 2024-06-17
Attending: NURSE PRACTITIONER
Payer: MEDICARE

## 2024-06-17 DIAGNOSIS — Z12.31 ENCOUNTER FOR SCREENING MAMMOGRAM FOR BREAST CANCER: ICD-10-CM

## 2024-06-17 PROCEDURE — 77063 BREAST TOMOSYNTHESIS BI: CPT

## 2024-07-22 ENCOUNTER — OFFICE VISIT (OUTPATIENT)
Dept: URGENT CARE | Facility: PHYSICIAN GROUP | Age: 74
End: 2024-07-22
Payer: MEDICARE

## 2024-07-22 VITALS
BODY MASS INDEX: 30.12 KG/M2 | OXYGEN SATURATION: 96 % | SYSTOLIC BLOOD PRESSURE: 118 MMHG | WEIGHT: 170 LBS | DIASTOLIC BLOOD PRESSURE: 80 MMHG | HEIGHT: 63 IN | HEART RATE: 94 BPM | RESPIRATION RATE: 14 BRPM | TEMPERATURE: 97.6 F

## 2024-07-22 DIAGNOSIS — I10 ESSENTIAL HYPERTENSION: ICD-10-CM

## 2024-07-22 PROCEDURE — 3074F SYST BP LT 130 MM HG: CPT | Performed by: PHYSICIAN ASSISTANT

## 2024-07-22 PROCEDURE — 99214 OFFICE O/P EST MOD 30 MIN: CPT | Performed by: PHYSICIAN ASSISTANT

## 2024-07-22 PROCEDURE — 3079F DIAST BP 80-89 MM HG: CPT | Performed by: PHYSICIAN ASSISTANT

## 2024-07-22 RX ORDER — HYDROCHLOROTHIAZIDE 25 MG/1
TABLET ORAL
Qty: 28 TABLET | Refills: 0 | Status: SHIPPED | OUTPATIENT
Start: 2024-07-22

## 2024-07-22 RX ORDER — HYDROCHLOROTHIAZIDE 25 MG/1
50 TABLET ORAL DAILY
Qty: 180 TABLET | Refills: 2 | Status: SHIPPED | OUTPATIENT
Start: 2024-07-22 | End: 2024-10-20

## 2024-07-22 RX ORDER — PROPRANOLOL HYDROCHLORIDE 20 MG/1
TABLET ORAL
COMMUNITY
Start: 2024-07-19

## 2024-07-22 ASSESSMENT — ENCOUNTER SYMPTOMS
CONSTITUTIONAL NEGATIVE: 1
NEUROLOGICAL NEGATIVE: 1
CARDIOVASCULAR NEGATIVE: 1
RESPIRATORY NEGATIVE: 1
GASTROINTESTINAL NEGATIVE: 1

## 2024-07-22 ASSESSMENT — FIBROSIS 4 INDEX: FIB4 SCORE: 0.86

## 2024-08-15 ENCOUNTER — OFFICE VISIT (OUTPATIENT)
Dept: MEDICAL GROUP | Facility: PHYSICIAN GROUP | Age: 74
End: 2024-08-15
Payer: MEDICARE

## 2024-08-15 VITALS
RESPIRATION RATE: 14 BRPM | SYSTOLIC BLOOD PRESSURE: 120 MMHG | BODY MASS INDEX: 30.48 KG/M2 | WEIGHT: 172 LBS | OXYGEN SATURATION: 95 % | HEART RATE: 100 BPM | HEIGHT: 63 IN | TEMPERATURE: 97.2 F | DIASTOLIC BLOOD PRESSURE: 68 MMHG

## 2024-08-15 DIAGNOSIS — F41.9 ANXIETY: ICD-10-CM

## 2024-08-15 DIAGNOSIS — E11.9 DM (DIABETES MELLITUS), TYPE 2 (HCC): ICD-10-CM

## 2024-08-15 DIAGNOSIS — F17.200 TOBACCO DEPENDENCE: ICD-10-CM

## 2024-08-15 DIAGNOSIS — E11.8 TYPE 2 DIABETES MELLITUS WITH COMPLICATION, WITHOUT LONG-TERM CURRENT USE OF INSULIN (HCC): ICD-10-CM

## 2024-08-15 PROCEDURE — 99214 OFFICE O/P EST MOD 30 MIN: CPT | Performed by: FAMILY MEDICINE

## 2024-08-15 PROCEDURE — 3078F DIAST BP <80 MM HG: CPT | Performed by: FAMILY MEDICINE

## 2024-08-15 PROCEDURE — 3074F SYST BP LT 130 MM HG: CPT | Performed by: FAMILY MEDICINE

## 2024-08-15 RX ORDER — VARENICLINE TARTRATE 1 MG/1
1 TABLET, FILM COATED ORAL 2 TIMES DAILY
Qty: 60 TABLET | Refills: 11 | Status: SHIPPED | OUTPATIENT
Start: 2024-08-15

## 2024-08-15 RX ORDER — ONDANSETRON 4 MG/1
4 TABLET, FILM COATED ORAL EVERY 4 HOURS PRN
Qty: 20 TABLET | Refills: 0 | Status: SHIPPED | OUTPATIENT
Start: 2024-08-15

## 2024-08-15 RX ORDER — VARENICLINE TARTRATE 0.5 (11)-1
KIT ORAL
Qty: 53 EACH | Refills: 0 | Status: SHIPPED | OUTPATIENT
Start: 2024-08-15

## 2024-08-15 RX ORDER — VENLAFAXINE HYDROCHLORIDE 75 MG/1
75 CAPSULE, EXTENDED RELEASE ORAL DAILY
Qty: 30 CAPSULE | Refills: 3 | Status: SHIPPED | OUTPATIENT
Start: 2024-08-15

## 2024-08-15 SDOH — ECONOMIC STABILITY: FOOD INSECURITY: WITHIN THE PAST 12 MONTHS, YOU WORRIED THAT YOUR FOOD WOULD RUN OUT BEFORE YOU GOT MONEY TO BUY MORE.: NEVER TRUE

## 2024-08-15 SDOH — ECONOMIC STABILITY: INCOME INSECURITY: HOW HARD IS IT FOR YOU TO PAY FOR THE VERY BASICS LIKE FOOD, HOUSING, MEDICAL CARE, AND HEATING?: NOT VERY HARD

## 2024-08-15 SDOH — HEALTH STABILITY: PHYSICAL HEALTH: ON AVERAGE, HOW MANY MINUTES DO YOU ENGAGE IN EXERCISE AT THIS LEVEL?: 0 MIN

## 2024-08-15 SDOH — ECONOMIC STABILITY: TRANSPORTATION INSECURITY
IN THE PAST 12 MONTHS, HAS THE LACK OF TRANSPORTATION KEPT YOU FROM MEDICAL APPOINTMENTS OR FROM GETTING MEDICATIONS?: YES

## 2024-08-15 SDOH — ECONOMIC STABILITY: FOOD INSECURITY: WITHIN THE PAST 12 MONTHS, THE FOOD YOU BOUGHT JUST DIDN'T LAST AND YOU DIDN'T HAVE MONEY TO GET MORE.: NEVER TRUE

## 2024-08-15 SDOH — ECONOMIC STABILITY: INCOME INSECURITY: IN THE LAST 12 MONTHS, WAS THERE A TIME WHEN YOU WERE NOT ABLE TO PAY THE MORTGAGE OR RENT ON TIME?: NO

## 2024-08-15 SDOH — HEALTH STABILITY: PHYSICAL HEALTH: ON AVERAGE, HOW MANY DAYS PER WEEK DO YOU ENGAGE IN MODERATE TO STRENUOUS EXERCISE (LIKE A BRISK WALK)?: 0 DAYS

## 2024-08-15 ASSESSMENT — LIFESTYLE VARIABLES
HOW OFTEN DO YOU HAVE SIX OR MORE DRINKS ON ONE OCCASION: NEVER
SKIP TO QUESTIONS 9-10: 1
AUDIT-C TOTAL SCORE: 0
HOW MANY STANDARD DRINKS CONTAINING ALCOHOL DO YOU HAVE ON A TYPICAL DAY: PATIENT DOES NOT DRINK
HOW OFTEN DO YOU HAVE A DRINK CONTAINING ALCOHOL: NEVER

## 2024-08-15 ASSESSMENT — SOCIAL DETERMINANTS OF HEALTH (SDOH)
HOW MANY DRINKS CONTAINING ALCOHOL DO YOU HAVE ON A TYPICAL DAY WHEN YOU ARE DRINKING: PATIENT DOES NOT DRINK
HOW OFTEN DO YOU ATTENT MEETINGS OF THE CLUB OR ORGANIZATION YOU BELONG TO?: NEVER
HOW OFTEN DO YOU HAVE SIX OR MORE DRINKS ON ONE OCCASION: NEVER
DO YOU BELONG TO ANY CLUBS OR ORGANIZATIONS SUCH AS CHURCH GROUPS UNIONS, FRATERNAL OR ATHLETIC GROUPS, OR SCHOOL GROUPS?: NO
DO YOU BELONG TO ANY CLUBS OR ORGANIZATIONS SUCH AS CHURCH GROUPS UNIONS, FRATERNAL OR ATHLETIC GROUPS, OR SCHOOL GROUPS?: NO
HOW OFTEN DO YOU ATTENT MEETINGS OF THE CLUB OR ORGANIZATION YOU BELONG TO?: NEVER
HOW OFTEN DO YOU HAVE A DRINK CONTAINING ALCOHOL: NEVER
WITHIN THE PAST 12 MONTHS, YOU WORRIED THAT YOUR FOOD WOULD RUN OUT BEFORE YOU GOT THE MONEY TO BUY MORE: NEVER TRUE
HOW OFTEN DO YOU GET TOGETHER WITH FRIENDS OR RELATIVES?: PATIENT DECLINED
HOW HARD IS IT FOR YOU TO PAY FOR THE VERY BASICS LIKE FOOD, HOUSING, MEDICAL CARE, AND HEATING?: NOT VERY HARD
HOW OFTEN DO YOU GET TOGETHER WITH FRIENDS OR RELATIVES?: PATIENT DECLINED
IN A TYPICAL WEEK, HOW MANY TIMES DO YOU TALK ON THE PHONE WITH FAMILY, FRIENDS, OR NEIGHBORS?: MORE THAN THREE TIMES A WEEK
IN A TYPICAL WEEK, HOW MANY TIMES DO YOU TALK ON THE PHONE WITH FAMILY, FRIENDS, OR NEIGHBORS?: MORE THAN THREE TIMES A WEEK
IN THE PAST 12 MONTHS, HAS THE ELECTRIC, GAS, OIL, OR WATER COMPANY THREATENED TO SHUT OFF SERVICE IN YOUR HOME?: NO

## 2024-08-15 ASSESSMENT — FIBROSIS 4 INDEX: FIB4 SCORE: 0.86

## 2024-08-15 NOTE — PROGRESS NOTES
Subjective:   Jigna Allen is a 73 y.o. female here today for evaluation and management of:     Type 2 diabetes mellitus with complication, without long-term current use of insulin (HCC)  A1c 7.5  GFR 93  LDL 75  Normal ur microalbumin/cr ratio  On metformin 1000 bid  Losartan, asa,   The ASCVD Risk score (Mary BRITT, et al., 2019) failed to calculate.  Normal sensation to monofilament testing b/l, has no callus or ulcers. Some dry skin, followed by her podiatrist every 8 weeks.   She is not on a statin, discussed this with her as goal LDL is <70  She prefers to make diet changes as she has myalgia with statins.     Has chronic nausea, vomiting, diarrhea, could be IBS, could also be gastroparesis from DM2, has been evaluated by GI,       Anxiety  On wellbutrin for tobacco cessation, has not helped with this, can wean off as not helping much with anxiety either  Prozac in the past helped but caused wt gain  Propranolol also helped but made her very dizzy.   Rx for effexor provided.     Tobacco dependence  Trying to quit smoking  Chantix was not covered after 65, she quit smoking for a year after her heart attack till insurance did not cover it, wellbutrin did not help, she has tried nicotine patches and gum but they were not effective for her.   Will order chantix as medically recommended she have medication assistance to stop smoking to prevent further MI/CVA/cancer of lung and bladder.            Current medicines (including changes today)  Current Outpatient Medications   Medication Sig Dispense Refill    ondansetron (ZOFRAN) 4 MG Tab tablet Take 1 Tablet by mouth every four hours as needed for Nausea/Vomiting. 20 Tablet 0    venlafaxine XR (EFFEXOR XR) 75 MG CAPSULE SR 24 HR Take 1 Capsule by mouth every day. 30 Capsule 3    Varenicline Tartrate, Starter, 0.5 MG X 11 & 1 MG X 42 Tablet Therapy Pack Take as directed for smoking cessation 53 Each 0    varenicline (CHANTIX CONTINUING MONTH [place]) 1 MG tablet Take 1  Tablet by mouth 2 times a day. 60 Tablet 11    hydroCHLOROthiazide 25 MG Tab Take 2 Tablets by mouth every day for 90 days. 180 Tablet 2    fluticasone-umeclidinium-vilanterol (TRELEGY ELLIPTA) 100-62.5-25 mcg/act inhaler Inhale 1 Puff every day. X 365 days 90 Each 3    hydrALAZINE (APRESOLINE) 10 MG Tab 1 tab PO bid prn systolic bp over 160 180 Tablet 3    losartan (COZAAR) 100 MG Tab Take 1 Tablet by mouth every day. 90 Tablet 3    acetaminophen (TYLENOL 8 HOUR ARTHRITIS PAIN) 650 MG CR tablet Take 1,300 mg by mouth every 6 hours as needed. Indications: Pain      ipratropium-albuterol (DUONEB) 0.5-2.5 (3) MG/3ML nebulizer solution USE 1 VIAL (3 ML) VIA NEBULIZER FOUR TIMES A DAY (Patient taking differently: Take 3 mL by nebulization every 6 hours as needed for Shortness of Breath. USE 1 VIAL (3 ML) VIA NEBULIZER FOUR TIMES A DAY) 360 Each 3    metformin (GLUCOPHAGE) 1000 MG tablet Take 1 Tablet by mouth 2 times a day with meals. 180 Tablet 3    Multiple Vitamins-Minerals (PRESERVISION AREDS PO) Take 1 Capsule by mouth 2 times a day.      nitroglycerin (NITROSTAT) 0.4 MG SL Tab Place 1 Tablet under the tongue as needed for Chest Pain (Once every 5 minutes up to 3 doses). 100 Tablet 3    albuterol (PROAIR HFA) 108 (90 Base) MCG/ACT Aero Soln inhalation aerosol USE 2 INHALATIONS EVERY 6 HOURS AS NEEDED FOR SHORTNESS OF BREATH (Patient taking differently: Inhale 2 Puffs every 6 hours as needed for Shortness of Breath. USE 2 INHALATIONS EVERY 6 HOURS AS NEEDED FOR SHORTNESS OF BREATH) 1 Each 11    pantoprazole (PROTONIX) 20 MG tablet Take 20 mg by mouth every day.      Nebulizers Misc Use nebulizer as directed up to 4x day prn sob 1 Each 1    glucose blood (FREESTYLE LITE) strip Use as directed.patient tests fasting blood sugar every am 100 Strip 3    Cholecalciferol (VITAMIN D) 2000 UNIT Tab Take 4,000 Units by mouth every day.      aspirin EC (ECOTRIN) 81 MG Tablet Delayed Response Take 1 Tablet by mouth every day.    "    No current facility-administered medications for this visit.     She  has a past medical history of Abnormal screening cardiac CT (02/14/2020), Anxiety, Asthma, Bowel habit changes, Breath shortness, Bronchitis (2018), Carotid arterial disease (HCC), Cataract, COPD (chronic obstructive pulmonary disease) (HCC), Diabetes (HCC), Emphysema of lung (HCC) (2023), Goiter (03/25/2016), Hiatus hernia syndrome (2023), HTN (hypertension), Hyperlipidemia, Hyperlipidemia (03/25/2016), Indigestion, Myocardial infarct (HCC) (2010), Pain (2023), Pneumonia (2022), PONV (postoperative nausea and vomiting) (1989), Tobacco use, and Urinary incontinence (2023).    She has no past medical history of Acute nasopharyngitis, Anesthesia, Anginal syndrome (HCC), Arrhythmia, Arthritis, Blood clotting disorder (HCC), Cancer (HCC), Carcinoma in situ of respiratory system, Congestive heart failure (HCC), Continuous ambulatory peritoneal dialysis status ([facility]), Coughing blood, Dental disorder, Dialysis patient ([facility]), Glaucoma, Gynecological disorder, Heart burn, Heart murmur, Heart valve disease, Hemorrhagic disorder (HCC), Hepatitis A, Hepatitis B, Hepatitis C, Infectious disease, Jaundice, Pacemaker, Pregnant, Psychiatric problem, Renal disorder, Rheumatic fever, Seizure (HCC), Sleep apnea, Snoring, Stroke (HCC), Tuberculosis, or Urinary bladder disorder.    ROS  No chest pain, no shortness of breath, no abdominal pain       Objective:     /68 (BP Location: Left arm, Patient Position: Sitting, BP Cuff Size: Adult)   Pulse 100   Temp 36.2 °C (97.2 °F) (Temporal)   Resp 14   Ht 1.6 m (5' 3\")   Wt 78 kg (172 lb)   SpO2 95%  Body mass index is 30.47 kg/m².   Physical Exam:  Constitutional: Alert, no distress.  Skin: Warm, dry, good turgor, no rashes in visible areas.  Eye: Equal, round and reactive, conjunctiva clear, lids normal.  ENMT: Lips without lesions, good dentition, oropharynx clear.  Neck: Trachea midline, no masses, no " thyromegaly. No cervical or supraclavicular lymphadenopathy  Respiratory: Unlabored respiratory effort, lungs clear to auscultation, no wheezes, no ronchi.  Cardiovascular: Normal S1, S2, no murmur, no edema.  Abdomen: Soft, non-tender, no masses, no hepatosplenomegaly.  Psych: Alert and oriented x3, normal affect and mood.    Assessment and Plan:   The following treatment plan was discussed    1. DM (diabetes mellitus), type 2 (HCC)  - Diabetic Monofilament LE Exam  - Referral for Retinal Screening Exam; Future    2. Type 2 diabetes mellitus with complication, without long-term current use of insulin (HCC)  - Lipid Profile; Future  - HEMOGLOBIN A1C; Future    3. Tobacco dependence  - REFERRAL TO LUNG CANCER SCREENING PROGRAM    4. Anxiety    Other orders  - ondansetron (ZOFRAN) 4 MG Tab tablet; Take 1 Tablet by mouth every four hours as needed for Nausea/Vomiting.  Dispense: 20 Tablet; Refill: 0  - venlafaxine XR (EFFEXOR XR) 75 MG CAPSULE SR 24 HR; Take 1 Capsule by mouth every day.  Dispense: 30 Capsule; Refill: 3  - Varenicline Tartrate, Starter, 0.5 MG X 11 & 1 MG X 42 Tablet Therapy Pack; Take as directed for smoking cessation  Dispense: 53 Each; Refill: 0  - varenicline (CHANTIX CONTINUING MONTH PAK) 1 MG tablet; Take 1 Tablet by mouth 2 times a day.  Dispense: 60 Tablet; Refill: 11      Followup: Return in about 3 months (around 11/15/2024) for Lab Review, Diabetes, prior auth for chantix when needed pls. .

## 2024-08-15 NOTE — ASSESSMENT & PLAN NOTE
On wellbutrin for tobacco cessation, has not helped with this, can wean off as not helping much with anxiety either  Prozac in the past helped but caused wt gain  Propranolol also helped but made her very dizzy.   Rx for effexor provided.

## 2024-08-15 NOTE — ASSESSMENT & PLAN NOTE
A1c 7.5  GFR 93  LDL 75  Normal ur microalbumin/cr ratio  On metformin 1000 bid  Losartan, asa,   The ASCVD Risk score (Mary BRITT, et al., 2019) failed to calculate.  Normal sensation to monofilament testing b/l, has no callus or ulcers. Some dry skin, followed by her podiatrist every 8 weeks.   She is not on a statin, discussed this with her as goal LDL is <70  She prefers to make diet changes as she has myalgia with statins.     Has chronic nausea, vomiting, diarrhea, could be IBS, could also be gastroparesis from DM2, has been evaluated by GI,

## 2024-08-31 ENCOUNTER — PATIENT MESSAGE (OUTPATIENT)
Dept: MEDICAL GROUP | Facility: PHYSICIAN GROUP | Age: 74
End: 2024-08-31
Payer: MEDICARE

## 2024-09-02 DIAGNOSIS — J44.9 CHRONIC OBSTRUCTIVE PULMONARY DISEASE, UNSPECIFIED COPD TYPE (HCC): ICD-10-CM

## 2024-09-02 NOTE — TELEPHONE ENCOUNTER
Received request via: Patient    Was the patient seen in the last year in this department? Yes    Does the patient have an active prescription (recently filled or refills available) for medication(s) requested? No    [facility]     Does the patient have correction Plus and need 100-day supply? (This applies to ALL medications) Patient does not have SCP

## 2024-09-04 RX ORDER — ALBUTEROL SULFATE 90 UG/1
INHALANT RESPIRATORY (INHALATION)
Qty: 1 EACH | Refills: 11 | Status: SHIPPED | OUTPATIENT
Start: 2024-09-04 | End: 2024-09-25 | Stop reason: SDUPTHER

## 2024-09-13 ENCOUNTER — PATIENT MESSAGE (OUTPATIENT)
Dept: MEDICAL GROUP | Facility: PHYSICIAN GROUP | Age: 74
End: 2024-09-13
Payer: MEDICARE

## 2024-09-13 DIAGNOSIS — J44.9 CHRONIC OBSTRUCTIVE PULMONARY DISEASE, UNSPECIFIED COPD TYPE (HCC): ICD-10-CM

## 2024-09-25 NOTE — PATIENT COMMUNICATION
Received request via: Patient    Was the patient seen in the last year in this department? Yes    Does the patient have an active prescription (recently filled or refills available) for medication(s) requested? Yes, however, pt needs sent to mail order pharmacy    Pharm[facility] Name: [facility]    Does the patient have alf Plus and need 100-day supply? (This applies to ALL medications) Patient does not have SCP    Requested Prescriptions     Pending Prescriptions Disp Refills    albuterol (PROAIR HFA) 108 (90 Base) MCG/ACT Aero Soln inhalation aerosol 1 Each 11     Sig: USE 2 INHALATIONS EVERY 6 HOURS AS NEEDED FOR SHORTNESS OF BREATH

## 2024-09-26 RX ORDER — ALBUTEROL SULFATE 90 UG/1
INHALANT RESPIRATORY (INHALATION)
Qty: 3 EACH | Refills: 5 | Status: SHIPPED | OUTPATIENT
Start: 2024-09-26

## 2024-10-09 ENCOUNTER — TELEPHONE (OUTPATIENT)
Dept: MEDICAL GROUP | Facility: PHYSICIAN GROUP | Age: 74
End: 2024-10-09
Payer: MEDICARE

## 2024-10-09 NOTE — TELEPHONE ENCOUNTER
[facility] called re: a refill request that was submitted recently, they have not received the refill and would like an update. Also they requested 3 refills to be placed. Please advise.    Thank you!!

## 2024-12-04 NOTE — ASSESSMENT & PLAN NOTE
HEART CARE ELECTROPHYSIOLOGY NOTE      [facility]  [phone]      Assessment/Recommendations   Assessment/Plan:  1.  Paroxysmal Atrial Fibrillation: Symptomatic improvement with maintenance of sinus rhythm.  We had a lengthy discussion of the physiology and natural progression of atrial fibrillation and treatment options with medications or pulmonary vein isolation ablation.  She was given information to review at home.  -- Continue flecainide 50 mg twice daily with metoprolol XL 50 mg daily  -- Consideration for ablation (after dental work completed)  -- Utilize Kardia device for rhythm monitoring    She was reassured that atrial fibrillation is not life-threatening, but carries an increased risk for stroke.  She has a [id number] score of 5 for age 65-74, female gender, HTN, CVA.    -- Continue Eliquis 5 mg twice daily    2.  Nonsustained ventricular tachycardia: Very short run of NSVT seen on MCT.  Echo and nuclear stress test unremarkable, thus considered very low risk.  No further intervention recommended at this time.    3.  Hypertension: Blood pressure elevated today in clinic but controlled per home readings.  Metoprolol XL and losartan.    Follow up in 3 months     History of Present Illness/Subjective    HPI: Marifer Temple is a 74 year old female who comes in today accompanied by her  for EP follow-up of atrial fibrillation.  She has a history of paroxysmal atrial fibrillation, NSVT, hypertension, stage III CKD, CVA (7/16/2024,s/p tPA). MRI brain showed small, late acute vs subacute infarct of the right precentral gyrus and subcortical white matter; small old lacunar infarct of the posterior limb of the right internal capsule/dorsal lentiform nucleus; mild to mdoerate chronic small vessel changes.  No evidence of A-fib during hospitalization, but subsequently seen on MCT.  Unclear if CVA due to hypertension/small vessel disease or atrial arrhythmias.  Her  Glenn  Patient continues to have neuropathic pain in her right hand  She does not report chronic neck pain  Of note she does have type 2 diabetes, status control unknown  She has been taking ibuprofen and naproxen  Discouraged use of 2 different anti-inflammatories  Because her pain is neuropathic in nature we will trial gabapentin 100 mg up to 3 times a day  She will titrate slowly up by 1 pill every 3 to 5 days as tolerated  Discussed with her the risks, benefits side effects associated with this medication   also has A-fib and is a patient of mine as well.    AF Arrhythmia history  Dx/date: PAF 7/2024 documented on MCT  Sx: Palpitations, fluttering in the base of her throat, dyspnea with more strenuous exertion such as stairs  [id number] score: 5 for age 65-74, female gender, HTN, CVA  Oral anticoagulation: Eliquis 5 mg twice daily  Antiarrhythmic medications, AV acosta blocking agents: Metoprolol XL  Procedures  DCCV: 10/31/2024  Ablation: None    Marifer states that she feels well and remains very active.  She reports significant symptomatic improvement after cardioversion, particularly no longer having any dyspnea on exertion.  She has not had any A-fib and has checked her rhythm using her Kardia device.  She denies chest discomfort, palpitations, abdominal fullness/bloating or peripheral edema, shortness of breath, paroxysmal nocturnal dyspnea, orthopnea, lightheadedness, dizziness, pre-syncope, or syncope.    Cardiographics (EKG, MCTs personally reviewed):  EKG done 10/31/2024 shows sinus rhythm at 60 bpm, QRS 70 ms, QT/QTc 436/436 ms  EKG done 8/30/2024 shows atrial fibrillation with rapid ventricular response at 106 bpm  EKG done 7/16/2024 shows sinus rhythm at 71 bpm, QRS 76 ms, QT/QTc 418/454 ms    Zio monitoring from 9/30/2024 to 10/14/2024 (duration 13d 21h).  Continuous atrial fibrillation, 48 to 184bpm, average 94bpm.  There were no pauses of greater than 3 seconds.  Rare premature ventricular contractions (<1%).  Symptom triggers correlated with AF with RVR or just AF with rare PVCs.    Cardiac event monitoring from 7/18/2024 to 8/16/2024 (monitored duration 28d 21h 22m).  Baseline rhythm was sinus rhythm 80bpm.    Reported heart rate range 50 to 172bpm, average 71bpm.  1 symptom trigger correlated to atrial fibrillation/atrial flutter with ventricular rates up to 172bpm.  1 episode of nonsustained ventricular tachycardia - 8 beats, 156bpm.  There were no pauses of over 3.0s.  Atrial fibrillation/flutter  "are incompletely characterized on this monitoring modality.  Supraventricular and ventricular ectopic beat frequency are not reported on this monitoring modality.     ECHO done 7/17/2024:  1. Normal left ventricular size and systolic performance with a visually  estimated ejection fraction of 60-65%.  2. There is trace aortic insufficiency.  3. Normal right ventricular size and systolic performance.    NM stress test done 9/5/2024:    Lexiscan stress ECG negative for ischemia.    The nuclear stress test is negative for inducible myocardial ischemia or infarction.    Stress to rest cavity ratio is 1.32.  Significance of this finding is unknown but may be related to atrial fibrillation with RVR given normal perfusion on imaging.    The left ventricular ejection fraction at stress is 68%.    There is no prior study for comparison.  Baseline electrocardiogram demonstrates atrial fibrillation.     I have reviewed and updated the patient's Past Medical History, Social History, Family History and Medication List.     Physical Examination  Review of Systems   Vitals: BP (!) 158/87 (BP Location: Left arm, Patient Position: Sitting, Cuff Size: Adult Regular)   Pulse 59   Resp 17   Ht 1.549 m (5' 1\")   Wt 58.1 kg (128 lb)   LMP  (LMP Unknown)   BMI 24.19 kg/m    BMI= Body mass index is 24.19 kg/m .  Wt Readings from Last 3 Encounters:   12/04/24 58.1 kg (128 lb)   10/31/24 58.5 kg (129 lb)   10/28/24 58.9 kg (129 lb 14.4 oz)       General Appearance:   Alert, well-appearing and in no acute distress.   HEENT: Atraumatic, normocephalic.  No scleral icterus, normal conjunctivae, EOMs intact, PERRL.  Mucous membranes pink and moist.     Chest/Lungs:   Chest symmetric, spine straight.  Respirations unlabored.  Lungs are clear to auscultation.   Cardiovascular:   Regular rate and rhythm.  Normal first and second heart sounds with no murmurs, rubs, or gallops; radial pulses are intact, No edema.   Abdomen:  Soft, nondistended "   Extremities: No cyanosis or clubbing.   Musculoskeletal: Moves all extremities.     Skin: Warm, dry, intact.    Neurologic: Mood and affect are appropriate.  Alert and oriented to person, place, time, and situation.     ROS: 10 point ROS neg other than the symptoms noted above in the HPI.        Medical History  Surgical History Family History Social History   Past Medical History:   Diagnosis Date    Atrial flutter (H)     Carpal tunnel syndrome of right wrist 01/14/2019    Cerebrovascular accident (CVA), unspecified mechanism (H) 07/16/2024    Hypertension     Paroxysmal atrial fibrillation (H) 09/10/2024     Past Surgical History:   Procedure Laterality Date    OTHER SURGICAL HISTORY      bowel obstruction pediatric    OTHER SURGICAL HISTORY      s/p partial colectomy as a child    OTHER SURGICAL HISTORY      right carpal  tunnel release     Family History   Problem Relation Age of Onset    Cerebrovascular Disease Mother     Leukemia Mother     Hypertension Mother     Colon Cancer Father     Atrial fibrillation Sister     Rheumatoid Arthritis Sister     Arrhythmia Sister     Breast Cancer Maternal Aunt         Social History     Socioeconomic History    Marital status:      Spouse name: Not on file    Number of children: Not on file    Years of education: Not on file    Highest education level: Not on file   Occupational History    Not on file   Tobacco Use    Smoking status: Never    Smokeless tobacco: Never   Vaping Use    Vaping status: Never Used   Substance and Sexual Activity    Alcohol use: Not Currently    Drug use: Never    Sexual activity: Not on file   Other Topics Concern    Parent/sibling w/ CABG, MI or angioplasty before 65F 55M? No   Social History Narrative    Lives with  .   Likes to travel ,   Worked in  bank        Social Drivers of Health     Financial Resource Strain: Not on file   Food Insecurity: Not on file   Transportation Needs: Not on file   Physical Activity: Not on  "file   Stress: Not on file   Social Connections: Not on file   Interpersonal Safety: Low Risk  (10/31/2024)    Interpersonal Safety     Do you feel physically and emotionally safe where you currently live?: Yes     Within the past 12 months, have you been hit, slapped, kicked or otherwise physically hurt by someone?: No     Within the past 12 months, have you been humiliated or emotionally abused in other ways by your partner or ex-partner?: No   Housing Stability: Not on file           Medications  Allergies   Current Outpatient Medications   Medication Sig Dispense Refill    apixaban ANTICOAGULANT (ELIQUIS ANTICOAGULANT) 5 MG tablet Take 1 tablet (5 mg) by mouth 2 times daily. 180 tablet 3    cholecalciferol, vitamin D3, (VITAMIN D3) 2,000 unit capsule Take 1,000 Units by mouth daily.      flecainide (TAMBOCOR) 50 MG tablet Take 1 tablet (50 mg) by mouth 2 times daily. 180 tablet 3    losartan (COZAAR) 25 MG tablet Take 1 tablet (25 mg) by mouth daily. 90 tablet 3    metoprolol succinate ER (TOPROL XL) 50 MG 24 hr tablet Take 1 tablet (50 mg) by mouth daily. 90 tablet 1     No Known Allergies       Lab Results    Chemistry/lipid CBC Cardiac Enzymes/BNP/TSH/INR   Recent Labs   Lab Test 07/17/24  0423   CHOL 160   HDL 78   LDL 67   TRIG 73     Recent Labs   Lab Test 07/17/24  0423 05/07/21  1146 01/14/19  1059   LDL 67 94 98     Recent Labs   Lab Test 08/09/24  1442      POTASSIUM 4.4   CHLORIDE 106   CO2 26   GLC 90   BUN 22.9   CR 1.20*   GFRESTIMATED 47*   PHILLY 9.4     Recent Labs   Lab Test 08/09/24  1442 07/18/24  0716 07/17/24  0423   CR 1.20* 1.09* 0.99*     Recent Labs   Lab Test 07/17/24  0423   A1C 5.1      Recent Labs   Lab Test 07/17/24  0423   WBC 10.2   HGB 13.1   HCT 39.6   MCV 89        Recent Labs   Lab Test 07/17/24  0423 07/16/24  1734 05/07/21  1146   HGB 13.1 13.4 14.2    No results for input(s): \"TROPONINI\" in the last 08383 hours.  No results for input(s): \"BNP\", \"NTBNPI\", \"NTBNP\" " in the last 17850 hours.  Recent Labs   Lab Test 08/09/24  1442   TSH 3.54     Recent Labs   Lab Test 07/17/24  0423 07/16/24  1734   INR 1.06 1.03        The longitudinal plan of care for the diagnosis(es)/condition(s) as documented were addressed during this visit. Due to the added complexity in care, I will continue to support Marifer in the subsequent management and with ongoing continuity of care.

## 2024-12-10 ENCOUNTER — TELEPHONE (OUTPATIENT)
Dept: HEALTH INFORMATION MANAGEMENT | Facility: OTHER | Age: 74
End: 2024-12-10
Payer: MEDICARE

## (undated) DEVICE — MASK AIRWAY SIZE 2 LMA WITH LUBE & SYRINGE (10/BX)

## (undated) DEVICE — TOWEL STOP TIMEOUT SAFETY FLAG (40EA/CA)

## (undated) DEVICE — BITE BLOCK ADULT 60FR (100EA/CA)

## (undated) DEVICE — SOD. CHL. INJ. 0.9% 1000 ML - (14EA/CA 60CA/PF)

## (undated) DEVICE — SET LEADWIRE 5 LEAD BEDSIDE DISPOSABLE ECG (1SET OF 5/EA)

## (undated) DEVICE — CUFF BP ADULT LARGE DISPOSABLE (20EA/CA)

## (undated) DEVICE — COVER LIGHT HANDLE FLEXIBLE - SOFT (2EA/PK 80PK/CA)

## (undated) DEVICE — BLOCK BITE ENDOSCOPIC 2809 - (100/BX) INTERMEDIATE

## (undated) DEVICE — MASK O2 VNYL ADLT RBRTH HI - (50/CS)

## (undated) DEVICE — TUBE E-T HI-LO CUFF 6.5MM (10EA/BX)

## (undated) DEVICE — TUBE CONNECTING SUCTION - CLEAR PLASTIC STERILE 72 IN (50EA/CA)

## (undated) DEVICE — CANISTER SUCTION RIGID RED 1500CC (40EA/CA)

## (undated) DEVICE — FORCEP RADIAL JAW 4 STANDARD CAPACITY W/NEEDLE 240CM (40EA/BX)

## (undated) DEVICE — MASK AIRWAY SIZE 4 UNIQUE SILICON (10EA/BX)

## (undated) DEVICE — SYRINGE SAFETY 5 ML 18 GA X 1-1/2 BLUNT LL (100/BX 4BX/CA)

## (undated) DEVICE — KIT  I.V. START (100EA/CA)

## (undated) DEVICE — EXTRACTOR PRO XL 12-15 MM ABOVE

## (undated) DEVICE — TUBE E-T HI-LO CUFF 8.5MM (10EA/PK)

## (undated) DEVICE — GOWN SURGEONS LARGE - (32/CA)

## (undated) DEVICE — GUIDEWIRE .025X260CM JAGWIRE REVOLUTION (2EA/BX)

## (undated) DEVICE — MASK  AIRWAY SIZE 5 UNIQUE SILICON (10EA/BX)

## (undated) DEVICE — SYRINGE SAFETY 10 ML 18 GA X 1 1/2 BLUNT LL (100/BX 4BX/CA)

## (undated) DEVICE — SYRINGE DISP. 60 CC LL - (30/BX, 12BX/CA)**WHEN THESE ARE GONE ORDER #500206**

## (undated) DEVICE — MASK AIRWAY SIZE 3 UNIQUE SILICON (10/BX)

## (undated) DEVICE — SENSOR OXIMETER ADULT SPO2 RD SET (20EA/BX)

## (undated) DEVICE — TUBE SUCTION YANKAUER  1/4 X 6FT (20EA/CA)"

## (undated) DEVICE — TUBE E-T HI-LO CUFF 7.0MM (10EA/PK)

## (undated) DEVICE — SPONGE GAUZE NON-STERILE 4X4 - (2000/CA 10PK/CA)

## (undated) DEVICE — MASK WITH FACE SHIELD (25/BX 4BX/CA)

## (undated) DEVICE — TUBING CLEARLINK DUO-VENT - C-FLO (48EA/CA)

## (undated) DEVICE — SYRINGE 12 CC LUER TIP - (80/BX) OBSOLETE ITEM

## (undated) DEVICE — WATER IRRIGATION STERILE 1000ML (12EA/CA)

## (undated) DEVICE — CATHETER IV SAFETY 20 GA X 1-1/4 (50/BX)

## (undated) DEVICE — SYRINGE SAFETY 3 ML 18 GA X 1 1/2 BLUNT LL (100/BX 8BX/CA)

## (undated) DEVICE — KIT CUSTOM PROCEDURE SINGLE FOR ENDO  (15/CA)

## (undated) DEVICE — LACTATED RINGERS INJ 1000 ML - (14EA/CA 60CA/PF)

## (undated) DEVICE — CATHETER IV SAFETY 22 GA X 1 (50EA/BX)

## (undated) DEVICE — TUBE E-T HI-LO CUFF 7.5MM (10EA/PK)

## (undated) DEVICE — ELECTRODE DUAL RETURN W/ CORD - (50/PK)

## (undated) DEVICE — TUBE E-T HI-LO CUFF 8.0MM (10EA/PK)